# Patient Record
Sex: FEMALE | Race: WHITE | NOT HISPANIC OR LATINO | Employment: OTHER | URBAN - METROPOLITAN AREA
[De-identification: names, ages, dates, MRNs, and addresses within clinical notes are randomized per-mention and may not be internally consistent; named-entity substitution may affect disease eponyms.]

---

## 2017-03-16 ENCOUNTER — OFFICE VISIT (OUTPATIENT)
Dept: RETAIL CLINIC | Facility: CLINIC | Age: 77
End: 2017-03-16

## 2017-03-16 VITALS
HEART RATE: 60 BPM | OXYGEN SATURATION: 97 % | SYSTOLIC BLOOD PRESSURE: 124 MMHG | TEMPERATURE: 97.2 F | DIASTOLIC BLOOD PRESSURE: 74 MMHG | RESPIRATION RATE: 16 BRPM

## 2017-03-16 DIAGNOSIS — J40 BRONCHITIS: ICD-10-CM

## 2017-03-16 DIAGNOSIS — J01.00 ACUTE MAXILLARY SINUSITIS, RECURRENCE NOT SPECIFIED: Primary | ICD-10-CM

## 2017-03-16 PROBLEM — N95.1 MENOPAUSAL SYMPTOM: Status: ACTIVE | Noted: 2017-03-16

## 2017-03-16 PROBLEM — R92.8 ABNORMAL MAMMOGRAM: Status: ACTIVE | Noted: 2017-03-16

## 2017-03-16 PROBLEM — J34.9 SINUS PROBLEM: Status: ACTIVE | Noted: 2017-03-16

## 2017-03-16 PROBLEM — R05.9 COUGH: Status: ACTIVE | Noted: 2017-03-16

## 2017-03-16 PROBLEM — R09.89 CHEST CONGESTION: Status: ACTIVE | Noted: 2017-03-16

## 2017-03-16 PROBLEM — L72.3 SEBACEOUS CYST: Status: ACTIVE | Noted: 2017-03-16

## 2017-03-16 PROBLEM — R31.9 HEMATURIA: Status: ACTIVE | Noted: 2017-03-16

## 2017-03-16 PROCEDURE — 99213 OFFICE O/P EST LOW 20 MIN: CPT | Performed by: NURSE PRACTITIONER

## 2017-03-16 RX ORDER — AMOXICILLIN 875 MG/1
875 TABLET, COATED ORAL 2 TIMES DAILY
Qty: 20 TABLET | Refills: 0 | Status: SHIPPED | OUTPATIENT
Start: 2017-03-16 | End: 2017-05-24

## 2017-03-16 RX ORDER — BROMPHENIRAMINE MALEATE, PSEUDOEPHEDRINE HYDROCHLORIDE, AND DEXTROMETHORPHAN HYDROBROMIDE 2; 30; 10 MG/5ML; MG/5ML; MG/5ML
5 SYRUP ORAL 4 TIMES DAILY PRN
Qty: 200 ML | Refills: 0 | Status: SHIPPED | OUTPATIENT
Start: 2017-03-16 | End: 2017-05-22

## 2017-03-16 RX ORDER — PREDNISONE 1 MG/1
TABLET ORAL
Qty: 21 TABLET | Refills: 0 | Status: SHIPPED | OUTPATIENT
Start: 2017-03-16 | End: 2017-05-24

## 2017-03-16 NOTE — PROGRESS NOTES
Subjective   Janet Martinez is a 76 y.o. female.     URI    The current episode started yesterday. The problem has been gradually worsening. Associated symptoms include congestion, coughing, rhinorrhea and sinus pain. Pertinent negatives include no ear pain, headaches, nausea, sore throat or wheezing. Associated symptoms comments: Nasal congestion.   Cough   This is a new problem. The current episode started yesterday. The cough is non-productive. Associated symptoms include rhinorrhea and shortness of breath. Pertinent negatives include no chills, ear pain, fever, headaches, postnasal drip, sore throat or wheezing. Her past medical history is significant for COPD.        The following portions of the patient's history were reviewed and updated as appropriate: allergies, current medications, past family history, past medical history, past social history, past surgical history and problem list.    Review of Systems   Constitutional: Negative for chills, fatigue and fever.   HENT: Positive for congestion and rhinorrhea. Negative for ear pain, postnasal drip and sore throat.         Nasal congestion   Eyes: Negative.    Respiratory: Positive for cough and shortness of breath. Negative for chest tightness and wheezing.         Chest congestion   Cardiovascular: Negative.    Gastrointestinal: Negative.  Negative for nausea.   Neurological: Negative for headaches.       Objective   Physical Exam   Constitutional: She is oriented to person, place, and time. She appears well-developed and well-nourished. No distress.   HENT:   Head: Normocephalic.   Right Ear: Hearing and tympanic membrane normal.   Left Ear: Hearing and tympanic membrane normal.   Nose: Mucosal edema and rhinorrhea present. Right sinus exhibits maxillary sinus tenderness. Right sinus exhibits no frontal sinus tenderness. Left sinus exhibits maxillary sinus tenderness. Left sinus exhibits no frontal sinus tenderness.   Mouth/Throat: Oropharynx is clear and  moist. No posterior oropharyngeal edema or posterior oropharyngeal erythema.   Neck: No JVD present.   Cardiovascular: Normal rate and regular rhythm.    Pulmonary/Chest: Effort normal. No respiratory distress. She has no decreased breath sounds. She has wheezes in the right upper field and the left upper field. She has rhonchi in the right upper field, the right middle field, the left upper field and the left middle field. She has no rales. She exhibits no tenderness.   Neurological: She is oriented to person, place, and time.   Psychiatric: She has a normal mood and affect. Her behavior is normal.   Nursing note and vitals reviewed.      Assessment/Plan   Janet was seen today for uri and sinus problem.    Diagnoses and all orders for this visit:    Acute maxillary sinusitis, recurrence not specified  -     amoxicillin (AMOXIL) 875 MG tablet; Take 1 tablet by mouth 2 (Two) Times a Day.    Bronchitis  -     predniSONE (DELTASONE) 5 MG tablet; 5 mg pack  -     brompheniramine-pseudoephedrine-DM 30-2-10 MG/5ML syrup; Take 5 mL by mouth 4 (Four) Times a Day As Needed for Cough or Allergies.      Talked to the patient about the diagnosis and educate the patient and advise to visit to PCP if the symptoms worsens

## 2017-03-16 NOTE — PATIENT INSTRUCTIONS
Acute Bronchitis  Bronchitis is inflammation of the airways that extend from the windpipe into the lungs (bronchi). The inflammation often causes mucus to develop. This leads to a cough, which is the most common symptom of bronchitis.   In acute bronchitis, the condition usually develops suddenly and goes away over time, usually in a couple weeks. Smoking, allergies, and asthma can make bronchitis worse. Repeated episodes of bronchitis may cause further lung problems.   CAUSES  Acute bronchitis is most often caused by the same virus that causes a cold. The virus can spread from person to person (contagious) through coughing, sneezing, and touching contaminated objects.  SIGNS AND SYMPTOMS   · Cough.    · Fever.    · Coughing up mucus.    · Body aches.    · Chest congestion.    · Chills.    · Shortness of breath.    · Sore throat.    DIAGNOSIS   Acute bronchitis is usually diagnosed through a physical exam. Your health care provider will also ask you questions about your medical history. Tests, such as chest X-rays, are sometimes done to rule out other conditions.   TREATMENT   Acute bronchitis usually goes away in a couple weeks. Oftentimes, no medical treatment is necessary. Medicines are sometimes given for relief of fever or cough. Antibiotic medicines are usually not needed but may be prescribed in certain situations. In some cases, an inhaler may be recommended to help reduce shortness of breath and control the cough. A cool mist vaporizer may also be used to help thin bronchial secretions and make it easier to clear the chest.   HOME CARE INSTRUCTIONS  · Get plenty of rest.    · Drink enough fluids to keep your urine clear or pale yellow (unless you have a medical condition that requires fluid restriction). Increasing fluids may help thin your respiratory secretions (sputum) and reduce chest congestion, and it will prevent dehydration.    · Take medicines only as directed by your health care provider.  · If  you were prescribed an antibiotic medicine, finish it all even if you start to feel better.  · Avoid smoking and secondhand smoke. Exposure to cigarette smoke or irritating chemicals will make bronchitis worse. If you are a smoker, consider using nicotine gum or skin patches to help control withdrawal symptoms. Quitting smoking will help your lungs heal faster.    · Reduce the chances of another bout of acute bronchitis by washing your hands frequently, avoiding people with cold symptoms, and trying not to touch your hands to your mouth, nose, or eyes.    · Keep all follow-up visits as directed by your health care provider.    SEEK MEDICAL CARE IF:  Your symptoms do not improve after 1 week of treatment.   SEEK IMMEDIATE MEDICAL CARE IF:  · You develop an increased fever or chills.    · You have chest pain.    · You have severe shortness of breath.  · You have bloody sputum.    · You develop dehydration.  · You faint or repeatedly feel like you are going to pass out.  · You develop repeated vomiting.  · You develop a severe headache.  MAKE SURE YOU:   · Understand these instructions.  · Will watch your condition.  · Will get help right away if you are not doing well or get worse.     This information is not intended to replace advice given to you by your health care provider. Make sure you discuss any questions you have with your health care provider.     Document Released: 01/25/2006 Document Revised: 01/08/2016 Document Reviewed: 06/10/2014  [employer] Interactive Patient Education ©2016 [employer]    Sinusitis, Adult  Sinusitis is redness, soreness, and inflammation of the paranasal sinuses. Paranasal sinuses are air pockets within the bones of your face. They are located beneath your eyes, in the middle of your forehead, and above your eyes. In healthy paranasal sinuses, mucus is able to drain out, and air is able to circulate through them by way of your nose. However, when your paranasal sinuses are inflamed,  mucus and air can become trapped. This can allow bacteria and other germs to grow and cause infection.  Sinusitis can develop quickly and last only a short time (acute) or continue over a long period (chronic). Sinusitis that lasts for more than 12 weeks is considered chronic.  CAUSES  Causes of sinusitis include:  · Allergies.  · Structural abnormalities, such as displacement of the cartilage that separates your nostrils (deviated septum), which can decrease the air flow through your nose and sinuses and affect sinus drainage.  · Functional abnormalities, such as when the small hairs (cilia) that line your sinuses and help remove mucus do not work properly or are not present.  SIGNS AND SYMPTOMS  Symptoms of acute and chronic sinusitis are the same. The primary symptoms are pain and pressure around the affected sinuses. Other symptoms include:  · Upper toothache.  · Earache.  · Headache.  · Bad breath.  · Decreased sense of smell and taste.  · A cough, which worsens when you are lying flat.  · Fatigue.  · Fever.  · Thick drainage from your nose, which often is green and may contain pus (purulent).  · Swelling and warmth over the affected sinuses.  DIAGNOSIS  Your health care provider will perform a physical exam. During your exam, your health care provider may perform any of the following to help determine if you have acute sinusitis or chronic sinusitis:  · Look in your nose for signs of abnormal growths in your nostrils (nasal polyps).  · Tap over the affected sinus to check for signs of infection.  · View the inside of your sinuses using an imaging device that has a light attached (endoscope).  If your health care provider suspects that you have chronic sinusitis, one or more of the following tests may be recommended:  · Allergy tests.  · Nasal culture. A sample of mucus is taken from your nose, sent to a lab, and screened for bacteria.  · Nasal cytology. A sample of mucus is taken from your nose and examined by  your health care provider to determine if your sinusitis is related to an allergy.  TREATMENT  Most cases of acute sinusitis are related to a viral infection and will resolve on their own within 10 days. Sometimes, medicines are prescribed to help relieve symptoms of both acute and chronic sinusitis. These may include pain medicines, decongestants, nasal steroid sprays, or saline sprays.  However, for sinusitis related to a bacterial infection, your health care provider will prescribe antibiotic medicines. These are medicines that will help kill the bacteria causing the infection.  Rarely, sinusitis is caused by a fungal infection. In these cases, your health care provider will prescribe antifungal medicine.  For some cases of chronic sinusitis, surgery is needed. Generally, these are cases in which sinusitis recurs more than 3 times per year, despite other treatments.  HOME CARE INSTRUCTIONS  · Drink plenty of water. Water helps thin the mucus so your sinuses can drain more easily.  · Use a humidifier.  · Inhale steam 3-4 times a day (for example, sit in the bathroom with the shower running).  · Apply a warm, moist washcloth to your face 3-4 times a day, or as directed by your health care provider.  · Use saline nasal sprays to help moisten and clean your sinuses.  · Take medicines only as directed by your health care provider.  · If you were prescribed either an antibiotic or antifungal medicine, finish it all even if you start to feel better.  SEEK IMMEDIATE MEDICAL CARE IF:  · You have increasing pain or severe headaches.  · You have nausea, vomiting, or drowsiness.  · You have swelling around your face.  · You have vision problems.  · You have a stiff neck.  · You have difficulty breathing.     This information is not intended to replace advice given to you by your health care provider. Make sure you discuss any questions you have with your health care provider.     Document Released: 12/18/2006 Document  Revised: 01/08/2016 Document Reviewed: 10/12/2016  [employer] Interactive Patient Education ©2016 [employer]  Talked to the patient about the diagnosis and educate the patient and advise to visit to PCP if the symptoms worsens

## 2017-03-21 ENCOUNTER — HOSPITAL ENCOUNTER (OUTPATIENT)
Dept: HOSPITAL 23 - CAMB | Age: 77
Discharge: HOME | End: 2017-03-21
Payer: COMMERCIAL

## 2017-03-21 DIAGNOSIS — S93.129D: ICD-10-CM

## 2017-03-21 DIAGNOSIS — J44.9: ICD-10-CM

## 2017-03-21 DIAGNOSIS — S92.811K: ICD-10-CM

## 2017-03-21 DIAGNOSIS — M06.871: ICD-10-CM

## 2017-03-21 DIAGNOSIS — Z01.818: Primary | ICD-10-CM

## 2017-03-21 LAB
BLOOD UREA NITROGEN: 12 MG/DL (ref 9–23)
BUN/CREATININE RATIO: 17.14
CALCIUM SERUM: 9 MG/DL (ref 8.4–10.2)
CREATININE SERUM: 0.7 MG/DL (ref 0.6–1.4)
GLOM FILT RATE ESTIMATED: (no result) ML/MIN (ref 60–?)
GLUCOSE FASTING: 98 MG/DL (ref 70–110)
KETONES UR QL: 29 MMOL/L (ref 22–31)
KETONES UR QL: 97 MMOL/L (ref 100–111)
POTASSIUM: 3.3 MMOL/L (ref 3.5–5.1)
SODIUM: 133 MMOL/L (ref 135–145)

## 2017-03-21 RX ORDER — DULOXETIN HYDROCHLORIDE 60 MG/1
CAPSULE, DELAYED RELEASE ORAL
Qty: 90 CAPSULE | Refills: 1 | Status: SHIPPED | OUTPATIENT
Start: 2017-03-21 | End: 2017-06-30 | Stop reason: SDUPTHER

## 2017-03-31 ENCOUNTER — OFFICE VISIT (OUTPATIENT)
Dept: INTERNAL MEDICINE | Facility: CLINIC | Age: 77
End: 2017-03-31

## 2017-03-31 VITALS
BODY MASS INDEX: 23 KG/M2 | HEART RATE: 63 BPM | SYSTOLIC BLOOD PRESSURE: 129 MMHG | WEIGHT: 134 LBS | DIASTOLIC BLOOD PRESSURE: 70 MMHG | OXYGEN SATURATION: 98 %

## 2017-03-31 DIAGNOSIS — J44.9 CHRONIC OBSTRUCTIVE PULMONARY DISEASE, UNSPECIFIED COPD TYPE (HCC): ICD-10-CM

## 2017-03-31 DIAGNOSIS — I10 ESSENTIAL HYPERTENSION: Primary | ICD-10-CM

## 2017-03-31 DIAGNOSIS — E87.6 HYPOKALEMIA: ICD-10-CM

## 2017-03-31 DIAGNOSIS — M79.671 RIGHT FOOT PAIN: ICD-10-CM

## 2017-03-31 PROBLEM — R09.89 CHEST CONGESTION: Status: RESOLVED | Noted: 2017-03-16 | Resolved: 2017-03-31

## 2017-03-31 PROBLEM — R05.9 COUGH: Status: RESOLVED | Noted: 2017-03-16 | Resolved: 2017-03-31

## 2017-03-31 PROBLEM — J34.9 SINUS PROBLEM: Status: RESOLVED | Noted: 2017-03-16 | Resolved: 2017-03-31

## 2017-03-31 PROCEDURE — 99214 OFFICE O/P EST MOD 30 MIN: CPT | Performed by: INTERNAL MEDICINE

## 2017-03-31 NOTE — PROGRESS NOTES
Chief Complaint   Patient presents with   • Surgical Clearance       History of Present Illness   Janet Martinez is a 76 y.o. female presents for preoperative evaluation. She is doing well today. She has hypertension. Taking diuretic and other meds. On potassium daily. Noted to be low in November and advised to increase to 2 tab daily and retest in 1 month. She did not increase tabs or retest as advised. preop labs reveal continued hypokalemia.   Has COPD. She went to pulmonary MD and was advised to use daily flonase for PND. She was advised she could proceed w/ her surgery. She has good exercise tolerance. She had TKR 1 year ago and tolerated this well last year w/out complications.       The following portions of the patient's history were reviewed and updated as appropriate: allergies, current medications, past family history, past medical history, past social history, past surgical history and problem list.  Current Outpatient Prescriptions on File Prior to Visit   Medication Sig Dispense Refill   • Amlodipine-Valsartan-HCTZ -25 MG tablet Take 1 tablet by mouth daily. 90 tablet 2   • atorvastatin (LIPITOR) 20 MG tablet Take 1 tablet by mouth daily. 90 tablet 2   • brompheniramine-pseudoephedrine-DM 30-2-10 MG/5ML syrup Take 5 mL by mouth 4 (Four) Times a Day As Needed for Cough or Allergies. 200 mL 0   • buPROPion XL (WELLBUTRIN XL) 150 MG 24 hr tablet TAKE 1 TABLET BY MOUTH DAILY 90 tablet 1   • Cholecalciferol (VITAMIN D3) 2000 UNITS capsule Take 1 capsule by mouth Daily. 30 capsule 11   • diclofenac (VOLTAREN) 1 % gel gel Apply 4 g topically 4 (Four) Times a Day As Needed (knee  pain). Apply dime size amount to left jenny 5 tube 3   • DULoxetine (CYMBALTA) 60 MG capsule TAKE 1 CAPSULE BY MOUTH DAILY. 90 capsule 1   • etanercept (ENBREL) 50 MG/ML solution prefilled syringe injection Inject 50 mg under the skin 1 (one) time per week. THURSDAYS     • folic acid (FOLVITE) 1 MG tablet Take 1 tablet by mouth  daily. 90 tablet 1   • omeprazole (PriLOSEC) 40 MG capsule TK ONE C PO D  3   • potassium chloride (K-DUR,KLOR-CON) 20 MEQ CR tablet Take 1 tablet by mouth 2 (Two) Times a Day. 30 tablet 6   • vitamin D (ERGOCALCIFEROL) 55849 UNITS capsule capsule Take 1 capsule by mouth 1 (One) Time Per Week.  0   • amoxicillin (AMOXIL) 875 MG tablet Take 1 tablet by mouth 2 (Two) Times a Day. 20 tablet 0   • predniSONE (DELTASONE) 5 MG tablet 5 mg pack 21 tablet 0     No current facility-administered medications on file prior to visit.      Review of Systems   Constitutional: Negative.    HENT: Negative.    Eyes: Negative.    Respiratory:        Mild cough  Reports related to seasonal allergies   Cardiovascular: Negative.    Gastrointestinal: Negative.    Genitourinary: Negative.    Musculoskeletal: Positive for arthralgias.        Ankle pain   Skin: Negative.    Allergic/Immunologic: Negative.    Neurological: Negative.    Hematological: Negative.    Psychiatric/Behavioral: Negative.        Objective   Physical Exam   Constitutional: She is oriented to person, place, and time. She appears well-developed and well-nourished.   HENT:   Head: Normocephalic and atraumatic.   Right Ear: External ear normal.   Left Ear: External ear normal.   Nose: Nose normal.   Mouth/Throat: Oropharynx is clear and moist.   Eyes: EOM are normal. Pupils are equal, round, and reactive to light.   Neck: Normal range of motion. Neck supple.   Cardiovascular: Normal rate, regular rhythm and normal heart sounds.    Pulmonary/Chest: Effort normal and breath sounds normal. No respiratory distress.   Abdominal: Soft. Bowel sounds are normal.   Musculoskeletal:   OA changes hands and foot pain   Neurological: She is alert and oriented to person, place, and time.   Skin: Skin is warm and dry.   Psychiatric: She has a normal mood and affect. Her behavior is normal. Judgment and thought content normal.   Nursing note and vitals reviewed.       /70  Pulse 63   Wt 134 lb (60.8 kg)  SpO2 98%  BMI 23 kg/m2    Assessment/Plan   Diagnoses and all orders for this visit:    Essential hypertension    Hypokalemia  -     Basic Metabolic Panel; Future  -     Magnesium; Future    Chronic obstructive pulmonary disease, unspecified COPD type    Right foot pain    Patient preoperative evaluation. She is doing well today. She has hypokalemia. Advised again to take 2 tablets daily. Gave patient written instructions for same. Will continue current bp med. May consider d/c hctz in the future but not at this time. She had a preoperative eval by pulm md and per patient this is wnl. She will f/u pulmonary routinely. Routine dvt prophylaxis and weight bearing restrictions per surgeon. Repeat bmp w/ mg in 1 mo. F/u in office 3 mo or prn.

## 2017-04-04 ENCOUNTER — HOSPITAL ENCOUNTER (INPATIENT)
Dept: HOSPITAL 23 - CSUR | Age: 77
LOS: 4 days | Discharge: HOME HEALTH | DRG: 503 | End: 2017-04-08
Admitting: ORTHOPAEDIC SURGERY
Payer: COMMERCIAL

## 2017-04-04 DIAGNOSIS — R33.9: ICD-10-CM

## 2017-04-04 DIAGNOSIS — E83.42: ICD-10-CM

## 2017-04-04 DIAGNOSIS — M20.5X1: ICD-10-CM

## 2017-04-04 DIAGNOSIS — J98.11: ICD-10-CM

## 2017-04-04 DIAGNOSIS — J44.1: ICD-10-CM

## 2017-04-04 DIAGNOSIS — Z99.81: ICD-10-CM

## 2017-04-04 DIAGNOSIS — M21.41: ICD-10-CM

## 2017-04-04 DIAGNOSIS — N99.89: ICD-10-CM

## 2017-04-04 DIAGNOSIS — I10: ICD-10-CM

## 2017-04-04 DIAGNOSIS — S92.311A: ICD-10-CM

## 2017-04-04 DIAGNOSIS — E78.5: ICD-10-CM

## 2017-04-04 DIAGNOSIS — E87.1: ICD-10-CM

## 2017-04-04 DIAGNOSIS — K21.9: ICD-10-CM

## 2017-04-04 DIAGNOSIS — E87.6: ICD-10-CM

## 2017-04-04 DIAGNOSIS — M06.9: Primary | ICD-10-CM

## 2017-04-04 DIAGNOSIS — E78.00: ICD-10-CM

## 2017-04-04 DIAGNOSIS — J96.21: ICD-10-CM

## 2017-04-04 PROCEDURE — 0SGM07Z FUSION OF RIGHT METATARSAL-PHALANGEAL JOINT WITH AUTOLOGOUS TISSUE SUBSTITUTE, OPEN APPROACH: ICD-10-PCS | Performed by: ORTHOPAEDIC SURGERY

## 2017-04-04 PROCEDURE — 0SGP04Z FUSION OF RIGHT TOE PHALANGEAL JOINT WITH INTERNAL FIXATION DEVICE, OPEN APPROACH: ICD-10-PCS | Performed by: ORTHOPAEDIC SURGERY

## 2017-04-04 PROCEDURE — C1713 ANCHOR/SCREW BN/BN,TIS/BN: HCPCS

## 2017-04-04 PROCEDURE — 0SGH04Z FUSION OF RIGHT TARSAL JOINT WITH INTERNAL FIXATION DEVICE, OPEN APPROACH: ICD-10-PCS | Performed by: ORTHOPAEDIC SURGERY

## 2017-04-04 PROCEDURE — 0QBN0ZZ EXCISION OF RIGHT METATARSAL, OPEN APPROACH: ICD-10-PCS | Performed by: ORTHOPAEDIC SURGERY

## 2017-04-04 PROCEDURE — 0SPH04Z REMOVAL OF INTERNAL FIXATION DEVICE FROM RIGHT TARSAL JOINT, OPEN APPROACH: ICD-10-PCS | Performed by: ORTHOPAEDIC SURGERY

## 2017-04-04 PROCEDURE — G0238 OTH RESP PROC, INDIV: HCPCS

## 2017-04-05 LAB
%MB: 2.6 %
BLOOD UREA NITROGEN: 10 MG/DL
BUN/CREATININE RATIO: 16.66
CALCIUM SERUM: 7.9 MG/DL
CK MB SERPL-RTO: 13.6 %
CK MB SERPL-RTO: 32.8 G/DL
CK TOTAL: 204 IU/L
CREATININE SERUM: 0.6 MG/DL
GLOM FILT RATE ESTIMATED: 89.8 ML/MIN
GLUCOSE FASTING: 144 MG/DL
HEMATOCRIT: 32.4 %
HEMATOCRIT: 32.6 %
HEMOGLOBIN: 10.7 GM/DL
HEMOGLOBIN: 10.7 GM/DL
KETONES UR QL: 104 MMOL/L
KETONES UR QL: 26 MMOL/L
MB: 5.3 NG/ML
MEAN CELL VOLUME: 85.9 FL
MEAN CORPUSCULAR HEMOGLOBIN: 28.2 PG
MEAN PLATELET VOLUME: 8.6 FL
PLATELET COUNT: 227 X10E3
POTASSIUM: 3.1 MMOL/L
RED BLOOD COUNT: 3.79 X10E
SODIUM: 137 MMOL/L
WHITE BLOOD COUNT: 10.4 X10E3

## 2017-04-06 LAB
ARTERIAL BLOOD GAS ALLEN TEST: NORMAL
ARTERIAL BLOOD GAS ART SITE: (no result)
ARTERIAL BLOOD GAS DELIVERY: (no result)
ARTERIAL BLOOD GAS LITER FLOW: 5
BLOOD UREA NITROGEN: 10 MG/DL
BUN/CREATININE RATIO: 16.66
CALCIUM SERUM: 8.2 MG/DL
CK MB SERPL-RTO: 13.1 %
CK MB SERPL-RTO: 33.5 G/DL
CREATININE SERUM: 0.6 MG/DL
GENTAMICIN SERPL-MCNC: 1 %SAT
GENTAMICIN SERPL-MCNC: YES MG/L
GENTAMICIN TROUGH SERPL-MCNC: 0.7 %SAT
GENTAMICIN TROUGH SERPL-MCNC: 47.7 MMHG
GLOM FILT RATE ESTIMATED: 89.8 ML/MIN
GLUCOSE FASTING: 134 MG/DL
HCO3 BLDA-SCNC: 27.7 MMOL/L
HEMATOCRIT: 28.2 %
HEMOGLOBIN: 9.5 GM/DL
KETONES UR QL: 100 MMOL/L
KETONES UR QL: 26 MMOL/L
MAGNESIUM: 1.4 MG/DL
MEAN CELL VOLUME: 85 FL
MEAN CORPUSCULAR HEMOGLOBIN: 28.5 PG
MEAN PLATELET VOLUME: 9.6 FL
PLATELET COUNT: 205 X10E3
PO2 BLDA: 61.6 MMHG
POTASSIUM: 3.2 MMOL/L
RED BLOOD COUNT: 3.32 X10E
SAO2 % BLDA: 90.1 %
SODIUM: 133 MMOL/L
WHITE BLOOD COUNT: 11.6 X10E3

## 2017-04-07 LAB
BLOOD UREA NITROGEN: 11 MG/DL
BUN/CREATININE RATIO: 22
CALCIUM SERUM: 8.4 MG/DL
CREATININE SERUM: 0.5 MG/DL
GLOM FILT RATE ESTIMATED: 95.4 ML/MIN
GLUCOSE FASTING: 177 MG/DL
KETONES UR QL: 100 MMOL/L
KETONES UR QL: 25 MMOL/L
MAGNESIUM: 1.9 MG/DL
POTASSIUM: 4.1 MMOL/L
SODIUM: 132 MMOL/L

## 2017-04-24 RX ORDER — BUPROPION HYDROCHLORIDE 150 MG/1
TABLET ORAL
Qty: 240 TABLET | Refills: 0 | Status: SHIPPED | OUTPATIENT
Start: 2017-04-24 | End: 2017-05-22

## 2017-04-25 ENCOUNTER — RESULTS ENCOUNTER (OUTPATIENT)
Dept: INTERNAL MEDICINE | Facility: CLINIC | Age: 77
End: 2017-04-25

## 2017-04-25 DIAGNOSIS — E87.6 HYPOKALEMIA: ICD-10-CM

## 2017-05-19 RX ORDER — BUPROPION HYDROCHLORIDE 150 MG/1
TABLET ORAL
Qty: 90 TABLET | Refills: 1 | Status: SHIPPED | OUTPATIENT
Start: 2017-05-19 | End: 2017-05-22

## 2017-05-22 RX ORDER — BUPROPION HYDROCHLORIDE 150 MG/1
150 TABLET ORAL EVERY MORNING
Qty: 90 TABLET | Refills: 1
Start: 2017-05-22 | End: 2018-07-30 | Stop reason: SDUPTHER

## 2017-05-24 ENCOUNTER — OFFICE VISIT (OUTPATIENT)
Dept: ORTHOPEDIC SURGERY | Facility: CLINIC | Age: 77
End: 2017-05-24

## 2017-05-24 VITALS — WEIGHT: 132 LBS | TEMPERATURE: 97.6 F | BODY MASS INDEX: 22.53 KG/M2 | HEIGHT: 64 IN

## 2017-05-24 DIAGNOSIS — Z96.652 HISTORY OF TOTAL KNEE ARTHROPLASTY, LEFT: Primary | ICD-10-CM

## 2017-05-24 PROCEDURE — 73560 X-RAY EXAM OF KNEE 1 OR 2: CPT | Performed by: ORTHOPAEDIC SURGERY

## 2017-05-24 PROCEDURE — 99212 OFFICE O/P EST SF 10 MIN: CPT | Performed by: ORTHOPAEDIC SURGERY

## 2017-06-06 RX ORDER — ATORVASTATIN CALCIUM 20 MG/1
TABLET, FILM COATED ORAL
Qty: 90 TABLET | Refills: 2 | Status: SHIPPED | OUTPATIENT
Start: 2017-06-06 | End: 2017-07-10 | Stop reason: SDUPTHER

## 2017-06-06 RX ORDER — AMLODIPINE, VALSARTAN AND HYDROCHLOROTHIAZIDE 10; 320; 25 MG/1; MG/1; MG/1
TABLET ORAL
Qty: 90 TABLET | Refills: 2 | Status: SHIPPED | OUTPATIENT
Start: 2017-06-06 | End: 2017-07-10 | Stop reason: SDUPTHER

## 2017-06-09 ENCOUNTER — TELEPHONE (OUTPATIENT)
Dept: INTERNAL MEDICINE | Facility: CLINIC | Age: 77
End: 2017-06-09

## 2017-06-19 ENCOUNTER — OFFICE VISIT (OUTPATIENT)
Dept: INTERNAL MEDICINE | Facility: CLINIC | Age: 77
End: 2017-06-19

## 2017-06-19 VITALS — SYSTOLIC BLOOD PRESSURE: 124 MMHG | DIASTOLIC BLOOD PRESSURE: 62 MMHG | HEART RATE: 69 BPM | OXYGEN SATURATION: 98 %

## 2017-06-19 DIAGNOSIS — N95.1 MENOPAUSAL SYMPTOMS: ICD-10-CM

## 2017-06-19 DIAGNOSIS — J06.9 UPPER RESPIRATORY TRACT INFECTION, UNSPECIFIED TYPE: Primary | ICD-10-CM

## 2017-06-19 DIAGNOSIS — I10 ESSENTIAL HYPERTENSION: ICD-10-CM

## 2017-06-19 DIAGNOSIS — M19.071 PRIMARY OSTEOARTHRITIS OF RIGHT FOOT: ICD-10-CM

## 2017-06-19 PROCEDURE — 99214 OFFICE O/P EST MOD 30 MIN: CPT | Performed by: INTERNAL MEDICINE

## 2017-06-19 RX ORDER — AMOXICILLIN 875 MG/1
875 TABLET, COATED ORAL 2 TIMES DAILY
Qty: 20 TABLET | Refills: 0 | Status: SHIPPED | OUTPATIENT
Start: 2017-06-19 | End: 2017-07-10

## 2017-06-19 NOTE — PROGRESS NOTES
"Chief Complaint   Patient presents with   • Sinusitis   foot pain  Vaginal dryness.     History of Present Illness   Janet Martinez is a 77 y.o. female presents for acute care. She has a 1 week history of sinus drainage and congestion. She started mucinex w/ some benefit. Also using flonase \"whenever it plugs up\".   Has vaginal dryness. She has been using a estrogen/ progesterone combination in the past. Last usage was approx 1 year ago. She is now having discomfort w/ intercourse.   She has a recent foot surgery. Unfortunately injured her great toe w/ a fall about 1 days ago.   Has hypertension. Well managed w/ current meds.         The following portions of the patient's history were reviewed and updated as appropriate: allergies, current medications, past family history, past medical history, past social history, past surgical history and problem list.  Current Outpatient Prescriptions on File Prior to Visit   Medication Sig Dispense Refill   • Amlodipine-Valsartan-HCTZ -25 MG tablet TAKE 1 TABLET BY MOUTH DAILY. 90 tablet 2   • atorvastatin (LIPITOR) 20 MG tablet TAKE 1 TABLET BY MOUTH DAILY. 90 tablet 2   • buPROPion XL (WELLBUTRIN XL) 150 MG 24 hr tablet Take 1 tablet by mouth Every Morning. 90 tablet 1   • DULoxetine (CYMBALTA) 60 MG capsule TAKE 1 CAPSULE BY MOUTH DAILY. 90 capsule 1   • etanercept (ENBREL) 50 MG/ML solution prefilled syringe injection Inject 50 mg under the skin 1 (one) time per week. THURSDAYS     • folic acid (FOLVITE) 1 MG tablet Take 1 tablet by mouth daily. 90 tablet 1   • omeprazole (PriLOSEC) 40 MG capsule TK ONE C PO D  3     No current facility-administered medications on file prior to visit.      Review of Systems   Constitutional: Negative.    HENT: Positive for rhinorrhea, sinus pressure and sore throat.    Eyes: Negative.    Respiratory: Negative.    Cardiovascular: Negative.    Gastrointestinal: Negative.    Endocrine: Negative.    Genitourinary: Negative.  "   Musculoskeletal: Negative.    Skin: Negative.    Allergic/Immunologic: Negative.    Neurological: Negative.    Hematological: Negative.    Psychiatric/Behavioral: Negative.        Objective   Physical Exam   Constitutional: She is oriented to person, place, and time. She appears well-developed and well-nourished.   HENT:   Head: Normocephalic and atraumatic.   Right Ear: External ear normal.   Left Ear: External ear normal.   Nose: Nose normal.   Mouth/Throat: Oropharynx is clear and moist.   Sinus bogginess   Eyes: Conjunctivae and EOM are normal. Pupils are equal, round, and reactive to light.   Neck: Normal range of motion. Neck supple.   Cardiovascular: Normal rate, regular rhythm, normal heart sounds and intact distal pulses.    Pulmonary/Chest: Effort normal and breath sounds normal.   Abdominal: Soft. Bowel sounds are normal.   Musculoskeletal: Normal range of motion.   Neurological: She is alert and oriented to person, place, and time. She has normal reflexes.   Skin: Skin is warm and dry.   Diffuse cracking/ fungal changes of foot in cast.    Psychiatric: She has a normal mood and affect. Her behavior is normal. Judgment and thought content normal.   Nursing note and vitals reviewed.       /62  Pulse 69  SpO2 98%    Assessment/Plan   Diagnoses and all orders for this visit:    Upper respiratory tract infection, unspecified type    Primary osteoarthritis of right foot    Menopausal symptoms    Essential hypertension    Other orders  -     amoxicillin (AMOXIL) 875 MG tablet; Take 1 tablet by mouth 2 (Two) Times a Day.      Patient w/ uri/ possible sinusitis. Will avoid steroid as she is still recovering from ankle/ foot surgery. She will try nasal saline mist. Discarded neosynephrine and advised against that product. She has hypertension and bp is normotensive on meds and will continue these. She had previous topical hrt for menopausal symptoms. Have discussed w/ patient potential side effects of  this product (blood clot, breast cancer, etc). Advised she needs mammo and pelvic for hrt from this office. She is to schedule this. In the interim she is advised to try replens daily. She is only having vaginal symptoms so would use a vaginal only preparation if needs a hormone. Some fungal changes on the foot. May try lotrimin ultra for this. To f/u w/ ortho regarding foot trauma and she has an appointment later this week.

## 2017-06-30 RX ORDER — DULOXETIN HYDROCHLORIDE 60 MG/1
CAPSULE, DELAYED RELEASE ORAL
Qty: 90 CAPSULE | Refills: 0 | Status: SHIPPED | OUTPATIENT
Start: 2017-06-30 | End: 2017-09-25 | Stop reason: SDUPTHER

## 2017-07-10 ENCOUNTER — OFFICE VISIT (OUTPATIENT)
Dept: INTERNAL MEDICINE | Facility: CLINIC | Age: 77
End: 2017-07-10

## 2017-07-10 VITALS
DIASTOLIC BLOOD PRESSURE: 64 MMHG | WEIGHT: 136 LBS | SYSTOLIC BLOOD PRESSURE: 116 MMHG | OXYGEN SATURATION: 98 % | BODY MASS INDEX: 23.34 KG/M2 | HEART RATE: 67 BPM

## 2017-07-10 DIAGNOSIS — J44.9 CHRONIC OBSTRUCTIVE PULMONARY DISEASE, UNSPECIFIED COPD TYPE (HCC): ICD-10-CM

## 2017-07-10 DIAGNOSIS — Z12.39 ENCOUNTER FOR BREAST CANCER SCREENING OTHER THAN MAMMOGRAM: ICD-10-CM

## 2017-07-10 DIAGNOSIS — I10 ESSENTIAL HYPERTENSION: ICD-10-CM

## 2017-07-10 DIAGNOSIS — M81.0 OSTEOPOROSIS: ICD-10-CM

## 2017-07-10 DIAGNOSIS — R73.01 IMPAIRED FASTING GLUCOSE: ICD-10-CM

## 2017-07-10 DIAGNOSIS — M06.9 RHEUMATOID ARTHRITIS OF HAND, UNSPECIFIED LATERALITY, UNSPECIFIED RHEUMATOID FACTOR PRESENCE: ICD-10-CM

## 2017-07-10 DIAGNOSIS — Z00.00 HEALTHCARE MAINTENANCE: ICD-10-CM

## 2017-07-10 DIAGNOSIS — K63.5 COLON POLYP: Primary | ICD-10-CM

## 2017-07-10 DIAGNOSIS — E78.5 HYPERLIPIDEMIA, UNSPECIFIED HYPERLIPIDEMIA TYPE: ICD-10-CM

## 2017-07-10 DIAGNOSIS — Z12.11 COLON CANCER SCREENING: ICD-10-CM

## 2017-07-10 DIAGNOSIS — Z12.31 ENCOUNTER FOR SCREENING MAMMOGRAM FOR MALIGNANT NEOPLASM OF BREAST: ICD-10-CM

## 2017-07-10 DIAGNOSIS — Z12.4 ENCOUNTER FOR SCREENING FOR CERVICAL CANCER: ICD-10-CM

## 2017-07-10 PROCEDURE — 99214 OFFICE O/P EST MOD 30 MIN: CPT | Performed by: INTERNAL MEDICINE

## 2017-07-10 PROCEDURE — G0439 PPPS, SUBSEQ VISIT: HCPCS | Performed by: INTERNAL MEDICINE

## 2017-07-10 RX ORDER — ATORVASTATIN CALCIUM 20 MG/1
20 TABLET, FILM COATED ORAL DAILY
Qty: 90 TABLET | Refills: 2 | Status: SHIPPED | OUTPATIENT
Start: 2017-07-10 | End: 2018-08-28 | Stop reason: SDUPTHER

## 2017-07-10 RX ORDER — AMLODIPINE, VALSARTAN AND HYDROCHLOROTHIAZIDE 10; 320; 25 MG/1; MG/1; MG/1
10 TABLET ORAL DAILY
Qty: 90 TABLET | Refills: 2 | Status: SHIPPED | OUTPATIENT
Start: 2017-07-10 | End: 2018-01-08 | Stop reason: HOSPADM

## 2017-07-10 NOTE — PROGRESS NOTES
Subjective   AWV, RA, htn, hyperlipidemia, COPD, osteoporosis    Janet Martinez is a 77 y.o. female who presents for an annual wellness visit. She is doing well today. She had recent foot and ankle surgery and has been non weight bearing for 3 months. She is healing well from this. Has a h/o depression and is doing well w/ mood on current meds. Has hypertension and bp is normotensive- especially as she has been sedentary. She has well regulated lipids on lipitor. She has RA and this is stable w/ current meds. Has O2 in the evening and breathing well w/ this. Following routinely w/ pulm has osteoporosis and is switching from reclast to prolia. She is scheduled for a DEXA through her rheumatologist. Mild ifg in the past. Reports a healthy diet at this time.           Review of Systems   Constitutional: Negative.    HENT: Negative.    Eyes: Negative.    Respiratory: Negative.    Cardiovascular: Negative.    Gastrointestinal: Negative.    Endocrine: Negative.    Genitourinary: Negative.    Musculoskeletal: Positive for arthralgias.        S/p foot/ ankle surgery. Healing well. Pain if weight bears. Has boot.    Skin: Negative.    Allergic/Immunologic: Negative.    Neurological: Negative.    Hematological: Negative.    Psychiatric/Behavioral: Negative.        The following portions of the patient's history were reviewed and updated as appropriate: allergies, current medications, past family history, past medical history, past social history, past surgical history and problem list.     Patient Active Problem List   Diagnosis   • Arthritis of knee   • Anxiety   • Chronic obstructive pulmonary disease   • Hyperlipidemia   • Hypertension   • Impaired fasting glucose   • Knee pain   • Osteoporosis   • Rheumatoid arthritis   • Arthritis of knee, left   • Status post total left knee replacement   • History of total knee arthroplasty   • Chronic obstructive pulmonary disease with acute exacerbation   • Abnormal mammogram   •  Hematuria   • Menopausal symptom   • Sebaceous cyst   • Hypokalemia   • Right foot pain       Past Medical History:   Diagnosis Date   • COPD (chronic obstructive pulmonary disease)    • Depression    • Hiatal hernia    • History of bruising easily    • Hypercholesteremia    • Hypertension    • Joint pain     swelling   • On home oxygen therapy     O2 NC 2.5 LITERS/ NIGHT   • Osteoporosis    • PONV (postoperative nausea and vomiting)    • Rheumatoid arthritis     DIAGNOSIS AGE 23 - FOLLOWED BY RHEUMATOLOGY DR INFANTE, NO PROBLEMS WITH FLEX/ EXTENSION        Past Surgical History:   Procedure Laterality Date   • BREAST BIOPSY Left     BENIGN    • CAROTID ARTERY ANGIOPLASTY Left    • FOOT SURGERY      Bilateral (MULITPLE SURGERIES)   • HAND SURGERY      Bilateral (MULTIPLE SURGERIES)   • KNEE ARTHROSCOPY Left    • LEFT OOPHORECTOMY      AGE 10   • TOTAL KNEE ARTHROPLASTY Left 5/18/2016    Procedure: LT TOTAL KNEE ARTHROPLASTY WITH FELECIA NAVIGATION;  Surgeon: Florencio Frazier MD;  Location: [facility] OR;  Service:        Family History   Problem Relation Age of Onset   • Heart disease Mother    • Hypertension Mother    • Lung disease Mother    • Other Mother        Social History     Social History   • Marital status:      Spouse name: N/A   • Number of children: N/A   • Years of education: N/A     Occupational History   • Not on file.     Social History Main Topics   • Smoking status: Former Smoker     Packs/day: 1.50     Years: 30.00     Types: Cigarettes     Quit date: 1990   • Smokeless tobacco: Never Used   • Alcohol use No   • Drug use: No   • Sexual activity: Not on file     Other Topics Concern   • Not on file     Social History Narrative       Current Outpatient Prescriptions on File Prior to Visit   Medication Sig Dispense Refill   • Amlodipine-Valsartan-HCTZ -25 MG tablet TAKE 1 TABLET BY MOUTH DAILY. 90 tablet 2   • atorvastatin (LIPITOR) 20 MG tablet TAKE 1 TABLET BY MOUTH DAILY. 90 tablet 2    • buPROPion XL (WELLBUTRIN XL) 150 MG 24 hr tablet Take 1 tablet by mouth Every Morning. 90 tablet 1   • DULoxetine (CYMBALTA) 60 MG capsule TAKE 1 CAPSULE BY MOUTH DAILY. 90 capsule 0   • etanercept (ENBREL) 50 MG/ML solution prefilled syringe injection Inject 50 mg under the skin 1 (one) time per week. THURSDAYS     • folic acid (FOLVITE) 1 MG tablet Take 1 tablet by mouth daily. 90 tablet 1   • omeprazole (PriLOSEC) 40 MG capsule TK ONE C PO D  3   • [DISCONTINUED] amoxicillin (AMOXIL) 875 MG tablet Take 1 tablet by mouth 2 (Two) Times a Day. 20 tablet 0     No current facility-administered medications on file prior to visit.        Allergies   Allergen Reactions   • Hydrocodone Irritability     HYPERACTIVITY       Immunization History   Administered Date(s) Administered   • Influenza Split High Dose Preservative Free IM 11/07/2016   • Pneumococcal Conjugate 06/01/2010   • Pneumococcal Conjugate 13-Valent 03/31/2015   • Tdap 01/01/2010       Objective     /64  Pulse 67  Wt 136 lb (61.7 kg)  SpO2 98%  BMI 23.34 kg/m2    Physical Exam   Constitutional: She is oriented to person, place, and time. She appears well-developed and well-nourished.   HENT:   Head: Normocephalic and atraumatic.   Right Ear: External ear normal.   Left Ear: External ear normal.   Nose: Nose normal.   Mouth/Throat: Oropharynx is clear and moist.   Eyes: EOM are normal. Pupils are equal, round, and reactive to light.   Neck: Neck supple.   Cardiovascular: Normal rate, regular rhythm, normal heart sounds and intact distal pulses.    Pulmonary/Chest: Effort normal and breath sounds normal. No respiratory distress.   Abdominal: Soft. Bowel sounds are normal.   Genitourinary: Vagina normal. Rectal exam shows guaiac negative stool. No vaginal discharge found.   Genitourinary Comments: Vaginal dryness   Musculoskeletal: Normal range of motion.   Neurological: She is alert and oriented to person, place, and time.   Skin: Skin is warm and  dry.   Psychiatric: She has a normal mood and affect. Her behavior is normal. Judgment and thought content normal.   Nursing note and vitals reviewed.      Assessment/Plan   Janet was seen today for annual exam.    Diagnoses and all orders for this visit:    Colon polyp  -     Ambulatory Referral For Screening Colonoscopy  -     CBC & Differential    Healthcare maintenance    Colon cancer screening  -     Ambulatory Referral For Screening Colonoscopy    Encounter for screening mammogram for malignant neoplasm of breast   -     Mammo Screening Bilateral With CAD; Future    Encounter for breast cancer screening other than mammogram  -     Mammo Screening Bilateral With CAD; Future    Hyperlipidemia, unspecified hyperlipidemia type  -     CBC & Differential  -     Comprehensive Metabolic Panel  -     Lipid Panel With LDL / HDL Ratio    Essential hypertension  -     Comprehensive Metabolic Panel  -     TSH    Chronic obstructive pulmonary disease, unspecified COPD type    Impaired fasting glucose  -     Comprehensive Metabolic Panel  -     Urinalysis With / Culture If Indicated  -     Hemoglobin A1c    Osteoporosis  -     Vitamin D 25 Hydroxy  -     TSH    Rheumatoid arthritis of hand, unspecified laterality, unspecified rheumatoid factor presence  -     CBC & Differential    Encounter for screening for cervical cancer   -     Pap IG, Rfx HPV ASCU    Other orders  -     Amlodipine-Valsartan-HCTZ -25 MG tablet; Take 10 mg by mouth Daily.  -     atorvastatin (LIPITOR) 20 MG tablet; Take 1 tablet by mouth Daily.        Discussion    AWV.  See scanned forms for andriy history, PHQ-9, functional ability questionnaire, cognitive impairment screening and preventive services check list.  These were all reviewed with the patient and the patient was provided with a copy of the preventative services checklist. She is due for mammogram and cscope and has been referred for these tests. She will continue current meds for bp and  lipids. reclast for osteoporosis. Evening o2 for copd. Low carb diet. Test listed labs. F/u in 6 mo or prn.              No future appointments.

## 2017-07-11 DIAGNOSIS — E87.6 HYPOKALEMIA: Primary | ICD-10-CM

## 2017-07-11 LAB
25(OH)D3+25(OH)D2 SERPL-MCNC: 23.8 NG/ML (ref 30–100)
ALBUMIN SERPL-MCNC: 4.5 G/DL (ref 3.5–5.2)
ALBUMIN/GLOB SERPL: 1.3 G/DL
ALP SERPL-CCNC: 87 U/L (ref 39–117)
ALT SERPL-CCNC: 13 U/L (ref 1–33)
AST SERPL-CCNC: 18 U/L (ref 1–32)
BASOPHILS # BLD AUTO: 0.03 10*3/MM3 (ref 0–0.2)
BASOPHILS NFR BLD AUTO: 0.4 % (ref 0–1.5)
BILIRUB SERPL-MCNC: 0.3 MG/DL (ref 0.1–1.2)
BUN SERPL-MCNC: 9 MG/DL (ref 8–23)
BUN/CREAT SERPL: 12.9 (ref 7–25)
CALCIUM SERPL-MCNC: 9.1 MG/DL (ref 8.6–10.5)
CHLORIDE SERPL-SCNC: 97 MMOL/L (ref 98–107)
CHOLEST SERPL-MCNC: 126 MG/DL (ref 0–200)
CO2 SERPL-SCNC: 27 MMOL/L (ref 22–29)
CREAT SERPL-MCNC: 0.7 MG/DL (ref 0.57–1)
EOSINOPHIL # BLD AUTO: 0.49 10*3/MM3 (ref 0–0.7)
EOSINOPHIL NFR BLD AUTO: 6.1 % (ref 0.3–6.2)
ERYTHROCYTE [DISTWIDTH] IN BLOOD BY AUTOMATED COUNT: 13.9 % (ref 11.7–13)
GLOBULIN SER CALC-MCNC: 3.5 GM/DL
GLUCOSE SERPL-MCNC: 70 MG/DL (ref 65–99)
HBA1C MFR BLD: 5.85 % (ref 4.8–5.6)
HCT VFR BLD AUTO: 35.9 % (ref 35.6–45.5)
HDLC SERPL-MCNC: 43 MG/DL (ref 40–60)
HGB BLD-MCNC: 12 G/DL (ref 11.9–15.5)
IMM GRANULOCYTES # BLD: 0.02 10*3/MM3 (ref 0–0.03)
IMM GRANULOCYTES NFR BLD: 0.3 % (ref 0–0.5)
LDLC SERPL CALC-MCNC: 60 MG/DL (ref 0–100)
LDLC/HDLC SERPL: 1.4 {RATIO}
LYMPHOCYTES # BLD AUTO: 2.1 10*3/MM3 (ref 0.9–4.8)
LYMPHOCYTES NFR BLD AUTO: 26.3 % (ref 19.6–45.3)
MCH RBC QN AUTO: 28.1 PG (ref 26.9–32)
MCHC RBC AUTO-ENTMCNC: 33.4 G/DL (ref 32.4–36.3)
MCV RBC AUTO: 84.1 FL (ref 80.5–98.2)
MONOCYTES # BLD AUTO: 1.16 10*3/MM3 (ref 0.2–1.2)
MONOCYTES NFR BLD AUTO: 14.5 % (ref 5–12)
NEUTROPHILS # BLD AUTO: 4.2 10*3/MM3 (ref 1.9–8.1)
NEUTROPHILS NFR BLD AUTO: 52.4 % (ref 42.7–76)
PLATELET # BLD AUTO: 329 10*3/MM3 (ref 140–500)
POTASSIUM SERPL-SCNC: 3.3 MMOL/L (ref 3.5–5.2)
PROT SERPL-MCNC: 8 G/DL (ref 6–8.5)
RBC # BLD AUTO: 4.27 10*6/MM3 (ref 3.9–5.2)
SODIUM SERPL-SCNC: 140 MMOL/L (ref 136–145)
TRIGL SERPL-MCNC: 114 MG/DL (ref 0–150)
TSH SERPL DL<=0.005 MIU/L-ACNC: 2.96 MIU/ML (ref 0.27–4.2)
UNABLE TO VOID: NORMAL
VLDLC SERPL CALC-MCNC: 22.8 MG/DL (ref 5–40)
WBC # BLD AUTO: 8 10*3/MM3 (ref 4.5–10.7)

## 2017-07-11 RX ORDER — POTASSIUM CHLORIDE 20 MEQ/1
20 TABLET, EXTENDED RELEASE ORAL DAILY
Qty: 30 TABLET | Refills: 4 | Status: SHIPPED | OUTPATIENT
Start: 2017-07-11 | End: 2017-12-01 | Stop reason: SDUPTHER

## 2017-07-12 LAB
CONV .: NORMAL
CYTOLOGIST CVX/VAG CYTO: NORMAL
CYTOLOGY CVX/VAG DOC THIN PREP: NORMAL
DX ICD CODE: NORMAL
HIV 1 & 2 AB SER-IMP: NORMAL
OTHER STN SPEC: NORMAL
PATH REPORT.FINAL DX SPEC: NORMAL
STAT OF ADQ CVX/VAG CYTO-IMP: NORMAL

## 2017-07-17 ENCOUNTER — OFFICE VISIT (OUTPATIENT)
Dept: RETAIL CLINIC | Facility: CLINIC | Age: 77
End: 2017-07-17

## 2017-07-17 VITALS
OXYGEN SATURATION: 96 % | RESPIRATION RATE: 16 BRPM | SYSTOLIC BLOOD PRESSURE: 146 MMHG | HEART RATE: 66 BPM | TEMPERATURE: 98.3 F | DIASTOLIC BLOOD PRESSURE: 70 MMHG

## 2017-07-17 DIAGNOSIS — J01.00 ACUTE MAXILLARY SINUSITIS, RECURRENCE NOT SPECIFIED: Primary | ICD-10-CM

## 2017-07-17 PROCEDURE — 99213 OFFICE O/P EST LOW 20 MIN: CPT | Performed by: NURSE PRACTITIONER

## 2017-07-17 RX ORDER — AMOXICILLIN 875 MG/1
875 TABLET, COATED ORAL 2 TIMES DAILY
Qty: 14 TABLET | Refills: 0 | Status: SHIPPED | OUTPATIENT
Start: 2017-07-17 | End: 2017-07-24

## 2017-07-17 NOTE — PROGRESS NOTES
Subjective   Patient ID: Janet Martinez is a 77 y.o. female presents with   Chief Complaint   Patient presents with   • Sinus Problem       Sinus Problem   This is a new problem. The current episode started 1 to 4 weeks ago. The problem has been gradually worsening since onset. There has been no fever. She is experiencing no pain. Associated symptoms include congestion and sinus pressure (maxillary/nasal pressure mostly at night). Pertinent negatives include no chills, coughing, ear pain, headaches, shortness of breath, sneezing or sore throat. Past treatments include spray decongestants and oral decongestants (mucinex and flonase). The treatment provided no relief.       Allergies   Allergen Reactions   • Hydrocodone Irritability     HYPERACTIVITY       The following portions of the patient's history were reviewed and updated as appropriate: allergies, current medications, past family history, past medical history, past social history, past surgical history and problem list.      Review of Systems   Constitutional: Negative for chills, fatigue and fever.   HENT: Positive for congestion, rhinorrhea (purulent and blood tinged-mostly in the mornings) and sinus pressure (maxillary/nasal pressure mostly at night). Negative for ear pain, postnasal drip, sneezing and sore throat.    Respiratory: Negative for cough, shortness of breath and wheezing.    Cardiovascular: Negative.    Gastrointestinal: Negative for diarrhea, nausea and vomiting.   Neurological: Negative for headaches.       Objective     Vitals:    07/17/17 1815   BP: 146/70   Pulse: 66   Resp: 16   Temp: 98.3 °F (36.8 °C)   SpO2: 96%         Physical Exam   Constitutional: She is oriented to person, place, and time. She appears well-developed and well-nourished. She does not appear ill. No distress.   HENT:   Head: Normocephalic.   Right Ear: Hearing, tympanic membrane, external ear and ear canal normal.   Left Ear: Hearing, tympanic membrane, external ear  and ear canal normal.   Nose: Mucosal edema, rhinorrhea and sinus tenderness present. Right sinus exhibits maxillary sinus tenderness (ethmoidal). Right sinus exhibits no frontal sinus tenderness. Left sinus exhibits maxillary sinus tenderness (ethmoidal). Left sinus exhibits no frontal sinus tenderness.   Mouth/Throat: Oropharynx is clear and moist and mucous membranes are normal. Tonsils are 2+ on the right. Tonsils are 2+ on the left. No tonsillar exudate.   Eyes: Conjunctivae are normal.   Sclera white.   Neck: No tracheal deviation present.   Cardiovascular: Normal rate, regular rhythm, S1 normal, S2 normal and normal heart sounds.    Pulmonary/Chest: Effort normal and breath sounds normal. No accessory muscle usage. No respiratory distress.   Abdominal: Soft. Bowel sounds are normal. There is no tenderness.   Lymphadenopathy:     She has no cervical adenopathy.   Neurological: She is alert and oriented to person, place, and time.   Skin: Skin is warm and dry.   Vitals reviewed.        Janet was seen today for sinus problem.    Diagnoses and all orders for this visit:    Acute maxillary sinusitis, recurrence not specified  -     amoxicillin (AMOXIL) 875 MG tablet; Take 1 tablet by mouth 2 (Two) Times a Day for 7 days.        Follow-up with Primary Care Physician in 48-72 hours if these symptoms worsen or fail to improve as anticipated. Patient verbalizes understanding.

## 2017-07-18 ENCOUNTER — APPOINTMENT (OUTPATIENT)
Dept: MAMMOGRAPHY | Facility: HOSPITAL | Age: 77
End: 2017-07-18

## 2017-07-25 ENCOUNTER — TRANSCRIBE ORDERS (OUTPATIENT)
Dept: ADMINISTRATIVE | Facility: HOSPITAL | Age: 77
End: 2017-07-25

## 2017-07-25 DIAGNOSIS — Z12.31 VISIT FOR SCREENING MAMMOGRAM: Primary | ICD-10-CM

## 2017-07-28 ENCOUNTER — APPOINTMENT (OUTPATIENT)
Dept: MAMMOGRAPHY | Facility: HOSPITAL | Age: 77
End: 2017-07-28

## 2017-08-08 ENCOUNTER — PREP FOR SURGERY (OUTPATIENT)
Dept: OTHER | Facility: HOSPITAL | Age: 77
End: 2017-08-08

## 2017-08-08 DIAGNOSIS — Z86.010 ENCOUNTER FOR COLONOSCOPY DUE TO HISTORY OF ADENOMATOUS COLONIC POLYPS: Primary | ICD-10-CM

## 2017-08-08 DIAGNOSIS — Z12.11 ENCOUNTER FOR COLONOSCOPY DUE TO HISTORY OF ADENOMATOUS COLONIC POLYPS: Primary | ICD-10-CM

## 2017-08-21 RX ORDER — FOLIC ACID 1 MG/1
TABLET ORAL
Qty: 90 TABLET | Refills: 1 | Status: SHIPPED | OUTPATIENT
Start: 2017-08-21 | End: 2018-02-23 | Stop reason: SDUPTHER

## 2017-08-31 PROBLEM — Z86.010 ENCOUNTER FOR COLONOSCOPY DUE TO HISTORY OF ADENOMATOUS COLONIC POLYPS: Status: ACTIVE | Noted: 2017-08-31

## 2017-08-31 PROBLEM — Z12.11 ENCOUNTER FOR COLONOSCOPY DUE TO HISTORY OF ADENOMATOUS COLONIC POLYPS: Status: ACTIVE | Noted: 2017-08-31

## 2017-09-18 ENCOUNTER — TELEPHONE (OUTPATIENT)
Dept: INTERNAL MEDICINE | Facility: CLINIC | Age: 77
End: 2017-09-18

## 2017-09-18 NOTE — TELEPHONE ENCOUNTER
"Schedule patient an office visit to discuss \"sinus issues\" to determine if treatment can be given and does she need imaging prior to ent referral  "

## 2017-09-19 ENCOUNTER — OFFICE VISIT (OUTPATIENT)
Dept: INTERNAL MEDICINE | Facility: CLINIC | Age: 77
End: 2017-09-19

## 2017-09-19 VITALS
WEIGHT: 135 LBS | DIASTOLIC BLOOD PRESSURE: 70 MMHG | OXYGEN SATURATION: 98 % | BODY MASS INDEX: 23.17 KG/M2 | SYSTOLIC BLOOD PRESSURE: 134 MMHG | HEART RATE: 68 BPM

## 2017-09-19 DIAGNOSIS — J44.9 CHRONIC OBSTRUCTIVE PULMONARY DISEASE, UNSPECIFIED COPD TYPE (HCC): ICD-10-CM

## 2017-09-19 DIAGNOSIS — J30.2 SEASONAL ALLERGIC RHINITIS, UNSPECIFIED ALLERGIC RHINITIS TRIGGER: Primary | ICD-10-CM

## 2017-09-19 DIAGNOSIS — R04.0 EPISTAXIS: ICD-10-CM

## 2017-09-19 DIAGNOSIS — H91.93 HEARING IMPAIRMENT, BILATERAL: ICD-10-CM

## 2017-09-19 PROCEDURE — 99214 OFFICE O/P EST MOD 30 MIN: CPT | Performed by: INTERNAL MEDICINE

## 2017-09-19 RX ORDER — PREDNISONE 10 MG/1
10 TABLET ORAL DAILY
Qty: 15 TABLET | Refills: 0 | Status: SHIPPED | OUTPATIENT
Start: 2017-09-19 | End: 2017-10-09

## 2017-09-19 RX ORDER — AZELASTINE 1 MG/ML
2 SPRAY, METERED NASAL 2 TIMES DAILY
Qty: 1 EACH | Refills: 12 | Status: SHIPPED | OUTPATIENT
Start: 2017-09-19 | End: 2017-11-24

## 2017-09-19 RX ORDER — MONTELUKAST SODIUM 10 MG/1
10 TABLET ORAL NIGHTLY
Qty: 30 TABLET | Refills: 9 | Status: ON HOLD | OUTPATIENT
Start: 2017-09-19 | End: 2017-10-30

## 2017-09-19 NOTE — PROGRESS NOTES
"Chief Complaint   Patient presents with   • Allergies   allergic rhinitis    History of Present Illness   Janet Martinez is a 77 y.o. female presents for acute care. She reports several week h/o nasal dryness and irritation. She feels congested in the morning. She will \"blow and blow\" and then tends to clear for the afternoon. She is using a \"hilaria pot\" daily. Also using flonase daily. She has reduced hearing and would like audiology assesment for possible hearing aids as well. Using oxygen hs for copd. She is also using a new inhaler for her lungs.       The following portions of the patient's history were reviewed and updated as appropriate: allergies, current medications, past family history, past medical history, past social history, past surgical history and problem list.  Current Outpatient Prescriptions on File Prior to Visit   Medication Sig Dispense Refill   • Amlodipine-Valsartan-HCTZ -25 MG tablet Take 10 mg by mouth Daily. 90 tablet 2   • atorvastatin (LIPITOR) 20 MG tablet Take 1 tablet by mouth Daily. 90 tablet 2   • buPROPion XL (WELLBUTRIN XL) 150 MG 24 hr tablet Take 1 tablet by mouth Every Morning. 90 tablet 1   • DULoxetine (CYMBALTA) 60 MG capsule TAKE 1 CAPSULE BY MOUTH DAILY. 90 capsule 0   • etanercept (ENBREL) 50 MG/ML solution prefilled syringe injection Inject 50 mg under the skin 1 (one) time per week. THURSDAYS     • folic acid (FOLVITE) 1 MG tablet TAKE 1 TABLET BY MOUTH DAILY. 90 tablet 1   • omeprazole (PriLOSEC) 40 MG capsule TK ONE C PO D  3   • potassium chloride (K-DUR,KLOR-CON) 20 MEQ CR tablet Take 1 tablet by mouth Daily. 30 tablet 4     No current facility-administered medications on file prior to visit.      Review of Systems   Constitutional: Negative.    HENT: Positive for hearing loss, postnasal drip, rhinorrhea, sinus pressure, sneezing and sore throat.    Eyes: Negative.    Respiratory: Negative.    Cardiovascular: Negative.    Gastrointestinal: Negative.  "   Endocrine: Negative.    Genitourinary: Negative.    Musculoskeletal: Negative.    Skin: Negative.    Allergic/Immunologic: Negative.    Neurological: Negative.    Hematological: Negative.    Psychiatric/Behavioral: Negative.        Objective   Physical Exam   Constitutional: She is oriented to person, place, and time. She appears well-developed and well-nourished.   HENT:   Head: Normocephalic and atraumatic.   Right Ear: External ear normal.   Left Ear: External ear normal.   Mouth/Throat: Oropharynx is clear and moist.   Boggy irritated sinus passage   Eyes: EOM are normal. Pupils are equal, round, and reactive to light.   Neck: Neck supple.   Cardiovascular: Normal rate, regular rhythm and normal heart sounds.    Pulmonary/Chest: Effort normal. No respiratory distress. She has wheezes.   Abdominal: Soft.   Musculoskeletal: Normal range of motion.   Neurological: She is alert and oriented to person, place, and time.   Skin: Skin is warm and dry.   Psychiatric: She has a normal mood and affect. Her behavior is normal. Judgment and thought content normal.   Nursing note and vitals reviewed.       /70  Pulse 68  Wt 135 lb (61.2 kg)  SpO2 98%  BMI 23.17 kg/m2    Assessment/Plan   Diagnoses and all orders for this visit:    Seasonal allergic rhinitis, unspecified allergic rhinitis trigger  -     Ambulatory Referral to ENT (Otolaryngology)    Chronic obstructive pulmonary disease, unspecified COPD type    Epistaxis  -     Ambulatory Referral to ENT (Otolaryngology)    Hearing impairment, bilateral  -     Ambulatory Referral to ENT (Otolaryngology)    Other orders  -     montelukast (SINGULAIR) 10 MG tablet; Take 1 tablet by mouth Every Night.  -     azelastine (ASTELIN) 0.1 % nasal spray; 2 sprays into each nostril 2 (Two) Times a Day. Use in each nostril as directed  -     predniSONE (DELTASONE) 10 MG tablet; Take 1 tablet by mouth Daily.      Patient with sinus irritation, epistaxis, decreased hearing.  Will start singulair daily. Start astelin in place of flonase daily. She will start a premixed nasal saline. She will take a short course of prednisone given wheezing and sinus congestion. She will f/u w/ ENT for sinus evaluation as well as hearing testing once sinus congestion has cleared. combivent bid for wheeze/ soa. Continue long acting inhaler routinely.

## 2017-09-25 RX ORDER — DULOXETIN HYDROCHLORIDE 60 MG/1
CAPSULE, DELAYED RELEASE ORAL
Qty: 90 CAPSULE | Refills: 0 | Status: SHIPPED | OUTPATIENT
Start: 2017-09-25 | End: 2017-12-09 | Stop reason: SDUPTHER

## 2017-10-09 ENCOUNTER — OFFICE VISIT (OUTPATIENT)
Dept: RETAIL CLINIC | Facility: CLINIC | Age: 77
End: 2017-10-09

## 2017-10-09 VITALS
RESPIRATION RATE: 18 BRPM | TEMPERATURE: 98.1 F | HEART RATE: 64 BPM | OXYGEN SATURATION: 97 % | SYSTOLIC BLOOD PRESSURE: 124 MMHG | DIASTOLIC BLOOD PRESSURE: 70 MMHG

## 2017-10-09 DIAGNOSIS — J40 BRONCHITIS: Primary | ICD-10-CM

## 2017-10-09 PROCEDURE — 94640 AIRWAY INHALATION TREATMENT: CPT | Performed by: NURSE PRACTITIONER

## 2017-10-09 PROCEDURE — 99213 OFFICE O/P EST LOW 20 MIN: CPT | Performed by: NURSE PRACTITIONER

## 2017-10-09 RX ORDER — BENZONATATE 100 MG/1
CAPSULE ORAL
Qty: 30 CAPSULE | Refills: 0 | Status: SHIPPED | OUTPATIENT
Start: 2017-10-09 | End: 2017-11-06

## 2017-10-09 RX ORDER — PREDNISONE 20 MG/1
TABLET ORAL
Qty: 20 TABLET | Refills: 0 | Status: ON HOLD | OUTPATIENT
Start: 2017-10-09 | End: 2017-10-30

## 2017-10-09 RX ORDER — GUAIFENESIN 600 MG/1
600 TABLET, EXTENDED RELEASE ORAL 2 TIMES DAILY
Qty: 28 TABLET | Refills: 0 | Status: SHIPPED | OUTPATIENT
Start: 2017-10-09 | End: 2017-10-23

## 2017-10-09 RX ORDER — IPRATROPIUM BROMIDE AND ALBUTEROL SULFATE 2.5; .5 MG/3ML; MG/3ML
3 SOLUTION RESPIRATORY (INHALATION) ONCE
Status: COMPLETED | OUTPATIENT
Start: 2017-10-09 | End: 2017-10-11

## 2017-10-09 RX ORDER — IPRATROPIUM BROMIDE AND ALBUTEROL SULFATE 2.5; .5 MG/3ML; MG/3ML
3 SOLUTION RESPIRATORY (INHALATION)
Qty: 25 VIAL | Refills: 0 | Status: ON HOLD | OUTPATIENT
Start: 2017-10-09 | End: 2017-10-30

## 2017-10-09 RX ORDER — GUAIFENESIN 600 MG/1
1200 TABLET, EXTENDED RELEASE ORAL 2 TIMES DAILY
Qty: 28 TABLET | Refills: 0 | Status: SHIPPED | OUTPATIENT
Start: 2017-10-09 | End: 2017-10-09 | Stop reason: SDUPTHER

## 2017-10-09 NOTE — PATIENT INSTRUCTIONS
Acute Bronchitis  Bronchitis is inflammation of the airways that extend from the windpipe into the lungs (bronchi). The inflammation often causes mucus to develop. This leads to a cough, which is the most common symptom of bronchitis.   In acute bronchitis, the condition usually develops suddenly and goes away over time, usually in a couple weeks. Smoking, allergies, and asthma can make bronchitis worse. Repeated episodes of bronchitis may cause further lung problems.   CAUSES  Acute bronchitis is most often caused by the same virus that causes a cold. The virus can spread from person to person (contagious) through coughing, sneezing, and touching contaminated objects.  SIGNS AND SYMPTOMS   · Cough.    · Fever.    · Coughing up mucus.    · Body aches.    · Chest congestion.    · Chills.    · Shortness of breath.    · Sore throat.    DIAGNOSIS   Acute bronchitis is usually diagnosed through a physical exam. Your health care provider will also ask you questions about your medical history. Tests, such as chest X-rays, are sometimes done to rule out other conditions.   TREATMENT   Acute bronchitis usually goes away in a couple weeks. Oftentimes, no medical treatment is necessary. Medicines are sometimes given for relief of fever or cough. Antibiotic medicines are usually not needed but may be prescribed in certain situations. In some cases, an inhaler may be recommended to help reduce shortness of breath and control the cough. A cool mist vaporizer may also be used to help thin bronchial secretions and make it easier to clear the chest.   HOME CARE INSTRUCTIONS  · Get plenty of rest.    · Drink enough fluids to keep your urine clear or pale yellow (unless you have a medical condition that requires fluid restriction). Increasing fluids may help thin your respiratory secretions (sputum) and reduce chest congestion, and it will prevent dehydration.    · Take medicines only as directed by your health care provider.  · If  you were prescribed an antibiotic medicine, finish it all even if you start to feel better.  · Avoid smoking and secondhand smoke. Exposure to cigarette smoke or irritating chemicals will make bronchitis worse. If you are a smoker, consider using nicotine gum or skin patches to help control withdrawal symptoms. Quitting smoking will help your lungs heal faster.    · Reduce the chances of another bout of acute bronchitis by washing your hands frequently, avoiding people with cold symptoms, and trying not to touch your hands to your mouth, nose, or eyes.    · Keep all follow-up visits as directed by your health care provider.    SEEK MEDICAL CARE IF:  Your symptoms do not improve after 1 week of treatment.   SEEK IMMEDIATE MEDICAL CARE IF:  · You develop an increased fever or chills.    · You have chest pain.    · You have severe shortness of breath.  · You have bloody sputum.    · You develop dehydration.  · You faint or repeatedly feel like you are going to pass out.  · You develop repeated vomiting.  · You develop a severe headache.  MAKE SURE YOU:   · Understand these instructions.  · Will watch your condition.  · Will get help right away if you are not doing well or get worse.     This information is not intended to replace advice given to you by your health care provider. Make sure you discuss any questions you have with your health care provider.     Document Released: 01/25/2006 Document Revised: 01/08/2016 Document Reviewed: 06/10/2014  [employer] Patient Education ©2017 [employer]

## 2017-10-09 NOTE — PROGRESS NOTES
Subjective:     Janet Martinez is a 77 y.o.     Cough   The current episode started more than 1 month ago. The problem has been gradually worsening. The cough is non-productive. Associated symptoms include shortness of breath (mild) and wheezing. Pertinent negatives include no chills, ear pain, fever, headaches, myalgias, nasal congestion, postnasal drip, rash, rhinorrhea or sore throat. Associated symptoms comments: Tickle in throat. Treatments tried: mucinex, robitussin.         The following portions of the patient's history were reviewed and updated as appropriate: allergies, current medications, past family history, past medical history, past social history, past surgical history and problem list.      Review of Systems   Constitutional: Negative for chills and fever.   HENT: Positive for sinus pain and sinus pressure. Negative for ear pain, postnasal drip, rhinorrhea and sore throat. Congestion: dry now has been using juju pot.    Respiratory: Positive for cough, shortness of breath (mild) and wheezing.    Gastrointestinal: Negative for diarrhea, nausea and vomiting.   Musculoskeletal: Negative for myalgias.   Skin: Negative for color change, pallor and rash.   Neurological: Negative for dizziness, light-headedness and headaches.         Objective:      Physical Exam   Constitutional: She is oriented to person, place, and time. She appears well-developed and well-nourished. She is cooperative.   HENT:   Head: Normocephalic and atraumatic.   Right Ear: Tympanic membrane and ear canal normal.   Left Ear: Tympanic membrane and ear canal normal.   Nose: Right sinus exhibits maxillary sinus tenderness.   Mouth/Throat: Posterior oropharyngeal erythema (minimal) present. No oropharyngeal exudate.   Eyes: EOM and lids are normal. Pupils are equal, round, and reactive to light.   Neck: Normal range of motion. Neck supple.   Cardiovascular: Normal rate, regular rhythm and normal heart sounds.    Pulmonary/Chest:  Effort normal. She has wheezes. She has rhonchi (some clearing with cough) in the right upper field, the right middle field and the left upper field.   Wheezing gone post nebulizer treatment   Abdominal: Soft. Normal appearance and bowel sounds are normal. There is no tenderness.   Musculoskeletal: Normal range of motion.   Lymphadenopathy:     She has no cervical adenopathy.   Neurological: She is alert and oriented to person, place, and time.   Skin: Skin is warm, dry and intact.   Psychiatric: She has a normal mood and affect. Her speech is normal and behavior is normal. Thought content normal.   Vitals reviewed.          Janet was seen today for uri, nasal congestion and cough.    Diagnoses and all orders for this visit:    Bronchitis  -     ipratropium-albuterol (DUO-NEB) nebulizer solution 3 mL; Take 3 mL by nebulization 1 (One) Time.    Other orders  -     predniSONE (DELTASONE) 20 MG tablet; Prednisone 20mg tabs, 3 for 3 days, 2 for 3 days, 1 for 3 days, 1/2 for 3 days take with food or milk  -     benzonatate (TESSALON PERLES) 100 MG capsule; 1 to 2 capsules 3 times a day  -     Discontinue: guaiFENesin (MUCINEX) 600 MG 12 hr tablet; Take 2 tablets by mouth 2 (Two) Times a Day for 14 days.  -     ipratropium-albuterol (DUO-NEB) 0.5-2.5 mg/mL nebulizer; Take 3 mL by nebulization 4 (Four) Times a Day for 30 days.  -     guaiFENesin (MUCINEX) 600 MG 12 hr tablet; Take 1 tablet by mouth 2 (Two) Times a Day for 14 days.    If symptoms worsen or persist follow up with primary care provider.

## 2017-10-11 RX ADMIN — IPRATROPIUM BROMIDE AND ALBUTEROL SULFATE 3 ML: 2.5; .5 SOLUTION RESPIRATORY (INHALATION) at 13:11

## 2017-10-30 ENCOUNTER — ANESTHESIA EVENT (OUTPATIENT)
Dept: GASTROENTEROLOGY | Facility: HOSPITAL | Age: 77
End: 2017-10-30

## 2017-10-30 ENCOUNTER — ANESTHESIA (OUTPATIENT)
Dept: GASTROENTEROLOGY | Facility: HOSPITAL | Age: 77
End: 2017-10-30

## 2017-10-30 ENCOUNTER — HOSPITAL ENCOUNTER (OUTPATIENT)
Facility: HOSPITAL | Age: 77
Setting detail: HOSPITAL OUTPATIENT SURGERY
Discharge: HOME | End: 2017-10-30
Attending: INTERNAL MEDICINE | Admitting: INTERNAL MEDICINE

## 2017-10-30 VITALS
HEIGHT: 63 IN | DIASTOLIC BLOOD PRESSURE: 76 MMHG | WEIGHT: 132.6 LBS | RESPIRATION RATE: 16 BRPM | HEART RATE: 67 BPM | TEMPERATURE: 98.1 F | OXYGEN SATURATION: 97 % | BODY MASS INDEX: 23.5 KG/M2 | SYSTOLIC BLOOD PRESSURE: 153 MMHG

## 2017-10-30 DIAGNOSIS — Z86.010 ENCOUNTER FOR COLONOSCOPY DUE TO HISTORY OF ADENOMATOUS COLONIC POLYPS: ICD-10-CM

## 2017-10-30 DIAGNOSIS — Z12.11 ENCOUNTER FOR COLONOSCOPY DUE TO HISTORY OF ADENOMATOUS COLONIC POLYPS: ICD-10-CM

## 2017-10-30 PROCEDURE — 88305 TISSUE EXAM BY PATHOLOGIST: CPT | Performed by: INTERNAL MEDICINE

## 2017-10-30 PROCEDURE — S0260 H&P FOR SURGERY: HCPCS | Performed by: INTERNAL MEDICINE

## 2017-10-30 PROCEDURE — 45380 COLONOSCOPY AND BIOPSY: CPT | Performed by: INTERNAL MEDICINE

## 2017-10-30 PROCEDURE — 25010000002 PROPOFOL 10 MG/ML EMULSION: Performed by: ANESTHESIOLOGY

## 2017-10-30 RX ORDER — SODIUM CHLORIDE, SODIUM LACTATE, POTASSIUM CHLORIDE, CALCIUM CHLORIDE 600; 310; 30; 20 MG/100ML; MG/100ML; MG/100ML; MG/100ML
1000 INJECTION, SOLUTION INTRAVENOUS CONTINUOUS PRN
Status: DISCONTINUED | OUTPATIENT
Start: 2017-10-30 | End: 2017-10-30 | Stop reason: HOSPADM

## 2017-10-30 RX ORDER — PROPOFOL 10 MG/ML
VIAL (ML) INTRAVENOUS CONTINUOUS PRN
Status: DISCONTINUED | OUTPATIENT
Start: 2017-10-30 | End: 2017-10-30 | Stop reason: SURG

## 2017-10-30 RX ORDER — PROPOFOL 10 MG/ML
VIAL (ML) INTRAVENOUS AS NEEDED
Status: DISCONTINUED | OUTPATIENT
Start: 2017-10-30 | End: 2017-10-30 | Stop reason: SURG

## 2017-10-30 RX ORDER — LIDOCAINE HYDROCHLORIDE 20 MG/ML
INJECTION, SOLUTION INFILTRATION; PERINEURAL AS NEEDED
Status: DISCONTINUED | OUTPATIENT
Start: 2017-10-30 | End: 2017-10-30 | Stop reason: SURG

## 2017-10-30 RX ADMIN — SODIUM CHLORIDE, POTASSIUM CHLORIDE, SODIUM LACTATE AND CALCIUM CHLORIDE 1000 ML: 600; 310; 30; 20 INJECTION, SOLUTION INTRAVENOUS at 13:15

## 2017-10-30 RX ADMIN — PROPOFOL 50 MCG/KG/MIN: 10 INJECTION, EMULSION INTRAVENOUS at 13:55

## 2017-10-30 RX ADMIN — LIDOCAINE HYDROCHLORIDE 60 MG: 20 INJECTION, SOLUTION INFILTRATION; PERINEURAL at 13:55

## 2017-10-30 RX ADMIN — SODIUM CHLORIDE, POTASSIUM CHLORIDE, SODIUM LACTATE AND CALCIUM CHLORIDE: 600; 310; 30; 20 INJECTION, SOLUTION INTRAVENOUS at 13:50

## 2017-10-30 RX ADMIN — PROPOFOL 100 MG: 10 INJECTION, EMULSION INTRAVENOUS at 13:55

## 2017-10-30 NOTE — ANESTHESIA POSTPROCEDURE EVALUATION
"Patient: Janet Martinez    Procedure Summary     Date Anesthesia Start Anesthesia Stop Room / Location    10/30/17 1352 1424  [facility]       Procedure Diagnosis Surgeon Provider    COLONOSCOPY TO CECUM, TO TERMINAL ILEUM WITH COLD BIOPSY POLYPECTOMY (N/A ) Encounter for colonoscopy due to history of adenomatous colonic polyps  (Encounter for colonoscopy due to history of adenomatous colonic polyps [Z12.11, Z86.010]) MD Aman Glynn MD          Anesthesia Type: MAC  Last vitals  BP   150/72 (10/30/17 1433)   Temp   36.7 °C (98.1 °F) (10/30/17 1423)   Pulse   65 (10/30/17 1433)   Resp   16 (10/30/17 1433)     SpO2   97 % (10/30/17 1433)     Post Anesthesia Care and Evaluation    Patient location during evaluation: PACU  Patient participation: complete - patient participated  Level of consciousness: awake  Pain score: 0  Pain management: adequate  Airway patency: patent  Anesthetic complications: No anesthetic complications  PONV Status: none  Cardiovascular status: acceptable  Respiratory status: acceptable  Hydration status: acceptable    Comments: /72 (BP Location: Left arm, Patient Position: Lying)  Pulse 65  Temp 36.7 °C (98.1 °F) (Oral)   Resp 16  Ht 63\" (160 cm)  Wt 132 lb 9.6 oz (60.1 kg)  SpO2 97%  BMI 23.49 kg/m2      "

## 2017-10-30 NOTE — ANESTHESIA PREPROCEDURE EVALUATION
Anesthesia Evaluation     Patient summary reviewed and Nursing notes reviewed   history of anesthetic complications: PONV         Airway   Mallampati: I  TM distance: <3 FB  Neck ROM: full  no difficulty expected  Dental - normal exam     Pulmonary - normal exam   (+) a smoker Former, COPD,   Cardiovascular - normal exam    (+) hypertension, hyperlipidemia      Neuro/Psych- negative ROS  GI/Hepatic/Renal/Endo    (+)  hiatal hernia,     Musculoskeletal     Abdominal  - normal exam    Bowel sounds: normal.   Substance History - negative use     OB/GYN negative ob/gyn ROS         Other   (+) arthritis                                     Anesthesia Plan    ASA 3     MAC     Anesthetic plan and risks discussed with patient.

## 2017-10-31 LAB
CYTO UR: NORMAL
LAB AP CASE REPORT: NORMAL
Lab: NORMAL
PATH REPORT.FINAL DX SPEC: NORMAL
PATH REPORT.GROSS SPEC: NORMAL

## 2017-11-01 ENCOUNTER — TELEPHONE (OUTPATIENT)
Dept: GASTROENTEROLOGY | Facility: CLINIC | Age: 77
End: 2017-11-01

## 2017-11-01 NOTE — TELEPHONE ENCOUNTER
Called pt and advised per Dr Stanley that one of the polyps removed showed adenomatous change.  This is not cancerous but is considered potentially precancerous and she recommends a repeat c/s in 5 yrs. Pt verb understanding.     C/s placed in recall for 10/30/2022.

## 2017-11-01 NOTE — TELEPHONE ENCOUNTER
One of the polyp(s) biopsies showed adenomatous change. This is not cancerous but is considered potentially precancerous. Follow-up colonoscopy in 5 years is advised.

## 2017-11-04 ENCOUNTER — HOSPITAL ENCOUNTER (EMERGENCY)
Facility: HOSPITAL | Age: 77
Discharge: HOME | End: 2017-11-04
Attending: EMERGENCY MEDICINE | Admitting: EMERGENCY MEDICINE

## 2017-11-04 ENCOUNTER — APPOINTMENT (OUTPATIENT)
Dept: GENERAL RADIOLOGY | Facility: HOSPITAL | Age: 77
End: 2017-11-04

## 2017-11-04 VITALS
HEART RATE: 95 BPM | HEIGHT: 64 IN | SYSTOLIC BLOOD PRESSURE: 142 MMHG | TEMPERATURE: 97.4 F | RESPIRATION RATE: 16 BRPM | WEIGHT: 132 LBS | OXYGEN SATURATION: 96 % | DIASTOLIC BLOOD PRESSURE: 64 MMHG | BODY MASS INDEX: 22.53 KG/M2

## 2017-11-04 DIAGNOSIS — J44.1 COPD EXACERBATION (HCC): ICD-10-CM

## 2017-11-04 DIAGNOSIS — J06.9 UPPER RESPIRATORY TRACT INFECTION, UNSPECIFIED TYPE: Primary | ICD-10-CM

## 2017-11-04 LAB
ALBUMIN SERPL-MCNC: 4.1 G/DL (ref 3.5–5.2)
ALBUMIN/GLOB SERPL: 1.1 G/DL
ALP SERPL-CCNC: 73 U/L (ref 39–117)
ALT SERPL W P-5'-P-CCNC: 16 U/L (ref 1–33)
ANION GAP SERPL CALCULATED.3IONS-SCNC: 14.2 MMOL/L
AST SERPL-CCNC: 20 U/L (ref 1–32)
BASOPHILS # BLD AUTO: 0.03 10*3/MM3 (ref 0–0.2)
BASOPHILS NFR BLD AUTO: 0.4 % (ref 0–1.5)
BILIRUB SERPL-MCNC: 0.3 MG/DL (ref 0.1–1.2)
BUN BLD-MCNC: 8 MG/DL (ref 8–23)
BUN/CREAT SERPL: 15.1 (ref 7–25)
CALCIUM SPEC-SCNC: 9.6 MG/DL (ref 8.6–10.5)
CHLORIDE SERPL-SCNC: 99 MMOL/L (ref 98–107)
CO2 SERPL-SCNC: 26.8 MMOL/L (ref 22–29)
CREAT BLD-MCNC: 0.53 MG/DL (ref 0.57–1)
DEPRECATED RDW RBC AUTO: 46.7 FL (ref 37–54)
EOSINOPHIL # BLD AUTO: 0.72 10*3/MM3 (ref 0–0.7)
EOSINOPHIL NFR BLD AUTO: 9.5 % (ref 0.3–6.2)
ERYTHROCYTE [DISTWIDTH] IN BLOOD BY AUTOMATED COUNT: 14.5 % (ref 11.7–13)
GFR SERPL CREATININE-BSD FRML MDRD: 112 ML/MIN/1.73
GLOBULIN UR ELPH-MCNC: 3.7 GM/DL
GLUCOSE BLD-MCNC: 132 MG/DL (ref 65–99)
HCT VFR BLD AUTO: 38 % (ref 35.6–45.5)
HGB BLD-MCNC: 12.3 G/DL (ref 11.9–15.5)
IMM GRANULOCYTES # BLD: 0 10*3/MM3 (ref 0–0.03)
IMM GRANULOCYTES NFR BLD: 0 % (ref 0–0.5)
LYMPHOCYTES # BLD AUTO: 1.37 10*3/MM3 (ref 0.9–4.8)
LYMPHOCYTES NFR BLD AUTO: 18.2 % (ref 19.6–45.3)
MCH RBC QN AUTO: 28.3 PG (ref 26.9–32)
MCHC RBC AUTO-ENTMCNC: 32.4 G/DL (ref 32.4–36.3)
MCV RBC AUTO: 87.6 FL (ref 80.5–98.2)
MONOCYTES # BLD AUTO: 0.93 10*3/MM3 (ref 0.2–1.2)
MONOCYTES NFR BLD AUTO: 12.3 % (ref 5–12)
NEUTROPHILS # BLD AUTO: 4.49 10*3/MM3 (ref 1.9–8.1)
NEUTROPHILS NFR BLD AUTO: 59.6 % (ref 42.7–76)
PLATELET # BLD AUTO: 296 10*3/MM3 (ref 140–500)
PMV BLD AUTO: 11.3 FL (ref 6–12)
POTASSIUM BLD-SCNC: 3.4 MMOL/L (ref 3.5–5.2)
PROT SERPL-MCNC: 7.8 G/DL (ref 6–8.5)
RBC # BLD AUTO: 4.34 10*6/MM3 (ref 3.9–5.2)
SODIUM BLD-SCNC: 140 MMOL/L (ref 136–145)
WBC NRBC COR # BLD: 7.54 10*3/MM3 (ref 4.5–10.7)

## 2017-11-04 PROCEDURE — 80053 COMPREHEN METABOLIC PANEL: CPT | Performed by: EMERGENCY MEDICINE

## 2017-11-04 PROCEDURE — 71020 HC CHEST PA AND LATERAL: CPT

## 2017-11-04 PROCEDURE — 25010000002 METHYLPREDNISOLONE PER 125 MG: Performed by: EMERGENCY MEDICINE

## 2017-11-04 PROCEDURE — 94640 AIRWAY INHALATION TREATMENT: CPT

## 2017-11-04 PROCEDURE — 85025 COMPLETE CBC W/AUTO DIFF WBC: CPT | Performed by: EMERGENCY MEDICINE

## 2017-11-04 PROCEDURE — 99283 EMERGENCY DEPT VISIT LOW MDM: CPT

## 2017-11-04 PROCEDURE — 96374 THER/PROPH/DIAG INJ IV PUSH: CPT

## 2017-11-04 RX ORDER — AZITHROMYCIN 250 MG/1
TABLET, FILM COATED ORAL
Qty: 6 TABLET | Refills: 0 | Status: SHIPPED | OUTPATIENT
Start: 2017-11-04 | End: 2017-11-24

## 2017-11-04 RX ORDER — IPRATROPIUM BROMIDE AND ALBUTEROL SULFATE 2.5; .5 MG/3ML; MG/3ML
3 SOLUTION RESPIRATORY (INHALATION) ONCE
Status: COMPLETED | OUTPATIENT
Start: 2017-11-04 | End: 2017-11-04

## 2017-11-04 RX ORDER — BUDESONIDE 0.5 MG/2ML
0.5 INHALANT ORAL
COMMUNITY
End: 2018-03-26 | Stop reason: ALTCHOICE

## 2017-11-04 RX ORDER — METHYLPREDNISOLONE SODIUM SUCCINATE 125 MG/2ML
125 INJECTION, POWDER, LYOPHILIZED, FOR SOLUTION INTRAMUSCULAR; INTRAVENOUS ONCE
Status: COMPLETED | OUTPATIENT
Start: 2017-11-04 | End: 2017-11-04

## 2017-11-04 RX ORDER — METHYLPREDNISOLONE 4 MG/1
TABLET ORAL
Qty: 21 TABLET | Refills: 0 | Status: SHIPPED | OUTPATIENT
Start: 2017-11-04 | End: 2017-11-24

## 2017-11-04 RX ORDER — SODIUM CHLORIDE 0.9 % (FLUSH) 0.9 %
10 SYRINGE (ML) INJECTION AS NEEDED
Status: DISCONTINUED | OUTPATIENT
Start: 2017-11-04 | End: 2017-11-04 | Stop reason: HOSPADM

## 2017-11-04 RX ORDER — GUAIFENESIN 600 MG/1
1200 TABLET, EXTENDED RELEASE ORAL 2 TIMES DAILY
COMMUNITY
End: 2018-01-26

## 2017-11-04 RX ADMIN — METHYLPREDNISOLONE SODIUM SUCCINATE 125 MG: 125 INJECTION, POWDER, FOR SOLUTION INTRAMUSCULAR; INTRAVENOUS at 04:58

## 2017-11-04 RX ADMIN — IPRATROPIUM BROMIDE AND ALBUTEROL SULFATE 3 ML: .5; 3 SOLUTION RESPIRATORY (INHALATION) at 05:23

## 2017-11-04 NOTE — ED PROVIDER NOTES
EMERGENCY DEPARTMENT ENCOUNTER    CHIEF COMPLAINT  Chief Complaint: Cough  History given by: Patient  History limited by: nothing  Room Number: 18/18  PMD: Agata Burgess MD      HPI:  Pt is a 77 y.o. female who presents complaining of productive cough that began 2 days ago. Pt complains of sore throat and SOA after taking Mucinex, but denies chest pain and abd pain. Pt has hx of COPD and wheezing.     Duration:  2 days  Onset: gradual  Timing: constant  Location: chest  Radiation: none  Quality: cough  Intensity/Severity: mild  Progression: unchanged  Associated Symptoms: sore throat and SOA  Aggravating Factors: none  Alleviating Factors: none  Previous Episodes: none  Treatment before arrival: Pt took Mucinex prior to arrival at ED.    PAST MEDICAL HISTORY  Active Ambulatory Problems     Diagnosis Date Noted   • Arthritis of knee 03/23/2016   • Anxiety 04/05/2016   • Chronic obstructive pulmonary disease 04/05/2016   • Hyperlipidemia 04/05/2016   • Hypertension 04/05/2016   • Impaired fasting glucose 04/05/2016   • Knee pain 04/05/2016   • Osteoporosis 04/05/2016   • Rheumatoid arthritis 04/05/2016   • Arthritis of knee, left 05/18/2016   • Status post total left knee replacement 06/03/2016   • History of total knee arthroplasty 07/21/2016   • Chronic obstructive pulmonary disease with acute exacerbation 08/01/2016   • Abnormal mammogram 03/16/2017   • Hematuria 03/16/2017   • Menopausal symptom 03/16/2017   • Sebaceous cyst 03/16/2017   • Hypokalemia 03/31/2017   • Right foot pain 03/31/2017   • Encounter for colonoscopy due to history of adenomatous colonic polyps 08/31/2017     Resolved Ambulatory Problems     Diagnosis Date Noted   • Sinus problem 03/16/2017   • Chest congestion 03/16/2017   • Cough 03/16/2017     Past Medical History:   Diagnosis Date   • COPD (chronic obstructive pulmonary disease)    • Depression    • Hiatal hernia    • History of bruising easily    • Hypercholesteremia    • Hypertension     • Joint pain    • On home oxygen therapy    • Osteoporosis    • PONV (postoperative nausea and vomiting)    • Rheumatoid arthritis        PAST SURGICAL HISTORY  Past Surgical History:   Procedure Laterality Date   • BREAST BIOPSY Left     BENIGN    • CAROTID ARTERY ANGIOPLASTY Left    • COLONOSCOPY N/A 10/30/2017    Procedure: COLONOSCOPY TO CECUM, TO TERMINAL ILEUM WITH COLD BIOPSY POLYPECTOMY;  Surgeon: Jossie Stanley MD;  Location: [facility] ENDOSCOPY;  Service:    • FOOT SURGERY      Bilateral (MULITPLE SURGERIES)   • HAND SURGERY      Bilateral (MULTIPLE SURGERIES)   • KNEE ARTHROSCOPY Left    • LEFT OOPHORECTOMY      AGE 10-benign tumor   • TOTAL KNEE ARTHROPLASTY Left 5/18/2016    Procedure: LT TOTAL KNEE ARTHROPLASTY WITH FELECIA NAVIGATION;  Surgeon: Florencio Frazier MD;  Location: [facility] MAIN OR;  Service:        FAMILY HISTORY  Family History   Problem Relation Age of Onset   • Heart disease Mother    • Hypertension Mother    • Lung disease Mother    • Other Mother    • Diabetes Sister        SOCIAL HISTORY  Social History     Social History   • Marital status:      Spouse name: N/A   • Number of children: N/A   • Years of education: N/A     Occupational History   • Not on file.     Social History Main Topics   • Smoking status: Former Smoker     Packs/day: 1.50     Years: 30.00     Types: Cigarettes     Quit date: 1990   • Smokeless tobacco: Never Used   • Alcohol use No   • Drug use: No   • Sexual activity: Not on file     Other Topics Concern   • Not on file     Social History Narrative       ALLERGIES  Gold-containing drug products and Hydrocodone    REVIEW OF SYSTEMS  Review of Systems   Constitutional: Negative for fever.   HENT: Positive for sore throat.    Eyes: Negative.    Respiratory: Positive for cough (productive) and shortness of breath.    Cardiovascular: Negative for chest pain.   Gastrointestinal: Negative for abdominal pain, diarrhea and vomiting.   Genitourinary: Negative for  dysuria.   Musculoskeletal: Negative for neck pain.   Skin: Negative for rash.   Allergic/Immunologic: Negative.    Neurological: Negative for weakness, numbness and headaches.   Hematological: Negative.    Psychiatric/Behavioral: Negative.    All other systems reviewed and are negative.      PHYSICAL EXAM  ED Triage Vitals   Temp Heart Rate Resp BP SpO2   11/04/17 0411 11/04/17 0411 11/04/17 0411 11/04/17 0411 11/04/17 0411   97.4 °F (36.3 °C) 82 18 164/74 91 %      Temp src Heart Rate Source Patient Position BP Location FiO2 (%)   11/04/17 0411 11/04/17 0411 -- -- --   Tympanic Monitor          Physical Exam   Constitutional: She is oriented to person, place, and time and well-developed, well-nourished, and in no distress. No distress.   HENT:   Head: Normocephalic and atraumatic.   Mouth/Throat: No posterior oropharyngeal edema or posterior oropharyngeal erythema.   Eyes: EOM are normal. Pupils are equal, round, and reactive to light.   Neck: Normal range of motion. Neck supple.   Cardiovascular: Normal rate, regular rhythm and normal heart sounds.    Pulmonary/Chest: She is in respiratory distress (mild ). She has wheezes (mild ). She has rhonchi (mild).   Abdominal: Soft. There is no tenderness. There is no rebound and no guarding.   Musculoskeletal: Normal range of motion. She exhibits no edema.   Neurological: She is alert and oriented to person, place, and time. She has normal sensation and normal strength.   Skin: Skin is warm and dry. No rash noted.   Psychiatric: Mood and affect normal.   Nursing note and vitals reviewed.      LAB RESULTS  Lab Results (last 24 hours)     Procedure Component Value Units Date/Time    CBC & Differential [[id number]] Collected:  11/04/17 0428    Specimen:  Blood Updated:  11/04/17 0451    Narrative:       The following orders were created for panel order CBC & Differential.  Procedure                               Abnormality         Status                     ---------                                -----------         ------                     CBC Auto Differential[[id number]]        Abnormal            Final result                 Please view results for these tests on the individual orders.    Comprehensive Metabolic Panel [[id number]]  (Abnormal) Collected:  11/04/17 0428    Specimen:  Blood Updated:  11/04/17 0516     Glucose 132 (H) mg/dL      BUN 8 mg/dL      Creatinine 0.53 (L) mg/dL      Sodium 140 mmol/L      Potassium 3.4 (L) mmol/L      Chloride 99 mmol/L      CO2 26.8 mmol/L      Calcium 9.6 mg/dL      Total Protein 7.8 g/dL      Albumin 4.10 g/dL      ALT (SGPT) 16 U/L      AST (SGOT) 20 U/L      Alkaline Phosphatase 73 U/L      Total Bilirubin 0.3 mg/dL      eGFR Non African Amer 112 mL/min/1.73      Globulin 3.7 gm/dL      A/G Ratio 1.1 g/dL      BUN/Creatinine Ratio 15.1     Anion Gap 14.2 mmol/L     Narrative:       The MDRD GFR formula is only valid for adults with stable renal function between ages 18 and 70.    CBC Auto Differential [[id number]]  (Abnormal) Collected:  11/04/17 0428    Specimen:  Blood Updated:  11/04/17 0451     WBC 7.54 10*3/mm3      RBC 4.34 10*6/mm3      Hemoglobin 12.3 g/dL      Hematocrit 38.0 %      MCV 87.6 fL      MCH 28.3 pg      MCHC 32.4 g/dL      RDW 14.5 (H) %      RDW-SD 46.7 fl      MPV 11.3 fL      Platelets 296 10*3/mm3      Neutrophil % 59.6 %      Lymphocyte % 18.2 (L) %      Monocyte % 12.3 (H) %      Eosinophil % 9.5 (H) %      Basophil % 0.4 %      Immature Grans % 0.0 %      Neutrophils, Absolute 4.49 10*3/mm3      Lymphocytes, Absolute 1.37 10*3/mm3      Monocytes, Absolute 0.93 10*3/mm3      Eosinophils, Absolute 0.72 (H) 10*3/mm3      Basophils, Absolute 0.03 10*3/mm3      Immature Grans, Absolute 0.00 10*3/mm3           I ordered the above labs and reviewed the results    RADIOLOGY  XR Chest 2 View   Preliminary Result   No acute findings.                   I ordered the above noted radiological studies. Interpreted by  radiologist. Reviewed by me in PACS.       PROCEDURES  Procedures      PROGRESS AND CONSULTS  ED Course     0423  SOLU-Medrol and DUO-NEB ordered.   CXR and labs ordered for further evaluation.     0519  Pt rechecked and wheezing diminished due to breathing treatment. Discussed the negative results of CXR and labs and the possibility of viral illness. Discussed plan for discharge and changing medications, discontinuing Mucinex and medications that are not having effect. Discussed plan to give pt medication to alleviate cough as well as placing pt on steroid pack. Pt and family understand and agree with plan, all questions answered.    MEDICAL DECISION MAKING  Results were reviewed/discussed with the patient and they were also made aware of online access. Pt also made aware that some labs, such as cultures, will not be resulted during ER visit and follow up with PMD is necessary.     MDM  Number of Diagnoses or Management Options     Amount and/or Complexity of Data Reviewed  Clinical lab tests: ordered and reviewed (Glucose 132  BUN 8  WBC 7.54)  Tests in the radiology section of CPT®: ordered and reviewed (CXR- no acute findings)           DIAGNOSIS  Final diagnoses:   Upper respiratory tract infection, unspecified type   COPD exacerbation       DISPOSITION  DISCHARGE    Patient discharged in stable condition.    Reviewed implications of results, diagnosis, meds, responsibility to follow up, warning signs and symptoms of possible worsening, potential complications and reasons to return to ER.    Patient/Family voiced understanding of above instructions.    Discussed plan for discharge, as there is no emergent indication for admission.  Pt/family is agreeable and understands need for follow up and repeat testing.  Pt is aware that discharge does not mean that nothing is wrong but it indicates no emergency is present that requires admission and they must continue care with follow-up as given below or physician of  their choice.     FOLLOW-UP  Agata Burgess MD  [address] [facility] [address]  [place] [address]  [phone]    Call      Tariq Sigala MD  [address] [facility] [address]  [place] [address]  [phone]    Call           Medication List      New Prescriptions          azithromycin 250 MG tablet   Commonly known as:  ZITHROMAX   Take 2 tablets the first day, then 1 tablet daily for 4 days.       HYDROcod Polst-CPM Polst ER 10-8 MG/5ML ER suspension   Commonly known as:  TUSSIONEX PENNKINETIC ER   Take 5 mL by mouth Every 12 (Twelve) Hours As Needed for Cough.       MethylPREDNISolone 4 MG tablet   Commonly known as:  MEDROL (SAI)   Take as directed on package instructions.         Stop          benzonatate 100 MG capsule   Commonly known as:  TESSALON PERLES       guaiFENesin 600 MG 12 hr tablet   Commonly known as:  MUCINEX             Latest Documented Vital Signs:  As of 5:44 AM  BP- 152/88 HR- 69 Temp- 97.4 °F (36.3 °C) (Tympanic) O2 sat- 95%    --  Documentation assistance provided by johnny Hernandez  for Dr. Blake.  Information recorded by the scribe was done at my direction and has been verified and validated by me.     Kena Hernandez  11/04/17 0538       Megan Benjamin  11/04/17 0544       Trenton Blake MD  11/04/17 0668

## 2017-11-04 NOTE — ED NOTES
Pt  Reports that she has been coughing off and on 2 days. Pt reports going to [facility] and they gave her a cough medication. Pt reports going to ENT a week ago for sinus infection. Pt reports waking up tonight and taking muscinex and hasn't been able to stop coughing since.     Lisa Howe RN  11/04/17 0418

## 2017-11-06 ENCOUNTER — OFFICE VISIT (OUTPATIENT)
Dept: INTERNAL MEDICINE | Facility: CLINIC | Age: 77
End: 2017-11-06

## 2017-11-06 VITALS
WEIGHT: 139 LBS | DIASTOLIC BLOOD PRESSURE: 60 MMHG | OXYGEN SATURATION: 99 % | HEART RATE: 76 BPM | BODY MASS INDEX: 23.86 KG/M2 | SYSTOLIC BLOOD PRESSURE: 140 MMHG

## 2017-11-06 DIAGNOSIS — J44.1 CHRONIC OBSTRUCTIVE PULMONARY DISEASE WITH ACUTE EXACERBATION (HCC): Primary | ICD-10-CM

## 2017-11-06 PROCEDURE — 99214 OFFICE O/P EST MOD 30 MIN: CPT | Performed by: INTERNAL MEDICINE

## 2017-11-06 RX ORDER — GUAIFENESIN AND CODEINE PHOSPHATE 100; 10 MG/5ML; MG/5ML
5 SOLUTION ORAL 3 TIMES DAILY PRN
Qty: 150 ML | Refills: 1 | Status: SHIPPED | OUTPATIENT
Start: 2017-11-06 | End: 2018-01-26

## 2017-11-24 ENCOUNTER — OFFICE VISIT (OUTPATIENT)
Dept: RETAIL CLINIC | Facility: CLINIC | Age: 77
End: 2017-11-24

## 2017-11-24 VITALS
TEMPERATURE: 98 F | DIASTOLIC BLOOD PRESSURE: 69 MMHG | HEART RATE: 71 BPM | RESPIRATION RATE: 18 BRPM | OXYGEN SATURATION: 93 % | SYSTOLIC BLOOD PRESSURE: 146 MMHG

## 2017-11-24 DIAGNOSIS — J44.1 COPD EXACERBATION (HCC): Primary | ICD-10-CM

## 2017-11-24 PROCEDURE — 99213 OFFICE O/P EST LOW 20 MIN: CPT | Performed by: NURSE PRACTITIONER

## 2017-11-24 RX ORDER — PREDNISONE 20 MG/1
20 TABLET ORAL 2 TIMES DAILY
Qty: 14 TABLET | Refills: 0 | Status: SHIPPED | OUTPATIENT
Start: 2017-11-24 | End: 2017-12-01

## 2017-11-24 RX ORDER — IPRATROPIUM BROMIDE AND ALBUTEROL SULFATE 2.5; .5 MG/3ML; MG/3ML
3 SOLUTION RESPIRATORY (INHALATION) EVERY 4 HOURS PRN
Qty: 360 ML | Refills: 0 | Status: SHIPPED | OUTPATIENT
Start: 2017-11-24 | End: 2017-12-24

## 2017-11-24 RX ORDER — DOXYCYCLINE HYCLATE 100 MG/1
100 TABLET, DELAYED RELEASE ORAL 2 TIMES DAILY
Qty: 14 TABLET | Refills: 0 | Status: SHIPPED | OUTPATIENT
Start: 2017-11-24 | End: 2017-12-01

## 2017-11-24 NOTE — PATIENT INSTRUCTIONS
Chronic Obstructive Pulmonary Disease  Chronic obstructive pulmonary disease (COPD) is a common lung condition in which airflow from the lungs is limited. COPD is a general term that can be used to describe many different lung problems that limit airflow, including both chronic bronchitis and emphysema. If you have COPD, your lung function will probably never return to normal, but there are measures you can take to improve lung function and make yourself feel better.  CAUSES   · Smoking (common).  · Exposure to secondhand smoke.  · Genetic problems.  · Chronic inflammatory lung diseases or recurrent infections.  SYMPTOMS  · Shortness of breath, especially with physical activity.  · Deep, persistent (chronic) cough with a large amount of thick mucus.  · Wheezing.  · Rapid breaths (tachypnea).  · Gray or bluish discoloration (cyanosis) of the skin, especially in your fingers, toes, or lips.  · Fatigue.  · Weight loss.  · Frequent infections or episodes when breathing symptoms become much worse (exacerbations).  · Chest tightness.  DIAGNOSIS  Your health care provider will take a medical history and perform a physical examination to diagnose COPD. Additional tests for COPD may include:  · Lung (pulmonary) function tests.  · Chest X-ray.  · CT scan.  · Blood tests.  TREATMENT   Treatment for COPD may include:  · Inhaler and nebulizer medicines. These help manage the symptoms of COPD and make your breathing more comfortable.  · Supplemental oxygen. Supplemental oxygen is only helpful if you have a low oxygen level in your blood.  · Exercise and physical activity. These are beneficial for nearly all people with COPD.  · Lung surgery or transplant.  · Nutrition therapy to gain weight, if you are underweight.  · Pulmonary rehabilitation. This may involve working with a team of health care providers and specialists, such as respiratory, occupational, and physical therapists.  HOME CARE INSTRUCTIONS  · Take all medicines  (inhaled or pills) as directed by your health care provider.  · Avoid over-the-counter medicines or cough syrups that dry up your airway (such as antihistamines) and slow down the elimination of secretions unless instructed otherwise by your health care provider.  · If you are a smoker, the most important thing that you can do is stop smoking. Continuing to smoke will cause further lung damage and breathing trouble. Ask your health care provider for help with quitting smoking. He or she can direct you to community resources or hospitals that provide support.  · Avoid exposure to irritants such as smoke, chemicals, and fumes that aggravate your breathing.  · Use oxygen therapy and pulmonary rehabilitation if directed by your health care provider. If you require home oxygen therapy, ask your health care provider whether you should purchase a pulse oximeter to measure your oxygen level at home.  · Avoid contact with individuals who have a contagious illness.  · Avoid extreme temperature and humidity changes.  · Eat healthy foods. Eating smaller, more frequent meals and resting before meals may help you maintain your strength.  · Stay active, but balance activity with periods of rest. Exercise and physical activity will help you maintain your ability to do things you want to do.  · Preventing infection and hospitalization is very important when you have COPD. Make sure to receive all the vaccines your health care provider recommends, especially the pneumococcal and influenza vaccines. Ask your health care provider whether you need a pneumonia vaccine.  · Learn and use relaxation techniques to manage stress.  · Learn and use controlled breathing techniques as directed by your health care provider. Controlled breathing techniques include:    Pursed lip breathing. Start by breathing in (inhaling) through your nose for 1 second. Then, purse your lips as if you were going to whistle and breathe out (exhale) through the  pursed lips for 2 seconds.    Diaphragmatic breathing. Start by putting one hand on your abdomen just above your waist. Inhale slowly through your nose. The hand on your abdomen should move out. Then purse your lips and exhale slowly. You should be able to feel the hand on your abdomen moving in as you exhale.  · Learn and use controlled coughing to clear mucus from your lungs. Controlled coughing is a series of short, progressive coughs. The steps of controlled coughing are:  . Lean your head slightly forward.  . Breathe in deeply using diaphragmatic breathing.  . Try to hold your breath for 3 seconds.  . Keep your mouth slightly open while coughing twice.  . Spit any mucus out into a tissue.  . Rest and repeat the steps once or twice as needed.  SEEK MEDICAL CARE IF:  · You are coughing up more mucus than usual.  · There is a change in the color or thickness of your mucus.  · Your breathing is more labored than usual.  · Your breathing is faster than usual.  SEEK IMMEDIATE MEDICAL CARE IF:  · You have shortness of breath while you are resting.  · You have shortness of breath that prevents you from:    Being able to talk.    Performing your usual physical activities.  · You have chest pain lasting longer than 5 minutes.  · Your skin color is more cyanotic than usual.  · You measure low oxygen saturations for longer than 5 minutes with a pulse oximeter.  MAKE SURE YOU:  · Understand these instructions.  · Will watch your condition.  · Will get help right away if you are not doing well or get worse.     This information is not intended to replace advice given to you by your health care provider. Make sure you discuss any questions you have with your health care provider.     Document Released: 09/27/2006 Document Revised: 01/08/2016 Document Reviewed: 08/14/2014  [employer] Patient Education ©2017 [employer]

## 2017-11-24 NOTE — PROGRESS NOTES
Subjective   Janet Martinez is a 77 y.o. female.     Cough   This is a recurrent problem. The current episode started more than 1 month ago. The problem has been gradually worsening. The problem occurs every few minutes. The cough is productive of sputum. Associated symptoms include shortness of breath and wheezing. Pertinent negatives include no chest pain, chills, ear congestion, ear pain, eye redness, fever, headaches, heartburn, hemoptysis, myalgias, nasal congestion, postnasal drip, rash, rhinorrhea, sore throat, sweats or weight loss. The symptoms are aggravated by lying down, fumes and exercise. Risk factors for lung disease include smoking/tobacco exposure (quit smoking in ). She has tried prescription cough suppressant, oral steroids and ipratropium inhaler (has been prescribed medrol x2 packs and azithromycin with mild improvement in symptoms but never complete resolution, has not used nebs because her box is ) for the symptoms. The treatment provided mild relief. Her past medical history is significant for bronchitis, COPD and environmental allergies. There is no history of asthma or pneumonia.       The following portions of the patient's history were reviewed and updated as appropriate: allergies, current medications, past family history, past medical history, past social history, past surgical history and problem list.    Review of Systems   Constitutional: Positive for appetite change and fatigue. Negative for chills, diaphoresis, fever, unexpected weight change and weight loss.   HENT: Negative for congestion, ear discharge, ear pain, facial swelling, hearing loss, mouth sores, nosebleeds, postnasal drip, rhinorrhea, sinus pressure, sneezing, sore throat, trouble swallowing and voice change.    Eyes: Negative for pain, discharge, redness and itching.   Respiratory: Positive for cough, chest tightness, shortness of breath and wheezing. Negative for hemoptysis and stridor.    Cardiovascular:  Negative for chest pain and palpitations.   Gastrointestinal: Negative for abdominal pain, constipation, diarrhea, heartburn, nausea and vomiting.   Genitourinary: Negative for decreased urine volume.   Musculoskeletal: Negative for myalgias, neck pain and neck stiffness.   Skin: Negative for color change and rash.   Allergic/Immunologic: Positive for environmental allergies and immunocompromised state. Negative for food allergies.   Neurological: Negative for dizziness, syncope, weakness and headaches.       Objective   Physical Exam   Constitutional: She is oriented to person, place, and time. She appears well-developed and well-nourished. She is cooperative.  Non-toxic appearance. She does not appear ill. No distress.   HENT:   Right Ear: Hearing, tympanic membrane, external ear and ear canal normal.   Left Ear: Hearing, tympanic membrane, external ear and ear canal normal.   Nose: Nose normal. Right sinus exhibits no maxillary sinus tenderness and no frontal sinus tenderness. Left sinus exhibits no maxillary sinus tenderness and no frontal sinus tenderness.   Mouth/Throat: Uvula is midline, oropharynx is clear and moist and mucous membranes are normal. Tonsils are 2+ on the right. Tonsils are 2+ on the left. No tonsillar exudate.   Eyes: Conjunctivae and lids are normal.   Cardiovascular: Normal rate, regular rhythm, S1 normal and S2 normal.    Pulmonary/Chest: Effort normal. She has decreased breath sounds in the right lower field. She has wheezes in the left lower field. She has rhonchi in the right upper field, the right middle field, the left upper field, the left middle field and the left lower field. She has no rales.   Abdominal: Bowel sounds are normal. There is no tenderness.   Lymphadenopathy:     She has no cervical adenopathy.   Neurological: She is alert and oriented to person, place, and time.   Skin: Skin is warm and dry. She is not diaphoretic. No pallor.   Vitals reviewed.      Assessment/Plan    Janet was seen today for cough.    Diagnoses and all orders for this visit:    COPD exacerbation  -     ipratropium-albuterol (DUO-NEB) 0.5-2.5 mg/mL nebulizer; Take 3 mL by nebulization Every 4 (Four) Hours As Needed for Wheezing or Shortness of Air for up to 30 days.  -     doxycycline (DORYX) 100 MG enteric coated tablet; Take 1 tablet by mouth 2 (Two) Times a Day for 7 days.  -     predniSONE (DELTASONE) 20 MG tablet; Take 1 tablet by mouth 2 (Two) Times a Day for 7 days.          -     Follow up with your pulmonologist for persistent or worsening symptoms

## 2017-12-01 RX ORDER — POTASSIUM CHLORIDE 1500 MG/1
TABLET, EXTENDED RELEASE ORAL
Qty: 30 TABLET | Refills: 4 | Status: SHIPPED | OUTPATIENT
Start: 2017-12-01 | End: 2018-05-15

## 2017-12-06 RX ORDER — OMEPRAZOLE 40 MG/1
CAPSULE, DELAYED RELEASE ORAL
Qty: 90 CAPSULE | Refills: 3 | Status: SHIPPED | OUTPATIENT
Start: 2017-12-06 | End: 2018-05-15

## 2017-12-11 RX ORDER — DULOXETIN HYDROCHLORIDE 60 MG/1
CAPSULE, DELAYED RELEASE ORAL
Qty: 90 CAPSULE | Refills: 0 | Status: SHIPPED | OUTPATIENT
Start: 2017-12-11 | End: 2018-03-08 | Stop reason: SDUPTHER

## 2018-01-02 ENCOUNTER — HOSPITAL ENCOUNTER (INPATIENT)
Facility: HOSPITAL | Age: 78
LOS: 6 days | Discharge: HOME | End: 2018-01-08
Attending: INTERNAL MEDICINE | Admitting: INTERNAL MEDICINE

## 2018-01-02 ENCOUNTER — APPOINTMENT (OUTPATIENT)
Dept: GENERAL RADIOLOGY | Facility: HOSPITAL | Age: 78
End: 2018-01-02
Attending: INTERNAL MEDICINE

## 2018-01-02 DIAGNOSIS — R05.3 PERSISTENT COUGH: ICD-10-CM

## 2018-01-02 LAB
ALBUMIN SERPL-MCNC: 4 G/DL (ref 3.5–5.2)
ALBUMIN/GLOB SERPL: 1.1 G/DL
ALP SERPL-CCNC: 69 U/L (ref 39–117)
ALT SERPL W P-5'-P-CCNC: 18 U/L (ref 1–33)
ANION GAP SERPL CALCULATED.3IONS-SCNC: 17 MMOL/L
AST SERPL-CCNC: 23 U/L (ref 1–32)
B PERT DNA SPEC QL NAA+PROBE: NOT DETECTED
BASOPHILS # BLD AUTO: 0.01 10*3/MM3 (ref 0–0.2)
BASOPHILS NFR BLD AUTO: 0.2 % (ref 0–1.5)
BILIRUB SERPL-MCNC: 0.3 MG/DL (ref 0.1–1.2)
BUN BLD-MCNC: 10 MG/DL (ref 8–23)
BUN/CREAT SERPL: 15.2 (ref 7–25)
C PNEUM DNA NPH QL NAA+NON-PROBE: NOT DETECTED
CALCIUM SPEC-SCNC: 8.8 MG/DL (ref 8.6–10.5)
CHLORIDE SERPL-SCNC: 93 MMOL/L (ref 98–107)
CO2 SERPL-SCNC: 28 MMOL/L (ref 22–29)
CREAT BLD-MCNC: 0.66 MG/DL (ref 0.57–1)
DEPRECATED RDW RBC AUTO: 45.2 FL (ref 37–54)
EOSINOPHIL # BLD AUTO: 0 10*3/MM3 (ref 0–0.7)
EOSINOPHIL NFR BLD AUTO: 0 % (ref 0.3–6.2)
ERYTHROCYTE [DISTWIDTH] IN BLOOD BY AUTOMATED COUNT: 14.4 % (ref 11.7–13)
FLUAV H1 2009 PAND RNA NPH QL NAA+PROBE: NOT DETECTED
FLUAV H1 HA GENE NPH QL NAA+PROBE: NOT DETECTED
FLUAV H3 RNA NPH QL NAA+PROBE: DETECTED
FLUAV SUBTYP SPEC NAA+PROBE: NOT DETECTED
FLUBV RNA ISLT QL NAA+PROBE: NOT DETECTED
GFR SERPL CREATININE-BSD FRML MDRD: 87 ML/MIN/1.73
GLOBULIN UR ELPH-MCNC: 3.8 GM/DL
GLUCOSE BLD-MCNC: 229 MG/DL (ref 65–99)
GLUCOSE BLDC GLUCOMTR-MCNC: 243 MG/DL (ref 70–130)
HADV DNA SPEC NAA+PROBE: NOT DETECTED
HCOV 229E RNA SPEC QL NAA+PROBE: NOT DETECTED
HCOV HKU1 RNA SPEC QL NAA+PROBE: NOT DETECTED
HCOV NL63 RNA SPEC QL NAA+PROBE: NOT DETECTED
HCOV OC43 RNA SPEC QL NAA+PROBE: NOT DETECTED
HCT VFR BLD AUTO: 37.9 % (ref 35.6–45.5)
HGB BLD-MCNC: 12.4 G/DL (ref 11.9–15.5)
HMPV RNA NPH QL NAA+NON-PROBE: NOT DETECTED
HPIV1 RNA SPEC QL NAA+PROBE: NOT DETECTED
HPIV2 RNA SPEC QL NAA+PROBE: NOT DETECTED
HPIV3 RNA NPH QL NAA+PROBE: NOT DETECTED
HPIV4 P GENE NPH QL NAA+PROBE: NOT DETECTED
IMM GRANULOCYTES # BLD: 0 10*3/MM3 (ref 0–0.03)
IMM GRANULOCYTES NFR BLD: 0 % (ref 0–0.5)
INR PPP: 0.99 (ref 0.9–1.1)
LYMPHOCYTES # BLD AUTO: 0.39 10*3/MM3 (ref 0.9–4.8)
LYMPHOCYTES NFR BLD AUTO: 6 % (ref 19.6–45.3)
M PNEUMO IGG SER IA-ACNC: NOT DETECTED
MCH RBC QN AUTO: 28.2 PG (ref 26.9–32)
MCHC RBC AUTO-ENTMCNC: 32.7 G/DL (ref 32.4–36.3)
MCV RBC AUTO: 86.3 FL (ref 80.5–98.2)
MONOCYTES # BLD AUTO: 0.23 10*3/MM3 (ref 0.2–1.2)
MONOCYTES NFR BLD AUTO: 3.5 % (ref 5–12)
NEUTROPHILS # BLD AUTO: 5.86 10*3/MM3 (ref 1.9–8.1)
NEUTROPHILS NFR BLD AUTO: 90.3 % (ref 42.7–76)
PLATELET # BLD AUTO: 280 10*3/MM3 (ref 140–500)
PMV BLD AUTO: 11.6 FL (ref 6–12)
POTASSIUM BLD-SCNC: 3.2 MMOL/L (ref 3.5–5.2)
PROT SERPL-MCNC: 7.8 G/DL (ref 6–8.5)
PROTHROMBIN TIME: 12.7 SECONDS (ref 11.7–14.2)
RBC # BLD AUTO: 4.39 10*6/MM3 (ref 3.9–5.2)
RHINOVIRUS RNA SPEC NAA+PROBE: NOT DETECTED
RSV RNA NPH QL NAA+NON-PROBE: NOT DETECTED
SODIUM BLD-SCNC: 138 MMOL/L (ref 136–145)
WBC NRBC COR # BLD: 6.49 10*3/MM3 (ref 4.5–10.7)

## 2018-01-02 PROCEDURE — 85025 COMPLETE CBC W/AUTO DIFF WBC: CPT | Performed by: INTERNAL MEDICINE

## 2018-01-02 PROCEDURE — 94799 UNLISTED PULMONARY SVC/PX: CPT

## 2018-01-02 PROCEDURE — 87486 CHLMYD PNEUM DNA AMP PROBE: CPT | Performed by: INTERNAL MEDICINE

## 2018-01-02 PROCEDURE — 71046 X-RAY EXAM CHEST 2 VIEWS: CPT

## 2018-01-02 PROCEDURE — 87633 RESP VIRUS 12-25 TARGETS: CPT | Performed by: INTERNAL MEDICINE

## 2018-01-02 PROCEDURE — 82962 GLUCOSE BLOOD TEST: CPT

## 2018-01-02 PROCEDURE — 87798 DETECT AGENT NOS DNA AMP: CPT | Performed by: INTERNAL MEDICINE

## 2018-01-02 PROCEDURE — 25010000002 METHYLPREDNISOLONE PER 125 MG: Performed by: INTERNAL MEDICINE

## 2018-01-02 PROCEDURE — 80053 COMPREHEN METABOLIC PANEL: CPT | Performed by: INTERNAL MEDICINE

## 2018-01-02 PROCEDURE — 63710000001 INSULIN ASPART PER 5 UNITS: Performed by: INTERNAL MEDICINE

## 2018-01-02 PROCEDURE — 87205 SMEAR GRAM STAIN: CPT | Performed by: INTERNAL MEDICINE

## 2018-01-02 PROCEDURE — 94640 AIRWAY INHALATION TREATMENT: CPT

## 2018-01-02 PROCEDURE — 85610 PROTHROMBIN TIME: CPT | Performed by: INTERNAL MEDICINE

## 2018-01-02 PROCEDURE — 87581 M.PNEUMON DNA AMP PROBE: CPT | Performed by: INTERNAL MEDICINE

## 2018-01-02 PROCEDURE — 87070 CULTURE OTHR SPECIMN AEROBIC: CPT | Performed by: INTERNAL MEDICINE

## 2018-01-02 RX ORDER — PREDNISONE 10 MG/1
20 TABLET ORAL DAILY
COMMUNITY
Start: 2018-01-03 | End: 2018-01-08 | Stop reason: HOSPADM

## 2018-01-02 RX ORDER — IPRATROPIUM BROMIDE AND ALBUTEROL SULFATE 2.5; .5 MG/3ML; MG/3ML
3 SOLUTION RESPIRATORY (INHALATION)
Status: DISCONTINUED | OUTPATIENT
Start: 2018-01-02 | End: 2018-01-08 | Stop reason: HOSPADM

## 2018-01-02 RX ORDER — HYDROCHLOROTHIAZIDE 25 MG/1
25 TABLET ORAL DAILY
Status: DISCONTINUED | OUTPATIENT
Start: 2018-01-02 | End: 2018-01-08 | Stop reason: HOSPADM

## 2018-01-02 RX ORDER — POTASSIUM CHLORIDE 750 MG/1
20 CAPSULE, EXTENDED RELEASE ORAL DAILY
Status: DISCONTINUED | OUTPATIENT
Start: 2018-01-02 | End: 2018-01-02

## 2018-01-02 RX ORDER — NICOTINE POLACRILEX 4 MG
15 LOZENGE BUCCAL
Status: DISCONTINUED | OUTPATIENT
Start: 2018-01-02 | End: 2018-01-08 | Stop reason: HOSPADM

## 2018-01-02 RX ORDER — PANTOPRAZOLE SODIUM 40 MG/1
40 TABLET, DELAYED RELEASE ORAL EVERY MORNING
Status: DISCONTINUED | OUTPATIENT
Start: 2018-01-03 | End: 2018-01-08 | Stop reason: HOSPADM

## 2018-01-02 RX ORDER — DEXTROSE MONOHYDRATE 25 G/50ML
25 INJECTION, SOLUTION INTRAVENOUS
Status: DISCONTINUED | OUTPATIENT
Start: 2018-01-02 | End: 2018-01-08 | Stop reason: HOSPADM

## 2018-01-02 RX ORDER — VALSARTAN 320 MG/1
320 TABLET ORAL
Status: DISCONTINUED | OUTPATIENT
Start: 2018-01-02 | End: 2018-01-08 | Stop reason: HOSPADM

## 2018-01-02 RX ORDER — DULOXETIN HYDROCHLORIDE 60 MG/1
60 CAPSULE, DELAYED RELEASE ORAL DAILY
Status: DISCONTINUED | OUTPATIENT
Start: 2018-01-02 | End: 2018-01-08 | Stop reason: HOSPADM

## 2018-01-02 RX ORDER — METHYLPREDNISOLONE SODIUM SUCCINATE 40 MG/ML
40 INJECTION, POWDER, LYOPHILIZED, FOR SOLUTION INTRAMUSCULAR; INTRAVENOUS EVERY 12 HOURS
Status: DISCONTINUED | OUTPATIENT
Start: 2018-01-03 | End: 2018-01-04

## 2018-01-02 RX ORDER — IPRATROPIUM BROMIDE AND ALBUTEROL SULFATE 2.5; .5 MG/3ML; MG/3ML
3 SOLUTION RESPIRATORY (INHALATION)
Status: DISCONTINUED | OUTPATIENT
Start: 2018-01-02 | End: 2018-01-07

## 2018-01-02 RX ORDER — POTASSIUM CHLORIDE 750 MG/1
20 CAPSULE, EXTENDED RELEASE ORAL DAILY
Status: DISCONTINUED | OUTPATIENT
Start: 2018-01-03 | End: 2018-01-08 | Stop reason: HOSPADM

## 2018-01-02 RX ORDER — AMLODIPINE BESYLATE 10 MG/1
10 TABLET ORAL
Status: DISCONTINUED | OUTPATIENT
Start: 2018-01-03 | End: 2018-01-08 | Stop reason: HOSPADM

## 2018-01-02 RX ORDER — METHYLPREDNISOLONE SODIUM SUCCINATE 125 MG/2ML
125 INJECTION, POWDER, LYOPHILIZED, FOR SOLUTION INTRAMUSCULAR; INTRAVENOUS ONCE
Status: COMPLETED | OUTPATIENT
Start: 2018-01-02 | End: 2018-01-02

## 2018-01-02 RX ORDER — BUPROPION HYDROCHLORIDE 150 MG/1
150 TABLET ORAL EVERY MORNING
Status: DISCONTINUED | OUTPATIENT
Start: 2018-01-03 | End: 2018-01-08 | Stop reason: HOSPADM

## 2018-01-02 RX ORDER — FOLIC ACID 1 MG/1
1 TABLET ORAL DAILY
Status: DISCONTINUED | OUTPATIENT
Start: 2018-01-02 | End: 2018-01-08 | Stop reason: HOSPADM

## 2018-01-02 RX ORDER — ATORVASTATIN CALCIUM 20 MG/1
20 TABLET, FILM COATED ORAL DAILY
Status: DISCONTINUED | OUTPATIENT
Start: 2018-01-02 | End: 2018-01-08 | Stop reason: HOSPADM

## 2018-01-02 RX ORDER — NEOMYCIN SULFATE, POLYMYXIN B SULFATE, AND DEXAMETHASONE 3.5; 10000; 1 MG/G; [USP'U]/G; MG/G
1 OINTMENT OPHTHALMIC EVERY 12 HOURS
COMMUNITY
End: 2018-01-26

## 2018-01-02 RX ORDER — VALSARTAN AND HYDROCHLOROTHIAZIDE 160; 12.5 MG/1; MG/1
2 TABLET, FILM COATED ORAL
Status: DISCONTINUED | OUTPATIENT
Start: 2018-01-02 | End: 2018-01-02

## 2018-01-02 RX ORDER — VALSARTAN AND HYDROCHLOROTHIAZIDE 160; 12.5 MG/1; MG/1
2 TABLET, FILM COATED ORAL
Status: DISCONTINUED | OUTPATIENT
Start: 2018-01-03 | End: 2018-01-02 | Stop reason: CLARIF

## 2018-01-02 RX ADMIN — IPRATROPIUM BROMIDE AND ALBUTEROL SULFATE 3 ML: .5; 3 SOLUTION RESPIRATORY (INHALATION) at 23:37

## 2018-01-02 RX ADMIN — METHYLPREDNISOLONE SODIUM SUCCINATE 125 MG: 125 INJECTION, POWDER, FOR SOLUTION INTRAMUSCULAR; INTRAVENOUS at 18:49

## 2018-01-02 RX ADMIN — FOLIC ACID 1 MG: 1 TABLET ORAL at 21:09

## 2018-01-02 RX ADMIN — IPRATROPIUM BROMIDE AND ALBUTEROL SULFATE 3 ML: .5; 3 SOLUTION RESPIRATORY (INHALATION) at 19:14

## 2018-01-02 RX ADMIN — INSULIN ASPART 4 UNITS: 100 INJECTION, SOLUTION INTRAVENOUS; SUBCUTANEOUS at 21:26

## 2018-01-02 NOTE — PLAN OF CARE
Problem: Patient Care Overview (Adult)  Goal: Plan of Care Review  Outcome: Ongoing (interventions implemented as appropriate)   01/02/18 8989   Coping/Psychosocial Response Interventions   Plan Of Care Reviewed With patient;spouse   Patient Care Overview   Progress no change   Outcome Evaluation   Outcome Summary/Follow up Plan pt c/o cough and soa for several weeks, npo after MN for possible broch, consent not signed as MD has not talked to pt about it, chest xray done, resp viral panel sent, need sputum speciman, per pt she does use oxygen at night     Goal: Adult Individualization and Mutuality  Outcome: Ongoing (interventions implemented as appropriate)    Goal: Discharge Needs Assessment  Outcome: Ongoing (interventions implemented as appropriate)      Problem: Respiratory Insufficiency (Adult)  Goal: Identify Related Risk Factors and Signs and Symptoms  Outcome: Outcome(s) achieved Date Met: 01/02/18    Goal: Acid/Base Balance  Outcome: Ongoing (interventions implemented as appropriate)    Goal: Effective Ventilation  Outcome: Ongoing (interventions implemented as appropriate)

## 2018-01-02 NOTE — H&P
[facility]  Phone: [phone]  Tariq Sigala MD      Subjective   LOS: 0 days     The patient is a 77 year old female who presents with COPD. She came to see me in November 2018 bc of bronchitis symptoms. She has been to [facility] and ER prior. Ongoing for 1-2 months. She had 3 courses of prednisone. She got Azithromycin and finished Doxycycline. She ran out of [place] 1 month ago and presently using Combivent inhaler 2 puffs once a day. She is using her nebs tid to qid. CxR 11/4/17 was clear. She has congested cough that is clear productive phlegm, small qty. Denies fever or chills. She notices wheezing. She reports recent sinus infection and using a sinus abx rinse bid presently. Also reports recent mild leg edema, though none today. Using O2 with sleep and needs to change supplier since [employer] not covered by insurance. She was prescribed albuterol nebulizer along with acetylcysteine nebs.    At subsequent visit end December on O2 at night through a different company. Using nebs tid. Some better but not all the way. She wakes up feeling congested and coughing but rest of day she is some better.  She denies any fever or chills.    Came in today with : Still coughing and it hurts her chest. Having to take codeine cough syrup at night for relief. Denies fever. Using nebs as prescribed. She is still on end of prednisone taper. Wheezing has been worse. Remains on O2 at night.       I have reviewed and edited the Past Medical History, Past Surgical History, Home Medications, Social History and Family History as of 5:58 PM on 01/02/18.    Prescriptions Prior to Admission   Medication Sig Dispense Refill Last Dose   • Amlodipine-Valsartan-HCTZ -25 MG tablet Take 10 mg by mouth Daily. 90 tablet 2 1/2/2018 at Unknown time   • atorvastatin (LIPITOR) 20 MG tablet Take 1 tablet by mouth Daily. (Patient taking differently: Take 20 mg by mouth Every Night.) 90 tablet 2 1/1/2018 at Unknown time   • buPROPion  XL (WELLBUTRIN XL) 150 MG 24 hr tablet Take 1 tablet by mouth Every Morning. 90 tablet 1 1/2/2018 at Unknown time   • DULoxetine (CYMBALTA) 60 MG capsule TAKE 1 CAPSULE BY MOUTH DAILY. 90 capsule 0 1/2/2018 at Unknown time   • etanercept (ENBREL) 50 MG/ML solution prefilled syringe injection Inject 50 mg under the skin 1 (one) time per week. THURSDAYS   Past Week at Unknown time   • folic acid (FOLVITE) 1 MG tablet TAKE 1 TABLET BY MOUTH DAILY. 90 tablet 1 1/2/2018 at Unknown time   • guaifenesin-codeine (GUAIFENESIN AC) 100-10 MG/5ML liquid Take 5 mL by mouth 3 (Three) Times a Day As Needed for Cough. 150 mL 1 1/2/2018 at Unknown time   • ipratropium-albuterol (COMBIVENT RESPIMAT)  MCG/ACT inhaler Inhale 2 puffs Daily.   1/2/2018 at Unknown time   • KLOR-CON 20 MEQ CR tablet TAKE 1 TABLET BY MOUTH DAILY. 30 tablet 4 1/2/2018 at Unknown time   • neomycin-polymyxin-dexamethamethasone (POLYDEX) 3.5-56741-9.1 ointment ophthalmic ointment Apply 1 application to eye Every 12 (Twelve) Hours.   1/2/2018 at Unknown time   • omeprazole (priLOSEC) 40 MG capsule TAKE 1 CAPSULE BY MOUTH DAILY (Patient taking differently: TAKE 1 CAPSULE BY MOUTH NIGHTLY) 90 capsule 3 1/1/2018 at Unknown time   • [START ON 1/3/2018] predniSONE (DELTASONE) 10 MG tablet Take 20 mg by mouth Daily.      • budesonide (PULMICORT) 0.5 MG/2ML nebulizer solution Take 0.5 mg by nebulization Daily.      • guaiFENesin (MUCINEX) 600 MG 12 hr tablet Take 1,200 mg by mouth 2 (Two) Times a Day.   Not Taking   • mupirocin (BACTROBAN) 2 % ointment Apply  topically 3 (Three) Times a Day.      • tobramycin (MARIA LUISA) Take 300 mg by nebulization 2 (Two) Times a Day.   Not Taking   • Unable to find 1 each 1 (One) Time. neilmed sinus   Unknown     Allergies   Allergen Reactions   • Gold-Containing Drug Products    • Hydrocodone Irritability     HYPERACTIVITY       Review of Systems  General Not Present- Chills, Fever, Night Sweats, Poor Appetite and Weight  Loss.  Skin Not Present- Nail Changes and Rash.  HEENT Not Present- Ear Pain, Eye Pain, Hoarseness, Nasal Congestion, Post Nasal Drip, Recurrent Nose Bleeds and Sores in Mouth.  Respiratory Present- Cough, Shortness of Breath and Wheezing. Not Present- Awakens Exhausted, Bloody sputum, Chest Discomfort/Tightness, Excessive Daytime Sleepiness, Snoring, Trouble Falling Asleep and Wakes up from Sleep Wheezing or Short of Breath.  Cardiovascular Present- Difficulty Breathing Lying Down. Not Present- Chest Pain, Palpitations and Swollen Ankles or Legs.  Gastrointestinal Not Present- Belching, Difficulty Swallowing, Frequent Heartburn and Indigestion.  Male Genitourinary Not Present- Difficulty Emptying Bladder and Frequent Urination.  Female Genitourinary Not Present- Difficulty Emptying Bladder, Frequent Urination and Pregnancy.  Musculoskeletal Not Present- Joint Swelling and Muscle Weakness.  Neurological Not Present- Difficulty Speaking and Seizures.  Psychiatric Not Present- Anxiety and Depression.  Endocrine Not Present- Cold Intolerance and Excessive Sweating.  Hematology Not Present- Enlarged Lymph Nodes and Excessive bleeding.    Vital Signs past 24hrs  BP range: BP: (136)/(68) 136/68  Pulse range: Heart Rate:  [77] 77  Resp rate range: Resp:  [20] 20  Temp range: Temp (24hrs), Av.3 °F (36.3 °C), Min:97.3 °F (36.3 °C), Max:97.3 °F (36.3 °C)    Oxygen range: SpO2:  [93 %] 93 %;  ;   O2 Device: room air  58.9 kg (129 lb 12.8 oz); Body mass index is 22.99 kg/(m^2).       Physical Exam (Tariq Sigala MD; 2018 1:48 PM)  General  General Appearance - Not in acute distress.  Build & Nutrition - Well developed.  Gait - Ambulatory.  Oxygen Therapy - Breathing Room Air.    Head and Neck  Trachea - midline.  Thyroid  Gland Characteristics - normal size and consistency.    ENMT  Global Assessment  Upon examination of the ears, nose, mouth and throat - external inspection reveals no scars, masses or lesions.  Mouth and  Throat  Oral Cavity/Oropharynx - Teeth - Inspection reveals normal dentation. Tongue - normal, not enlarged. Oropharynx - Mallampati III (NARROW PX), no thrush present. Tonsils - Characteristics - Bilateral - no hypertrophy.    Chest and Lung Exam  Inspection  Chest Wall - Normal. Shape - Normal and Symmetric. Movements - Normal and Symmetrical. Accessory muscles - No use of accessory muscles in breathing.  Percussion  Quality and Intensity - Percussion of the chest reveals - Normal resonance. Diaphragm - Percussion Normal.  Palpation  Palpation of the chest reveals - Non-tender.  Auscultation  Breath sounds - Decreased - Both Lung Fields. Adventitious sounds - Expiratory wheeze - Both Lung Fields (MILD TO MODERATE COARSE).    Cardiovascular  Auscultation  Rhythm - Regular. Rate - heart rate is normal. Murmurs & Other Heart Sounds - Auscultation of the heart reveals - No Murmurs. Heart Sounds - Normal heart sounds.    Abdomen  Palpation/Percussion  Palpation and Percussion of the abdomen reveal - soft and non-tender. Liver - Normal. Spleen - Normal.  Auscultation  - Bowel sounds normal.    Peripheral Vascular  Upper Extremity  Inspection - Bilateral - Pink nail beds, No Digital clubbing.  Lower Extremity  Palpation - Edema - Bilateral - No edema.    Neuropsychiatric  Orientation - oriented X3.  The patient's mood and affect are described as  - normal.  Judgment and Insight - insight is appropriate concerning matters relevant to self.    Lymphatic  General  Cervical Nodes - Grossly normal bilaterally. Supraclavicular Nodes - Grossly normal bilaterally.    Results Review:    I have reviewed the laboratory and imaging data from current admission. My annotations are as noted in assessment and plan.    Medication Review:  I have reviewed the current MAR. My annotations are as noted in assessment and plan.    Plan   PCCM Problems  COPD exacerbation, not resolving  Nocturnal hypoxia, chronic  Now relative hypoxia while  awake    Plan of Treatment  I offered the patient admission from the office and she agreed.  She has been dealing with this now since the beginning of November if not earlier.  She continues to have wheezing.  She requires a bronchoscopy.  I briefly discussed pros and cons with her.  She is agreeable to procedure.  I have made her nothing by mouth after midnight for the same in case the hospital team decides to proceed.    Due to persistent wheezing I'll have started her on IV steroids.  We'll treat her here with bronchodilators along with acetylcysteine.  We will check a respiratory virus panel as well as sputum sample for bacterial infection.  He chest x-ray will be repeated.  Routine labs have been ordered.    I have started her Lovenox prophylaxis from tomorrow night in case bronchoscopy is done tomorrow morning.    Part of this note may be an electronic transcription/translation of spoken language to printed text using the Dragon Dictation System.

## 2018-01-03 ENCOUNTER — ANESTHESIA (OUTPATIENT)
Dept: GASTROENTEROLOGY | Facility: HOSPITAL | Age: 78
End: 2018-01-03

## 2018-01-03 ENCOUNTER — ANESTHESIA EVENT (OUTPATIENT)
Dept: GASTROENTEROLOGY | Facility: HOSPITAL | Age: 78
End: 2018-01-03

## 2018-01-03 PROBLEM — R05.3 PERSISTENT COUGH: Status: ACTIVE | Noted: 2018-01-03

## 2018-01-03 LAB
APPEARANCE FLD: ABNORMAL
COLOR FLD: ABNORMAL
EOSINOPHIL NFR FLD MANUAL: 1 %
GIE STN SPEC: NORMAL
GLUCOSE BLDC GLUCOMTR-MCNC: 127 MG/DL (ref 70–130)
GLUCOSE BLDC GLUCOMTR-MCNC: 136 MG/DL (ref 70–130)
GLUCOSE BLDC GLUCOMTR-MCNC: 160 MG/DL (ref 70–130)
GLUCOSE BLDC GLUCOMTR-MCNC: 164 MG/DL (ref 70–130)
LYMPHOCYTES NFR FLD MANUAL: 7 %
MONOS+MACROS NFR FLD: 82 %
NEUTROPHILS NFR FLD MANUAL: 10 %
OTHER CELLS FLUID PER 100/WBCS: 2 /100 WBCS
RBC # FLD AUTO: 3800 /MM3
WBC # FLD: 740 /MM3

## 2018-01-03 PROCEDURE — 87205 SMEAR GRAM STAIN: CPT | Performed by: INTERNAL MEDICINE

## 2018-01-03 PROCEDURE — 25010000002 METHYLPREDNISOLONE PER 40 MG: Performed by: INTERNAL MEDICINE

## 2018-01-03 PROCEDURE — 88312 SPECIAL STAINS GROUP 1: CPT | Performed by: INTERNAL MEDICINE

## 2018-01-03 PROCEDURE — 87206 SMEAR FLUORESCENT/ACID STAI: CPT | Performed by: INTERNAL MEDICINE

## 2018-01-03 PROCEDURE — 63710000001 INSULIN ASPART PER 5 UNITS: Performed by: INTERNAL MEDICINE

## 2018-01-03 PROCEDURE — 0B9D8ZX DRAINAGE OF RIGHT MIDDLE LUNG LOBE, VIA NATURAL OR ARTIFICIAL OPENING ENDOSCOPIC, DIAGNOSTIC: ICD-10-PCS | Performed by: INTERNAL MEDICINE

## 2018-01-03 PROCEDURE — 87102 FUNGUS ISOLATION CULTURE: CPT | Performed by: INTERNAL MEDICINE

## 2018-01-03 PROCEDURE — 25010000002 PROPOFOL 10 MG/ML EMULSION: Performed by: NURSE ANESTHETIST, CERTIFIED REGISTERED

## 2018-01-03 PROCEDURE — 82962 GLUCOSE BLOOD TEST: CPT

## 2018-01-03 PROCEDURE — 88112 CYTOPATH CELL ENHANCE TECH: CPT | Performed by: INTERNAL MEDICINE

## 2018-01-03 PROCEDURE — 87071 CULTURE AEROBIC QUANT OTHER: CPT | Performed by: INTERNAL MEDICINE

## 2018-01-03 PROCEDURE — 25010000002 ENOXAPARIN PER 10 MG: Performed by: INTERNAL MEDICINE

## 2018-01-03 PROCEDURE — 94799 UNLISTED PULMONARY SVC/PX: CPT

## 2018-01-03 PROCEDURE — 88305 TISSUE EXAM BY PATHOLOGIST: CPT | Performed by: INTERNAL MEDICINE

## 2018-01-03 PROCEDURE — 25010000002 SUCCINYLCHOLINE PER 20 MG: Performed by: NURSE ANESTHETIST, CERTIFIED REGISTERED

## 2018-01-03 PROCEDURE — 87116 MYCOBACTERIA CULTURE: CPT | Performed by: INTERNAL MEDICINE

## 2018-01-03 PROCEDURE — 89051 BODY FLUID CELL COUNT: CPT | Performed by: INTERNAL MEDICINE

## 2018-01-03 PROCEDURE — 25010000002 PROPOFOL 1000 MG/ML EMULSION: Performed by: NURSE ANESTHETIST, CERTIFIED REGISTERED

## 2018-01-03 PROCEDURE — 0B9H8ZX DRAINAGE OF LUNG LINGULA, VIA NATURAL OR ARTIFICIAL OPENING ENDOSCOPIC, DIAGNOSTIC: ICD-10-PCS | Performed by: INTERNAL MEDICINE

## 2018-01-03 RX ORDER — GLYCOPYRROLATE 0.2 MG/ML
INJECTION INTRAMUSCULAR; INTRAVENOUS AS NEEDED
Status: DISCONTINUED | OUTPATIENT
Start: 2018-01-03 | End: 2018-01-03 | Stop reason: SURG

## 2018-01-03 RX ORDER — LIDOCAINE HYDROCHLORIDE 20 MG/ML
INJECTION, SOLUTION EPIDURAL; INFILTRATION; INTRACAUDAL; PERINEURAL AS NEEDED
Status: DISCONTINUED | OUTPATIENT
Start: 2018-01-03 | End: 2018-01-03 | Stop reason: HOSPADM

## 2018-01-03 RX ORDER — SODIUM CHLORIDE 0.9 % (FLUSH) 0.9 %
3 SYRINGE (ML) INJECTION AS NEEDED
Status: DISCONTINUED | OUTPATIENT
Start: 2018-01-03 | End: 2018-01-03

## 2018-01-03 RX ORDER — SUCCINYLCHOLINE CHLORIDE 20 MG/ML
INJECTION INTRAMUSCULAR; INTRAVENOUS AS NEEDED
Status: DISCONTINUED | OUTPATIENT
Start: 2018-01-03 | End: 2018-01-03 | Stop reason: SURG

## 2018-01-03 RX ORDER — SODIUM CHLORIDE, SODIUM LACTATE, POTASSIUM CHLORIDE, CALCIUM CHLORIDE 600; 310; 30; 20 MG/100ML; MG/100ML; MG/100ML; MG/100ML
1000 INJECTION, SOLUTION INTRAVENOUS CONTINUOUS PRN
Status: DISCONTINUED | OUTPATIENT
Start: 2018-01-03 | End: 2018-01-08 | Stop reason: HOSPADM

## 2018-01-03 RX ORDER — LIDOCAINE HYDROCHLORIDE 20 MG/ML
INJECTION, SOLUTION INFILTRATION; PERINEURAL AS NEEDED
Status: DISCONTINUED | OUTPATIENT
Start: 2018-01-03 | End: 2018-01-03 | Stop reason: SURG

## 2018-01-03 RX ORDER — OSELTAMIVIR PHOSPHATE 75 MG/1
75 CAPSULE ORAL EVERY 12 HOURS SCHEDULED
Status: COMPLETED | OUTPATIENT
Start: 2018-01-03 | End: 2018-01-07

## 2018-01-03 RX ORDER — PROPOFOL 10 MG/ML
VIAL (ML) INTRAVENOUS AS NEEDED
Status: DISCONTINUED | OUTPATIENT
Start: 2018-01-03 | End: 2018-01-03 | Stop reason: SURG

## 2018-01-03 RX ADMIN — OSELTAMIVIR PHOSPHATE 75 MG: 75 CAPSULE ORAL at 21:23

## 2018-01-03 RX ADMIN — IPRATROPIUM BROMIDE AND ALBUTEROL SULFATE 3 ML: .5; 3 SOLUTION RESPIRATORY (INHALATION) at 16:17

## 2018-01-03 RX ADMIN — INSULIN ASPART 2 UNITS: 100 INJECTION, SOLUTION INTRAVENOUS; SUBCUTANEOUS at 21:23

## 2018-01-03 RX ADMIN — METHYLPREDNISOLONE SODIUM SUCCINATE 40 MG: 40 INJECTION, POWDER, FOR SOLUTION INTRAMUSCULAR; INTRAVENOUS at 07:40

## 2018-01-03 RX ADMIN — PROPOFOL 150 MG: 10 INJECTION, EMULSION INTRAVENOUS at 11:30

## 2018-01-03 RX ADMIN — SODIUM CHLORIDE, POTASSIUM CHLORIDE, SODIUM LACTATE AND CALCIUM CHLORIDE 1000 ML: 600; 310; 30; 20 INJECTION, SOLUTION INTRAVENOUS at 11:07

## 2018-01-03 RX ADMIN — AMLODIPINE BESYLATE 10 MG: 10 TABLET ORAL at 09:33

## 2018-01-03 RX ADMIN — PROPOFOL 150 MCG/KG/MIN: 10 INJECTION, EMULSION INTRAVENOUS at 11:31

## 2018-01-03 RX ADMIN — FOLIC ACID 1 MG: 1 TABLET ORAL at 09:33

## 2018-01-03 RX ADMIN — IPRATROPIUM BROMIDE AND ALBUTEROL SULFATE 3 ML: .5; 3 SOLUTION RESPIRATORY (INHALATION) at 07:57

## 2018-01-03 RX ADMIN — IPRATROPIUM BROMIDE AND ALBUTEROL SULFATE 3 ML: .5; 3 SOLUTION RESPIRATORY (INHALATION) at 23:03

## 2018-01-03 RX ADMIN — PANTOPRAZOLE SODIUM 40 MG: 40 TABLET, DELAYED RELEASE ORAL at 07:39

## 2018-01-03 RX ADMIN — SUCCINYLCHOLINE CHLORIDE 40 MG: 20 INJECTION, SOLUTION INTRAMUSCULAR; INTRAVENOUS at 11:34

## 2018-01-03 RX ADMIN — IPRATROPIUM BROMIDE AND ALBUTEROL SULFATE 3 ML: .5; 3 SOLUTION RESPIRATORY (INHALATION) at 19:11

## 2018-01-03 RX ADMIN — POTASSIUM CHLORIDE 20 MEQ: 750 CAPSULE, EXTENDED RELEASE ORAL at 09:33

## 2018-01-03 RX ADMIN — INSULIN ASPART 2 UNITS: 100 INJECTION, SOLUTION INTRAVENOUS; SUBCUTANEOUS at 13:21

## 2018-01-03 RX ADMIN — GLYCOPYRROLATE 0.2 MG: 0.2 INJECTION INTRAMUSCULAR; INTRAVENOUS at 11:27

## 2018-01-03 RX ADMIN — OSELTAMIVIR PHOSPHATE 75 MG: 75 CAPSULE ORAL at 12:55

## 2018-01-03 RX ADMIN — VALSARTAN 320 MG: 320 TABLET, FILM COATED ORAL at 09:33

## 2018-01-03 RX ADMIN — ATORVASTATIN CALCIUM 20 MG: 20 TABLET, FILM COATED ORAL at 09:33

## 2018-01-03 RX ADMIN — HYDROCHLOROTHIAZIDE 25 MG: 25 TABLET ORAL at 09:33

## 2018-01-03 RX ADMIN — METHYLPREDNISOLONE SODIUM SUCCINATE 40 MG: 40 INJECTION, POWDER, FOR SOLUTION INTRAMUSCULAR; INTRAVENOUS at 18:18

## 2018-01-03 RX ADMIN — IPRATROPIUM BROMIDE AND ALBUTEROL SULFATE 3 ML: .5; 3 SOLUTION RESPIRATORY (INHALATION) at 03:41

## 2018-01-03 RX ADMIN — LIDOCAINE HYDROCHLORIDE 80 MG: 20 INJECTION, SOLUTION INFILTRATION; PERINEURAL at 11:30

## 2018-01-03 RX ADMIN — DULOXETINE HYDROCHLORIDE 60 MG: 60 CAPSULE, DELAYED RELEASE ORAL at 09:33

## 2018-01-03 RX ADMIN — ENOXAPARIN SODIUM 40 MG: 40 INJECTION SUBCUTANEOUS at 21:23

## 2018-01-03 RX ADMIN — BUPROPION HYDROCHLORIDE 150 MG: 150 TABLET, EXTENDED RELEASE ORAL at 07:39

## 2018-01-03 NOTE — PLAN OF CARE
Problem: Bronchoscopy (Adult)  Intervention: Monitor/Manage Procedure Recovery   01/03/18 1043   Respiratory Interventions   Airway/Ventilation Management airway patency maintained   Coping/Psychosocial Interventions   Environmental Support calm environment promoted   Activity   Activity Type activity adjusted per tolerance     Intervention: Promote Effective Ventilation During Bronchoscopy   01/03/18 1043   Respiratory Interventions   Airway/Ventilation Management airway patency maintained       Goal: Signs and Symptoms of Listed Potential Problems Will be Absent or Manageable (Bronchoscopy)  Outcome: Ongoing (interventions implemented as appropriate)   01/03/18 1043   Bronchoscopy   Problems Assessed (Bronchoscopy) all   Problems Present (Bronchoscopy) none

## 2018-01-03 NOTE — PROGRESS NOTES
[facility] INPATIENT PROGRESS NOTE         [facility] ENDO SUITES    1/3/2018      PATIENT IDENTIFICATION:   Name:  Janet Martinez      MRN:  [id number]     77 y.o.  female             LOS 1    Reason for visit: f/u resp failure with AECOPD and flu    SUBJECTIVE:    Still soa and with cough and moderate wheeze.    Objective   OBJECTIVE:    Vital Sign Min/Max for last 24 hours  Temp  Min: 97 °F (36.1 °C)  Max: 98.6 °F (37 °C)   BP  Min: 116/67  Max: 158/84   Pulse  Min: 75  Max: 88   Resp  Min: 18  Max: 20   SpO2  Min: 91 %  Max: 97 %   No Data Recorded   Weight  Min: 58.9 kg (129 lb 12.8 oz)  Max: 58.9 kg (129 lb 12.8 oz)                         Body mass index is 22.99 kg/(m^2).    Intake/Output Summary (Last 24 hours) at 01/03/18 1121  Last data filed at 01/03/18 0611   Gross per 24 hour   Intake              600 ml   Output              800 ml   Net             -200 ml         Exam:  GEN:  No distress, appears stated age  EYES:   PERRL, anicteric sclera  ENT:    External ears/nose normal, OP clear  NECK:  No adenopathy, midline trachea  LUNGS: Normal chest on inspection, palpation and diffuse wheeze on auscultation  CV:  Normal S1S2, without murmur  ABD:  Non tender, non distended, no hepatosplenomegaly, +BS  EXT:  No edema, cyanosis or clubbing    Scheduled meds:    amLODIPine 10 mg Oral Q24H   atorvastatin 20 mg Oral Daily   buPROPion  mg Oral QAM   DULoxetine 60 mg Oral Daily   enoxaparin 40 mg Subcutaneous Nightly   folic acid 1 mg Oral Daily   valsartan 320 mg Oral Q24H   And      hydrochlorothiazide 25 mg Oral Daily   insulin aspart 0-9 Units Subcutaneous 4x Daily With Meals & Nightly   ipratropium-albuterol 3 mL Nebulization Q4H - RT   methylPREDNISolone sodium succinate 40 mg Intravenous Q12H   oseltamivir 75 mg Oral Q12H   pantoprazole 40 mg Oral QAM   potassium chloride 20 mEq Oral Daily     IV meds:                        lactated ringers 1,000 mL Last Rate: 1,000 mL (01/03/18 1107)      Data Review:    Results from last 7 days  Lab Units 01/02/18 2006   SODIUM mmol/L 138   POTASSIUM mmol/L 3.2*   CHLORIDE mmol/L 93*   CO2 mmol/L 28.0   BUN mg/dL 10   CREATININE mg/dL 0.66   GLUCOSE mg/dL 229*   CALCIUM mg/dL 8.8         Estimated Creatinine Clearance: 56.5 mL/min (by C-G formula based on Cr of 0.66).    Results from last 7 days  Lab Units 01/02/18 2006   WBC 10*3/mm3 6.49   HEMOGLOBIN g/dL 12.4   PLATELETS 10*3/mm3 280       Results from last 7 days  Lab Units 01/02/18 2006   INR  0.99       Results from last 7 days  Lab Units 01/02/18 2006   ALT (SGPT) U/L 18   AST (SGOT) U/L 23              Influenza A H3 Detected (A)             CXR 1/2/18 viewed    )Assessment   ASSESSMENT:   Influenza   COPD exacerbation  Nocturnal hypoxia, chronic  Now relative hypoxia while awake    PLAN:  Plan for bronchoscopy.  Continue same inhaled therapy  Tamiflu    Trenton Garcia MD  Pulmonary and Critical Care Medicine  [facility], [facility]  1/3/2018    11:21 AM

## 2018-01-03 NOTE — PLAN OF CARE
Problem: Patient Care Overview (Adult)  Goal: Plan of Care Review  Outcome: Ongoing (interventions implemented as appropriate)   01/03/18 8360   Coping/Psychosocial Response Interventions   Plan Of Care Reviewed With patient   Patient Care Overview   Progress no change   Outcome Evaluation   Outcome Summary/Follow up Plan Bronch done today with lavage. Pt c/o soa when up to take a shower, 2LO2 applied and breathing tx given per respiratory. Droplet isolation maintained. VSS. Regular diet initiated after bronch. Pt got 2U of insulin per ssi for lunch. No coverage needed for breakfast or supper. No c/o pain, n/v/d. Will continue to monitor.      Goal: Adult Individualization and Mutuality  Outcome: Ongoing (interventions implemented as appropriate)    Goal: Discharge Needs Assessment  Outcome: Ongoing (interventions implemented as appropriate)      Problem: Respiratory Insufficiency (Adult)  Goal: Acid/Base Balance  Outcome: Ongoing (interventions implemented as appropriate)    Goal: Effective Ventilation  Outcome: Ongoing (interventions implemented as appropriate)

## 2018-01-03 NOTE — ANESTHESIA PREPROCEDURE EVALUATION
Anesthesia Evaluation     Patient summary reviewed and Nursing notes reviewed   history of anesthetic complications: PONV  NPO Solid Status: > 8 hours  NPO Liquid Status: > 8 hours     Airway   Mallampati: II  TM distance: >3 FB  Neck ROM: full  no difficulty expected  Dental - normal exam     Pulmonary    (+) a smoker Former, COPD mild, recent URI, rhonchi, decreased breath sounds,     ROS comment: Currently with H.flu.  Baseline COPD is stable, rare MDI's, does use 2L home O2 qhs.  Cardiovascular - normal exam  Exercise tolerance: good (4-7 METS)    Rhythm: regular  Rate: normal    (+) hypertension well controlled, hyperlipidemia      Neuro/Psych  (+) psychiatric history Anxiety and Depression,     GI/Hepatic/Renal/Endo    (+)  hiatal hernia,     Musculoskeletal     Abdominal  - normal exam   Substance History - negative use     OB/GYN          Other   (+) arthritis                                             Anesthesia Plan    ASA 3     general     intravenous induction   Anesthetic plan and risks discussed with patient.    Plan discussed with CRNA and attending.

## 2018-01-03 NOTE — PLAN OF CARE
Problem: Patient Care Overview (Adult)  Goal: Plan of Care Review  Outcome: Ongoing (interventions implemented as appropriate)   01/02/18 1826 01/02/18 2000 01/03/18 0402   Coping/Psychosocial Response Interventions   Plan Of Care Reviewed With --  patient --    Patient Care Overview   Progress no change --  --    Outcome Evaluation   Outcome Summary/Follow up Plan --  --  Steady gait. Resp panel pos influenza. Droplet isolation initiated. Sputum specimen sent. Productive cough with small amount thick, whitish sputum. SOA with exertion. On 2 liters o2 at night. NPO since midnight for pos bronch 1/3. Carter exp wheezes and rhonchi. No c/o pain.      Goal: Adult Individualization and Mutuality  Outcome: Ongoing (interventions implemented as appropriate)    Goal: Discharge Needs Assessment  Outcome: Ongoing (interventions implemented as appropriate)      Problem: Respiratory Insufficiency (Adult)  Goal: Acid/Base Balance  Outcome: Ongoing (interventions implemented as appropriate)    Goal: Effective Ventilation  Outcome: Ongoing (interventions implemented as appropriate)

## 2018-01-03 NOTE — PAYOR COMM NOTE
"Allie Martinez (77 y.o. Female)     Date of Birth Social Security Number Address Home Phone MRN    [id number]  [id number]   [facility] [address] [phone] [id number]    [facility] Mar[facility] Status          None        Admission Date Admission Type Admitting Provider Attending Provider Department, Room/Bed    1/2/18 Urgent Trenton Garcia MD Esterle, Mark Edwin, MD [facility] ENDO SUITES, ENDO/ENDO    Discharge Date Discharge Disposition Discharge Destination                      Attending Provider: Trenton Garcia MD     Allergies:  Gold-containing Drug Products, Hydrocodone    Isolation:  Droplet   Infection:  Influenza (01/02/18)   Code Status:  Prior    Ht:  160 cm (63\")   Wt:  58.9 kg (129 lb 12.8 oz)    Admission Cmt:  None   Principal Problem:  None                Active Insurance as of 1/2/2018     Primary Coverage     Payor Plan Insurance Group Employer/Plan Group    MISC COMMERCIAL MISC COMMERCIAL INS [id number]     Coverage Address Coverage Phone Number Effective From Effective To    [address] [phone] 6/5/2017     [place] [address]       Subscriber Name Subscriber Birth Date Member ID       ALLIE MARTINEZ 1940 [id number]           Secondary Coverage     Payor Plan Insurance Group Employer/Plan Group    ANTH MEDICARE REPLACEMENT ANTHEM MEDICARE ADVANTAGE [id number]     Payor Plan Address Payor Plan Phone Number Effective From Effective To    [address] [phone] 1/1/2016     [place] [address]       Subscriber Name Subscriber Birth Date Member ID       ALLIE MARTINEZ 1940 [id number]                 Emergency Contacts      (Rel.) Home Phone Work Phone Mobile Phone    MartinezÓscar hernandez (Spouse) [phone] -- [phone]    Mian Martinez (Son) [phone] -- --        ATTN: LIANNE VILLAFUERTE   REF#[id number]  PLEASE CALL BACK TO HIPOLITO [phone] OR [phone]  THANKS!  HIPOLITO       History & Physical    "   Tariq Sigala MD at 1/2/2018  5:57 PM              [facility]  Phone: [phone]  Tariq Sigala MD      Subjective   LOS: 0 days     The patient is a 77 year old female who presents with COPD. She came to see me in November 2018 bc of bronchitis symptoms. She has been to [facility] and ER prior. Ongoing for 1-2 months. She had 3 courses of prednisone. She got Azithromycin and finished Doxycycline. She ran out of [employer] 1 month ago and presently using Combivent inhaler 2 puffs once a day. She is using her nebs tid to qid. CxR 11/4/17 was clear. She has congested cough that is clear productive phlegm, small qty. Denies fever or chills. She notices wheezing. She reports recent sinus infection and using a sinus abx rinse bid presently. Also reports recent mild leg edema, though none today. Using O2 with sleep and needs to change supplier since [employer] not covered by insurance. She was prescribed albuterol nebulizer along with acetylcysteine nebs.    At subsequent visit end December on O2 at night through a different company. Using nebs tid. Some better but not all the way. She wakes up feeling congested and coughing but rest of day she is some better.  She denies any fever or chills.    Came in today with : Still coughing and it hurts her chest. Having to take codeine cough syrup at night for relief. Denies fever. Using nebs as prescribed. She is still on end of prednisone taper. Wheezing has been worse. Remains on O2 at night.       I have reviewed and edited the Past Medical History, Past Surgical History, Home Medications, Social History and Family History as of 5:58 PM on 01/02/18.    Prescriptions Prior to Admission   Medication Sig Dispense Refill Last Dose   • Amlodipine-Valsartan-HCTZ -25 MG tablet Take 10 mg by mouth Daily. 90 tablet 2 1/2/2018 at Unknown time   • atorvastatin (LIPITOR) 20 MG tablet Take 1 tablet by mouth Daily. (Patient taking differently: Take 20 mg by mouth Every Night.) 90  tablet 2 1/1/2018 at Unknown time   • buPROPion XL (WELLBUTRIN XL) 150 MG 24 hr tablet Take 1 tablet by mouth Every Morning. 90 tablet 1 1/2/2018 at Unknown time   • DULoxetine (CYMBALTA) 60 MG capsule TAKE 1 CAPSULE BY MOUTH DAILY. 90 capsule 0 1/2/2018 at Unknown time   • etanercept (ENBREL) 50 MG/ML solution prefilled syringe injection Inject 50 mg under the skin 1 (one) time per week. THURSDAYS   Past Week at Unknown time   • folic acid (FOLVITE) 1 MG tablet TAKE 1 TABLET BY MOUTH DAILY. 90 tablet 1 1/2/2018 at Unknown time   • guaifenesin-codeine (GUAIFENESIN AC) 100-10 MG/5ML liquid Take 5 mL by mouth 3 (Three) Times a Day As Needed for Cough. 150 mL 1 1/2/2018 at Unknown time   • ipratropium-albuterol (COMBIVENT RESPIMAT)  MCG/ACT inhaler Inhale 2 puffs Daily.   1/2/2018 at Unknown time   • KLOR-CON 20 MEQ CR tablet TAKE 1 TABLET BY MOUTH DAILY. 30 tablet 4 1/2/2018 at Unknown time   • neomycin-polymyxin-dexamethamethasone (POLYDEX) 3.5-50610-4.1 ointment ophthalmic ointment Apply 1 application to eye Every 12 (Twelve) Hours.   1/2/2018 at Unknown time   • omeprazole (priLOSEC) 40 MG capsule TAKE 1 CAPSULE BY MOUTH DAILY (Patient taking differently: TAKE 1 CAPSULE BY MOUTH NIGHTLY) 90 capsule 3 1/1/2018 at Unknown time   • [START ON 1/3/2018] predniSONE (DELTASONE) 10 MG tablet Take 20 mg by mouth Daily.      • budesonide (PULMICORT) 0.5 MG/2ML nebulizer solution Take 0.5 mg by nebulization Daily.      • guaiFENesin (MUCINEX) 600 MG 12 hr tablet Take 1,200 mg by mouth 2 (Two) Times a Day.   Not Taking   • mupirocin (BACTROBAN) 2 % ointment Apply  topically 3 (Three) Times a Day.      • tobramycin (MARIA LUISA) Take 300 mg by nebulization 2 (Two) Times a Day.   Not Taking   • Unable to find 1 each 1 (One) Time. neilmed sinus   Unknown     Allergies   Allergen Reactions   • Gold-Containing Drug Products    • Hydrocodone Irritability     HYPERACTIVITY       Review of Systems  General Not Present- Chills, Fever,  Night Sweats, Poor Appetite and Weight Loss.  Skin Not Present- Nail Changes and Rash.  HEENT Not Present- Ear Pain, Eye Pain, Hoarseness, Nasal Congestion, Post Nasal Drip, Recurrent Nose Bleeds and Sores in Mouth.  Respiratory Present- Cough, Shortness of Breath and Wheezing. Not Present- Awakens Exhausted, Bloody sputum, Chest Discomfort/Tightness, Excessive Daytime Sleepiness, Snoring, Trouble Falling Asleep and Wakes up from Sleep Wheezing or Short of Breath.  Cardiovascular Present- Difficulty Breathing Lying Down. Not Present- Chest Pain, Palpitations and Swollen Ankles or Legs.  Gastrointestinal Not Present- Belching, Difficulty Swallowing, Frequent Heartburn and Indigestion.  Male Genitourinary Not Present- Difficulty Emptying Bladder and Frequent Urination.  Female Genitourinary Not Present- Difficulty Emptying Bladder, Frequent Urination and Pregnancy.  Musculoskeletal Not Present- Joint Swelling and Muscle Weakness.  Neurological Not Present- Difficulty Speaking and Seizures.  Psychiatric Not Present- Anxiety and Depression.  Endocrine Not Present- Cold Intolerance and Excessive Sweating.  Hematology Not Present- Enlarged Lymph Nodes and Excessive bleeding.    Vital Signs past 24hrs  BP range: BP: (136)/(68) 136/68  Pulse range: Heart Rate:  [77] 77  Resp rate range: Resp:  [20] 20  Temp range: Temp (24hrs), Av.3 °F (36.3 °C), Min:97.3 °F (36.3 °C), Max:97.3 °F (36.3 °C)    Oxygen range: SpO2:  [93 %] 93 %;  ;   O2 Device: room air  58.9 kg (129 lb 12.8 oz); Body mass index is 22.99 kg/(m^2).       Physical Exam (Tariq Sigala MD; 2018 1:48 PM)  General  General Appearance - Not in acute distress.  Build & Nutrition - Well developed.  Gait - Ambulatory.  Oxygen Therapy - Breathing Room Air.    Head and Neck  Trachea - midline.  Thyroid  Gland Characteristics - normal size and consistency.    ENMT  Global Assessment  Upon examination of the ears, nose, mouth and throat - external inspection reveals  no scars, masses or lesions.  Mouth and Throat  Oral Cavity/Oropharynx - Teeth - Inspection reveals normal dentation. Tongue - normal, not enlarged. Oropharynx - Mallampati III (NARROW PX), no thrush present. Tonsils - Characteristics - Bilateral - no hypertrophy.    Chest and Lung Exam  Inspection  Chest Wall - Normal. Shape - Normal and Symmetric. Movements - Normal and Symmetrical. Accessory muscles - No use of accessory muscles in breathing.  Percussion  Quality and Intensity - Percussion of the chest reveals - Normal resonance. Diaphragm - Percussion Normal.  Palpation  Palpation of the chest reveals - Non-tender.  Auscultation  Breath sounds - Decreased - Both Lung Fields. Adventitious sounds - Expiratory wheeze - Both Lung Fields (MILD TO MODERATE COARSE).    Cardiovascular  Auscultation  Rhythm - Regular. Rate - heart rate is normal. Murmurs & Other Heart Sounds - Auscultation of the heart reveals - No Murmurs. Heart Sounds - Normal heart sounds.    Abdomen  Palpation/Percussion  Palpation and Percussion of the abdomen reveal - soft and non-tender. Liver - Normal. Spleen - Normal.  Auscultation  - Bowel sounds normal.    Peripheral Vascular  Upper Extremity  Inspection - Bilateral - Pink nail beds, No Digital clubbing.  Lower Extremity  Palpation - Edema - Bilateral - No edema.    Neuropsychiatric  Orientation - oriented X3.  The patient's mood and affect are described as  - normal.  Judgment and Insight - insight is appropriate concerning matters relevant to self.    Lymphatic  General  Cervical Nodes - Grossly normal bilaterally. Supraclavicular Nodes - Grossly normal bilaterally.    Results Review:    I have reviewed the laboratory and imaging data from current admission. My annotations are as noted in assessment and plan.    Medication Review:  I have reviewed the current MAR. My annotations are as noted in assessment and plan.    Plan   PCCM Problems  COPD exacerbation, not resolving  Nocturnal hypoxia,  chronic  Now relative hypoxia while awake    Plan of Treatment  I offered the patient admission from the office and she agreed.  She has been dealing with this now since the beginning of November if not earlier.  She continues to have wheezing.  She requires a bronchoscopy.  I briefly discussed pros and cons with her.  She is agreeable to procedure.  I have made her nothing by mouth after midnight for the same in case the hospital team decides to proceed.    Due to persistent wheezing I'll have started her on IV steroids.  We'll treat her here with bronchodilators along with acetylcysteine.  We will check a respiratory virus panel as well as sputum sample for bacterial infection.  He chest x-ray will be repeated.  Routine labs have been ordered.    I have started her Lovenox prophylaxis from tomorrow night in case bronchoscopy is done tomorrow morning.    Part of this note may be an electronic transcription/translation of spoken language to printed text using the Dragon Dictation System.       Electronically signed by Tariq Sigala MD at 1/2/2018  6:12 PM        Hospital Medications (all)       Dose Frequency Start End    amLODIPine (NORVASC) tablet 10 mg 10 mg Every 24 Hours Scheduled 1/3/2018     Sig - Route: Take 1 tablet by mouth Daily. - Oral    atorvastatin (LIPITOR) tablet 20 mg 20 mg Daily 1/2/2018     Sig - Route: Take 1 tablet by mouth Daily. - Oral    buPROPion XL (WELLBUTRIN XL) 24 hr tablet 150 mg 150 mg Every Morning 1/3/2018     Sig - Route: Take 1 tablet by mouth Every Morning. - Oral    dextrose (D50W) solution 25 g 25 g Every 15 Minutes PRN 1/2/2018     Sig - Route: Infuse 50 mL into a venous catheter Every 15 (Fifteen) Minutes As Needed for Low Blood Sugar (Blood Sugar Less Than 70, Patient Has IV Access - Unresponsive, NPO or Unable To Safely Swallow). - Intravenous    dextrose (GLUTOSE) oral gel 15 g 15 g Every 15 Minutes PRN 1/2/2018     Sig - Route: Take 15 g by mouth Every 15 (Fifteen) Minutes  "As Needed for Low Blood Sugar (Blood Sugar Less Than 70, Patient Alert, Is Not NPO & Can Safely Swallow). - Oral    DULoxetine (CYMBALTA) DR capsule 60 mg 60 mg Daily 1/2/2018     Sig - Route: Take 1 capsule by mouth Daily. - Oral    enoxaparin (LOVENOX) syringe 40 mg 40 mg Nightly 1/3/2018     Sig - Route: Inject 0.4 mL under the skin Every Night. - Subcutaneous    folic acid (FOLVITE) tablet 1 mg 1 mg Daily 1/2/2018     Sig - Route: Take 1 tablet by mouth Daily. - Oral    glucagon (human recombinant) (GLUCAGEN DIAGNOSTIC) injection 1 mg 1 mg Every 15 Minutes PRN 1/2/2018     Sig - Route: Inject 1 mg under the skin Every 15 (Fifteen) Minutes As Needed (Blood Glucose Less Than 70 - Patient Without IV Access - Unresponsive, NPO or Unable To Safely Swallow). - Subcutaneous    hydrochlorothiazide (HYDRODIURIL) tablet 25 mg 25 mg Daily 1/2/2018     Sig - Route: Take 1 tablet by mouth Daily. - Oral    Linked Group 1:  \"And\" Linked Group Details        insulin aspart (novoLOG) injection 0-9 Units 0-9 Units 4 Times Daily With Meals & Nightly 1/2/2018     Sig - Route: Inject 0-9 Units under the skin 4 (Four) Times a Day With Meals & at Bedtime. - Subcutaneous    ipratropium-albuterol (DUO-NEB) nebulizer solution 3 mL 3 mL Every 4 Hours - RT 1/2/2018     Sig - Route: Take 3 mL by nebulization Every 4 (Four) Hours. - Nebulization    ipratropium-albuterol (DUO-NEB) nebulizer solution 3 mL 3 mL Every 2 Hours PRN 1/2/2018     Sig - Route: Take 3 mL by nebulization Every 2 (Two) Hours As Needed for Shortness of Air. - Nebulization    lactated ringers infusion 1,000 mL 1,000 mL Continuous PRN 1/3/2018     Sig - Route: Infuse 1,000 mL into a venous catheter Continuous As Needed (Start Prior to Procedure). - Intravenous    Cosign for Ordering: Required by Trenton Garcia MD    methylPREDNISolone sodium succinate (SOLU-Medrol) injection 125 mg 125 mg Once 1/2/2018 1/2/2018    Sig - Route: Infuse 2 mL into a venous catheter 1 " "(One) Time. - Intravenous    methylPREDNISolone sodium succinate (SOLU-Medrol) injection 40 mg 40 mg Every 12 Hours 1/3/2018     Sig - Route: Infuse 1 mL into a venous catheter Every 12 (Twelve) Hours. - Intravenous    oseltamivir (TAMIFLU) capsule 75 mg 75 mg Every 12 Hours Scheduled 1/3/2018 1/8/2018    Sig - Route: Take 1 capsule by mouth Every 12 (Twelve) Hours. - Oral    pantoprazole (PROTONIX) EC tablet 40 mg 40 mg Every Morning 1/3/2018     Sig - Route: Take 1 tablet by mouth Every Morning. - Oral    potassium chloride (MICRO-K) CR capsule 20 mEq 20 mEq Daily 1/3/2018     Sig - Route: Take 2 capsules by mouth Daily. - Oral    sodium chloride 0.9 % flush 3 mL 3 mL As Needed 1/3/2018     Sig - Route: Infuse 3 mL into a venous catheter As Needed for Line Care. - Intravenous    Cosign for Ordering: Required by Trenton Garcia MD    valsartan (DIOVAN) tablet 320 mg 320 mg Every 24 Hours Scheduled 1/2/2018     Sig - Route: Take 1 tablet by mouth Daily. - Oral    Linked Group 1:  \"And\" Linked Group Details        lidocaine PF 2% (XYLOCAINE) injection (Discontinued)  As Needed 1/3/2018 1/3/2018    Sig: As Needed.    Reason for Discontinue: Patient Discharge    potassium chloride (MICRO-K) CR capsule 20 mEq (Discontinued) 20 mEq Daily 1/2/2018 1/2/2018    Sig - Route: Take 2 capsules by mouth Daily. - Oral    valsartan-hydrochlorothiazide (DIOVAN-HCT) 160-12.5 MG per tablet 2 tablet (Discontinued) 2 tablet Every 24 Hours Scheduled 1/2/2018 1/2/2018    Sig - Route: Take 2 tablets by mouth Daily. - Oral    valsartan-hydrochlorothiazide (DIOVAN-HCT) 160-12.5 MG per tablet 2 tablet (Discontinued) 2 tablet Every 24 Hours Scheduled 1/3/2018 1/2/2018    Sig - Route: Take 2 tablets by mouth Daily. - Oral    Reason for Discontinue: Formulary change             Operative/Procedure Notes (all)      Trenton Garcia MD at 1/3/2018 11:37 AM  Version 1 of 1         Bronchoscopy Procedure Note    Procedure:  1. Bronchoscopy, " Diagnostic    Pre-Operative Diagnosis:  Chronic cough    Post-Operative Diagnosis: Same    Indication:  Chronic cough    Anesthesia: Monitored Anesthesia Care (MAC)    Procedure Details: Patient was consented for the procedure with all risk and benefit of the procedure explained in detail.  Patient was given the opportunity to ask questions and all concerns were answered.  The bronchocope was inserted into the main airway via the LMA. An anatomical survey was done of the main airways and the subsegmental bronchus to at least the first subsegmental level of all five lobes of both lungs.  The findings are reported below.  A bronchialalveolar lavage was performed using aliquots of normal saline instilled into the airways then aspirated back.    Findings:  Bronchoscope passed through LMA to the level of the vocal cords.  Lidocaine used for local anesthetic over vocal cords.  Bronchoscope was passed between the vocal cords into the trachea.  All airways were visualized to at least the first subsegment level of all 5 lobes of both lungs.  Airways were of normal size and caliber.  No endobronchial lesions seen.  Erythematous and edematous airways noted bilaterally.  Bronchial alveolar lavage performed in right middle lobe and Lingula and sent for analysis with 180cc instilled and 67cc blood tinged, hazy return.    Estimated Blood Loss:  Minimal           Specimens:  BAL RML and Lingula                Complications:  None; patient tolerated the procedure well.           Disposition: PACU - hemodynamically stable.      Patient tolerated the procedure well.    Trenton Garcia MD  1/3/2018  11:37 AM       Electronically signed by Trenton Garcia MD at 1/3/2018 11:38 AM           Physician Progress Notes (last 72 hours) (Notes from 12/31/2017 12:40 PM through 1/3/2018 12:40 PM)      Trenton Garcia MD at 1/3/2018  8:00 AM  Version 1 of 1             [facility] INPATIENT PROGRESS NOTE         [facility] ENDO  SUITES    1/3/2018      PATIENT IDENTIFICATION:   Name:  Janet Martinez      MRN:  [id number]     77 y.o.  female             LOS 1    Reason for visit: f/u resp failure with AECOPD and flu    SUBJECTIVE:    Still soa and with cough and moderate wheeze.    Objective   OBJECTIVE:    Vital Sign Min/Max for last 24 hours  Temp  Min: 97 °F (36.1 °C)  Max: 98.6 °F (37 °C)   BP  Min: 116/67  Max: 158/84   Pulse  Min: 75  Max: 88   Resp  Min: 18  Max: 20   SpO2  Min: 91 %  Max: 97 %   No Data Recorded   Weight  Min: 58.9 kg (129 lb 12.8 oz)  Max: 58.9 kg (129 lb 12.8 oz)                         Body mass index is 22.99 kg/(m^2).    Intake/Output Summary (Last 24 hours) at 01/03/18 1121  Last data filed at 01/03/18 0611   Gross per 24 hour   Intake              600 ml   Output              800 ml   Net             -200 ml         Exam:  GEN:  No distress, appears stated age  EYES:   PERRL, anicteric sclera  ENT:    External ears/nose normal, OP clear  NECK:  No adenopathy, midline trachea  LUNGS: Normal chest on inspection, palpation and diffuse wheeze on auscultation  CV:  Normal S1S2, without murmur  ABD:  Non tender, non distended, no hepatosplenomegaly, +BS  EXT:  No edema, cyanosis or clubbing    Scheduled meds:    amLODIPine 10 mg Oral Q24H   atorvastatin 20 mg Oral Daily   buPROPion  mg Oral QAM   DULoxetine 60 mg Oral Daily   enoxaparin 40 mg Subcutaneous Nightly   folic acid 1 mg Oral Daily   valsartan 320 mg Oral Q24H   And      hydrochlorothiazide 25 mg Oral Daily   insulin aspart 0-9 Units Subcutaneous 4x Daily With Meals & Nightly   ipratropium-albuterol 3 mL Nebulization Q4H - RT   methylPREDNISolone sodium succinate 40 mg Intravenous Q12H   oseltamivir 75 mg Oral Q12H   pantoprazole 40 mg Oral QAM   potassium chloride 20 mEq Oral Daily     IV meds:                        lactated ringers 1,000 mL Last Rate: 1,000 mL (01/03/18 1107)     Data Review:    Results from last 7 days  Lab Units 01/02/18 2006    SODIUM mmol/L 138   POTASSIUM mmol/L 3.2*   CHLORIDE mmol/L 93*   CO2 mmol/L 28.0   BUN mg/dL 10   CREATININE mg/dL 0.66   GLUCOSE mg/dL 229*   CALCIUM mg/dL 8.8         Estimated Creatinine Clearance: 56.5 mL/min (by C-G formula based on Cr of 0.66).    Results from last 7 days  Lab Units 01/02/18 2006   WBC 10*3/mm3 6.49   HEMOGLOBIN g/dL 12.4   PLATELETS 10*3/mm3 280       Results from last 7 days  Lab Units 01/02/18 2006   INR  0.99       Results from last 7 days  Lab Units 01/02/18 2006   ALT (SGPT) U/L 18   AST (SGOT) U/L 23              Influenza A H3 Detected (A)             CXR 1/2/18 viewed    )Assessment   ASSESSMENT:   Influenza   COPD exacerbation  Nocturnal hypoxia, chronic  Now relative hypoxia while awake    PLAN:  Plan for bronchoscopy.  Continue same inhaled therapy  Tamiflu    Trenton Garcia MD  Pulmonary and Critical Care Medicine  [facility]  1/3/2018    11:21 AM        Electronically signed by Trenton Garcia MD at 1/3/2018 11:24 AM        Consult Notes (last 72 hours) (Notes from 12/31/2017 12:40 PM through 1/3/2018 12:40 PM)     No notes of this type exist for this encounter.

## 2018-01-03 NOTE — OP NOTE
Bronchoscopy Procedure Note    Procedure:  1. Bronchoscopy, Diagnostic    Pre-Operative Diagnosis:  Chronic cough    Post-Operative Diagnosis: Same    Indication:  Chronic cough    Anesthesia: Monitored Anesthesia Care (MAC)    Procedure Details: Patient was consented for the procedure with all risk and benefit of the procedure explained in detail.  Patient was given the opportunity to ask questions and all concerns were answered.  The bronchocope was inserted into the main airway via the LMA. An anatomical survey was done of the main airways and the subsegmental bronchus to at least the first subsegmental level of all five lobes of both lungs.  The findings are reported below.  A bronchialalveolar lavage was performed using aliquots of normal saline instilled into the airways then aspirated back.    Findings:  Bronchoscope passed through LMA to the level of the vocal cords.  Lidocaine used for local anesthetic over vocal cords.  Bronchoscope was passed between the vocal cords into the trachea.  All airways were visualized to at least the first subsegment level of all 5 lobes of both lungs.  Airways were of normal size and caliber.  No endobronchial lesions seen.  Erythematous and edematous airways noted bilaterally.  Bronchial alveolar lavage performed in right middle lobe and Lingula and sent for analysis with 180cc instilled and 67cc blood tinged, hazy return.    Estimated Blood Loss:  Minimal           Specimens:  BAL RML and Lingula                Complications:  None; patient tolerated the procedure well.           Disposition: PACU - hemodynamically stable.      Patient tolerated the procedure well.    Trenton Garcia MD  1/3/2018  11:37 AM

## 2018-01-03 NOTE — ANESTHESIA POSTPROCEDURE EVALUATION
"Patient: Janet Martinez    Procedure Summary     Date Anesthesia Start Anesthesia Stop Room / Location    01/03/18 1122 1157  [facility] /  [facility]       Procedure Diagnosis Surgeon Provider    BRONCHOSCOPY with lavage in right middle lobe and lingula. (N/A Bronchus) (Pneumonia) MD Waqar Batres MD          Anesthesia Type: general  Last vitals  BP   137/82 (01/03/18 1220)   Temp   36.6 °C (97.9 °F) (01/03/18 1207)   Pulse   88 (01/03/18 1220)   Resp   14 (01/03/18 1220)     SpO2   93 % (01/03/18 1220)     Post Anesthesia Care and Evaluation    Patient location during evaluation: PACU  Patient participation: complete - patient participated  Level of consciousness: awake and alert  Pain score: 0  Pain management: adequate  Airway patency: patent  Anesthetic complications: No anesthetic complications    Cardiovascular status: acceptable  Respiratory status: acceptable  Hydration status: acceptable    Comments: /82 (BP Location: Right arm, Patient Position: Lying)  Pulse 88  Temp 36.6 °C (97.9 °F) (Oral)   Resp 14  Ht 160 cm (63\")  Wt 58.9 kg (129 lb 12.8 oz)  SpO2 93%  BMI 22.99 kg/m2      "

## 2018-01-03 NOTE — ANESTHESIA PROCEDURE NOTES
Airway  Urgency: elective    Airway not difficult    General Information and Staff    Patient location during procedure: OR  Anesthesiologist: CARINE PETERSON  CRNA: MUSTAPHA APONTE    Indications and Patient Condition  Indications for airway management: airway protection    Preoxygenated: yes  MILS maintained throughout  Mask difficulty assessment: 1 - vent by mask    Final Airway Details  Final airway type: supraglottic airway      Successful airway: unique  Size 4  Cuff Pressure (cm H2O): 14  Airway Seal Pressure (cm H2O): 22    Number of attempts at approach: 1

## 2018-01-04 ENCOUNTER — TELEPHONE (OUTPATIENT)
Dept: INTERNAL MEDICINE | Facility: CLINIC | Age: 78
End: 2018-01-04

## 2018-01-04 LAB
BACTERIA SPEC RESP CULT: NORMAL
BACTERIA SPEC RESP CULT: NORMAL
CYTO UR: NORMAL
GLUCOSE BLDC GLUCOMTR-MCNC: 101 MG/DL (ref 70–130)
GLUCOSE BLDC GLUCOMTR-MCNC: 130 MG/DL (ref 70–130)
GLUCOSE BLDC GLUCOMTR-MCNC: 145 MG/DL (ref 70–130)
GLUCOSE BLDC GLUCOMTR-MCNC: 212 MG/DL (ref 70–130)
GRAM STN SPEC: NORMAL
LAB AP CASE REPORT: NORMAL
LAB AP CLINICAL INFORMATION: NORMAL
Lab: NORMAL
PATH REPORT.FINAL DX SPEC: NORMAL
PATH REPORT.GROSS SPEC: NORMAL

## 2018-01-04 PROCEDURE — 63710000001 INSULIN ASPART PER 5 UNITS: Performed by: INTERNAL MEDICINE

## 2018-01-04 PROCEDURE — 25010000002 ENOXAPARIN PER 10 MG: Performed by: INTERNAL MEDICINE

## 2018-01-04 PROCEDURE — 25010000002 METHYLPREDNISOLONE PER 40 MG: Performed by: INTERNAL MEDICINE

## 2018-01-04 PROCEDURE — 82962 GLUCOSE BLOOD TEST: CPT

## 2018-01-04 PROCEDURE — 94799 UNLISTED PULMONARY SVC/PX: CPT

## 2018-01-04 RX ORDER — PREDNISONE 20 MG/1
40 TABLET ORAL
Status: DISCONTINUED | OUTPATIENT
Start: 2018-01-05 | End: 2018-01-06

## 2018-01-04 RX ORDER — SENNOSIDES 8.6 MG
2 TABLET ORAL NIGHTLY
Status: DISCONTINUED | OUTPATIENT
Start: 2018-01-04 | End: 2018-01-08 | Stop reason: HOSPADM

## 2018-01-04 RX ADMIN — IPRATROPIUM BROMIDE AND ALBUTEROL SULFATE 3 ML: .5; 3 SOLUTION RESPIRATORY (INHALATION) at 20:11

## 2018-01-04 RX ADMIN — HYDROCHLOROTHIAZIDE 25 MG: 25 TABLET ORAL at 08:06

## 2018-01-04 RX ADMIN — IPRATROPIUM BROMIDE AND ALBUTEROL SULFATE 3 ML: .5; 3 SOLUTION RESPIRATORY (INHALATION) at 07:19

## 2018-01-04 RX ADMIN — OSELTAMIVIR PHOSPHATE 75 MG: 75 CAPSULE ORAL at 21:55

## 2018-01-04 RX ADMIN — DULOXETINE HYDROCHLORIDE 60 MG: 60 CAPSULE, DELAYED RELEASE ORAL at 08:06

## 2018-01-04 RX ADMIN — ENOXAPARIN SODIUM 40 MG: 40 INJECTION SUBCUTANEOUS at 21:55

## 2018-01-04 RX ADMIN — FOLIC ACID 1 MG: 1 TABLET ORAL at 08:06

## 2018-01-04 RX ADMIN — IPRATROPIUM BROMIDE AND ALBUTEROL SULFATE 3 ML: .5; 3 SOLUTION RESPIRATORY (INHALATION) at 04:13

## 2018-01-04 RX ADMIN — ATORVASTATIN CALCIUM 20 MG: 20 TABLET, FILM COATED ORAL at 08:06

## 2018-01-04 RX ADMIN — METHYLPREDNISOLONE SODIUM SUCCINATE 40 MG: 40 INJECTION, POWDER, FOR SOLUTION INTRAMUSCULAR; INTRAVENOUS at 06:58

## 2018-01-04 RX ADMIN — OSELTAMIVIR PHOSPHATE 75 MG: 75 CAPSULE ORAL at 08:06

## 2018-01-04 RX ADMIN — SENNOSIDES 17.2 MG: 8.6 TABLET, FILM COATED ORAL at 21:55

## 2018-01-04 RX ADMIN — VALSARTAN 320 MG: 320 TABLET, FILM COATED ORAL at 08:06

## 2018-01-04 RX ADMIN — IPRATROPIUM BROMIDE AND ALBUTEROL SULFATE 3 ML: .5; 3 SOLUTION RESPIRATORY (INHALATION) at 10:56

## 2018-01-04 RX ADMIN — POTASSIUM CHLORIDE 20 MEQ: 750 CAPSULE, EXTENDED RELEASE ORAL at 08:06

## 2018-01-04 RX ADMIN — AMLODIPINE BESYLATE 10 MG: 10 TABLET ORAL at 08:06

## 2018-01-04 RX ADMIN — BUPROPION HYDROCHLORIDE 150 MG: 150 TABLET, EXTENDED RELEASE ORAL at 06:27

## 2018-01-04 RX ADMIN — IPRATROPIUM BROMIDE AND ALBUTEROL SULFATE 3 ML: .5; 3 SOLUTION RESPIRATORY (INHALATION) at 14:54

## 2018-01-04 RX ADMIN — PANTOPRAZOLE SODIUM 40 MG: 40 TABLET, DELAYED RELEASE ORAL at 06:27

## 2018-01-04 RX ADMIN — INSULIN ASPART 4 UNITS: 100 INJECTION, SOLUTION INTRAVENOUS; SUBCUTANEOUS at 12:30

## 2018-01-04 NOTE — PLAN OF CARE
Problem: Patient Care Overview (Adult)  Goal: Plan of Care Review  Outcome: Ongoing (interventions implemented as appropriate)   01/04/18 1550   Coping/Psychosocial Response Interventions   Plan Of Care Reviewed With patient   Patient Care Overview   Progress improving   Outcome Evaluation   Outcome Summary/Follow up Plan no c/o pain, c/o soa with exertion, n/p cough, O2 2n/l , droplet isolation maintain, up ad blake, will continue to monitor      Goal: Adult Individualization and Mutuality  Outcome: Ongoing (interventions implemented as appropriate)    Goal: Discharge Needs Assessment  Outcome: Ongoing (interventions implemented as appropriate)      Problem: Respiratory Insufficiency (Adult)  Goal: Acid/Base Balance  Outcome: Ongoing (interventions implemented as appropriate)    Goal: Effective Ventilation  Outcome: Ongoing (interventions implemented as appropriate)

## 2018-01-04 NOTE — PLAN OF CARE
Problem: Patient Care Overview (Adult)  Goal: Plan of Care Review  Outcome: Ongoing (interventions implemented as appropriate)   01/04/18 0442   Coping/Psychosocial Response Interventions   Plan Of Care Reviewed With patient   Patient Care Overview   Progress progress toward functional goals as expected   Outcome Evaluation   Outcome Summary/Follow up Plan Vitals as charted, no c/o SOA or pain, bronch completed yesterday w/ lavage, NC 2L, droplet isolation maintained, will continue to monitor.      Goal: Adult Individualization and Mutuality  Outcome: Ongoing (interventions implemented as appropriate)    Goal: Discharge Needs Assessment  Outcome: Ongoing (interventions implemented as appropriate)      Problem: Respiratory Insufficiency (Adult)  Goal: Acid/Base Balance  Outcome: Ongoing (interventions implemented as appropriate)    Goal: Effective Ventilation  Outcome: Ongoing (interventions implemented as appropriate)

## 2018-01-04 NOTE — PROGRESS NOTES
[facility] INPATIENT PROGRESS NOTE         [address]    1/4/2018      PATIENT IDENTIFICATION:   Name:  Janet Martinez      MRN:  [id number]     77 y.o.  female             LOS 2    Reason for visit: f/u resp failure with AECOPD and flu    SUBJECTIVE:    Still soa and with cough and moderate wheeze.    Objective   OBJECTIVE:    Vital Sign Min/Max for last 24 hours  Temp  Min: 97.1 °F (36.2 °C)  Max: 98.4 °F (36.9 °C)   BP  Min: 135/68  Max: 150/74   Pulse  Min: 74  Max: 87   Resp  Min: 16  Max: 20   SpO2  Min: 91 %  Max: 95 %   Flow (L/min)  Min: 2  Max: 2   No Data Recorded                         Body mass index is 22.99 kg/(m^2).    Intake/Output Summary (Last 24 hours) at 01/04/18 1454  Last data filed at 01/04/18 1228   Gross per 24 hour   Intake              600 ml   Output             1900 ml   Net            -1300 ml         Exam:  GEN:  No distress, appears stated age  EYES:   PERRL, anicteric sclera  ENT:    External ears/nose normal, OP clear  NECK:  No adenopathy, midline trachea  LUNGS: Normal chest on inspection, palpation and diffuse wheeze on auscultation  CV:  Normal S1S2, without murmur  ABD:  Non tender, non distended, no hepatosplenomegaly, +BS  EXT:  No edema, cyanosis or clubbing    Scheduled meds:      amLODIPine 10 mg Oral Q24H   atorvastatin 20 mg Oral Daily   buPROPion  mg Oral QAM   DULoxetine 60 mg Oral Daily   enoxaparin 40 mg Subcutaneous Nightly   folic acid 1 mg Oral Daily   valsartan 320 mg Oral Q24H   And      hydrochlorothiazide 25 mg Oral Daily   insulin aspart 0-9 Units Subcutaneous 4x Daily With Meals & Nightly   ipratropium-albuterol 3 mL Nebulization Q4H - RT   methylPREDNISolone sodium succinate 40 mg Intravenous Q12H   oseltamivir 75 mg Oral Q12H   pantoprazole 40 mg Oral QAM   potassium chloride 20 mEq Oral Daily   senna 2 tablet Oral Nightly     IV meds:                          lactated ringers 1,000 mL Last Rate: 1,000 mL (01/03/18 1107)     Data  Review:    Results from last 7 days  Lab Units 01/02/18 2006   SODIUM mmol/L 138   POTASSIUM mmol/L 3.2*   CHLORIDE mmol/L 93*   CO2 mmol/L 28.0   BUN mg/dL 10   CREATININE mg/dL 0.66   GLUCOSE mg/dL 229*   CALCIUM mg/dL 8.8         Estimated Creatinine Clearance: 56.5 mL/min (by C-G formula based on Cr of 0.66).    Results from last 7 days  Lab Units 01/02/18 2006   WBC 10*3/mm3 6.49   HEMOGLOBIN g/dL 12.4   PLATELETS 10*3/mm3 280       Results from last 7 days  Lab Units 01/02/18 2006   INR  0.99       Results from last 7 days  Lab Units 01/02/18 2006   ALT (SGPT) U/L 18   AST (SGOT) U/L 23              Influenza A H3 Detected (A)             CXR 1/2/18 viewed    )Assessment   ASSESSMENT:   Influenza   COPD exacerbation  Nocturnal hypoxia, chronic  Now relative hypoxia while awake    PLAN:  Status post bronchoscopy.  Continue same inhaled therapy  Continue bronchodilators.  Change steroids to by mouth with taper.  Tamiflu course x5 days    Trenton Garcia MD  Pulmonary and Critical Care Medicine  [facility], [facility]  1/4/2018    2:54 PM

## 2018-01-04 NOTE — TELEPHONE ENCOUNTER
----- Message from Agata Burgess MD sent at 1/3/2018  9:09 PM EST -----  Patient is admitted to [facility]. Cancel labs but keep her office visit. Will test needed labs during o/v.         Labs cancelled

## 2018-01-04 NOTE — PROGRESS NOTES
Discharge Planning Assessment  [facility]     Patient Name: Janet Martinez  MRN: [id number]  Today's Date: 1/4/2018    Admit Date: 1/2/2018          Discharge Needs Assessment       01/04/18 1318    Living Environment    Lives With spouse    Living Arrangements other (see comments)   Patio home    Home Accessibility bed and bath on same level    Stair Railings at Home none    Type of Financial/Environmental Concern none    Transportation Available car;family or friend will provide    Living Environment    Provides Primary Care For no one    Quality Of Family Relationships supportive    Able to Return to Prior Living Arrangements yes    Discharge Needs Assessment    Concerns To Be Addressed no discharge needs identified;denies needs/concerns at this time    Anticipated Changes Related to Illness none    Equipment Currently Used at Home nebulizer    Equipment Needed After Discharge none    Discharge Disposition home or self-care            Discharge Plan       01/04/18 1320    Case Management/Social Work Plan    Plan Home; currently denies any d/c needs.     Patient/Family In Agreement With Plan yes    Additional Comments Met with the patient at bedside; explained role of CCP, verified facesheet, checked IMM and discussed discharge planning needs.  The patient resides at home with hers pouse who can assist her as needed.  She uses a nebulizer, has no steps to enter her single story patio home.  Her PCP is Agata Burgess, pharmacy is [facility] [place] [facility] the [facility] and she denies any trouble remembering to take her medication or with affording her medication.  The patient was unsure who she has used in the past for HH services and previous CCP notes indicate she used [facility].  The patient denies any SNF history, was provided with a HH/SNF list and currently denies any d/c needs.  CCP will follow to assist with any d/c needs that may arise.  MONICA Zacarias        Discharge Placement     No information found                 Demographic Summary       01/04/18 1317    Referral Information    Admission Type inpatient    Arrived From home or self-care    Referral Source nursing;physician    Reason For Consult discharge planning    Record Reviewed history and physical;medical record    Primary Care Physician Information    Name Agata Burgess MD            Functional Status       01/04/18 1317    Functional Status Current    Ambulation 0-->independent    Transferring 0-->independent    Toileting 0-->independent    Bathing 0-->independent    Dressing 0-->independent    Eating 0-->independent    Communication 0-->understands/communicates without difficulty    Swallowing (if score 2 or more for any item, consult Rehab Services) 0-->swallows foods/liquids without difficulty    Functional Status Prior    Ambulation 0-->independent    Transferring 0-->independent    Toileting 0-->independent    Bathing 0-->independent    Dressing 0-->independent    Eating 0-->independent    Communication 0-->understands/communicates without difficulty    Swallowing 0-->swallows foods/liquids without difficulty    IADL    Medications independent    Meal Preparation independent    Housekeeping independent    Laundry independent    Shopping independent    Oral Care independent            Psychosocial     None            Abuse/Neglect     None            Legal     None            Substance Abuse     None            Patient Forms       01/04/18 1317    Patient Forms    Provider Choice List Delivered    Delivered to Patient    Method of delivery In person          MONICA Tran

## 2018-01-05 LAB
BACTERIA SPEC AEROBE CULT: NORMAL
BACTERIA SPEC AEROBE CULT: NORMAL
CREAT BLD-MCNC: 0.41 MG/DL (ref 0.57–1)
GFR SERPL CREATININE-BSD FRML MDRD: >150 ML/MIN/1.73
GLUCOSE BLDC GLUCOMTR-MCNC: 117 MG/DL (ref 70–130)
GLUCOSE BLDC GLUCOMTR-MCNC: 154 MG/DL (ref 70–130)
GLUCOSE BLDC GLUCOMTR-MCNC: 234 MG/DL (ref 70–130)
GLUCOSE BLDC GLUCOMTR-MCNC: 87 MG/DL (ref 70–130)
GRAM STN SPEC: NORMAL

## 2018-01-05 PROCEDURE — 94799 UNLISTED PULMONARY SVC/PX: CPT

## 2018-01-05 PROCEDURE — 63710000001 INSULIN ASPART PER 5 UNITS: Performed by: INTERNAL MEDICINE

## 2018-01-05 PROCEDURE — 82962 GLUCOSE BLOOD TEST: CPT

## 2018-01-05 PROCEDURE — 82565 ASSAY OF CREATININE: CPT | Performed by: INTERNAL MEDICINE

## 2018-01-05 PROCEDURE — 25010000002 ENOXAPARIN PER 10 MG: Performed by: INTERNAL MEDICINE

## 2018-01-05 PROCEDURE — 63710000001 PREDNISONE PER 1 MG: Performed by: INTERNAL MEDICINE

## 2018-01-05 RX ADMIN — PREDNISONE 40 MG: 20 TABLET ORAL at 10:02

## 2018-01-05 RX ADMIN — IPRATROPIUM BROMIDE AND ALBUTEROL SULFATE 3 ML: .5; 3 SOLUTION RESPIRATORY (INHALATION) at 23:50

## 2018-01-05 RX ADMIN — POTASSIUM CHLORIDE 20 MEQ: 750 CAPSULE, EXTENDED RELEASE ORAL at 10:01

## 2018-01-05 RX ADMIN — IPRATROPIUM BROMIDE AND ALBUTEROL SULFATE 3 ML: .5; 3 SOLUTION RESPIRATORY (INHALATION) at 07:39

## 2018-01-05 RX ADMIN — IPRATROPIUM BROMIDE AND ALBUTEROL SULFATE 3 ML: .5; 3 SOLUTION RESPIRATORY (INHALATION) at 12:36

## 2018-01-05 RX ADMIN — AMLODIPINE BESYLATE 10 MG: 10 TABLET ORAL at 10:04

## 2018-01-05 RX ADMIN — IPRATROPIUM BROMIDE AND ALBUTEROL SULFATE 3 ML: .5; 3 SOLUTION RESPIRATORY (INHALATION) at 20:01

## 2018-01-05 RX ADMIN — ENOXAPARIN SODIUM 40 MG: 40 INJECTION SUBCUTANEOUS at 22:05

## 2018-01-05 RX ADMIN — ATORVASTATIN CALCIUM 20 MG: 20 TABLET, FILM COATED ORAL at 10:02

## 2018-01-05 RX ADMIN — HYDROCHLOROTHIAZIDE 25 MG: 25 TABLET ORAL at 10:02

## 2018-01-05 RX ADMIN — DULOXETINE HYDROCHLORIDE 60 MG: 60 CAPSULE, DELAYED RELEASE ORAL at 10:02

## 2018-01-05 RX ADMIN — IPRATROPIUM BROMIDE AND ALBUTEROL SULFATE 3 ML: .5; 3 SOLUTION RESPIRATORY (INHALATION) at 17:26

## 2018-01-05 RX ADMIN — BUPROPION HYDROCHLORIDE 150 MG: 150 TABLET, EXTENDED RELEASE ORAL at 07:24

## 2018-01-05 RX ADMIN — FOLIC ACID 1 MG: 1 TABLET ORAL at 10:01

## 2018-01-05 RX ADMIN — OSELTAMIVIR PHOSPHATE 75 MG: 75 CAPSULE ORAL at 10:01

## 2018-01-05 RX ADMIN — IPRATROPIUM BROMIDE AND ALBUTEROL SULFATE 3 ML: .5; 3 SOLUTION RESPIRATORY (INHALATION) at 03:36

## 2018-01-05 RX ADMIN — INSULIN ASPART 4 UNITS: 100 INJECTION, SOLUTION INTRAVENOUS; SUBCUTANEOUS at 22:05

## 2018-01-05 RX ADMIN — VALSARTAN 320 MG: 320 TABLET, FILM COATED ORAL at 10:02

## 2018-01-05 RX ADMIN — OSELTAMIVIR PHOSPHATE 75 MG: 75 CAPSULE ORAL at 22:05

## 2018-01-05 RX ADMIN — PANTOPRAZOLE SODIUM 40 MG: 40 TABLET, DELAYED RELEASE ORAL at 07:24

## 2018-01-05 NOTE — PLAN OF CARE
Problem: Patient Care Overview (Adult)  Goal: Plan of Care Review  Outcome: Ongoing (interventions implemented as appropriate)   01/04/18 1550 01/04/18 2000 01/05/18 0034   Coping/Psychosocial Response Interventions   Plan Of Care Reviewed With --  patient --    Patient Care Overview   Progress improving --  --    Outcome Evaluation   Outcome Summary/Follow up Plan --  --  Steady gait. Non-productive cough. SOA with exertion. [place]. Exp wheezes. Droplet isolation for flu. No c/o pain. On 2 liters o2.      Goal: Adult Individualization and Mutuality  Outcome: Ongoing (interventions implemented as appropriate)    Goal: Discharge Needs Assessment  Outcome: Ongoing (interventions implemented as appropriate)      Problem: Respiratory Insufficiency (Adult)  Goal: Acid/Base Balance  Outcome: Ongoing (interventions implemented as appropriate)    Goal: Effective Ventilation  Outcome: Ongoing (interventions implemented as appropriate)

## 2018-01-05 NOTE — PROGRESS NOTES
[facility] INPATIENT PROGRESS NOTE         [address]    1/5/2018      PATIENT IDENTIFICATION:   Name:  Janet Martinez      MRN:  [id number]     77 y.o.  female             LOS 3    Reason for visit: f/u resp failure with AECOPD and flu    SUBJECTIVE:    Still pretty soa especially with activity and moderate cough.  Minimal improvement from yesterday.    Objective   OBJECTIVE:    Vital Sign Min/Max for last 24 hours  Temp  Min: 96.4 °F (35.8 °C)  Max: 98.7 °F (37.1 °C)   BP  Min: 137/75  Max: 147/86   Pulse  Min: 70  Max: 85   Resp  Min: 18  Max: 20   SpO2  Min: 89 %  Max: 95 %   Flow (L/min)  Min: 2  Max: 2   No Data Recorded                         Body mass index is 22.99 kg/(m^2).    Intake/Output Summary (Last 24 hours) at 01/05/18 1426  Last data filed at 01/05/18 1259   Gross per 24 hour   Intake             1080 ml   Output             2950 ml   Net            -1870 ml         Exam:  GEN:  No distress, appears stated age  EYES:   PERRL, anicteric sclera  ENT:    External ears/nose normal, OP clear  NECK:  No adenopathy, midline trachea  LUNGS: Normal chest on inspection, palpation and diffuse course wheeze on auscultation  CV:  Normal S1S2, without murmur  ABD:  Non tender, non distended, no hepatosplenomegaly, +BS  EXT:  No edema, cyanosis or clubbing    Scheduled meds:      amLODIPine 10 mg Oral Q24H   atorvastatin 20 mg Oral Daily   buPROPion  mg Oral QAM   DULoxetine 60 mg Oral Daily   enoxaparin 40 mg Subcutaneous Nightly   folic acid 1 mg Oral Daily   valsartan 320 mg Oral Q24H   And      hydrochlorothiazide 25 mg Oral Daily   insulin aspart 0-9 Units Subcutaneous 4x Daily With Meals & Nightly   ipratropium-albuterol 3 mL Nebulization Q4H - RT   oseltamivir 75 mg Oral Q12H   pantoprazole 40 mg Oral QAM   potassium chloride 20 mEq Oral Daily   predniSONE 40 mg Oral Daily With Breakfast   senna 2 tablet Oral Nightly     IV meds:                          lactated ringers 1,000 mL  Last Rate: 1,000 mL (01/03/18 1107)     Data Review:    Results from last 7 days  Lab Units 01/05/18  0647 01/02/18 2006   SODIUM mmol/L  --  138   POTASSIUM mmol/L  --  3.2*   CHLORIDE mmol/L  --  93*   CO2 mmol/L  --  28.0   BUN mg/dL  --  10   CREATININE mg/dL 0.41* 0.66   GLUCOSE mg/dL  --  229*   CALCIUM mg/dL  --  8.8         Estimated Creatinine Clearance: 56.5 mL/min (by C-G formula based on Cr of 0.41).    Results from last 7 days  Lab Units 01/02/18 2006   WBC 10*3/mm3 6.49   HEMOGLOBIN g/dL 12.4   PLATELETS 10*3/mm3 280       Results from last 7 days  Lab Units 01/02/18 2006   INR  0.99       Results from last 7 days  Lab Units 01/02/18 2006   ALT (SGPT) U/L 18   AST (SGOT) U/L 23              Influenza A H3 Detected (A)             CXR 1/2/18 viewed    )Assessment   ASSESSMENT:   Influenza   COPD exacerbation  Nocturnal hypoxia, chronic  Now relative hypoxia while awake  Chronic cough for several weeks persist     PLAN:  Status post bronchoscopy.  Final cultures pending.  Continue same inhaled therapy  Continue bronchodilators.  Changed steroids to by mouth with taper.  Tamiflu course x5 days.  Hopefully will be ready for home over weekend.    Trenton Garcia MD  Pulmonary and Critical Care Medicine  [facility], [facility]  1/5/2018    2:26 PM

## 2018-01-06 LAB
GLUCOSE BLDC GLUCOMTR-MCNC: 114 MG/DL (ref 70–130)
GLUCOSE BLDC GLUCOMTR-MCNC: 127 MG/DL (ref 70–130)
GLUCOSE BLDC GLUCOMTR-MCNC: 244 MG/DL (ref 70–130)
GLUCOSE BLDC GLUCOMTR-MCNC: 90 MG/DL (ref 70–130)

## 2018-01-06 PROCEDURE — 82962 GLUCOSE BLOOD TEST: CPT

## 2018-01-06 PROCEDURE — 25010000002 METHYLPREDNISOLONE PER 40 MG: Performed by: INTERNAL MEDICINE

## 2018-01-06 PROCEDURE — 25010000002 ENOXAPARIN PER 10 MG: Performed by: INTERNAL MEDICINE

## 2018-01-06 PROCEDURE — 94799 UNLISTED PULMONARY SVC/PX: CPT

## 2018-01-06 PROCEDURE — 63710000001 INSULIN ASPART PER 5 UNITS: Performed by: INTERNAL MEDICINE

## 2018-01-06 PROCEDURE — 63710000001 PREDNISONE PER 1 MG: Performed by: INTERNAL MEDICINE

## 2018-01-06 RX ORDER — METHYLPREDNISOLONE SODIUM SUCCINATE 40 MG/ML
20 INJECTION, POWDER, LYOPHILIZED, FOR SOLUTION INTRAMUSCULAR; INTRAVENOUS EVERY 12 HOURS
Status: DISCONTINUED | OUTPATIENT
Start: 2018-01-06 | End: 2018-01-07

## 2018-01-06 RX ADMIN — OSELTAMIVIR PHOSPHATE 75 MG: 75 CAPSULE ORAL at 09:09

## 2018-01-06 RX ADMIN — ENOXAPARIN SODIUM 40 MG: 40 INJECTION SUBCUTANEOUS at 22:55

## 2018-01-06 RX ADMIN — METHYLPREDNISOLONE SODIUM SUCCINATE 20 MG: 40 INJECTION, POWDER, FOR SOLUTION INTRAMUSCULAR; INTRAVENOUS at 22:55

## 2018-01-06 RX ADMIN — DULOXETINE HYDROCHLORIDE 60 MG: 60 CAPSULE, DELAYED RELEASE ORAL at 09:09

## 2018-01-06 RX ADMIN — AMLODIPINE BESYLATE 10 MG: 10 TABLET ORAL at 09:09

## 2018-01-06 RX ADMIN — IPRATROPIUM BROMIDE AND ALBUTEROL SULFATE 3 ML: .5; 3 SOLUTION RESPIRATORY (INHALATION) at 23:42

## 2018-01-06 RX ADMIN — IPRATROPIUM BROMIDE AND ALBUTEROL SULFATE 3 ML: .5; 3 SOLUTION RESPIRATORY (INHALATION) at 17:30

## 2018-01-06 RX ADMIN — POTASSIUM CHLORIDE 20 MEQ: 750 CAPSULE, EXTENDED RELEASE ORAL at 09:10

## 2018-01-06 RX ADMIN — ATORVASTATIN CALCIUM 20 MG: 20 TABLET, FILM COATED ORAL at 09:09

## 2018-01-06 RX ADMIN — PANTOPRAZOLE SODIUM 40 MG: 40 TABLET, DELAYED RELEASE ORAL at 06:56

## 2018-01-06 RX ADMIN — HYDROCHLOROTHIAZIDE 25 MG: 25 TABLET ORAL at 10:44

## 2018-01-06 RX ADMIN — FOLIC ACID 1 MG: 1 TABLET ORAL at 09:09

## 2018-01-06 RX ADMIN — SENNOSIDES 8.6 MG: 8.6 TABLET, FILM COATED ORAL at 22:55

## 2018-01-06 RX ADMIN — IPRATROPIUM BROMIDE AND ALBUTEROL SULFATE 3 ML: .5; 3 SOLUTION RESPIRATORY (INHALATION) at 03:26

## 2018-01-06 RX ADMIN — INSULIN ASPART 4 UNITS: 100 INJECTION, SOLUTION INTRAVENOUS; SUBCUTANEOUS at 17:47

## 2018-01-06 RX ADMIN — IPRATROPIUM BROMIDE AND ALBUTEROL SULFATE 3 ML: .5; 3 SOLUTION RESPIRATORY (INHALATION) at 12:30

## 2018-01-06 RX ADMIN — IPRATROPIUM BROMIDE AND ALBUTEROL SULFATE 3 ML: .5; 3 SOLUTION RESPIRATORY (INHALATION) at 19:53

## 2018-01-06 RX ADMIN — BUPROPION HYDROCHLORIDE 150 MG: 150 TABLET, EXTENDED RELEASE ORAL at 06:56

## 2018-01-06 RX ADMIN — VALSARTAN 320 MG: 320 TABLET, FILM COATED ORAL at 10:44

## 2018-01-06 RX ADMIN — PREDNISONE 40 MG: 20 TABLET ORAL at 09:09

## 2018-01-06 NOTE — PLAN OF CARE
Problem: Patient Care Overview (Adult)  Goal: Plan of Care Review  Outcome: Ongoing (interventions implemented as appropriate)   01/06/18 0330   Coping/Psychosocial Response Interventions   Plan Of Care Reviewed With patient   Patient Care Overview   Progress improving   Outcome Evaluation   Outcome Summary/Follow up Plan pt is alert and oriented, room air-2L O2 nasal cannula, nonproductive cough, SOA with exertion, wheezy, no complaints of pain, continue to monitor     Goal: Adult Individualization and Mutuality  Outcome: Ongoing (interventions implemented as appropriate)    Goal: Discharge Needs Assessment  Outcome: Ongoing (interventions implemented as appropriate)      Problem: Respiratory Insufficiency (Adult)  Goal: Acid/Base Balance  Outcome: Ongoing (interventions implemented as appropriate)    Goal: Effective Ventilation  Outcome: Ongoing (interventions implemented as appropriate)

## 2018-01-06 NOTE — PROGRESS NOTES
Dr. NATA Del Valle    [address]    1/6/2018    Patient ID:  Name:  Janet Martinez  MRN:  [id number]  1940  77 y.o.  female            CC/Reason for visit: Follow-up for flu infection,    HPI: Patient is still coughing and very short of breath with any minimal exertion.  Still wheezing    Vitals:  Vitals:    01/06/18 0808 01/06/18 1231 01/06/18 1701 01/06/18 1731   BP: 159/79  138/74    BP Location: Left arm  Left arm    Patient Position: Lying  Lying    Pulse: 64 68 80 82   Resp: 20 16 16 16   Temp: 97.5 °F (36.4 °C)  97 °F (36.1 °C)    TempSrc: Oral  Oral    SpO2: 98%  95%    Weight:       Height:               Body mass index is 22.99 kg/(m^2).    Intake/Output Summary (Last 24 hours) at 01/06/18 1828  Last data filed at 01/05/18 2206   Gross per 24 hour   Intake              360 ml   Output              500 ml   Net             -140 ml       Exam:  GEN:  No distress, appears stated age  EYES:   EOM-i, anicteric sclera bilat  ENT:    External ears/nose normal, OP clear  NECK:  Supple, midline trachea  LUNGS: Bilateral wheezing, nonlabored breathing  CV:  Normal S1S2, without murmur, no edema  ABD:  Non tender, no enlarged liver or masses  EXT:  No cyanosis or clubbing    Scheduled meds:    amLODIPine 10 mg Oral Q24H   atorvastatin 20 mg Oral Daily   buPROPion  mg Oral QAM   DULoxetine 60 mg Oral Daily   enoxaparin 40 mg Subcutaneous Nightly   folic acid 1 mg Oral Daily   valsartan 320 mg Oral Q24H   And      hydrochlorothiazide 25 mg Oral Daily   insulin aspart 0-9 Units Subcutaneous 4x Daily With Meals & Nightly   ipratropium-albuterol 3 mL Nebulization Q4H - RT   oseltamivir 75 mg Oral Q12H   pantoprazole 40 mg Oral QAM   potassium chloride 20 mEq Oral Daily   predniSONE 40 mg Oral Daily With Breakfast   senna 2 tablet Oral Nightly     IV meds:                        lactated ringers 1,000 mL Last Rate: 1,000 mL (01/03/18 1107)       Data Review:   I reviewed the patient's  medications and new clinical results.  Lab Results   Component Value Date    CALCIUM 8.8 01/02/2018       Results from last 7 days  Lab Units 01/05/18  0647 01/02/18 2006   SODIUM mmol/L  --  138   POTASSIUM mmol/L  --  3.2*   CHLORIDE mmol/L  --  93*   CO2 mmol/L  --  28.0   BUN mg/dL  --  10   CREATININE mg/dL 0.41* 0.66   CALCIUM mg/dL  --  8.8   BILIRUBIN mg/dL  --  0.3   ALK PHOS U/L  --  69   ALT (SGPT) U/L  --  18   AST (SGOT) U/L  --  23   GLUCOSE mg/dL  --  229*   WBC 10*3/mm3  --  6.49   HEMOGLOBIN g/dL  --  12.4   PLATELETS 10*3/mm3  --  280   INR   --  0.99       Results from last 7 days  Lab Units 01/03/18  1138 01/02/18 2034   RESPCX   --  Moderate growth (3+) Normal Respiratory Deepali   GRAM STAIN RESULT  Moderate (3+) WBCs seen  Few (2+) Epithelial cells per low power field  Mixed bacterial morphotypes seen on Gram Stain Few (2+) WBCs seen  Rare (1+) Epithelial cells per low power field  Few (2+) Gram positive cocci in pairs and chains       Results from last 7 days  Lab Units 01/02/18  1751   ADENOVIRUS DETECTION BY PCR  Not Detected   CORONAVIRUS 229E  Not Detected   CORONAVIRUS HKU1  Not Detected   CORONAVIRUS NL63  Not Detected   CORONAVIRUS OC43  Not Detected   HUMAN METAPNEUMOVIRUS  Not Detected   HUMAN RHINOVIRUS/ENTEROVIRUS  Not Detected   INFLUENZA B PCR  Not Detected   PARAINFLUENZA 1  Not Detected   PARAINFLUENZA VIRUS 2  Not Detected   PARAINFLUENZA VIRUS 3  Not Detected   PARAINFLUENZA VIRUS 4  Not Detected   BORDETELLA PERTUSSIS PCR  Not Detected   CHLAMYDOPHILA PNEUMONIAE PCR  Not Detected   MYCOPLAMA PNEUMO PCR  Not Detected   INFLUENZA A PCR  Not Detected   INFLUENZA A H3  Detected*   INFLUENZA A H1  Not Detected   RSV, PCR  Not Detected       ASSESSMENT:   Influenza   COPD exacerbation  Nocturnal hypoxia, chronic  Now relative hypoxia while awake  Chronic cough for several weeks persist       PLAN:  Patient has intermittently required oxygen during the daytime.  She is still  coughing and wheezing and is extremely limited in her activities of daily living.  She has severe dyspnea with any minimal exertion when she stands up and gets out of bed.  I will have physical therapy evaluate the patient.  She may need rehabilitation referral before going home.  Continue current care with steroids, oxygen and bronchodilators.        Davin Del Valle MD  1/6/2018

## 2018-01-07 LAB
GLUCOSE BLDC GLUCOMTR-MCNC: 135 MG/DL (ref 70–130)
GLUCOSE BLDC GLUCOMTR-MCNC: 159 MG/DL (ref 70–130)
GLUCOSE BLDC GLUCOMTR-MCNC: 166 MG/DL (ref 70–130)

## 2018-01-07 PROCEDURE — 97161 PT EVAL LOW COMPLEX 20 MIN: CPT

## 2018-01-07 PROCEDURE — 63710000001 PREDNISONE PER 1 MG: Performed by: INTERNAL MEDICINE

## 2018-01-07 PROCEDURE — 94799 UNLISTED PULMONARY SVC/PX: CPT

## 2018-01-07 PROCEDURE — 25010000002 ENOXAPARIN PER 10 MG: Performed by: INTERNAL MEDICINE

## 2018-01-07 PROCEDURE — 25010000002 METHYLPREDNISOLONE PER 40 MG: Performed by: INTERNAL MEDICINE

## 2018-01-07 PROCEDURE — 63710000001 INSULIN ASPART PER 5 UNITS: Performed by: INTERNAL MEDICINE

## 2018-01-07 PROCEDURE — 82962 GLUCOSE BLOOD TEST: CPT

## 2018-01-07 RX ORDER — PREDNISONE 20 MG/1
20 TABLET ORAL
Status: COMPLETED | OUTPATIENT
Start: 2018-01-07 | End: 2018-01-08

## 2018-01-07 RX ORDER — IPRATROPIUM BROMIDE AND ALBUTEROL SULFATE 2.5; .5 MG/3ML; MG/3ML
3 SOLUTION RESPIRATORY (INHALATION)
Status: DISCONTINUED | OUTPATIENT
Start: 2018-01-07 | End: 2018-01-08 | Stop reason: HOSPADM

## 2018-01-07 RX ADMIN — DULOXETINE HYDROCHLORIDE 60 MG: 60 CAPSULE, DELAYED RELEASE ORAL at 08:49

## 2018-01-07 RX ADMIN — POTASSIUM CHLORIDE 20 MEQ: 750 CAPSULE, EXTENDED RELEASE ORAL at 08:49

## 2018-01-07 RX ADMIN — SENNOSIDES 17.2 MG: 8.6 TABLET, FILM COATED ORAL at 21:57

## 2018-01-07 RX ADMIN — HYDROCHLOROTHIAZIDE 25 MG: 25 TABLET ORAL at 08:49

## 2018-01-07 RX ADMIN — FOLIC ACID 1 MG: 1 TABLET ORAL at 08:49

## 2018-01-07 RX ADMIN — ENOXAPARIN SODIUM 40 MG: 40 INJECTION SUBCUTANEOUS at 21:57

## 2018-01-07 RX ADMIN — OSELTAMIVIR PHOSPHATE 75 MG: 75 CAPSULE ORAL at 21:57

## 2018-01-07 RX ADMIN — IPRATROPIUM BROMIDE AND ALBUTEROL SULFATE 3 ML: .5; 3 SOLUTION RESPIRATORY (INHALATION) at 02:57

## 2018-01-07 RX ADMIN — VALSARTAN 320 MG: 320 TABLET, FILM COATED ORAL at 08:49

## 2018-01-07 RX ADMIN — BUPROPION HYDROCHLORIDE 150 MG: 150 TABLET, EXTENDED RELEASE ORAL at 06:49

## 2018-01-07 RX ADMIN — METHYLPREDNISOLONE SODIUM SUCCINATE 20 MG: 40 INJECTION, POWDER, FOR SOLUTION INTRAMUSCULAR; INTRAVENOUS at 06:49

## 2018-01-07 RX ADMIN — INSULIN ASPART 2 UNITS: 100 INJECTION, SOLUTION INTRAVENOUS; SUBCUTANEOUS at 08:49

## 2018-01-07 RX ADMIN — PANTOPRAZOLE SODIUM 40 MG: 40 TABLET, DELAYED RELEASE ORAL at 06:49

## 2018-01-07 RX ADMIN — IPRATROPIUM BROMIDE AND ALBUTEROL SULFATE 3 ML: .5; 3 SOLUTION RESPIRATORY (INHALATION) at 13:07

## 2018-01-07 RX ADMIN — ATORVASTATIN CALCIUM 20 MG: 20 TABLET, FILM COATED ORAL at 08:49

## 2018-01-07 RX ADMIN — PREDNISONE 20 MG: 20 TABLET ORAL at 15:41

## 2018-01-07 RX ADMIN — IPRATROPIUM BROMIDE AND ALBUTEROL SULFATE 3 ML: .5; 3 SOLUTION RESPIRATORY (INHALATION) at 07:09

## 2018-01-07 RX ADMIN — INSULIN ASPART 2 UNITS: 100 INJECTION, SOLUTION INTRAVENOUS; SUBCUTANEOUS at 13:11

## 2018-01-07 RX ADMIN — OSELTAMIVIR PHOSPHATE 75 MG: 75 CAPSULE ORAL at 08:49

## 2018-01-07 RX ADMIN — AMLODIPINE BESYLATE 10 MG: 10 TABLET ORAL at 08:49

## 2018-01-07 RX ADMIN — OSELTAMIVIR PHOSPHATE 75 MG: 75 CAPSULE ORAL at 00:00

## 2018-01-07 NOTE — PROGRESS NOTES
Dr. NATA Del Valle    [address]    1/7/2018    Patient ID:  Name:  Janet Martinez  MRN:  [id number]  1940  77 y.o.  female            CC/Reason for visit: Follow-up for COPD exacerbation    HPI: Patient is still coughing and wheezing but she feels a little bit better and a little less short of breath    Vitals:  Vitals:    01/07/18 0600 01/07/18 0709 01/07/18 0816 01/07/18 1307   BP: 146/77  163/81    BP Location: Left arm  Left arm    Patient Position: Lying  Lying    Pulse: 75 80 79 78   Resp: 16 18 18 20   Temp: 97.7 °F (36.5 °C)  98 °F (36.7 °C)    TempSrc: Oral  Oral    SpO2: 99%  95%    Weight:       Height:               Body mass index is 22.99 kg/(m^2).    Intake/Output Summary (Last 24 hours) at 01/07/18 1313  Last data filed at 01/07/18 0600   Gross per 24 hour   Intake                0 ml   Output             1600 ml   Net            -1600 ml       Exam:  GEN:  No distress, appears stated age  EYES:   EOM-i, anicteric sclera bilat  ENT:    External ears/nose normal, OP clear  NECK:  Supple, midline trachea  LUNGS: Distant and diminished breath sounds and a few wheezes, nonlabored breathing  CV:  Normal S1S2, without murmur, no edema  ABD:  Non tender, no enlarged liver or masses  EXT:  No cyanosis or clubbing    Scheduled meds:    amLODIPine 10 mg Oral Q24H   atorvastatin 20 mg Oral Daily   buPROPion  mg Oral QAM   DULoxetine 60 mg Oral Daily   enoxaparin 40 mg Subcutaneous Nightly   folic acid 1 mg Oral Daily   valsartan 320 mg Oral Q24H   And      hydrochlorothiazide 25 mg Oral Daily   insulin aspart 0-9 Units Subcutaneous 4x Daily With Meals & Nightly   ipratropium-albuterol 3 mL Nebulization Q4H - RT   methylPREDNISolone sodium succinate 20 mg Intravenous Q12H   oseltamivir 75 mg Oral Q12H   pantoprazole 40 mg Oral QAM   potassium chloride 20 mEq Oral Daily   senna 2 tablet Oral Nightly     IV meds:                        lactated ringers 1,000 mL Last Rate: 1,000 mL  (01/03/18 1107)       Data Review:   I reviewed the patient's medications and new clinical results.  Lab Results   Component Value Date    CALCIUM 8.8 01/02/2018       Results from last 7 days  Lab Units 01/05/18  0647 01/02/18 2006   SODIUM mmol/L  --  138   POTASSIUM mmol/L  --  3.2*   CHLORIDE mmol/L  --  93*   CO2 mmol/L  --  28.0   BUN mg/dL  --  10   CREATININE mg/dL 0.41* 0.66   CALCIUM mg/dL  --  8.8   BILIRUBIN mg/dL  --  0.3   ALK PHOS U/L  --  69   ALT (SGPT) U/L  --  18   AST (SGOT) U/L  --  23   GLUCOSE mg/dL  --  229*   WBC 10*3/mm3  --  6.49   HEMOGLOBIN g/dL  --  12.4   PLATELETS 10*3/mm3  --  280   INR   --  0.99       Results from last 7 days  Lab Units 01/03/18  1138 01/02/18 2034   RESPCX   --  Moderate growth (3+) Normal Respiratory Deepali   GRAM STAIN RESULT  Moderate (3+) WBCs seen  Few (2+) Epithelial cells per low power field  Mixed bacterial morphotypes seen on Gram Stain Few (2+) WBCs seen  Rare (1+) Epithelial cells per low power field  Few (2+) Gram positive cocci in pairs and chains       Results from last 7 days  Lab Units 01/02/18  1751   ADENOVIRUS DETECTION BY PCR  Not Detected   CORONAVIRUS 229E  Not Detected   CORONAVIRUS HKU1  Not Detected   CORONAVIRUS NL63  Not Detected   CORONAVIRUS OC43  Not Detected   HUMAN METAPNEUMOVIRUS  Not Detected   HUMAN RHINOVIRUS/ENTEROVIRUS  Not Detected   INFLUENZA B PCR  Not Detected   PARAINFLUENZA 1  Not Detected   PARAINFLUENZA VIRUS 2  Not Detected   PARAINFLUENZA VIRUS 3  Not Detected   PARAINFLUENZA VIRUS 4  Not Detected   BORDETELLA PERTUSSIS PCR  Not Detected   CHLAMYDOPHILA PNEUMONIAE PCR  Not Detected   MYCOPLAMA PNEUMO PCR  Not Detected   INFLUENZA A PCR  Not Detected   INFLUENZA A H3  Detected*   INFLUENZA A H1  Not Detected   RSV, PCR  Not Detected           ASSESSMENT:   Influenza   COPD exacerbation  Acute hypoxemia      PLAN:  We will try to wean her off of oxygen today.  I am planning on discharging her tomorrow.  We will  need ambulatory oximetry tomorrow.  She feels somewhat better today.        Davin Del Valle MD  1/7/2018

## 2018-01-07 NOTE — THERAPY DISCHARGE NOTE
Acute Care - Physical Therapy Initial Eval/Discharge  [facility]     Patient Name: Janet Martinez  : 1940  MRN: [id number]  Today's Date: 2018   Onset of Illness/Injury or Date of Surgery Date: 18     Referring Physician: Dr. Del Valle      Admit Date: 2018    Visit Dx:    ICD-10-CM ICD-9-CM   1. Persistent cough R05 786.2     Patient Active Problem List   Diagnosis   • Arthritis of knee   • Anxiety   • Chronic obstructive pulmonary disease   • Hyperlipidemia   • Hypertension   • Impaired fasting glucose   • Knee pain   • Osteoporosis   • Rheumatoid arthritis   • Arthritis of knee, left   • Status post total left knee replacement   • History of total knee arthroplasty   • Chronic obstructive pulmonary disease with acute exacerbation   • Abnormal mammogram   • Hematuria   • Menopausal symptom   • Sebaceous cyst   • Hypokalemia   • Right foot pain   • Encounter for colonoscopy due to history of adenomatous colonic polyps   • Persistent cough     Past Medical History:   Diagnosis Date   • COPD (chronic obstructive pulmonary disease)    • Depression    • Hiatal hernia    • History of bruising easily    • Hypercholesteremia    • Hypertension    • Joint pain     swelling   • On home oxygen therapy     O2 NC 2.5 LITERS/ NIGHT   • Osteoporosis    • PONV (postoperative nausea and vomiting)    • Rheumatoid arthritis     DIAGNOSIS AGE 23 - FOLLOWED BY RHEUMATOLOGY DR INFANTE, NO PROBLEMS WITH FLEX/ EXTENSION      Past Surgical History:   Procedure Laterality Date   • BREAST BIOPSY Left     BENIGN    • BRONCHOSCOPY N/A 1/3/2018    Procedure: BRONCHOSCOPY with lavage in right middle lobe and lingula.;  Surgeon: Trenton Garcia MD;  Location: [facility] ENDOSCOPY;  Service:    • CAROTID ARTERY ANGIOPLASTY Left    • COLONOSCOPY N/A 10/30/2017    Procedure: COLONOSCOPY TO CECUM, TO TERMINAL ILEUM WITH COLD BIOPSY POLYPECTOMY;  Surgeon: Jossie Stanley MD;  Location: [facility] ENDOSCOPY;  Service:    • FOOT SURGERY       Bilateral (MULITPLE SURGERIES)   • HAND SURGERY      Bilateral (MULTIPLE SURGERIES)   • KNEE ARTHROSCOPY Left    • LEFT OOPHORECTOMY      AGE 10-benign tumor   • TOTAL KNEE ARTHROPLASTY Left 5/18/2016    Procedure: LT TOTAL KNEE ARTHROPLASTY WITH FELECIA NAVIGATION;  Surgeon: Florencio Frazier MD;  Location: [facility] MAIN OR;  Service:           PT ASSESSMENT (last 72 hours)      PT Evaluation       01/07/18 1028 01/04/18 1318    Rehab Evaluation    Document Type evaluation  -AA     Subjective Information agree to therapy;no complaints  -AA     Patient Effort, Rehab Treatment excellent  -AA     Symptoms Noted During/After Treatment none  -AA     General Information    Patient Profile Review yes  -AA     Onset of Illness/Injury or Date of Surgery Date 01/02/18  -AA     Referring Physician Dr. Del Valle  -AA     General Observations pt supine in bed no signs distress/discomfort.  -AA     Pertinent History Of Current Problem Pt admitted with persistent cough and positive for flu  -AA     Precautions/Limitations fall precautions  -AA     Prior Level of Function independent:;all household mobility;ADL's  -AA     Equipment Currently Used at Home  nebulizer  -MD    Plans/Goals Discussed With patient  -AA     Barriers to Rehab none identified  -AA     Living Environment    Lives With spouse  -AA spouse  -MD    Living Arrangements house  -AA other (see comments)   [place] home  -MD    Home Accessibility no concerns  -AA bed and bath on same level  -MD    Stair Railings at Home  none  -MD    Type of Financial/Environmental Concern  none  -MD    Transportation Available  car;family or friend will provide  -MD    Clinical Impression    Patient/Family Goals Statement Pt would like to return to OF  -AA     Criteria for Skilled Therapeutic Interventions Met no problems identified which require skilled intervention  -AA     Pain Assessment    Pain Assessment No/denies pain  -AA     Cognitive Assessment/Intervention    Current  Cognitive/Communication Assessment functional  -     Orientation Status oriented x 4  -AA     Follows Commands/Answers Questions 100% of the time  -     Personal Safety WNL/WFL  -     Personal Safety Interventions fall prevention program maintained;gait belt;nonskid shoes/slippers when out of bed  -     ROM (Range of Motion)    General ROM no range of motion deficits identified  -     MMT (Manual Muscle Testing)    General MMT Assessment no strength deficits identified  -     Bed Mobility, Assessment/Treatment    Bed Mob, Supine to Sit, Jordan independent  -AA     Bed Mob, Sit to Supine, Jordan independent  -AA     Transfer Assessment/Treatment    Transfers, Sit-Stand Jordan independent  -     Transfers, Stand-Sit Jordan independent  -     Gait Assessment/Treatment    Gait, Jordan Level supervision required  -     Gait, Distance (Feet) 150  -AA     Gait, Gait Pattern Analysis swing-through gait  -     Gait, Comment no safety concerns noted during ambulation  -AA     Positioning and Restraints    Pre-Treatment Position in bed  -AA     Post Treatment Position bed  -AA     In Bed supine;call light within reach;encouraged to call for assist  -AA       User Key  (r) = Recorded By, (t) = Taken By, (c) = Cosigned By    Initials Name Provider Type    MD Delicia Dewitt, SWETHAW     CARSON Josue, PT Physical Therapist          Physical Therapy Education     Title: PT OT SLP Therapies (Resolved)     Topic: Physical Therapy (Resolved)     Point: Mobility training (Resolved)    Learning Progress Summary    Learner Readiness Method Response Comment Documented by Status   Patient Acceptance E [facility] 01/07/18 1043 Done               Point: Home exercise program (Resolved)    Learning Progress Summary    Learner Readiness Method Response Comment Documented by Status   Patient Acceptance E [facility] 01/07/18 1043 Done               Point: Body mechanics (Resolved)     Learning Progress Summary    Learner Readiness Method Response Comment Documented by Status   Patient Acceptance E [facility] 01/07/18 1043 Done               Point: Precautions (Resolved)    Learning Progress Summary    Learner Readiness Method Response Comment Documented by Status   Patient Acceptance E [facility] 01/07/18 1043 Done                      User Key     Initials Effective Dates Name Provider Type [facility] 09/05/17 -  Yenifer Josue PT Physical Therapist PT                PT Recommendation and Plan  Anticipated Discharge Disposition: home  PT Frequency: evaluation only  Plan of Care Review  Plan Of Care Reviewed With: patient  Outcome Summary/Follow up Plan: Pt able to perform bed mobility, transfers independent with no safety concerns. Pt ambulated 150ft SPV with no AD. Pt to be d/c from skilled services as she is independent/SPV with all mobility. PT recommends d/c home.              Outcome Measures       01/07/18 1000          How much help from another person do you currently need...    Turning from your back to your side while in flat bed without using bedrails? 4  -AA      Moving from lying on back to sitting on the side of a flat bed without bedrails? 4  -AA      Moving to and from a bed to a chair (including a wheelchair)? 4  -AA      Standing up from a chair using your arms (e.g., wheelchair, bedside chair)? 4  -AA      Climbing 3-5 steps with a railing? 4  -AA      To walk in hospital room? 4  -AA      AM-PAC 6 Clicks Score 24  -AA      Functional Assessment    Outcome Measure Options AM-PAC 6 Clicks Basic Mobility (PT)  -AA        User Key  (r) = Recorded By, (t) = Taken By, (c) = Cosigned By    Initials Name Provider Type     Yenifer Josue PT Physical Therapist           Time Calculation:         PT Charges       01/07/18 1038          Time Calculation    Start Time 1028  -AA      Stop Time 1039  -AA      Time Calculation (min) 11 min  -AA      PT Received On 01/07/18  -CARSON         User Key  (r) = Recorded By, (t) = Taken By, (c) = Cosigned By    Initials Name Provider Type    AA Yenifer Josue PT Physical Therapist          Therapy Charges for Today     Code Description Service Date Service Provider Modifiers Qty    [id number] HC PT EVAL LOW COMPLEXITY 1 1/7/2018 Yenifer Josue PT GP 1          PT G-Codes  Outcome Measure Options: AM-PAC 6 Clicks Basic Mobility (PT)    PT Discharge Summary  Anticipated Discharge Disposition: home    Yenifer Josue PT  1/7/2018

## 2018-01-07 NOTE — PLAN OF CARE
Problem: Patient Care Overview (Adult)  Goal: Plan of Care Review  Outcome: Ongoing (interventions implemented as appropriate)   01/07/18 0441   Coping/Psychosocial Response Interventions   Plan Of Care Reviewed With patient   Patient Care Overview   Progress improving   Outcome Evaluation   Outcome Summary/Follow up Plan pt is alert and oriented, 2L O2 nasal cannula, IV steroid, breathing treatments, continue to monitor     Goal: Adult Individualization and Mutuality  Outcome: Ongoing (interventions implemented as appropriate)    Goal: Discharge Needs Assessment  Outcome: Ongoing (interventions implemented as appropriate)      Problem: Respiratory Insufficiency (Adult)  Goal: Acid/Base Balance  Outcome: Ongoing (interventions implemented as appropriate)    Goal: Effective Ventilation  Outcome: Ongoing (interventions implemented as appropriate)

## 2018-01-07 NOTE — PLAN OF CARE
Problem: Patient Care Overview (Adult)  Goal: Plan of Care Review   01/07/18 1043   Coping/Psychosocial Response Interventions   Plan Of Care Reviewed With patient   Outcome Evaluation   Outcome Summary/Follow up Plan Pt able to perform bed mobility, transfers independent with no safety concerns. Pt ambulated 150ft SPV with no AD. Pt to be d/c from skilled services as she is independent/SPV with all mobility. PT recommends d/c home.

## 2018-01-07 NOTE — PLAN OF CARE
Problem: Patient Care Overview (Adult)  Goal: Plan of Care Review  Outcome: Ongoing (interventions implemented as appropriate)   01/07/18 7400   Coping/Psychosocial Response Interventions   Plan Of Care Reviewed With patient   Patient Care Overview   Progress improving   Outcome Evaluation   Outcome Summary/Follow up Plan cont to have SOA on exertion with some improvement per pt, 92% sats on room air, ambulated with PT in plaza, cont on neb, resp with exp wheezes, VSS, walking oximetry to be done in am on room air, poss home in am     Goal: Adult Individualization and Mutuality  Outcome: Ongoing (interventions implemented as appropriate)    Goal: Discharge Needs Assessment  Outcome: Ongoing (interventions implemented as appropriate)      Problem: Respiratory Insufficiency (Adult)  Goal: Acid/Base Balance  Outcome: Ongoing (interventions implemented as appropriate)    Goal: Effective Ventilation  Outcome: Ongoing (interventions implemented as appropriate)

## 2018-01-08 VITALS
SYSTOLIC BLOOD PRESSURE: 158 MMHG | HEART RATE: 75 BPM | DIASTOLIC BLOOD PRESSURE: 85 MMHG | TEMPERATURE: 97.4 F | HEIGHT: 63 IN | RESPIRATION RATE: 20 BRPM | BODY MASS INDEX: 23 KG/M2 | OXYGEN SATURATION: 98 % | WEIGHT: 129.8 LBS

## 2018-01-08 LAB — GLUCOSE BLDC GLUCOMTR-MCNC: 102 MG/DL (ref 70–130)

## 2018-01-08 PROCEDURE — 94799 UNLISTED PULMONARY SVC/PX: CPT

## 2018-01-08 PROCEDURE — 82962 GLUCOSE BLOOD TEST: CPT

## 2018-01-08 PROCEDURE — 63710000001 PREDNISONE PER 1 MG: Performed by: INTERNAL MEDICINE

## 2018-01-08 RX ORDER — HYDROCHLOROTHIAZIDE 25 MG/1
25 TABLET ORAL DAILY
Qty: 30 TABLET | Refills: 3 | Status: SHIPPED | OUTPATIENT
Start: 2018-01-09 | End: 2018-05-15

## 2018-01-08 RX ORDER — AMLODIPINE BESYLATE 10 MG/1
10 TABLET ORAL
Qty: 30 TABLET | Refills: 3 | Status: SHIPPED | OUTPATIENT
Start: 2018-01-09 | End: 2018-05-15

## 2018-01-08 RX ADMIN — IPRATROPIUM BROMIDE AND ALBUTEROL SULFATE 3 ML: .5; 3 SOLUTION RESPIRATORY (INHALATION) at 10:44

## 2018-01-08 RX ADMIN — DULOXETINE HYDROCHLORIDE 60 MG: 60 CAPSULE, DELAYED RELEASE ORAL at 08:51

## 2018-01-08 RX ADMIN — POTASSIUM CHLORIDE 20 MEQ: 750 CAPSULE, EXTENDED RELEASE ORAL at 08:51

## 2018-01-08 RX ADMIN — BUPROPION HYDROCHLORIDE 150 MG: 150 TABLET, EXTENDED RELEASE ORAL at 06:46

## 2018-01-08 RX ADMIN — HYDROCHLOROTHIAZIDE 25 MG: 25 TABLET ORAL at 08:51

## 2018-01-08 RX ADMIN — ATORVASTATIN CALCIUM 20 MG: 20 TABLET, FILM COATED ORAL at 08:51

## 2018-01-08 RX ADMIN — PREDNISONE 20 MG: 20 TABLET ORAL at 08:51

## 2018-01-08 RX ADMIN — PANTOPRAZOLE SODIUM 40 MG: 40 TABLET, DELAYED RELEASE ORAL at 06:46

## 2018-01-08 RX ADMIN — VALSARTAN 320 MG: 320 TABLET, FILM COATED ORAL at 08:51

## 2018-01-08 RX ADMIN — FOLIC ACID 1 MG: 1 TABLET ORAL at 08:51

## 2018-01-08 RX ADMIN — AMLODIPINE BESYLATE 10 MG: 10 TABLET ORAL at 08:51

## 2018-01-08 NOTE — DISCHARGE SUMMARY
Patient Identification:  Name: Janet Martinez  Age: 77 y.o.  Sex: female  :  1940  MRN: [id number]  Primary Care Physician: Agata Burgess MD    Admit date: 2018  Discharge date and time: 2018   Discharged Condition: good    Discharge Diagnoses:   Influenza   COPD exacerbation  Acute hypoxemia      Hospital Course: Janet Martinez presented to [facility]  admitted directly from the office where she was found to have hypoxemia.  She is an immunosuppressed host on Enbrel.  She usually only uses oxygen at nighttime but in the office she was found to be hypoxic on room air.  She was diagnosed with influenza infection which was causing wheezing and COPD exacerbation.  She was treated in the hospital with intravenous steroids, bronchodilators via nebulizer and supplemental oxygen.    Over the ensuing days she slowly improved.  She has been successfully weaned off oxygen today.  She is going home today and some of her home medications have been modified due to her blood pressure overall not requiring angiotensin receptor blocker agent.    Her Enbrel will be resumed and her usual bronchodilators will be resumed.  She is to follow-up with Dr. Gaston within 1-2 weeks.      Consults:   None    Significant Diagnostic Studies:   Imaging Results (most recent)     Procedure Component Value Units Date/Time    XR Chest 2 View [[id number]] Collected:  18     Updated:  18    Narrative:       XR CHEST 2 VW-     HISTORY: Female who is 77 years-old,  chronic dyspnea     TECHNIQUE: Frontal and lateral views of the chest     COMPARISON: 2017     FINDINGS: Heart, mediastinum and pulmonary vasculature are unremarkable.  No focal pulmonary consolidation, pleural effusion, or pneumothorax.  Pulmonary hyperinflation suggests COPD. Chronic right clavicle deformity  is noted. No acute osseous process.       Impression:       No focal pulmonary consolidation. COPD change.  Follow-up as  clinical indications persist.     This report was finalized on 1/2/2018 7:08 PM by Dr. Erasmo Green MD.             Results from last 7 days  Lab Units 01/02/18  1751   ADENOVIRUS DETECTION BY PCR  Not Detected   CORONAVIRUS 229E  Not Detected   CORONAVIRUS HKU1  Not Detected   CORONAVIRUS NL63  Not Detected   CORONAVIRUS OC43  Not Detected   HUMAN METAPNEUMOVIRUS  Not Detected   HUMAN RHINOVIRUS/ENTEROVIRUS  Not Detected   INFLUENZA B PCR  Not Detected   PARAINFLUENZA 1  Not Detected   PARAINFLUENZA VIRUS 2  Not Detected   PARAINFLUENZA VIRUS 3  Not Detected   PARAINFLUENZA VIRUS 4  Not Detected   BORDETELLA PERTUSSIS PCR  Not Detected   CHLAMYDOPHILA PNEUMONIAE PCR  Not Detected   MYCOPLAMA PNEUMO PCR  Not Detected   INFLUENZA A PCR  Not Detected   INFLUENZA A H3  Detected*   INFLUENZA A H1  Not Detected   RSV, PCR  Not Detected       Results from last 7 days  Lab Units 01/03/18  1138 01/02/18 2034   RESPCX   --  Moderate growth (3+) Normal Respiratory Deepali   GRAM STAIN RESULT  Moderate (3+) WBCs seen  Few (2+) Epithelial cells per low power field  Mixed bacterial morphotypes seen on Gram Stain Few (2+) WBCs seen  Rare (1+) Epithelial cells per low power field  Few (2+) Gram positive cocci in pairs and chains     TEST  RESULTS PENDING AT DISCHARGE   Order Current Status    Fungus Culture - Lavage, Lung, Right Middle Lobe In process    AFB Culture - Lavage, Lung, Right Middle Lobe Preliminary result          Discharge Exam:  Alert and oriented x 4, in NAD  Supple neck, midline trach  RRR, no m/r/g, no edema  Some mild scattered wheezes but much better today, nonlabored  No clubbing or cyanosis     Disposition:  Home    Patient Instructions:        Your medication list      START taking these medications       Instructions Last Dose Given Next Dose Due    amLODIPine 10 MG tablet   Commonly known as:  NORVASC   Start taking on:  1/9/2018        Take 1 tablet by mouth Daily.          hydrochlorothiazide 25 MG tablet   Commonly known as:  HYDRODIURIL   Start taking on:  1/9/2018        Take 1 tablet by mouth Daily.           CHANGE how you take these medications       Instructions Last Dose Given Next Dose Due    atorvastatin 20 MG tablet   Commonly known as:  LIPITOR   What changed:  when to take this        Take 1 tablet by mouth Daily.         omeprazole 40 MG capsule   Commonly known as:  priLOSEC   What changed:  See the new instructions.        TAKE 1 CAPSULE BY MOUTH DAILY           CONTINUE taking these medications       Instructions Last Dose Given Next Dose Due    budesonide 0.5 MG/2ML nebulizer solution   Commonly known as:  PULMICORT              buPROPion  MG 24 hr tablet   Commonly known as:  WELLBUTRIN XL        Take 1 tablet by mouth Every Morning.         DULoxetine 60 MG capsule   Commonly known as:  CYMBALTA        TAKE 1 CAPSULE BY MOUTH DAILY.         etanercept 50 MG/ML solution prefilled syringe injection   Commonly known as:  ENBREL              folic acid 1 MG tablet   Commonly known as:  FOLVITE        TAKE 1 TABLET BY MOUTH DAILY.         guaiFENesin 600 MG 12 hr tablet   Commonly known as:  MUCINEX              guaifenesin-codeine 100-10 MG/5ML liquid   Commonly known as:  GUAIFENESIN AC        Take 5 mL by mouth 3 (Three) Times a Day As Needed for Cough.         ipratropium-albuterol  MCG/ACT inhaler   Commonly known as:  COMBIVENT RESPIMAT              KLOR-CON 20 MEQ CR tablet   Generic drug:  potassium chloride        TAKE 1 TABLET BY MOUTH DAILY.         mupirocin 2 % ointment   Commonly known as:  BACTROBAN              neomycin-polymyxin-dexamethamethasone 3.5-40691-4.1 ointment ophthalmic ointment   Commonly known as:  POLYDEX              tobramycin   Commonly known as:  MARIA LUISA              Unable to find                STOP taking these medications          Amlodipine-Valsartan-HCTZ -25 MG tablet           predniSONE 10 MG tablet   Commonly  known as:  DELTASONE                Where to Get Your Medications      These medications were sent to [facility] [id number] [place] [facility] [place], [place] - [address] [facility] - [phone] [facility] [phone]   [address] [place] [id number]     Phone:  [phone]    • amLODIPine 10 MG tablet   • hydrochlorothiazide 25 MG tablet                 Medication Reconciliation: Please see electronically completed Med Rec.    Total time spent discharging patient including evaluation, medication reconciliation, arranging follow up, and post hospitalization instructions and education total time exceeds 30 minutes.    Signed:  Davin Del Valle MD  1/8/2018  11:25 AM

## 2018-01-08 NOTE — PLAN OF CARE
Problem: Patient Care Overview (Adult)  Goal: Plan of Care Review  Outcome: Ongoing (interventions implemented as appropriate)   01/08/18 0409   Coping/Psychosocial Response Interventions   Plan Of Care Reviewed With patient   Patient Care Overview   Progress improving   Outcome Evaluation   Outcome Summary/Follow up Plan VSS. SOA w/exertion. Wheezing noted during assessment. Walking Ox need to be done in AM prior to d/c       Problem: Respiratory Insufficiency (Adult)  Goal: Acid/Base Balance  Outcome: Ongoing (interventions implemented as appropriate)    Goal: Effective Ventilation  Outcome: Ongoing (interventions implemented as appropriate)

## 2018-01-08 NOTE — PROGRESS NOTES
Case Management Discharge Note    Final Note: Orders received to dc home. No needs noted. Family to transport per private auto.    Discharge Placement     No information found        Other: Family Support Services    Discharge Codes: 01  Discharge to home

## 2018-01-08 NOTE — PROGRESS NOTES
Continued Stay Note  [facility]     Patient Name: Janet Martinez  MRN: [id number]  Today's Date: 1/8/2018    Admit Date: 1/2/2018          Discharge Plan       01/08/18 1221    Case Management/Social Work Plan    Additional Comments Dr. Del Valle ordered walking oximetry. Patient 87% RA and 91% on 2/L. Refusing continuous O2. States insurance will not pay for continuous O2 and will use current O2 at HS. Patient's RN informed.              Discharge Codes       01/08/18 1221    Discharge Codes    Discharge Codes 01  Discharge to home        Expected Discharge Date and Time     Expected Discharge Date Expected Discharge Time    Jan 8, 2018             Aysha Matthews, RN

## 2018-01-08 NOTE — NURSING NOTE
Pt refuses any home oxygen for during the day. Spoke with CCP regarding this and it is documented that patient has refused

## 2018-01-08 NOTE — PROGRESS NOTES
Exercise Oximetry    Patient Name:Janet Martinez   MRN: [id number]   Date: 01/08/18             ROOM AIR BASELINE   SpO2% 87   Heart Rate83   Blood Pressure     EXERCISE ON ROOM AIR SpO2% EXERCISE ON O2 @2 LPM SpO2%   1 MINUTE  1 MINUTE 87   2 MINUTES  2 MINUTES 90   3 MINUTES  3 MINUTES 90   4 MINUTES  4 MINUTES 92   5 MINUTES  5 MINUTES 94   6 MINUTES  6 MINUTES 93              Distance Walked   Distance Walked   Dyspnea (Pamela Scale)   Dyspnea (Pamela Scale)   Fatigue (Pamela Scale)  Fatigue (Pamela Scale)   SpO2% Post Exercise  93 SpO2% Post Exercise   HR Post Exercise 82 HR Post Exercise   Time to Recovery  Time to Recovery     Comments:Tolerated well.

## 2018-01-12 ENCOUNTER — OFFICE VISIT (OUTPATIENT)
Dept: INTERNAL MEDICINE | Facility: CLINIC | Age: 78
End: 2018-01-12

## 2018-01-12 VITALS
DIASTOLIC BLOOD PRESSURE: 62 MMHG | TEMPERATURE: 97.9 F | SYSTOLIC BLOOD PRESSURE: 122 MMHG | HEIGHT: 63 IN | BODY MASS INDEX: 23.39 KG/M2 | WEIGHT: 132 LBS | HEART RATE: 81 BPM | OXYGEN SATURATION: 99 % | RESPIRATION RATE: 17 BRPM

## 2018-01-12 DIAGNOSIS — J44.1 COPD EXACERBATION (HCC): Primary | ICD-10-CM

## 2018-01-12 DIAGNOSIS — M06.9 RHEUMATOID ARTHRITIS OF HAND, UNSPECIFIED LATERALITY, UNSPECIFIED RHEUMATOID FACTOR PRESENCE: ICD-10-CM

## 2018-01-12 DIAGNOSIS — E78.5 HYPERLIPIDEMIA, UNSPECIFIED HYPERLIPIDEMIA TYPE: ICD-10-CM

## 2018-01-12 DIAGNOSIS — I10 ESSENTIAL HYPERTENSION: ICD-10-CM

## 2018-01-12 DIAGNOSIS — R73.01 IMPAIRED FASTING GLUCOSE: ICD-10-CM

## 2018-01-12 LAB
BUN SERPL-MCNC: 10 MG/DL (ref 8–23)
BUN/CREAT SERPL: 15.9 (ref 7–25)
CALCIUM SERPL-MCNC: 9.1 MG/DL (ref 8.6–10.5)
CHLORIDE SERPL-SCNC: 95 MMOL/L (ref 98–107)
CHOLEST SERPL-MCNC: 133 MG/DL (ref 0–200)
CO2 SERPL-SCNC: 27 MMOL/L (ref 22–29)
CREAT SERPL-MCNC: 0.63 MG/DL (ref 0.57–1)
GLUCOSE SERPL-MCNC: 107 MG/DL (ref 65–99)
HBA1C MFR BLD: 6 % (ref 4.8–5.6)
HDLC SERPL-MCNC: 50 MG/DL (ref 40–60)
LDLC SERPL CALC-MCNC: 59 MG/DL (ref 0–100)
LDLC/HDLC SERPL: 1.18 {RATIO}
POTASSIUM SERPL-SCNC: 3.3 MMOL/L (ref 3.5–5.2)
SODIUM SERPL-SCNC: 135 MMOL/L (ref 136–145)
TRIGL SERPL-MCNC: 121 MG/DL (ref 0–150)
TSH SERPL DL<=0.005 MIU/L-ACNC: 1.32 MIU/ML (ref 0.27–4.2)
VLDLC SERPL CALC-MCNC: 24.2 MG/DL (ref 5–40)

## 2018-01-12 PROCEDURE — 99214 OFFICE O/P EST MOD 30 MIN: CPT | Performed by: INTERNAL MEDICINE

## 2018-01-12 NOTE — PROGRESS NOTES
"Chief Complaint   Patient presents with   • Follow-up     [facility] f/u COPD       History of Present Illness   Janet Martinez is a 77 y.o. female presents for hosptial follow up. Patient has COPD. Acute exacerbation. Treated at  x2 then to ER. Diagnosed w/ influenza A She required admission w/ bronchoscopy to clear her airways. Given neb treatments, tamiflu, and steroid treatments. She now reports that she feels \"100%\" better. She has hypertension and her ARB was discontinued due to low pressures in the hospital. Reports that she has some GI upset w/ guaiffenisin. She has dyslipidemia and takes lipitor for this. She has RA and did require off enbrel for one dose. She is however, stable at this time w/ arthritis.       The following portions of the patient's history were reviewed and updated as appropriate: allergies, current medications, past family history, past medical history, past social history, past surgical history and problem list.  Current Outpatient Prescriptions on File Prior to Visit   Medication Sig Dispense Refill   • amLODIPine (NORVASC) 10 MG tablet Take 1 tablet by mouth Daily. 30 tablet 3   • atorvastatin (LIPITOR) 20 MG tablet Take 1 tablet by mouth Daily. (Patient taking differently: Take 20 mg by mouth Every Night.) 90 tablet 2   • budesonide (PULMICORT) 0.5 MG/2ML nebulizer solution Take 0.5 mg by nebulization Daily.     • buPROPion XL (WELLBUTRIN XL) 150 MG 24 hr tablet Take 1 tablet by mouth Every Morning. 90 tablet 1   • DULoxetine (CYMBALTA) 60 MG capsule TAKE 1 CAPSULE BY MOUTH DAILY. 90 capsule 0   • etanercept (ENBREL) 50 MG/ML solution prefilled syringe injection Inject 50 mg under the skin 1 (one) time per week. THURSDAYS     • folic acid (FOLVITE) 1 MG tablet TAKE 1 TABLET BY MOUTH DAILY. 90 tablet 1   • guaiFENesin (MUCINEX) 600 MG 12 hr tablet Take 1,200 mg by mouth 2 (Two) Times a Day.     • guaifenesin-codeine (GUAIFENESIN AC) 100-10 MG/5ML liquid Take 5 mL by mouth 3 (Three) Times a " Day As Needed for Cough. 150 mL 1   • hydrochlorothiazide (HYDRODIURIL) 25 MG tablet Take 1 tablet by mouth Daily. 30 tablet 3   • ipratropium-albuterol (COMBIVENT RESPIMAT)  MCG/ACT inhaler Inhale 2 puffs Daily.     • KLOR-CON 20 MEQ CR tablet TAKE 1 TABLET BY MOUTH DAILY. 30 tablet 4   • mupirocin (BACTROBAN) 2 % ointment Apply  topically 3 (Three) Times a Day.     • neomycin-polymyxin-dexamethamethasone (POLYDEX) 3.5-75889-2.1 ointment ophthalmic ointment Apply 1 application to eye Every 12 (Twelve) Hours.     • omeprazole (priLOSEC) 40 MG capsule TAKE 1 CAPSULE BY MOUTH DAILY (Patient taking differently: TAKE 1 CAPSULE BY MOUTH NIGHTLY) 90 capsule 3   • tobramycin (MARIA LUISA) Take 300 mg by nebulization 2 (Two) Times a Day.     • Unable to find 1 each 1 (One) Time. neilmed sinus       No current facility-administered medications on file prior to visit.      Review of Systems   Constitutional: Negative.  Negative for fatigue and fever.   HENT: Negative.    Eyes: Negative.    Respiratory: Positive for cough. Negative for shortness of breath and wheezing.    Cardiovascular: Negative.    Gastrointestinal: Negative.         Upset stomach from meds   Endocrine: Negative.         Elevated glucose while taking steroids   Genitourinary: Negative.    Musculoskeletal: Negative.    Skin: Negative.    Allergic/Immunologic: Negative.    Neurological: Negative.    Hematological: Negative.    Psychiatric/Behavioral: Negative.        Objective   Physical Exam   Constitutional: She is oriented to person, place, and time. She appears well-developed and well-nourished.   HENT:   Head: Normocephalic and atraumatic.   Right Ear: External ear normal.   Left Ear: External ear normal.   Nose: Nose normal.   Mouth/Throat: Oropharynx is clear and moist.   Eyes: Conjunctivae and EOM are normal. Pupils are equal, round, and reactive to light.   Neck: Normal range of motion. Neck supple.   Cardiovascular: Normal rate, regular rhythm, normal  "heart sounds and intact distal pulses.    Pulmonary/Chest: Effort normal and breath sounds normal. No respiratory distress.   Abdominal: Soft. Bowel sounds are normal.   Musculoskeletal: Normal range of motion.   Neurological: She is alert and oriented to person, place, and time.   Skin: Skin is warm and dry.   Psychiatric: She has a normal mood and affect. Her behavior is normal. Judgment and thought content normal.   Nursing note and vitals reviewed.       /62  Pulse 81  Temp 97.9 °F (36.6 °C)  Resp 17  Ht 160 cm (62.99\")  Wt 59.9 kg (132 lb)  SpO2 99%  BMI 23.39 kg/m2    Assessment/Plan   Diagnoses and all orders for this visit:    COPD exacerbation  -     Basic Metabolic Panel    Hyperlipidemia, unspecified hyperlipidemia type  -     Basic Metabolic Panel  -     Lipid Panel With LDL / HDL Ratio    Essential hypertension  -     Basic Metabolic Panel    Impaired fasting glucose  -     TSH  -     Hemoglobin A1c    Rheumatoid arthritis of hand, unspecified laterality, unspecified rheumatoid factor presence  -     TSH        Patient w/ recent copd exacerbation. She will clarify which nebulized treatments she should continue at this time. She will stop guaifenisin as it is causing gi upset. She will take 1/2 tab hctz and monitor bp. She is off of ARB as well. bp check in 3-4 weeks. Had hyperglycemia and will test A1C. She will adhere to a low carb diet w/ increased fitness as needed. Continue meds for lipid regulation and test levels today. She will f/u w/ rheum routinely given RA. F/u here in 4 mo or prn.          "

## 2018-01-15 DIAGNOSIS — Z86.39 HISTORY OF LOW POTASSIUM: Primary | ICD-10-CM

## 2018-01-26 ENCOUNTER — OFFICE VISIT (OUTPATIENT)
Dept: INTERNAL MEDICINE | Facility: CLINIC | Age: 78
End: 2018-01-26

## 2018-01-26 ENCOUNTER — TELEPHONE (OUTPATIENT)
Dept: INTERNAL MEDICINE | Facility: CLINIC | Age: 78
End: 2018-01-26

## 2018-01-26 VITALS
SYSTOLIC BLOOD PRESSURE: 124 MMHG | WEIGHT: 134 LBS | BODY MASS INDEX: 23.74 KG/M2 | OXYGEN SATURATION: 98 % | HEART RATE: 70 BPM | DIASTOLIC BLOOD PRESSURE: 60 MMHG

## 2018-01-26 DIAGNOSIS — E87.6 HYPOKALEMIA: ICD-10-CM

## 2018-01-26 DIAGNOSIS — J44.9 CHRONIC OBSTRUCTIVE PULMONARY DISEASE, UNSPECIFIED COPD TYPE (HCC): Primary | ICD-10-CM

## 2018-01-26 DIAGNOSIS — I10 ESSENTIAL HYPERTENSION: ICD-10-CM

## 2018-01-26 PROCEDURE — 99214 OFFICE O/P EST MOD 30 MIN: CPT | Performed by: INTERNAL MEDICINE

## 2018-01-26 NOTE — TELEPHONE ENCOUNTER
Pro air does not replace stiolto. That is her RESCUE inhaler. Will send RX for stioloto to her pharmacy. She should clarify her pulmonary meds w/ Dr. Sigala

## 2018-01-26 NOTE — PROGRESS NOTES
"Chief Complaint   Patient presents with   • Follow-up     from hospital   COPD exacerbation, flu, hypokalemia.       History of Present Illness   Janet Martinez is a 77 y.o. female presents for hospital follow up. Recent COPD exacerbation and hospitalization for this. She had been off of stiolto prior to hospital admission. Reports that she was discharged \"on some other inhaler that dr. clay gave me I'm not sure what it is. She is having improvement in breathing at this time. She did have the flu and these symptoms have resolved. She was incidentally noted to have hypokalemia. No muscular symptoms but some fatigue related to recent illness. bp is normotensive at this time. She is on hctz and this is likely her source of hypokalemia.      The following portions of the patient's history were reviewed and updated as appropriate: allergies, current medications, past family history, past medical history, past social history, past surgical history and problem list.  Current Outpatient Prescriptions on File Prior to Visit   Medication Sig Dispense Refill   • amLODIPine (NORVASC) 10 MG tablet Take 1 tablet by mouth Daily. 30 tablet 3   • atorvastatin (LIPITOR) 20 MG tablet Take 1 tablet by mouth Daily. (Patient taking differently: Take 20 mg by mouth Every Night.) 90 tablet 2   • budesonide (PULMICORT) 0.5 MG/2ML nebulizer solution Take 0.5 mg by nebulization Daily.     • buPROPion XL (WELLBUTRIN XL) 150 MG 24 hr tablet Take 1 tablet by mouth Every Morning. 90 tablet 1   • DULoxetine (CYMBALTA) 60 MG capsule TAKE 1 CAPSULE BY MOUTH DAILY. 90 capsule 0   • etanercept (ENBREL) 50 MG/ML solution prefilled syringe injection Inject 50 mg under the skin 1 (one) time per week. THURSDAYS     • folic acid (FOLVITE) 1 MG tablet TAKE 1 TABLET BY MOUTH DAILY. 90 tablet 1   • hydrochlorothiazide (HYDRODIURIL) 25 MG tablet Take 1 tablet by mouth Daily. 30 tablet 3   • ipratropium-albuterol (COMBIVENT RESPIMAT)  MCG/ACT inhaler " Inhale 2 puffs Daily.     • KLOR-CON 20 MEQ CR tablet TAKE 1 TABLET BY MOUTH DAILY. 30 tablet 4   • mupirocin (BACTROBAN) 2 % ointment Apply  topically 3 (Three) Times a Day.     • omeprazole (priLOSEC) 40 MG capsule TAKE 1 CAPSULE BY MOUTH DAILY (Patient taking differently: TAKE 1 CAPSULE BY MOUTH NIGHTLY) 90 capsule 3   • tobramycin (MARIA LUISA) Take 300 mg by nebulization 2 (Two) Times a Day.     • Unable to find 1 each 1 (One) Time. neilmed sinus     • [DISCONTINUED] guaiFENesin (MUCINEX) 600 MG 12 hr tablet Take 1,200 mg by mouth 2 (Two) Times a Day.     • [DISCONTINUED] guaifenesin-codeine (GUAIFENESIN AC) 100-10 MG/5ML liquid Take 5 mL by mouth 3 (Three) Times a Day As Needed for Cough. 150 mL 1   • [DISCONTINUED] neomycin-polymyxin-dexamethamethasone (POLYDEX) 3.5-06187-1.1 ointment ophthalmic ointment Apply 1 application to eye Every 12 (Twelve) Hours.       No current facility-administered medications on file prior to visit.      Review of Systems   Constitutional: Negative.    HENT: Negative.    Eyes: Negative.    Respiratory: Positive for shortness of breath. Negative for cough.    Cardiovascular: Negative.    Gastrointestinal: Negative.    Endocrine: Negative.    Genitourinary: Negative.    Musculoskeletal: Positive for arthralgias.   Skin: Negative.    Allergic/Immunologic: Negative.    Neurological: Negative.    Hematological: Negative.    Psychiatric/Behavioral: Negative.        Objective   Physical Exam   Constitutional: She is oriented to person, place, and time. She appears well-developed and well-nourished.   HENT:   Head: Normocephalic and atraumatic.   Right Ear: External ear normal.   Left Ear: External ear normal.   Nose: Nose normal.   Mouth/Throat: Oropharynx is clear and moist.   Eyes: EOM are normal. Pupils are equal, round, and reactive to light.   Neck: Neck supple.   Cardiovascular: Normal rate, regular rhythm and normal heart sounds.    Pulmonary/Chest: Effort normal and breath sounds  normal. No respiratory distress.   Abdominal: Soft. Bowel sounds are normal.   Musculoskeletal: Normal range of motion.   Neurological: She is alert and oriented to person, place, and time.   Skin: Skin is warm and dry.   Psychiatric: She has a normal mood and affect. Her behavior is normal. Judgment and thought content normal.   Nursing note and vitals reviewed.       /60  Pulse 70  Wt 60.8 kg (134 lb)  SpO2 98%  BMI 23.74 kg/m2    Assessment/Plan   Diagnoses and all orders for this visit:    Chronic obstructive pulmonary disease, unspecified COPD type    Essential hypertension    Hypokalemia  -     Basic Metabolic Panel  -     Magnesium      Recent copd exac. hospital follow up. She is improving today. Will need to find out what inhaler she is using to ensure she is on a long acting. She will also advise which medications she is taking. Will test potassium and magnesium levels and adjust medications as needed. She will continue potassium supplementation for now but may d/c in the future depending on levels. She will f/u here in 2 mo or rpn.

## 2018-01-27 LAB
BUN SERPL-MCNC: 8 MG/DL (ref 8–27)
BUN/CREAT SERPL: 14 (ref 12–28)
CALCIUM SERPL-MCNC: 9.6 MG/DL (ref 8.7–10.3)
CHLORIDE SERPL-SCNC: 97 MMOL/L (ref 96–106)
CO2 SERPL-SCNC: 28 MMOL/L (ref 18–29)
CREAT SERPL-MCNC: 0.56 MG/DL (ref 0.57–1)
GFR SERPLBLD CREATININE-BSD FMLA CKD-EPI: 105 ML/MIN/1.73
GFR SERPLBLD CREATININE-BSD FMLA CKD-EPI: 92 ML/MIN/1.73
GLUCOSE SERPL-MCNC: 124 MG/DL (ref 65–99)
MAGNESIUM SERPL-MCNC: 1.8 MG/DL (ref 1.6–2.3)
POTASSIUM SERPL-SCNC: 3.4 MMOL/L (ref 3.5–5.2)
SODIUM SERPL-SCNC: 142 MMOL/L (ref 134–144)

## 2018-01-29 ENCOUNTER — TELEPHONE (OUTPATIENT)
Dept: INTERNAL MEDICINE | Facility: CLINIC | Age: 78
End: 2018-01-29

## 2018-01-29 NOTE — TELEPHONE ENCOUNTER
She can check with her insurance company to see if anoro is covered. She can have samples to try. 1 puff daily.

## 2018-01-31 LAB — FUNGUS WND CULT: NORMAL

## 2018-02-14 LAB
MYCOBACTERIUM SPEC CULT: NORMAL
NIGHT BLUE STAIN TISS: NORMAL

## 2018-02-23 RX ORDER — FOLIC ACID 1 MG/1
TABLET ORAL
Qty: 90 TABLET | Refills: 1 | Status: SHIPPED | OUTPATIENT
Start: 2018-02-23 | End: 2019-01-12 | Stop reason: SDUPTHER

## 2018-03-05 ENCOUNTER — OFFICE VISIT (OUTPATIENT)
Dept: INTERNAL MEDICINE | Facility: CLINIC | Age: 78
End: 2018-03-05

## 2018-03-05 VITALS
HEART RATE: 70 BPM | DIASTOLIC BLOOD PRESSURE: 66 MMHG | BODY MASS INDEX: 23.92 KG/M2 | WEIGHT: 135 LBS | SYSTOLIC BLOOD PRESSURE: 124 MMHG | OXYGEN SATURATION: 96 %

## 2018-03-05 DIAGNOSIS — J44.9 CHRONIC OBSTRUCTIVE PULMONARY DISEASE, UNSPECIFIED COPD TYPE (HCC): ICD-10-CM

## 2018-03-05 DIAGNOSIS — J18.9 COMMUNITY ACQUIRED PNEUMONIA OF RIGHT LOWER LOBE OF LUNG: Primary | ICD-10-CM

## 2018-03-05 PROCEDURE — 99213 OFFICE O/P EST LOW 20 MIN: CPT | Performed by: INTERNAL MEDICINE

## 2018-03-05 PROCEDURE — 71046 X-RAY EXAM CHEST 2 VIEWS: CPT | Performed by: INTERNAL MEDICINE

## 2018-03-05 RX ORDER — GUAIFENESIN AND CODEINE PHOSPHATE 100; 10 MG/5ML; MG/5ML
5 SOLUTION ORAL 3 TIMES DAILY PRN
Qty: 130 ML | Refills: 0 | Status: SHIPPED | OUTPATIENT
Start: 2018-03-05 | End: 2018-03-27 | Stop reason: SDUPTHER

## 2018-03-05 RX ORDER — DOXYCYCLINE 100 MG/1
100 TABLET ORAL 2 TIMES DAILY
Qty: 14 TABLET | Refills: 0 | Status: SHIPPED | OUTPATIENT
Start: 2018-03-05 | End: 2018-03-26

## 2018-03-05 NOTE — PROGRESS NOTES
"Chief Complaint   Patient presents with   • Cough     2-3 weeks       History of Present Illness   Janet Martinez is a 77 y.o. female presents for acute care. Reports that she has a cough that started 2-3 weeks ago. She was on a prednisone taper course 2/16 and completed this prior to developing a cough. \"i do not feel that bad. My throat tickles and then I cough.\"  She is on anoro daily. She has not been using nebulized treatments for last 2 weeks. Denies fevers. She reports that she is going to have dental extraction in the future for some periodontal disease.     The following portions of the patient's history were reviewed and updated as appropriate: allergies, current medications, past family history, past medical history, past social history, past surgical history and problem list.  Current Outpatient Prescriptions on File Prior to Visit   Medication Sig Dispense Refill   • amLODIPine (NORVASC) 10 MG tablet Take 1 tablet by mouth Daily. 30 tablet 3   • atorvastatin (LIPITOR) 20 MG tablet Take 1 tablet by mouth Daily. (Patient taking differently: Take 20 mg by mouth Every Night.) 90 tablet 2   • budesonide (PULMICORT) 0.5 MG/2ML nebulizer solution Take 0.5 mg by nebulization Daily.     • buPROPion XL (WELLBUTRIN XL) 150 MG 24 hr tablet Take 1 tablet by mouth Every Morning. 90 tablet 1   • DULoxetine (CYMBALTA) 60 MG capsule TAKE 1 CAPSULE BY MOUTH DAILY. 90 capsule 0   • etanercept (ENBREL) 50 MG/ML solution prefilled syringe injection Inject 50 mg under the skin 1 (one) time per week. THURSDAYS     • folic acid (FOLVITE) 1 MG tablet TAKE 1 TABLET BY MOUTH DAILY. 90 tablet 1   • hydrochlorothiazide (HYDRODIURIL) 25 MG tablet Take 1 tablet by mouth Daily. 30 tablet 3   • ipratropium-albuterol (COMBIVENT RESPIMAT)  MCG/ACT inhaler Inhale 2 puffs Daily.     • KLOR-CON 20 MEQ CR tablet TAKE 1 TABLET BY MOUTH DAILY. 30 tablet 4   • mupirocin (BACTROBAN) 2 % ointment Apply  topically 3 (Three) Times a Day.   " "  • omeprazole (priLOSEC) 40 MG capsule TAKE 1 CAPSULE BY MOUTH DAILY (Patient taking differently: TAKE 1 CAPSULE BY MOUTH NIGHTLY) 90 capsule 3   • tiotropium bromide-olodaterol (STIOLTO RESPIMAT) 2.5-2.5 MCG/ACT aerosol solution inhaler Inhale 2 puffs Daily. 4 g 11   • tobramycin (MARIA LUISA) Take 300 mg by nebulization 2 (Two) Times a Day.     • Unable to find 1 each 1 (One) Time. neilmed sinus       No current facility-administered medications on file prior to visit.      Review of Systems   Constitutional: Positive for fatigue.   HENT: Positive for postnasal drip and rhinorrhea. Negative for sinus pain, sinus pressure and sore throat.         Right side of nose \"swollen\" compared to left   Eyes: Negative.    Respiratory: Positive for cough and wheezing. Negative for shortness of breath.    Cardiovascular: Negative.    Gastrointestinal: Positive for constipation.   Endocrine: Negative.    Genitourinary: Negative.    Musculoskeletal: Positive for arthralgias.        Right hip pain   Skin: Negative.    Allergic/Immunologic: Negative.    Neurological: Negative.    Hematological: Negative.    Psychiatric/Behavioral: Negative.      cxr for cough, wheeze. rll infiltrate. New compared to prior imaging.     Objective   Physical Exam   Constitutional: She is oriented to person, place, and time. She appears well-developed.   Alert. Has a deep cough but no acute distress.    HENT:   Head: Normocephalic and atraumatic.   Right Ear: External ear normal.   Left Ear: External ear normal.   Mild pharyngeal erythema   Eyes: EOM are normal. Pupils are equal, round, and reactive to light.   Neck: Normal range of motion. Neck supple.   Cardiovascular: Normal rate, regular rhythm and normal heart sounds.    Pulmonary/Chest: Effort normal. She has wheezes.   Right side > left   Abdominal: Soft. Bowel sounds are normal.   Musculoskeletal: Normal range of motion.   ra changes/ chronic   Neurological: She is alert and oriented to person, " place, and time.   Skin: Skin is warm and dry.   Psychiatric: She has a normal mood and affect. Her behavior is normal. Judgment and thought content normal.   Nursing note and vitals reviewed.     cxr for cough and wheeze. rll infiltrate. New compared to prior.   /66  Pulse 70  Wt 61.2 kg (135 lb)  SpO2 96%  BMI 23.92 kg/m2    Assessment/Plan   Diagnoses and all orders for this visit:    Community acquired pneumonia of right lower lobe of lung    Chronic obstructive pulmonary disease, unspecified COPD type    Other orders  -     ANORO ELLIPTA 62.5-25 MCG/INH aerosol powder  inhaler; INHALE 1 (ONE) PUFF BY MOUTH ONCE DAILY  -     doxycycline (ADOXA) 100 MG tablet; Take 1 tablet by mouth 2 (Two) Times a Day.  patient w/ acute cough and wheeze. Apparent infiltrate on cxr. Will forward xray to radiology for overread. Patient will start doxycycline bid. She will also take prednisone taper. To continue anoro. Start duo neb qid for wheeze. F/u 1 week or prn.

## 2018-03-08 RX ORDER — DULOXETIN HYDROCHLORIDE 60 MG/1
CAPSULE, DELAYED RELEASE ORAL
Qty: 90 CAPSULE | Refills: 0 | Status: SHIPPED | OUTPATIENT
Start: 2018-03-08 | End: 2018-03-26 | Stop reason: DRUGHIGH

## 2018-03-20 ENCOUNTER — OFFICE VISIT (OUTPATIENT)
Dept: INTERNAL MEDICINE | Facility: CLINIC | Age: 78
End: 2018-03-20

## 2018-03-20 VITALS
DIASTOLIC BLOOD PRESSURE: 66 MMHG | OXYGEN SATURATION: 93 % | WEIGHT: 137 LBS | BODY MASS INDEX: 24.27 KG/M2 | SYSTOLIC BLOOD PRESSURE: 130 MMHG | HEART RATE: 69 BPM

## 2018-03-20 DIAGNOSIS — M79.89 NODULE OF SOFT TISSUE: Primary | ICD-10-CM

## 2018-03-20 PROCEDURE — 99213 OFFICE O/P EST LOW 20 MIN: CPT | Performed by: INTERNAL MEDICINE

## 2018-03-20 NOTE — PROGRESS NOTES
Chief Complaint   Patient presents with   • Mass     in stomach   • Cough       History of Present Illness   Janet Martinez is a 77 y.o. female presents for acute care. Patient noted yesterday a mass in her abdomen. She reports that this is not tender. No changes in bowel habits. No recent heavy lifting.     The following portions of the patient's history were reviewed and updated as appropriate: allergies, current medications, past family history, past medical history, past social history, past surgical history and problem list.  Current Outpatient Prescriptions on File Prior to Visit   Medication Sig Dispense Refill   • amLODIPine (NORVASC) 10 MG tablet Take 1 tablet by mouth Daily. 30 tablet 3   • ANORO ELLIPTA 62.5-25 MCG/INH aerosol powder  inhaler INHALE 1 (ONE) PUFF BY MOUTH ONCE DAILY  5   • atorvastatin (LIPITOR) 20 MG tablet Take 1 tablet by mouth Daily. (Patient taking differently: Take 20 mg by mouth Every Night.) 90 tablet 2   • budesonide (PULMICORT) 0.5 MG/2ML nebulizer solution Take 0.5 mg by nebulization Daily.     • buPROPion XL (WELLBUTRIN XL) 150 MG 24 hr tablet Take 1 tablet by mouth Every Morning. 90 tablet 1   • doxycycline (ADOXA) 100 MG tablet Take 1 tablet by mouth 2 (Two) Times a Day. 14 tablet 0   • DULoxetine (CYMBALTA) 60 MG capsule TAKE 1 CAPSULE BY MOUTH DAILY. 90 capsule 0   • etanercept (ENBREL) 50 MG/ML solution prefilled syringe injection Inject 50 mg under the skin 1 (one) time per week. THURSDAYS     • folic acid (FOLVITE) 1 MG tablet TAKE 1 TABLET BY MOUTH DAILY. 90 tablet 1   • hydrochlorothiazide (HYDRODIURIL) 25 MG tablet Take 1 tablet by mouth Daily. 30 tablet 3   • ipratropium-albuterol (COMBIVENT RESPIMAT)  MCG/ACT inhaler Inhale 2 puffs Daily.     • KLOR-CON 20 MEQ CR tablet TAKE 1 TABLET BY MOUTH DAILY. 30 tablet 4   • mupirocin (BACTROBAN) 2 % ointment Apply  topically 3 (Three) Times a Day.     • omeprazole (priLOSEC) 40 MG capsule TAKE 1 CAPSULE BY MOUTH DAILY  (Patient taking differently: TAKE 1 CAPSULE BY MOUTH NIGHTLY) 90 capsule 3   • tiotropium bromide-olodaterol (STIOLTO RESPIMAT) 2.5-2.5 MCG/ACT aerosol solution inhaler Inhale 2 puffs Daily. 4 g 11   • guaifenesin-codeine (GUAIFENESIN AC) 100-10 MG/5ML liquid Take 5 mL by mouth 3 (Three) Times a Day As Needed for Cough. 130 mL 0   • tobramycin (MARIA LUISA) Take 300 mg by nebulization 2 (Two) Times a Day.     • Unable to find 1 each 1 (One) Time. neilmed sinus       No current facility-administered medications on file prior to visit.      Review of Systems   Constitutional: Negative.    HENT: Negative.    Eyes: Negative.    Respiratory: Negative.    Cardiovascular: Negative.    Gastrointestinal:        Abdominal mass   Endocrine: Negative.    Genitourinary: Negative.    Musculoskeletal: Positive for arthralgias.   Skin: Negative.    Allergic/Immunologic: Negative.    Neurological: Negative.    Hematological: Negative.    Psychiatric/Behavioral: Negative.        Objective   Physical Exam   Constitutional: She is oriented to person, place, and time. She appears well-developed and well-nourished.   HENT:   Head: Normocephalic and atraumatic.   Right Ear: Hearing, tympanic membrane and external ear normal.   Left Ear: Hearing, tympanic membrane and external ear normal.   Nose: Nose normal.   Mouth/Throat: Oropharynx is clear and moist.   Neck: Neck supple. No thyromegaly present.   Cardiovascular: Normal rate, regular rhythm and normal heart sounds.    No murmur heard.  Pulmonary/Chest: Effort normal and breath sounds normal. Right breast exhibits no mass. Left breast exhibits no mass.   Abdominal: Soft. She exhibits no distension. There is no hepatosplenomegaly. There is no tenderness.   Soft tissue mass noted upper mid abdomen. Non tender. Mobile.    Genitourinary: No breast tenderness.   Musculoskeletal:   Arthritic changes.    Lymphadenopathy:     She has no cervical adenopathy.   Neurological: She is alert and oriented to  person, place, and time.   Skin: Skin is warm and dry.   Psychiatric: She has a normal mood and affect. Her speech is normal and behavior is normal. Judgment and thought content normal. Cognition and memory are normal.        /66   Pulse 69   Wt 62.1 kg (137 lb)   SpO2 93%   BMI 24.27 kg/m²     Assessment/Plan   Diagnoses and all orders for this visit:    Nodule of soft tissue  -     US Soft Tissue; Future  -     Ambulatory Referral to General Surgery      Patient w/ newly noted soft tissue mass of the abdomen. Given history of immunosuppressants for RA will investigate this immediately, but suspect it is likely benign. Will get an ultrasound of the soft tissue and have a surgical evaluation to determine if resection is indicated.

## 2018-03-23 ENCOUNTER — HOSPITAL ENCOUNTER (OUTPATIENT)
Dept: ULTRASOUND IMAGING | Facility: HOSPITAL | Age: 78
Discharge: HOME | End: 2018-03-23
Admitting: INTERNAL MEDICINE

## 2018-03-23 DIAGNOSIS — M79.89 NODULE OF SOFT TISSUE: ICD-10-CM

## 2018-03-23 PROCEDURE — 76705 ECHO EXAM OF ABDOMEN: CPT

## 2018-03-26 ENCOUNTER — OFFICE VISIT (OUTPATIENT)
Dept: SURGERY | Facility: CLINIC | Age: 78
End: 2018-03-26

## 2018-03-26 VITALS — OXYGEN SATURATION: 97 % | WEIGHT: 136 LBS | HEART RATE: 65 BPM | BODY MASS INDEX: 23.22 KG/M2 | HEIGHT: 64 IN

## 2018-03-26 DIAGNOSIS — K43.9 EPIGASTRIC HERNIA: Primary | ICD-10-CM

## 2018-03-26 PROCEDURE — 99203 OFFICE O/P NEW LOW 30 MIN: CPT | Performed by: SURGERY

## 2018-03-26 RX ORDER — ROSUVASTATIN CALCIUM 5 MG/1
5 TABLET, COATED ORAL DAILY
COMMUNITY
End: 2018-05-15

## 2018-03-26 RX ORDER — PRASTERONE (DHEA) 25 MG
25 CAPSULE ORAL DAILY
COMMUNITY
End: 2018-05-15

## 2018-03-26 RX ORDER — BUDESONIDE AND FORMOTEROL FUMARATE DIHYDRATE 160; 4.5 UG/1; UG/1
2 AEROSOL RESPIRATORY (INHALATION)
COMMUNITY
End: 2018-03-26 | Stop reason: ALTCHOICE

## 2018-03-26 RX ORDER — MONTELUKAST SODIUM 10 MG/1
10 TABLET ORAL DAILY
COMMUNITY
End: 2018-08-07 | Stop reason: SDUPTHER

## 2018-03-26 RX ORDER — DULOXETIN HYDROCHLORIDE 30 MG/1
30 CAPSULE, DELAYED RELEASE ORAL DAILY
COMMUNITY
End: 2018-05-15

## 2018-03-26 RX ORDER — ALBUTEROL SULFATE 90 UG/1
2 AEROSOL, METERED RESPIRATORY (INHALATION) EVERY 4 HOURS PRN
COMMUNITY
End: 2018-06-11

## 2018-03-26 RX ORDER — CEFAZOLIN SODIUM 2 G/100ML
2 INJECTION, SOLUTION INTRAVENOUS ONCE
Status: CANCELLED | OUTPATIENT
Start: 2018-05-22 | End: 2018-05-22

## 2018-03-26 RX ORDER — ALBUTEROL SULFATE 2.5 MG/3ML
2.5 SOLUTION RESPIRATORY (INHALATION) EVERY 4 HOURS PRN
COMMUNITY

## 2018-03-26 RX ORDER — DULOXETIN HYDROCHLORIDE 60 MG/1
60 CAPSULE, DELAYED RELEASE ORAL DAILY
COMMUNITY
End: 2018-06-11 | Stop reason: SDUPTHER

## 2018-03-26 RX ORDER — ERGOCALCIFEROL 1.25 MG/1
50000 CAPSULE ORAL WEEKLY
COMMUNITY
End: 2018-06-11

## 2018-03-26 NOTE — PROGRESS NOTES
Cc: Knot on abdomen    History of presenting illness:   This is a very nice, 77-year-old lady with a history of COPD who presents with a knot which she noted on her abdomen a couple of weeks ago.  Its associated with some very mild discomfort and seems to be more notable when she is standing.  She says when she coughs she has some discomfort and she thinks it may have grown a little bit larger.  When she lies flat it seems to be better.  She's never noticed anything like this before couple of weeks ago.  There is no radiation of any discomfort.  She does report some constipation, but this problem pretty exists the finding of this mass.    Past Medical History: COPD, depression, arthritis, hypertension, of relevance she was hospitalized in January of this year with an attack of COPD, requiring a brief period of time on the ventilator    Past Surgical History: Left oophorectomy is an 10-year-old child, otherwise significant for multiple orthopedic procedures and carotid artery angioplasty and bronchoscopy.  She is also had a breast biopsy (benign)    Medications: Extensive, reviewed and reconciled with in Epic.  Of note she takes multiple inhalers and is also on Enbrel for her arthritis.    Allergies: Reviewed and reconciled with in Epic    Social History: Former smoker who quit greater than 25 years ago.  Does not use alcohol.  Retired.    Family History: Negative for colorectal cancer    Review of Systems:  Constitutional: Negative for change in activity, fever or unexpected fatigue  Neck: no swollen glands or dysphagia or odynophagia  Respiratory: Positive for cough, wheezing and shortness of breath, negative for hemoptysis  Cardiovascular: negative for chest pain, palpitations or peripheral edema  Gastrointestinal: Positive for abdominal swelling and constipation, negative for significant abdominal pain, negative for vomiting      Physical Exam:    General: alert and oriented, appropriate, no acute distress  Neck:  Supple without lymphadenopathy or thyromegaly, trachea is in the midline  Respiratory: Positive for bilateral wheezes and decreased breath sounds, negative for use of accessory muscles  Cardiovascular: Regular rate and rhythm without murmur, no peripheral edema  Gastrointestinal: Soft, mildly tender in the epigastrium with a partially reducible hernia noted.  I'm not able to completely reduce this.  It's difficult to feel the size of the neck, but I estimated to be less than 2 cm.  No significant periumbilical hernia noted.  No mass noted.  No hepatosplenomegaly appreciated.    Laboratory data: None relevant    Imaging data: Ultrasound done a couple of days ago reviewed by me.  His demonstrates a fascial defect in the linea alba, measured at about 2.8 cm.      Assessment and plan:   Symptomatic, partially incarcerated epigastric hernia  COPD    Discussed the options with the patient.  I think that particularly given her problems with constipation and persistent cough it's reasonable to repair this.  In all likelihood is going to grown larger over time and may potentially become incarcerated and therefore more dangerous.  However, I think she needs to check back with her family doctor, and possibly with her pulmonologist to ensure that her respiratory status is as good as we can get it.  There certainly no urgency to proceed with the operation.  In addition she will need to stop her Enbrel several weeks prior to the procedure in order to improve our chances that postoperative healing.  She understands all this and is willing to proceed.  I have recommended a da Margot robot assisted minimally invasive repair, likely with a preperitoneal approach.  Patient understands the risks of this and is willing to proceed.      Josr Jennings MD, FACS  General, Minimally Invasive and Endoscopic Surgery  [facility]    [address], [address]  [place], [place], [address]  P: [phone]  F: [phone]

## 2018-03-27 ENCOUNTER — OFFICE VISIT (OUTPATIENT)
Dept: INTERNAL MEDICINE | Facility: CLINIC | Age: 78
End: 2018-03-27

## 2018-03-27 VITALS
SYSTOLIC BLOOD PRESSURE: 142 MMHG | TEMPERATURE: 98.1 F | HEART RATE: 78 BPM | DIASTOLIC BLOOD PRESSURE: 60 MMHG | BODY MASS INDEX: 23.69 KG/M2 | WEIGHT: 138 LBS | OXYGEN SATURATION: 95 %

## 2018-03-27 DIAGNOSIS — J44.1 COPD EXACERBATION (HCC): ICD-10-CM

## 2018-03-27 DIAGNOSIS — R05.3 PERSISTENT COUGH: ICD-10-CM

## 2018-03-27 DIAGNOSIS — R05.9 COUGH: Primary | ICD-10-CM

## 2018-03-27 LAB
HCT VFR BLDA CALC: 38.8 %
HGB BLDA-MCNC: 12.6 G/DL
LYMPHOCYTES # BLD: 14.6 %
MCH, POC: 28
MCHC, POC: 32.6
MCV, POC: 86
MONOCYTES # BLD: 8.5 %
PLATELET # BLD: 240 10*3/MM3
PMV BLD: 8.8 FL
POC NEUTROPHIL: 76.9 %
RBC, POC: 4.51
RDW, POC: 16
WBC # BLD: 11.9 10*3/UL

## 2018-03-27 PROCEDURE — 99214 OFFICE O/P EST MOD 30 MIN: CPT | Performed by: INTERNAL MEDICINE

## 2018-03-27 PROCEDURE — 85025 COMPLETE CBC W/AUTO DIFF WBC: CPT | Performed by: INTERNAL MEDICINE

## 2018-03-27 PROCEDURE — 71046 X-RAY EXAM CHEST 2 VIEWS: CPT | Performed by: INTERNAL MEDICINE

## 2018-03-27 RX ORDER — LEVALBUTEROL INHALATION SOLUTION 0.63 MG/3ML
0.63 SOLUTION RESPIRATORY (INHALATION) EVERY 6 HOURS PRN
Status: DISCONTINUED | OUTPATIENT
Start: 2018-03-27 | End: 2018-05-15

## 2018-03-27 RX ORDER — GUAIFENESIN AND CODEINE PHOSPHATE 100; 10 MG/5ML; MG/5ML
5 SOLUTION ORAL 3 TIMES DAILY PRN
Qty: 150 ML | Refills: 0 | Status: SHIPPED | OUTPATIENT
Start: 2018-03-27 | End: 2018-05-15

## 2018-03-27 RX ORDER — METHYLPREDNISOLONE ACETATE 40 MG/ML
80 INJECTION, SUSPENSION INTRA-ARTICULAR; INTRALESIONAL; INTRAMUSCULAR; SOFT TISSUE ONCE
Status: COMPLETED | OUTPATIENT
Start: 2018-03-27 | End: 2018-03-27

## 2018-03-27 RX ORDER — AZITHROMYCIN 250 MG/1
TABLET, FILM COATED ORAL
Qty: 6 TABLET | Refills: 0 | Status: SHIPPED | OUTPATIENT
Start: 2018-03-27 | End: 2018-05-15

## 2018-03-27 RX ORDER — METHYLPREDNISOLONE 4 MG/1
TABLET ORAL
Qty: 21 EACH | Refills: 0 | Status: SHIPPED | OUTPATIENT
Start: 2018-03-27 | End: 2018-05-15

## 2018-03-27 RX ADMIN — METHYLPREDNISOLONE ACETATE 80 MG: 40 INJECTION, SUSPENSION INTRA-ARTICULAR; INTRALESIONAL; INTRAMUSCULAR; SOFT TISSUE at 15:54

## 2018-03-27 NOTE — PROGRESS NOTES
"Chief Complaint   Patient presents with   • Cough   ventral hernia    History of Present Illness   Janet Martinez is a 77 y.o. female presents for follow up evaluation. She has COPD and RA. She has had a cough for the last 4 months. She has been treated on several occasions for this. Most recently she was given doxycycline and prednisone. She felt well for a week or so and then started to cough again. No fevers. She is short of air. She is having wheezing. She recently had a surgical evaluation for abdominal hernia. She is thought to need this repaired but she needs to have improved lung activity before any procedure.   She is using anoro and duo neb. Patient reports having \"no energy\" and feeling cold.     The following portions of the patient's history were reviewed and updated as appropriate: allergies, current medications, past family history, past medical history, past social history, past surgical history and problem list.  Current Outpatient Prescriptions on File Prior to Visit   Medication Sig Dispense Refill   • albuterol (PROVENTIL HFA;VENTOLIN HFA) 108 (90 Base) MCG/ACT inhaler Inhale 2 puffs Every 4 (Four) Hours As Needed for Wheezing.     • albuterol (PROVENTIL) (2.5 MG/3ML) 0.083% nebulizer solution Take 2.5 mg by nebulization Every 4 (Four) Hours As Needed for Wheezing.     • amLODIPine (NORVASC) 10 MG tablet Take 1 tablet by mouth Daily. 30 tablet 3   • Amlodipine Besylate-Valsartan (EXFORGE PO) Take 10 mg by mouth Daily.     • ANORO ELLIPTA 62.5-25 MCG/INH aerosol powder  inhaler INHALE 1 (ONE) PUFF BY MOUTH ONCE DAILY  5   • atorvastatin (LIPITOR) 20 MG tablet Take 1 tablet by mouth Daily. (Patient taking differently: Take 20 mg by mouth Every Night.) 90 tablet 2   • buPROPion XL (WELLBUTRIN XL) 150 MG 24 hr tablet Take 1 tablet by mouth Every Morning. 90 tablet 1   • Calcium Carbonate-Vitamin D (CALCIUM 600+D PO) Take 1 tablet by mouth Daily.     • Calcium Citrate-Vitamin D (DESI-CITRATE PLUS " VITAMIN D PO) Take 1 tablet by mouth Daily.     • DHEA 25 MG capsule Take 25 mg by mouth Daily.     • DULoxetine (CYMBALTA) 30 MG capsule Take 30 mg by mouth Daily.     • DULoxetine (CYMBALTA) 60 MG capsule Take 60 mg by mouth Daily.     • etanercept (ENBREL) 50 MG/ML solution prefilled syringe injection Inject 50 mg under the skin 1 (one) time per week. THURSDAYS     • folic acid (FOLVITE) 1 MG tablet TAKE 1 TABLET BY MOUTH DAILY. 90 tablet 1   • hydrochlorothiazide (HYDRODIURIL) 25 MG tablet Take 1 tablet by mouth Daily. 30 tablet 3   • KLOR-CON 20 MEQ CR tablet TAKE 1 TABLET BY MOUTH DAILY. 30 tablet 4   • montelukast (SINGULAIR) 10 MG tablet Take 10 mg by mouth Every Night.     • mupirocin (BACTROBAN) 2 % ointment Apply  topically 3 (Three) Times a Day.     • omeprazole (priLOSEC) 40 MG capsule TAKE 1 CAPSULE BY MOUTH DAILY (Patient taking differently: TAKE 1 CAPSULE BY MOUTH NIGHTLY) 90 capsule 3   • rosuvastatin (CRESTOR) 5 MG tablet Take 5 mg by mouth Daily.     • Unable to find 1 each into each nostril As Needed. neilmed sinus rinse as needed     • vitamin D (ERGOCALCIFEROL) 07856 units capsule capsule Take 50,000 Units by mouth 1 (One) Time Per Week.     • [DISCONTINUED] guaifenesin-codeine (GUAIFENESIN AC) 100-10 MG/5ML liquid Take 5 mL by mouth 3 (Three) Times a Day As Needed for Cough. 130 mL 0     No current facility-administered medications on file prior to visit.      Review of Systems   Constitutional: Positive for fatigue.   HENT: Positive for rhinorrhea and sore throat.    Eyes: Negative.    Respiratory: Positive for cough, shortness of breath and wheezing.    Cardiovascular: Negative.    Gastrointestinal: Negative.    Endocrine: Negative.    Genitourinary: Negative.    Musculoskeletal: Positive for arthralgias.   Skin: Negative.    Allergic/Immunologic: Negative.    Neurological: Negative.    Hematological: Negative.    Psychiatric/Behavioral: Negative.        Objective   Physical Exam    Constitutional: She is oriented to person, place, and time. She appears well-developed and well-nourished.   HENT:   Head: Normocephalic and atraumatic.   Right Ear: External ear normal.   Left Ear: External ear normal.   Nose: Nose normal.   Mouth/Throat: Oropharynx is clear and moist.   Eyes: EOM are normal. Pupils are equal, round, and reactive to light.   Neck: Neck supple.   Cardiovascular: Normal rate, regular rhythm and normal heart sounds.    Pulmonary/Chest: No respiratory distress. She has wheezes.   B wheeze. Some dyspnea w/ speech.    Abdominal: Soft.   Musculoskeletal: Normal range of motion.   Neurological: She is alert and oriented to person, place, and time.   Skin: Skin is warm and dry.   Psychiatric: She has a normal mood and affect. Her behavior is normal. Judgment and thought content normal.   Nursing note and vitals reviewed.     xopenex neb given. Improvement symptomatically and some improved air movement after treatment.   /60   Pulse 78   Wt 62.6 kg (138 lb)   SpO2 95%   BMI 23.69 kg/m²     Assessment/Plan   Diagnoses and all orders for this visit:    Cough  -     XR Chest PA & Lateral (In Office)  -     levalbuterol (XOPENEX) nebulizer solution 0.63 mg; Take 3 mL by nebulization Every 6 (Six) Hours As Needed for Wheezing.    COPD exacerbation    Persistent cough    Other orders  -     MethylPREDNISolone (MEDROL, SAI,) 4 MG tablet; Take as directed on package instructions.  -     guaifenesin-codeine (GUAIFENESIN AC) 100-10 MG/5ML liquid; Take 5 mL by mouth 3 (Three) Times a Day As Needed for Cough.      Patient w/ persistent cough/ apparent copd exacerbation. Some improvement w/ neb.Cbc w elevated wbc. cxr chronic changes only. Afebrile.  Will give a medrol inj today with a medrol dose pack as well as course zpack. She is to have neb treatments qid. Prn codeine cough syrup. To pulmonary tomorrow as scheduled. She will need improved pulmonary function prior to surgery.            Alert and oriented to person, place, time/situation. normal mood and affect. no apparent risk to self or others.

## 2018-04-18 ENCOUNTER — TELEPHONE (OUTPATIENT)
Dept: SURGERY | Facility: CLINIC | Age: 78
End: 2018-04-18

## 2018-04-18 PROBLEM — K43.9 EPIGASTRIC HERNIA: Status: ACTIVE | Noted: 2018-04-18

## 2018-05-15 ENCOUNTER — APPOINTMENT (OUTPATIENT)
Dept: PREADMISSION TESTING | Facility: HOSPITAL | Age: 78
End: 2018-05-15

## 2018-05-15 VITALS
OXYGEN SATURATION: 98 % | HEIGHT: 64 IN | RESPIRATION RATE: 16 BRPM | TEMPERATURE: 98.3 F | DIASTOLIC BLOOD PRESSURE: 73 MMHG | WEIGHT: 130 LBS | HEART RATE: 59 BPM | BODY MASS INDEX: 22.2 KG/M2 | SYSTOLIC BLOOD PRESSURE: 163 MMHG

## 2018-05-15 DIAGNOSIS — K43.9 EPIGASTRIC HERNIA: ICD-10-CM

## 2018-05-15 LAB
ANION GAP SERPL CALCULATED.3IONS-SCNC: 12.4 MMOL/L
BASOPHILS # BLD AUTO: 0.04 10*3/MM3 (ref 0–0.2)
BASOPHILS NFR BLD AUTO: 0.6 % (ref 0–1.5)
BUN BLD-MCNC: 8 MG/DL (ref 8–23)
BUN/CREAT SERPL: 14.8 (ref 7–25)
CALCIUM SPEC-SCNC: 9.6 MG/DL (ref 8.6–10.5)
CHLORIDE SERPL-SCNC: 99 MMOL/L (ref 98–107)
CO2 SERPL-SCNC: 30.6 MMOL/L (ref 22–29)
CREAT BLD-MCNC: 0.54 MG/DL (ref 0.57–1)
DEPRECATED RDW RBC AUTO: 45 FL (ref 37–54)
EOSINOPHIL # BLD AUTO: 0.44 10*3/MM3 (ref 0–0.7)
EOSINOPHIL NFR BLD AUTO: 7 % (ref 0.3–6.2)
ERYTHROCYTE [DISTWIDTH] IN BLOOD BY AUTOMATED COUNT: 14.2 % (ref 11.7–13)
GFR SERPL CREATININE-BSD FRML MDRD: 110 ML/MIN/1.73
GLUCOSE BLD-MCNC: 120 MG/DL (ref 65–99)
HCT VFR BLD AUTO: 39.5 % (ref 35.6–45.5)
HGB BLD-MCNC: 13 G/DL (ref 11.9–15.5)
IMM GRANULOCYTES # BLD: 0 10*3/MM3 (ref 0–0.03)
IMM GRANULOCYTES NFR BLD: 0 % (ref 0–0.5)
LYMPHOCYTES # BLD AUTO: 1.63 10*3/MM3 (ref 0.9–4.8)
LYMPHOCYTES NFR BLD AUTO: 25.9 % (ref 19.6–45.3)
MCH RBC QN AUTO: 28.7 PG (ref 26.9–32)
MCHC RBC AUTO-ENTMCNC: 32.9 G/DL (ref 32.4–36.3)
MCV RBC AUTO: 87.2 FL (ref 80.5–98.2)
MONOCYTES # BLD AUTO: 0.74 10*3/MM3 (ref 0.2–1.2)
MONOCYTES NFR BLD AUTO: 11.7 % (ref 5–12)
NEUTROPHILS # BLD AUTO: 3.45 10*3/MM3 (ref 1.9–8.1)
NEUTROPHILS NFR BLD AUTO: 54.8 % (ref 42.7–76)
PLATELET # BLD AUTO: 302 10*3/MM3 (ref 140–500)
PMV BLD AUTO: 12.3 FL (ref 6–12)
POTASSIUM BLD-SCNC: 3.7 MMOL/L (ref 3.5–5.2)
RBC # BLD AUTO: 4.53 10*6/MM3 (ref 3.9–5.2)
SODIUM BLD-SCNC: 142 MMOL/L (ref 136–145)
WBC NRBC COR # BLD: 6.3 10*3/MM3 (ref 4.5–10.7)

## 2018-05-15 PROCEDURE — 85025 COMPLETE CBC W/AUTO DIFF WBC: CPT | Performed by: SURGERY

## 2018-05-15 PROCEDURE — 93005 ELECTROCARDIOGRAM TRACING: CPT | Performed by: SURGERY

## 2018-05-15 PROCEDURE — 36415 COLL VENOUS BLD VENIPUNCTURE: CPT

## 2018-05-15 PROCEDURE — 80048 BASIC METABOLIC PNL TOTAL CA: CPT | Performed by: SURGERY

## 2018-05-15 PROCEDURE — 93010 ELECTROCARDIOGRAM REPORT: CPT | Performed by: INTERNAL MEDICINE

## 2018-05-15 RX ORDER — IPRATROPIUM BROMIDE 42 UG/1
2 SPRAY, METERED NASAL 4 TIMES DAILY PRN
COMMUNITY
End: 2021-01-01

## 2018-05-15 RX ORDER — AMLODIPINE AND VALSARTAN 10; 320 MG/1; MG/1
1 TABLET ORAL DAILY
COMMUNITY
End: 2018-05-23 | Stop reason: SDUPTHER

## 2018-05-15 RX ORDER — IPRATROPIUM BROMIDE AND ALBUTEROL SULFATE 2.5; .5 MG/3ML; MG/3ML
3 SOLUTION RESPIRATORY (INHALATION) EVERY 4 HOURS PRN
COMMUNITY

## 2018-05-15 RX ORDER — POTASSIUM CHLORIDE 1.5 G/1.77G
20 POWDER, FOR SOLUTION ORAL DAILY
COMMUNITY
End: 2018-06-11

## 2018-05-15 RX ORDER — OMEPRAZOLE 40 MG/1
40 CAPSULE, DELAYED RELEASE ORAL EVERY EVENING
COMMUNITY
End: 2018-12-26 | Stop reason: SDUPTHER

## 2018-05-15 NOTE — DISCHARGE INSTRUCTIONS
Take the following medications the morning of surgery with a small sip of water: ANORA ELIPTA, AMLODIPINE/VALSARTAN        General Instructions:  • Do not eat solid food after midnight the night before surgery.  • You may drink clear liquids day of surgery but must stop at least one hour before your hospital arrival time.  • It is beneficial for you to have a clear drink that contains carbohydrates the day of surgery.  We suggest a 12 to 20 ounce bottle of Gatorade or Powerade for non-diabetic patients or a 12 to 20 ounce bottle of G2 or Powerade Zero for diabetic patients.     Clear liquids are liquids you can see through.  Nothing red in color.     Plain water                               Sports drinks  Sodas                                   Gelatin (Jell-O)  Fruit juices without pulp such as white grape juice and apple juice  Popsicles that contain no fruit or yogurt  Tea or coffee (no cream or milk added)  Gatorade / Powerade  G2 / Powerade Zero    • Bring any papers given to you in the doctor’s office.  • Wear clean comfortable clothes and socks.  • Do not wear contact lenses or make-up.  Bring a case for your glasses.   • Bring crutches or walker if applicable.  • Remove all piercings.  Leave jewelry and any other valuables at home.  • The Pre-Admission Testing nurse will instruct you to bring medications if unable to obtain an accurate list in Pre-Admission Testing.            Preventing a Surgical Site Infection:  • For 2 to 3 days before surgery, avoid shaving with a razor because the razor can irritate skin and make it easier to develop an infection.  • The night prior to surgery sleep in a clean bed with clean clothing.  Do not allow pets to sleep with you.  • Shower on the morning of surgery using a fresh bar of anti-bacterial soap (such as Dial) and clean washcloth.  Dry with a clean towel and dress in clean clothing.  • Ask your surgeon if you will be receiving antibiotics prior to surgery.  • Make  sure you, your family, and all healthcare providers clean their hands with soap and water or an alcohol based hand  before caring for you or your wound.    Day of surgery: 5/22/2018. MAIN Or. ARRIVAL TIME 6 AM  Upon arrival, a Pre-op nurse and Anesthesiologist will review your health history, obtain vital signs, and answer questions you may have.  The only belongings needed at this time will be your home medications and if applicable your C-PAP/BI-PAP machine.  If you are staying overnight your family can leave the rest of your belongings in the car and bring them to your room later.  A Pre-op nurse will start an IV and you may receive medication in preparation for surgery, including something to help you relax.  Your family will be able to see you in the Pre-op area.  While you are in surgery your family should notify the waiting room  if they leave the waiting room area and provide a contact phone number.    Please be aware that surgery does come with discomfort.  We want to make every effort to control your discomfort so please discuss any uncontrolled symptoms with your nurse.   Your doctor will most likely have prescribed pain medications.      If you are going home after surgery you will receive individualized written care instructions before being discharged.  A responsible adult must drive you to and from the hospital on the day of your surgery and stay with you for 24 hours.        If you have any questions please call Pre-Admission Testing at [phone].  Deductibles and co-payments are collected on the day of service. Please be prepared to pay the required co-pay, deductible or deposit on the day of service as defined by your plan.

## 2018-05-18 DIAGNOSIS — Z12.39 BREAST CANCER SCREENING: Primary | ICD-10-CM

## 2018-05-22 ENCOUNTER — ANESTHESIA EVENT (OUTPATIENT)
Dept: PERIOP | Facility: HOSPITAL | Age: 78
End: 2018-05-22

## 2018-05-22 ENCOUNTER — HOSPITAL ENCOUNTER (OUTPATIENT)
Facility: HOSPITAL | Age: 78
Setting detail: HOSPITAL OUTPATIENT SURGERY
Discharge: HOME | End: 2018-05-22
Attending: SURGERY | Admitting: SURGERY

## 2018-05-22 ENCOUNTER — ANESTHESIA (OUTPATIENT)
Dept: PERIOP | Facility: HOSPITAL | Age: 78
End: 2018-05-22

## 2018-05-22 VITALS
TEMPERATURE: 98.2 F | OXYGEN SATURATION: 96 % | HEART RATE: 77 BPM | SYSTOLIC BLOOD PRESSURE: 122 MMHG | RESPIRATION RATE: 16 BRPM | BODY MASS INDEX: 22.62 KG/M2 | HEIGHT: 64 IN | DIASTOLIC BLOOD PRESSURE: 58 MMHG | WEIGHT: 132.5 LBS

## 2018-05-22 DIAGNOSIS — K43.9 EPIGASTRIC HERNIA: ICD-10-CM

## 2018-05-22 PROCEDURE — 25010000002 PROPOFOL 10 MG/ML EMULSION: Performed by: NURSE ANESTHETIST, CERTIFIED REGISTERED

## 2018-05-22 PROCEDURE — 25010000002 HYDROMORPHONE HCL PF 500 MG/50ML SOLUTION: Performed by: NURSE ANESTHETIST, CERTIFIED REGISTERED

## 2018-05-22 PROCEDURE — 25010000002 HYDROMORPHONE PER 4 MG: Performed by: NURSE ANESTHETIST, CERTIFIED REGISTERED

## 2018-05-22 PROCEDURE — 94799 UNLISTED PULMONARY SVC/PX: CPT

## 2018-05-22 PROCEDURE — S0260 H&P FOR SURGERY: HCPCS | Performed by: SURGERY

## 2018-05-22 PROCEDURE — 49652 PR LAP, VENTRAL HERNIA REPAIR,REDUCIBLE: CPT | Performed by: PHYSICIAN ASSISTANT

## 2018-05-22 PROCEDURE — 25010000003 CEFAZOLIN IN DEXTROSE 2-4 GM/100ML-% SOLUTION: Performed by: SURGERY

## 2018-05-22 PROCEDURE — 25010000002 DEXAMETHASONE PER 1 MG: Performed by: NURSE ANESTHETIST, CERTIFIED REGISTERED

## 2018-05-22 PROCEDURE — S2900 ROBOTIC SURGICAL SYSTEM: HCPCS | Performed by: SURGERY

## 2018-05-22 PROCEDURE — 25010000002 ONDANSETRON PER 1 MG: Performed by: NURSE ANESTHETIST, CERTIFIED REGISTERED

## 2018-05-22 PROCEDURE — 25010000002 HYDRALAZINE PER 20 MG: Performed by: NURSE ANESTHETIST, CERTIFIED REGISTERED

## 2018-05-22 PROCEDURE — C1781 MESH (IMPLANTABLE): HCPCS | Performed by: SURGERY

## 2018-05-22 PROCEDURE — 49652 PR LAP, VENTRAL HERNIA REPAIR,REDUCIBLE: CPT | Performed by: SURGERY

## 2018-05-22 PROCEDURE — 25010000002 MIDAZOLAM PER 1 MG: Performed by: ANESTHESIOLOGY

## 2018-05-22 PROCEDURE — 25010000002 FENTANYL CITRATE (PF) 100 MCG/2ML SOLUTION: Performed by: NURSE ANESTHETIST, CERTIFIED REGISTERED

## 2018-05-22 DEVICE — MESH FLUT SHT 3X6IN: Type: IMPLANTABLE DEVICE | Status: FUNCTIONAL

## 2018-05-22 RX ORDER — DEXAMETHASONE SODIUM PHOSPHATE 10 MG/ML
INJECTION INTRAMUSCULAR; INTRAVENOUS AS NEEDED
Status: DISCONTINUED | OUTPATIENT
Start: 2018-05-22 | End: 2018-05-22 | Stop reason: SURG

## 2018-05-22 RX ORDER — MIDAZOLAM HYDROCHLORIDE 1 MG/ML
2 INJECTION INTRAMUSCULAR; INTRAVENOUS
Status: DISCONTINUED | OUTPATIENT
Start: 2018-05-22 | End: 2018-05-22 | Stop reason: HOSPADM

## 2018-05-22 RX ORDER — ROCURONIUM BROMIDE 10 MG/ML
INJECTION, SOLUTION INTRAVENOUS AS NEEDED
Status: DISCONTINUED | OUTPATIENT
Start: 2018-05-22 | End: 2018-05-22 | Stop reason: SURG

## 2018-05-22 RX ORDER — SODIUM CHLORIDE 0.9 % (FLUSH) 0.9 %
1-10 SYRINGE (ML) INJECTION AS NEEDED
Status: DISCONTINUED | OUTPATIENT
Start: 2018-05-22 | End: 2018-05-22 | Stop reason: HOSPADM

## 2018-05-22 RX ORDER — OXYCODONE HYDROCHLORIDE AND ACETAMINOPHEN 5; 325 MG/1; MG/1
1 TABLET ORAL EVERY 6 HOURS PRN
Qty: 30 TABLET | Refills: 0 | Status: SHIPPED | OUTPATIENT
Start: 2018-05-22 | End: 2018-06-06

## 2018-05-22 RX ORDER — LIDOCAINE HYDROCHLORIDE 20 MG/ML
INJECTION, SOLUTION INFILTRATION; PERINEURAL AS NEEDED
Status: DISCONTINUED | OUTPATIENT
Start: 2018-05-22 | End: 2018-05-22 | Stop reason: SURG

## 2018-05-22 RX ORDER — PROPOFOL 10 MG/ML
VIAL (ML) INTRAVENOUS AS NEEDED
Status: DISCONTINUED | OUTPATIENT
Start: 2018-05-22 | End: 2018-05-22 | Stop reason: SURG

## 2018-05-22 RX ORDER — EPHEDRINE SULFATE 50 MG/ML
5 INJECTION, SOLUTION INTRAVENOUS ONCE AS NEEDED
Status: DISCONTINUED | OUTPATIENT
Start: 2018-05-22 | End: 2018-05-22 | Stop reason: HOSPADM

## 2018-05-22 RX ORDER — PROMETHAZINE HYDROCHLORIDE 25 MG/ML
12.5 INJECTION, SOLUTION INTRAMUSCULAR; INTRAVENOUS ONCE AS NEEDED
Status: DISCONTINUED | OUTPATIENT
Start: 2018-05-22 | End: 2018-05-22 | Stop reason: HOSPADM

## 2018-05-22 RX ORDER — FENTANYL CITRATE 50 UG/ML
50 INJECTION, SOLUTION INTRAMUSCULAR; INTRAVENOUS
Status: DISCONTINUED | OUTPATIENT
Start: 2018-05-22 | End: 2018-05-22 | Stop reason: HOSPADM

## 2018-05-22 RX ORDER — ONDANSETRON 2 MG/ML
INJECTION INTRAMUSCULAR; INTRAVENOUS AS NEEDED
Status: DISCONTINUED | OUTPATIENT
Start: 2018-05-22 | End: 2018-05-22 | Stop reason: SURG

## 2018-05-22 RX ORDER — LIDOCAINE HYDROCHLORIDE 10 MG/ML
0.5 INJECTION, SOLUTION EPIDURAL; INFILTRATION; INTRACAUDAL; PERINEURAL ONCE AS NEEDED
Status: DISCONTINUED | OUTPATIENT
Start: 2018-05-22 | End: 2018-05-22 | Stop reason: HOSPADM

## 2018-05-22 RX ORDER — MIDAZOLAM HYDROCHLORIDE 1 MG/ML
1 INJECTION INTRAMUSCULAR; INTRAVENOUS
Status: DISCONTINUED | OUTPATIENT
Start: 2018-05-22 | End: 2018-05-22 | Stop reason: HOSPADM

## 2018-05-22 RX ORDER — NALOXONE HCL 0.4 MG/ML
0.2 VIAL (ML) INJECTION AS NEEDED
Status: DISCONTINUED | OUTPATIENT
Start: 2018-05-22 | End: 2018-05-22 | Stop reason: HOSPADM

## 2018-05-22 RX ORDER — SODIUM CHLORIDE, SODIUM LACTATE, POTASSIUM CHLORIDE, CALCIUM CHLORIDE 600; 310; 30; 20 MG/100ML; MG/100ML; MG/100ML; MG/100ML
INJECTION, SOLUTION INTRAVENOUS CONTINUOUS PRN
Status: DISCONTINUED | OUTPATIENT
Start: 2018-05-22 | End: 2018-05-22 | Stop reason: SURG

## 2018-05-22 RX ORDER — FLUMAZENIL 0.1 MG/ML
0.2 INJECTION INTRAVENOUS AS NEEDED
Status: DISCONTINUED | OUTPATIENT
Start: 2018-05-22 | End: 2018-05-22 | Stop reason: HOSPADM

## 2018-05-22 RX ORDER — PROMETHAZINE HYDROCHLORIDE 25 MG/1
25 TABLET ORAL ONCE AS NEEDED
Status: DISCONTINUED | OUTPATIENT
Start: 2018-05-22 | End: 2018-05-22 | Stop reason: HOSPADM

## 2018-05-22 RX ORDER — PROMETHAZINE HYDROCHLORIDE 25 MG/1
25 SUPPOSITORY RECTAL ONCE AS NEEDED
Status: DISCONTINUED | OUTPATIENT
Start: 2018-05-22 | End: 2018-05-22 | Stop reason: HOSPADM

## 2018-05-22 RX ORDER — FAMOTIDINE 10 MG/ML
20 INJECTION, SOLUTION INTRAVENOUS ONCE
Status: COMPLETED | OUTPATIENT
Start: 2018-05-22 | End: 2018-05-22

## 2018-05-22 RX ORDER — OXYCODONE AND ACETAMINOPHEN 7.5; 325 MG/1; MG/1
1 TABLET ORAL ONCE AS NEEDED
Status: COMPLETED | OUTPATIENT
Start: 2018-05-22 | End: 2018-05-22

## 2018-05-22 RX ORDER — DIPHENHYDRAMINE HYDROCHLORIDE 50 MG/ML
12.5 INJECTION INTRAMUSCULAR; INTRAVENOUS
Status: DISCONTINUED | OUTPATIENT
Start: 2018-05-22 | End: 2018-05-22 | Stop reason: HOSPADM

## 2018-05-22 RX ORDER — HYDRALAZINE HYDROCHLORIDE 20 MG/ML
INJECTION INTRAMUSCULAR; INTRAVENOUS AS NEEDED
Status: DISCONTINUED | OUTPATIENT
Start: 2018-05-22 | End: 2018-05-22 | Stop reason: SURG

## 2018-05-22 RX ORDER — EPHEDRINE SULFATE 50 MG/ML
INJECTION, SOLUTION INTRAVENOUS AS NEEDED
Status: DISCONTINUED | OUTPATIENT
Start: 2018-05-22 | End: 2018-05-22 | Stop reason: SURG

## 2018-05-22 RX ORDER — HYDRALAZINE HYDROCHLORIDE 20 MG/ML
5 INJECTION INTRAMUSCULAR; INTRAVENOUS
Status: DISCONTINUED | OUTPATIENT
Start: 2018-05-22 | End: 2018-05-22 | Stop reason: HOSPADM

## 2018-05-22 RX ORDER — SODIUM CHLORIDE, SODIUM LACTATE, POTASSIUM CHLORIDE, CALCIUM CHLORIDE 600; 310; 30; 20 MG/100ML; MG/100ML; MG/100ML; MG/100ML
9 INJECTION, SOLUTION INTRAVENOUS CONTINUOUS
Status: DISCONTINUED | OUTPATIENT
Start: 2018-05-22 | End: 2018-05-22 | Stop reason: HOSPADM

## 2018-05-22 RX ORDER — HYDROMORPHONE HYDROCHLORIDE 1 MG/ML
0.5 INJECTION, SOLUTION INTRAMUSCULAR; INTRAVENOUS; SUBCUTANEOUS
Status: DISCONTINUED | OUTPATIENT
Start: 2018-05-22 | End: 2018-05-22 | Stop reason: HOSPADM

## 2018-05-22 RX ORDER — BUPIVACAINE HYDROCHLORIDE AND EPINEPHRINE 5; 5 MG/ML; UG/ML
INJECTION, SOLUTION PERINEURAL AS NEEDED
Status: DISCONTINUED | OUTPATIENT
Start: 2018-05-22 | End: 2018-05-22 | Stop reason: HOSPADM

## 2018-05-22 RX ORDER — ONDANSETRON 2 MG/ML
4 INJECTION INTRAMUSCULAR; INTRAVENOUS ONCE AS NEEDED
Status: DISCONTINUED | OUTPATIENT
Start: 2018-05-22 | End: 2018-05-22 | Stop reason: HOSPADM

## 2018-05-22 RX ORDER — FENTANYL CITRATE 50 UG/ML
INJECTION, SOLUTION INTRAMUSCULAR; INTRAVENOUS AS NEEDED
Status: DISCONTINUED | OUTPATIENT
Start: 2018-05-22 | End: 2018-05-22 | Stop reason: SURG

## 2018-05-22 RX ORDER — LABETALOL HYDROCHLORIDE 5 MG/ML
5 INJECTION, SOLUTION INTRAVENOUS
Status: DISCONTINUED | OUTPATIENT
Start: 2018-05-22 | End: 2018-05-22 | Stop reason: HOSPADM

## 2018-05-22 RX ORDER — CEFAZOLIN SODIUM 2 G/100ML
2 INJECTION, SOLUTION INTRAVENOUS ONCE
Status: COMPLETED | OUTPATIENT
Start: 2018-05-22 | End: 2018-05-22

## 2018-05-22 RX ORDER — HYDROMORPHONE HCL 110MG/55ML
PATIENT CONTROLLED ANALGESIA SYRINGE INTRAVENOUS AS NEEDED
Status: DISCONTINUED | OUTPATIENT
Start: 2018-05-22 | End: 2018-05-22 | Stop reason: SURG

## 2018-05-22 RX ORDER — PROMETHAZINE HYDROCHLORIDE 25 MG/1
12.5 TABLET ORAL ONCE AS NEEDED
Status: DISCONTINUED | OUTPATIENT
Start: 2018-05-22 | End: 2018-05-22 | Stop reason: HOSPADM

## 2018-05-22 RX ADMIN — SODIUM CHLORIDE, POTASSIUM CHLORIDE, SODIUM LACTATE AND CALCIUM CHLORIDE 9 ML/HR: 600; 310; 30; 20 INJECTION, SOLUTION INTRAVENOUS at 07:32

## 2018-05-22 RX ADMIN — CEFAZOLIN SODIUM 2 G: 2 INJECTION, SOLUTION INTRAVENOUS at 08:16

## 2018-05-22 RX ADMIN — SODIUM CHLORIDE, POTASSIUM CHLORIDE, SODIUM LACTATE AND CALCIUM CHLORIDE: 600; 310; 30; 20 INJECTION, SOLUTION INTRAVENOUS at 08:16

## 2018-05-22 RX ADMIN — EPHEDRINE SULFATE 10 MG: 50 INJECTION INTRAMUSCULAR; INTRAVENOUS; SUBCUTANEOUS at 08:40

## 2018-05-22 RX ADMIN — ROCURONIUM BROMIDE 10 MG: 10 INJECTION INTRAVENOUS at 09:18

## 2018-05-22 RX ADMIN — HYDROMORPHONE HYDROCHLORIDE 0.5 MG: 2 INJECTION INTRAMUSCULAR; INTRAVENOUS; SUBCUTANEOUS at 08:57

## 2018-05-22 RX ADMIN — FENTANYL CITRATE 50 MCG: 50 INJECTION INTRAMUSCULAR; INTRAVENOUS at 08:20

## 2018-05-22 RX ADMIN — LIDOCAINE HYDROCHLORIDE 60 MG: 20 INJECTION, SOLUTION INFILTRATION; PERINEURAL at 08:28

## 2018-05-22 RX ADMIN — OXYCODONE HYDROCHLORIDE AND ACETAMINOPHEN 1 TABLET: 7.5; 325 TABLET ORAL at 11:12

## 2018-05-22 RX ADMIN — ROCURONIUM BROMIDE 40 MG: 10 INJECTION INTRAVENOUS at 08:28

## 2018-05-22 RX ADMIN — MIDAZOLAM 1 MG: 1 INJECTION INTRAMUSCULAR; INTRAVENOUS at 07:33

## 2018-05-22 RX ADMIN — SUGAMMADEX 200 MG: 100 INJECTION, SOLUTION INTRAVENOUS at 09:58

## 2018-05-22 RX ADMIN — DEXAMETHASONE SODIUM PHOSPHATE 8 MG: 10 INJECTION INTRAMUSCULAR; INTRAVENOUS at 08:37

## 2018-05-22 RX ADMIN — EPHEDRINE SULFATE 10 MG: 50 INJECTION INTRAMUSCULAR; INTRAVENOUS; SUBCUTANEOUS at 08:37

## 2018-05-22 RX ADMIN — PROPOFOL 100 MG: 10 INJECTION, EMULSION INTRAVENOUS at 08:28

## 2018-05-22 RX ADMIN — HYDROMORPHONE HYDROCHLORIDE 0.5 MG: 10 INJECTION INTRAMUSCULAR; INTRAVENOUS; SUBCUTANEOUS at 11:07

## 2018-05-22 RX ADMIN — ONDANSETRON 4 MG: 2 INJECTION INTRAMUSCULAR; INTRAVENOUS at 09:52

## 2018-05-22 RX ADMIN — FAMOTIDINE 20 MG: 10 INJECTION INTRAVENOUS at 07:32

## 2018-05-22 RX ADMIN — FENTANYL CITRATE 50 MCG: 50 INJECTION INTRAMUSCULAR; INTRAVENOUS at 08:23

## 2018-05-22 RX ADMIN — HYDROMORPHONE HYDROCHLORIDE 0.5 MG: 2 INJECTION INTRAMUSCULAR; INTRAVENOUS; SUBCUTANEOUS at 09:04

## 2018-05-22 RX ADMIN — HYDRALAZINE HYDROCHLORIDE 10 MG: 20 INJECTION INTRAMUSCULAR; INTRAVENOUS at 09:00

## 2018-05-22 NOTE — OP NOTE
Operative Note :   Josr Jennings MD    Janet Martinez  1940    Procedure Date: 05/22/18    Pre-op Diagnosis:  Epigastric hernia [K43.9]    Post-Op Diagnosis:  Same     Procedure:   · Upper scopic repair of epigastric hernia with da Margot robot assistance    Surgeon: Josr Jennings MD    Assistant: Sandhya DONIS    Anesthesia:  General (general endotracheal tube)    EBL:   minimal    Specimens:   none    Indications:  · 77-year-old lady with a small symptomatic hernia in the midline on the upper aspect of her abdominal wall    Findings:   · Small epigastric defect of approximately 2x1.5cm    Recommendations:   · Routine post op care    Description of procedure:    After obtaining informed consent, patient brought to the operating room and was placed under a general anesthetic.  Patient's positioned appropriately on the bed with all pressure points padded.  The bed was flexed to maximize our exposure of the abdominal wall.  The abdomen was sterilely prepped and draped.  I gained access to the peritoneal cavity with a direct access technique in the left upper quadrant with a 5 mm trocar.  Initial inspection the abdomen demonstrated no evidence of injury.  I then introduced a 12 mm trocar is far lateral as possible on the left side of the abdomen just below the umbilicus and a 8 mm robotic trocar was introduced in the left lower quadrant.  The initial 5 mm trocar was then switched out for the 8 mm robotic trocar and the robot was then brought up and docked perpendicular to the patient on her right side.  The Cadiere forceps were introduced on the right side.  The monopolar scissors were introduced on the left.  I identified the area of the hernia.  I then began taking the peritoneum down working my way from about 5 cm lateral to the midline and dissecting in the preperitoneal space until I was able identify the hernia defect.  I dissected the peritoneum away from the posterior rectus sheath superiorly and  inferiorly and then worked lateral to the hernia defect.  I peeled all of the preperitoneal fat out of this hernia defect until I could see the edges very clearly.  I then measured the defect and it was about 2 cm from craniocaudal direction and about 1.5 cm in width.  I measured the total space that I had made in the preperitoneal dissection and I had about 8 x 6 cm.  I then closed the fascial defect with a running barbed 0 PDS suture.  I then cut a Bard soft polypropylene mesh cut to 8 x 6 cm and then introduced this into the abdominal cavity.  A Prolene suture had been placed in the middle of this and the suture grasper was used to grab the Prolene suture and center the mesh.  The mesh was secured to the posterior rectus fascia with running 2-0 barbed Monocryl suture.  The Prolene suture was cut.  The peritoneal flap was closed with a running barbed 2-0 Monocryl suture.  All sutures were removed.  The robot was undocked.  The abdomen was desufflated after removal of the trochars under direct visualization.  The 12 mm camera trocar was closed with 0 Vicryl suture.  Skin incisions were closed with 4-0 Monocryl.  Sterile dressings were applied.  Patient tolerated the procedure well.    Josr Jennings MD  General and Endoscopic Surgery  [facility]    [address], [address]  [place], [place], [address]  P: [phone]  F: [phone]

## 2018-05-22 NOTE — H&P
Cc: Knot on abdomen     History of presenting illness:   This is a very nice, 77-year-old lady with a history of COPD who presents with a knot which she noted on her abdomen a couple of weeks ago.  Its associated with some very mild discomfort and seems to be more notable when she is standing.  She says when she coughs she has some discomfort and she thinks it may have grown a little bit larger.  When she lies flat it seems to be better.  She's never noticed anything like this before couple of weeks ago.  There is no radiation of any discomfort.  She does report some constipation, but this problem pretty exists the finding of this mass.     Past Medical History: COPD, depression, arthritis, hypertension, of relevance she was hospitalized in January of this year with an attack of COPD, requiring a brief period of time on the ventilator     Past Surgical History: Left oophorectomy is an 10-year-old child, otherwise significant for multiple orthopedic procedures and carotid artery angioplasty and bronchoscopy.  She is also had a breast biopsy (benign)     Medications: Extensive, reviewed and reconciled with in Epic.  Of note she takes multiple inhalers and is also on Enbrel for her arthritis.     Allergies: Reviewed and reconciled with in Epic     Social History: Former smoker who quit greater than 25 years ago.  Does not use alcohol.  Retired.     Family History: Negative for colorectal cancer     Review of Systems:  Constitutional: Negative for change in activity, fever or unexpected fatigue  Neck: no swollen glands or dysphagia or odynophagia  Respiratory: Positive for cough, wheezing and shortness of breath, negative for hemoptysis  Cardiovascular: negative for chest pain, palpitations or peripheral edema  Gastrointestinal: Positive for abdominal swelling and constipation, negative for significant abdominal pain, negative for vomiting        Physical Exam:     General: alert and oriented, appropriate, no acute  distress  Neck: Supple without lymphadenopathy or thyromegaly, trachea is in the midline  Respiratory: Positive for bilateral wheezes and decreased breath sounds, negative for use of accessory muscles  Cardiovascular: Regular rate and rhythm without murmur, no peripheral edema  Gastrointestinal: Soft, mildly tender in the epigastrium with a partially reducible hernia noted.  I'm not able to completely reduce this.  It's difficult to feel the size of the neck, but I estimated to be less than 2 cm.  No significant periumbilical hernia noted.  No mass noted.  No hepatosplenomegaly appreciated.     Laboratory data: None relevant     Imaging data: Ultrasound done a couple of days ago reviewed by me.  His demonstrates a fascial defect in the linea alba, measured at about 2.8 cm.        Assessment and plan:   Symptomatic, partially incarcerated epigastric hernia  COPD     Discussed the options with the patient.  I think that particularly given her problems with constipation and persistent cough it's reasonable to repair this.  In all likelihood is going to grown larger over time and may potentially become incarcerated and therefore more dangerous.  However, I think she needs to check back with her family doctor, and possibly with her pulmonologist to ensure that her respiratory status is as good as we can get it.  There certainly no urgency to proceed with the operation.  In addition she will need to stop her Enbrel several weeks prior to the procedure in order to improve our chances that postoperative healing.  She understands all this and is willing to proceed.  I have recommended a da Margot robot assisted minimally invasive repair, likely with a preperitoneal approach.  Patient understands the risks of this and is willing to proceed.        Josr Jennings MD, FACS  General, Minimally Invasive and Endoscopic Surgery  [facility]     [address], [address]  [place], [place], [address]  P: [phone]  F:  [phone]

## 2018-05-22 NOTE — ANESTHESIA PREPROCEDURE EVALUATION
Anesthesia Evaluation     Patient summary reviewed and Nursing notes reviewed   history of anesthetic complications: PONV  NPO Solid Status: > 8 hours  NPO Liquid Status: > 2 hours           Airway   Mallampati: II  no difficulty expected  Dental - normal exam     Pulmonary     breath sounds clear to auscultation  (+) a smoker Former, COPD,   Cardiovascular     ECG reviewed  Rhythm: regular  Rate: normal    (+) hypertension, hyperlipidemia,       Neuro/Psych  GI/Hepatic/Renal/Endo    (+)  hiatal hernia, GERD,      Musculoskeletal     Abdominal    Substance History      OB/GYN          Other   (+) arthritis                     Anesthesia Plan    ASA 3     general     intravenous and inhalational induction   Anesthetic plan and risks discussed with patient.

## 2018-05-22 NOTE — ANESTHESIA PROCEDURE NOTES
Airway  Urgency: elective    Date/Time: 5/22/2018 8:30 AM  Airway not difficult    General Information and Staff    Patient location during procedure: OR  Anesthesiologist: CHAO SEGUNDO  CRNA: SEJAL CARVER    Indications and Patient Condition  Indications for airway management: airway protection    Preoxygenated: yes  Mask difficulty assessment: 2 - vent by mask + OA or adjuvant +/- NMBA    Final Airway Details  Final airway type: endotracheal airway      Successful airway: ETT  Cuffed: yes   Successful intubation technique: direct laryngoscopy  Endotracheal tube insertion site: oral  Blade: Rivas  Blade size: #2  ETT size: 7.0 mm  Cormack-Lehane Classification: grade I - full view of glottis  Placement verified by: chest auscultation and capnometry   Cuff volume (mL): 4  Measured from: lips  ETT to lips (cm): 20  Number of attempts at approach: 1    Additional Comments  EBBS: ETCO2+: Atraumatic intubation; Lips and teeth same as pre op

## 2018-05-23 RX ORDER — AMLODIPINE AND VALSARTAN 10; 320 MG/1; MG/1
1 TABLET ORAL DAILY
Qty: 30 TABLET | Refills: 3 | Status: SHIPPED | OUTPATIENT
Start: 2018-05-23 | End: 2018-08-07 | Stop reason: SDUPTHER

## 2018-06-04 RX ORDER — DULOXETIN HYDROCHLORIDE 60 MG/1
CAPSULE, DELAYED RELEASE ORAL
Qty: 90 CAPSULE | Refills: 0 | Status: SHIPPED | OUTPATIENT
Start: 2018-06-04 | End: 2018-07-30 | Stop reason: SINTOL

## 2018-06-04 RX ORDER — BUPROPION HYDROCHLORIDE 150 MG/1
TABLET ORAL
Qty: 90 TABLET | Refills: 1 | Status: SHIPPED | OUTPATIENT
Start: 2018-06-04 | End: 2018-06-11

## 2018-06-06 ENCOUNTER — OFFICE VISIT (OUTPATIENT)
Dept: SURGERY | Facility: CLINIC | Age: 78
End: 2018-06-06

## 2018-06-06 DIAGNOSIS — K43.9 EPIGASTRIC HERNIA: Primary | ICD-10-CM

## 2018-06-06 PROCEDURE — 99024 POSTOP FOLLOW-UP VISIT: CPT | Performed by: SURGERY

## 2018-06-07 NOTE — PROGRESS NOTES
Follow-up da Margot robot assisted repair of epigastric hernia    Subjective:  No complaints.  Patient had minimal postoperative pain.  Eating well and bowels are functioning normally.    Objective:  Trocar sites are well-healed.  No significant tenderness in the epigastrium.  No evidence for recurrence of hernia.    Assessment and plan:  Patient is now couple of weeks out from da Margot robot assisted preperitoneal epigastric hernia repair.  No evidence of any postoperative complication.  Patient may return to normal activities next week.  Follow-up as needed.    Josr Jennings MD  General and Endoscopic Surgery  [facility]    [address] [place] [address], [address]  [place], [place], [address]  P: [phone]  F: [phone]

## 2018-06-11 ENCOUNTER — HOSPITAL ENCOUNTER (OUTPATIENT)
Dept: MAMMOGRAPHY | Facility: HOSPITAL | Age: 78
Discharge: HOME | End: 2018-06-11
Admitting: INTERNAL MEDICINE

## 2018-06-11 ENCOUNTER — OFFICE VISIT (OUTPATIENT)
Dept: INTERNAL MEDICINE | Facility: CLINIC | Age: 78
End: 2018-06-11

## 2018-06-11 VITALS
OXYGEN SATURATION: 98 % | RESPIRATION RATE: 18 BRPM | DIASTOLIC BLOOD PRESSURE: 68 MMHG | HEART RATE: 67 BPM | TEMPERATURE: 98.2 F | BODY MASS INDEX: 22.02 KG/M2 | WEIGHT: 129 LBS | SYSTOLIC BLOOD PRESSURE: 116 MMHG | HEIGHT: 64 IN

## 2018-06-11 DIAGNOSIS — J44.9 CHRONIC OBSTRUCTIVE PULMONARY DISEASE, UNSPECIFIED COPD TYPE (HCC): ICD-10-CM

## 2018-06-11 DIAGNOSIS — I10 ESSENTIAL HYPERTENSION: ICD-10-CM

## 2018-06-11 DIAGNOSIS — E78.5 HYPERLIPIDEMIA, UNSPECIFIED HYPERLIPIDEMIA TYPE: Primary | ICD-10-CM

## 2018-06-11 DIAGNOSIS — Z12.39 BREAST CANCER SCREENING: ICD-10-CM

## 2018-06-11 DIAGNOSIS — F32.A DEPRESSION, UNSPECIFIED DEPRESSION TYPE: ICD-10-CM

## 2018-06-11 PROBLEM — R05.3 PERSISTENT COUGH: Status: RESOLVED | Noted: 2018-01-03 | Resolved: 2018-06-11

## 2018-06-11 PROCEDURE — 77067 SCR MAMMO BI INCL CAD: CPT

## 2018-06-11 PROCEDURE — 99214 OFFICE O/P EST MOD 30 MIN: CPT | Performed by: INTERNAL MEDICINE

## 2018-06-11 PROCEDURE — 77063 BREAST TOMOSYNTHESIS BI: CPT

## 2018-06-11 NOTE — PROGRESS NOTES
Chief Complaint   Patient presents with   • COPD   • Hypertension   copd, hypertension, hyperlipidemia, ventral hernia      History of Present Illness   Janet Martinez is a 77 y.o. female presents for routine follow up evaluation. She has hypertension. She was previously prescribed amlod/ valsart/ hct. This was switched to valsart/ amlod w/o hct. However, she has been taking both medications at this time. She is also taking atorvastatin, buproprion, duloxetine, folic acid, singulair, and omeprazole. She reports that mood is stable. bp is running low given extra dosing of meds. She had a ventral hernia repair and has recovered well from this. She has a lot of confusion regarding her medication usage.     The following portions of the patient's history were reviewed and updated as appropriate: allergies, current medications, past family history, past medical history, past social history, past surgical history and problem list.  Current Outpatient Prescriptions on File Prior to Visit   Medication Sig Dispense Refill   • albuterol (PROVENTIL) (2.5 MG/3ML) 0.083% nebulizer solution Take 2.5 mg by nebulization Every 4 (Four) Hours As Needed for Wheezing.     • amLODIPine-valsartan (EXFORGE)  MG per tablet Take 1 tablet by mouth Daily. 30 tablet 3   • atorvastatin (LIPITOR) 20 MG tablet Take 1 tablet by mouth Daily. 90 tablet 2   • buPROPion XL (WELLBUTRIN XL) 150 MG 24 hr tablet Take 1 tablet by mouth Every Morning. 90 tablet 1   • Calcium Carbonate-Vitamin D (CALCIUM 600+D PO) Take 1 tablet by mouth Daily.     • DULoxetine (CYMBALTA) 60 MG capsule TAKE 1 CAPSULE BY MOUTH DAILY. 90 capsule 0   • etanercept (ENBREL) 50 MG/ML solution prefilled syringe injection Inject 50 mg under the skin 1 (One) Time Per Week. THURSDAYS. TO HOLD 4 WEEKS     • folic acid (FOLVITE) 1 MG tablet TAKE 1 TABLET BY MOUTH DAILY. 90 tablet 1   • ipratropium (ATROVENT) 0.06 % nasal spray 2 sprays into each nostril 4 (Four) Times a Day As  Needed for Rhinitis.     • ipratropium-albuterol (DUO-NEB) 0.5-2.5 mg/3 ml nebulizer Take 3 mL by nebulization Every 4 (Four) Hours As Needed for Wheezing.     • montelukast (SINGULAIR) 10 MG tablet Take 10 mg by mouth Daily. AT NOON     • omeprazole (priLOSEC) 40 MG capsule Take 40 mg by mouth Every Evening.     • umeclidinium-vilanterol (ANORO ELLIPTA) 62.5-25 MCG/INH aerosol powder  inhaler Inhale 1 puff Daily.     • [DISCONTINUED] albuterol (PROVENTIL HFA;VENTOLIN HFA) 108 (90 Base) MCG/ACT inhaler Inhale 2 puffs Every 4 (Four) Hours As Needed for Wheezing.     • [DISCONTINUED] buPROPion XL (WELLBUTRIN XL) 150 MG 24 hr tablet TAKE 1 TABLET BY MOUTH DAILY 90 tablet 1   • [DISCONTINUED] DULoxetine (CYMBALTA) 60 MG capsule Take 60 mg by mouth Daily.     • [DISCONTINUED] potassium chloride (KLOR-CON) 20 MEQ packet Take 20 mEq by mouth Daily.     • [DISCONTINUED] vitamin D (ERGOCALCIFEROL) 65546 units capsule capsule Take 50,000 Units by mouth 1 (One) Time Per Week. SUNDAYS       No current facility-administered medications on file prior to visit.      Review of Systems   Constitutional: Negative.    HENT: Negative.    Eyes: Negative.    Respiratory: Negative.    Cardiovascular: Negative.    Gastrointestinal: Positive for constipation.        Using stool softener for bowel health.    Endocrine: Negative.    Genitourinary: Negative.    Musculoskeletal: Positive for arthralgias.        Stable OA   Skin: Negative.    Allergic/Immunologic: Negative.    Neurological: Negative.    Hematological: Negative.    Psychiatric/Behavioral: Negative.        Objective   Physical Exam   Constitutional: She is oriented to person, place, and time. She appears well-developed and well-nourished.   HENT:   Head: Normocephalic and atraumatic.   Right Ear: Hearing, tympanic membrane and external ear normal.   Left Ear: Hearing, tympanic membrane and external ear normal.   Nose: Nose normal.   Mouth/Throat: Oropharynx is clear and moist.  "  Eyes: Conjunctivae and EOM are normal. Pupils are equal, round, and reactive to light.   Neck: Normal range of motion. Neck supple. No thyromegaly present.   Cardiovascular: Normal rate, regular rhythm, normal heart sounds and intact distal pulses.    No murmur heard.  Pulmonary/Chest: Effort normal and breath sounds normal. Right breast exhibits no mass. Left breast exhibits no mass.   Abdominal: Soft. Bowel sounds are normal. She exhibits no distension. There is no hepatosplenomegaly. There is no tenderness.   Well healed surgical scars. Hernia repaired   Genitourinary: No breast tenderness.   Musculoskeletal: Normal range of motion.   Lymphadenopathy:     She has no cervical adenopathy.   Neurological: She is alert and oriented to person, place, and time.   Skin: Skin is warm and dry.   Psychiatric: She has a normal mood and affect. Her speech is normal and behavior is normal. Judgment and thought content normal. Cognition and memory are normal.   Nursing note and vitals reviewed.       /68   Pulse 67   Temp 98.2 °F (36.8 °C) (Oral)   Resp 18   Ht 162.6 cm (64\")   Wt 58.5 kg (129 lb)   SpO2 98%   Breastfeeding? No   BMI 22.14 kg/m²     Assessment/Plan   Diagnoses and all orders for this visit:    Hyperlipidemia, unspecified hyperlipidemia type    Essential hypertension    Chronic obstructive pulmonary disease, unspecified COPD type    Depression, unspecified depression type          Current outpatient and discharge medications have been reconciled for the patient. Spent 30 min detailing patient's medications. She will stop the exforge hctand just take exforge. Monitor bp. Reduce then d/c welbutrin as she is taking cymbalta. May adjust cymbalta if needed. She had meds written out for her and timeing of taking. She will allow for full surgical healing. conitnue meds for breathing/ inhalers. Follow up in 6 mo or prn.     Reviewed by: Agata Burgess MD    "

## 2018-07-30 ENCOUNTER — TELEPHONE (OUTPATIENT)
Dept: INTERNAL MEDICINE | Facility: CLINIC | Age: 78
End: 2018-07-30

## 2018-07-30 RX ORDER — BUPROPION HYDROCHLORIDE 150 MG/1
150 TABLET ORAL EVERY MORNING
Qty: 90 TABLET | Refills: 1
Start: 2018-07-30 | End: 2018-08-17 | Stop reason: SINTOL

## 2018-07-30 RX ORDER — BUPROPION HYDROCHLORIDE 150 MG/1
150 TABLET ORAL EVERY MORNING
Qty: 90 TABLET | Refills: 1
Start: 2018-07-30 | End: 2018-07-30 | Stop reason: SDUPTHER

## 2018-07-30 NOTE — TELEPHONE ENCOUNTER
Pt called and said she is to emotional on the duloxetine. States she cries very easy and her  has noticed a change in her emotions. She said she use to take the Wellbutrin with this and it worked fine and then you stopped her Wellbutrin. She said she needs a change in meds.

## 2018-08-04 ENCOUNTER — OFFICE VISIT (OUTPATIENT)
Dept: RETAIL CLINIC | Facility: CLINIC | Age: 78
End: 2018-08-04

## 2018-08-04 VITALS
DIASTOLIC BLOOD PRESSURE: 74 MMHG | OXYGEN SATURATION: 98 % | TEMPERATURE: 98.2 F | HEART RATE: 62 BPM | RESPIRATION RATE: 18 BRPM | SYSTOLIC BLOOD PRESSURE: 138 MMHG

## 2018-08-04 DIAGNOSIS — R42 DIZZINESS: Primary | ICD-10-CM

## 2018-08-04 PROCEDURE — 99213 OFFICE O/P EST LOW 20 MIN: CPT | Performed by: NURSE PRACTITIONER

## 2018-08-04 NOTE — PROGRESS NOTES
Subjective   Janet Martinez is a 78 y.o. female.     Dizziness   This is a new problem. The current episode started yesterday. The problem occurs constantly. The problem has been gradually worsening. Associated symptoms include nausea. Pertinent negatives include no fever, headaches or sore throat. Associated symptoms comments: Ringing in the ears. Exacerbated by: started wellbutrin 4 days ago, nausea and dizziness started yesterday and continues. She has tried nothing for the symptoms.   Nausea   This is a new problem. The current episode started yesterday. The problem has been gradually worsening. Associated symptoms include nausea. Pertinent negatives include no fever, headaches or sore throat. Nothing aggravates the symptoms. The treatment provided mild relief.        The following portions of the patient's history were reviewed and updated as appropriate: allergies, current medications, past family history, past medical history, past social history, past surgical history and problem list.    Review of Systems   Constitutional: Negative for fever.   HENT: Negative for sore throat.    Respiratory: Negative.    Cardiovascular: Negative.    Gastrointestinal: Positive for nausea.   Genitourinary: Negative.    Musculoskeletal: Negative.    Neurological: Positive for dizziness.       Objective   Physical Exam   Constitutional: She is cooperative. No distress.   HENT:   Head: Normocephalic.   Right Ear: Hearing, external ear and ear canal normal. A middle ear effusion is present.   Left Ear: Hearing, tympanic membrane, external ear and ear canal normal.   Nose: Nose normal.   Mouth/Throat: Oropharynx is clear and moist.   Eyes: Pupils are equal, round, and reactive to light. Conjunctivae, EOM and lids are normal.   Neck: Trachea normal and full passive range of motion without pain.   Cardiovascular: Normal rate, regular rhythm and normal pulses.    Pulmonary/Chest: Effort normal and breath sounds normal.    Lymphadenopathy:     She has cervical adenopathy.        Right cervical: Superficial cervical adenopathy present.        Left cervical: Superficial cervical adenopathy present.   Neurological: She is alert.   Skin: Skin is warm. Capillary refill takes less than 2 seconds.   Psychiatric: She has a normal mood and affect. Her speech is normal and behavior is normal.   Vitals reviewed.        Assessment/Plan   Janet was seen today for dizziness and nausea.    Diagnoses and all orders for this visit:    Dizziness        Dizziness  Dizziness is a common problem. It is a feeling of unsteadiness or light-headedness. You may feel like you are about to faint. Dizziness can lead to injury if you stumble or fall. Anyone can become dizzy, but dizziness is more common in older adults. This condition can be caused by a number of things, including medicines, dehydration, or illness.  Follow these instructions at home:  Eating and drinking  · Drink enough fluid to keep your urine clear or pale yellow. This helps to keep you from becoming dehydrated. Try to drink more clear fluids, such as water.  · Do not drink alcohol.  · Limit your caffeine intake if told to do so by your health care provider. Check ingredients and nutrition facts to see if a food or beverage contains caffeine.  · Limit your salt (sodium) intake if told to do so by your health care provider. Check ingredients and nutrition facts to see if a food or beverage contains sodium.  Activity  · Avoid making quick movements.  ? Rise slowly from chairs and steady yourself until you feel okay.  ? In the morning, first sit up on the side of the bed. When you feel okay, stand slowly while you hold onto something until you know that your balance is fine.  · If you need to  one place for a long time, move your legs often. Tighten and relax the muscles in your legs while you are standing.  · Do not drive or use heavy machinery if you feel dizzy.  · Avoid bending down  if you feel dizzy. Place items in your home so that they are easy for you to reach without leaning over.  Lifestyle  · Do not use any products that contain nicotine or tobacco, such as cigarettes and e-cigarettes. If you need help quitting, ask your health care provider.  · Try to reduce your stress level by using methods such as yoga or meditation. Talk with your health care provider if you need help to manage your stress.  General instructions  · Watch your dizziness for any changes.  · Take over-the-counter and prescription medicines only as told by your health care provider. Talk with your health care provider if you think that your dizziness is caused by a medicine that you are taking.  · Tell a friend or a family member that you are feeling dizzy. If he or she notices any changes in your behavior, have this person call your health care provider.  · Keep all follow-up visits as told by your health care provider. This is important.  Contact a health care provider if:  · Your dizziness does not go away.  · Your dizziness or light-headedness gets worse.  · You feel nauseous.  · You have reduced hearing.  · You have new symptoms.  · You are unsteady on your feet or you feel like the room is spinning.  Get help right away if:  · You vomit or have diarrhea and are unable to eat or drink anything.  · You have problems talking, walking, swallowing, or using your arms, hands, or legs.  · You feel generally weak.  · You are not thinking clearly or you have trouble forming sentences. It may take a friend or family member to notice this.  · You have chest pain, abdominal pain, shortness of breath, or sweating.  · Your vision changes.  · You have any bleeding.  · You have a severe headache.  · You have neck pain or a stiff neck.  · You have a fever.  These symptoms may represent a serious problem that is an emergency. Do not wait to see if the symptoms will go away. Get medical help right away. Call your local emergency  services (911 in the [place] Do not drive yourself to the hospital.  Summary  · Dizziness is a feeling of unsteadiness or light-headedness. This condition can be caused by a number of things, including medicines, dehydration, or illness.  · Anyone can become dizzy, but dizziness is more common in older adults.  · Drink enough fluid to keep your urine clear or pale yellow. Do not drink alcohol.  · Avoid making quick movements if you feel dizzy. Monitor your dizziness for any changes.  This information is not intended to replace advice given to you by your health care provider. Make sure you discuss any questions you have with your health care provider.  Document Released: 06/13/2002 Document Revised: 01/20/2018 Document Reviewed: 01/20/2018  Elsevier Interactive Patient Education © 2018 [employer]

## 2018-08-04 NOTE — PATIENT INSTRUCTIONS
Dizziness  Dizziness is a common problem. It is a feeling of unsteadiness or light-headedness. You may feel like you are about to faint. Dizziness can lead to injury if you stumble or fall. Anyone can become dizzy, but dizziness is more common in older adults. This condition can be caused by a number of things, including medicines, dehydration, or illness.  Follow these instructions at home:  Eating and drinking  · Drink enough fluid to keep your urine clear or pale yellow. This helps to keep you from becoming dehydrated. Try to drink more clear fluids, such as water.  · Do not drink alcohol.  · Limit your caffeine intake if told to do so by your health care provider. Check ingredients and nutrition facts to see if a food or beverage contains caffeine.  · Limit your salt (sodium) intake if told to do so by your health care provider. Check ingredients and nutrition facts to see if a food or beverage contains sodium.  Activity  · Avoid making quick movements.  ? Rise slowly from chairs and steady yourself until you feel okay.  ? In the morning, first sit up on the side of the bed. When you feel okay, stand slowly while you hold onto something until you know that your balance is fine.  · If you need to  one place for a long time, move your legs often. Tighten and relax the muscles in your legs while you are standing.  · Do not drive or use heavy machinery if you feel dizzy.  · Avoid bending down if you feel dizzy. Place items in your home so that they are easy for you to reach without leaning over.  Lifestyle  · Do not use any products that contain nicotine or tobacco, such as cigarettes and e-cigarettes. If you need help quitting, ask your health care provider.  · Try to reduce your stress level by using methods such as yoga or meditation. Talk with your health care provider if you need help to manage your stress.  General instructions  · Watch your dizziness for any changes.  · Take over-the-counter and  prescription medicines only as told by your health care provider. Talk with your health care provider if you think that your dizziness is caused by a medicine that you are taking.  · Tell a friend or a family member that you are feeling dizzy. If he or she notices any changes in your behavior, have this person call your health care provider.  · Keep all follow-up visits as told by your health care provider. This is important.  Contact a health care provider if:  · Your dizziness does not go away.  · Your dizziness or light-headedness gets worse.  · You feel nauseous.  · You have reduced hearing.  · You have new symptoms.  · You are unsteady on your feet or you feel like the room is spinning.  Get help right away if:  · You vomit or have diarrhea and are unable to eat or drink anything.  · You have problems talking, walking, swallowing, or using your arms, hands, or legs.  · You feel generally weak.  · You are not thinking clearly or you have trouble forming sentences. It may take a friend or family member to notice this.  · You have chest pain, abdominal pain, shortness of breath, or sweating.  · Your vision changes.  · You have any bleeding.  · You have a severe headache.  · You have neck pain or a stiff neck.  · You have a fever.  These symptoms may represent a serious problem that is an emergency. Do not wait to see if the symptoms will go away. Get medical help right away. Call your local emergency services (911 in the [place] Do not drive yourself to the hospital.  Summary  · Dizziness is a feeling of unsteadiness or light-headedness. This condition can be caused by a number of things, including medicines, dehydration, or illness.  · Anyone can become dizzy, but dizziness is more common in older adults.  · Drink enough fluid to keep your urine clear or pale yellow. Do not drink alcohol.  · Avoid making quick movements if you feel dizzy. Monitor your dizziness for any changes.  This information is not intended to  replace advice given to you by your health care provider. Make sure you discuss any questions you have with your health care provider.  Document Released: 06/13/2002 Document Revised: 01/20/2018 Document Reviewed: 01/20/2018  Elsevier Interactive Patient Education © 2018 [employer]

## 2018-08-06 ENCOUNTER — TELEPHONE (OUTPATIENT)
Dept: INTERNAL MEDICINE | Facility: CLINIC | Age: 78
End: 2018-08-06

## 2018-08-06 NOTE — TELEPHONE ENCOUNTER
Pt stopped taking her Wellbutrin. Said it caused her to be nauseated and blurred vision . Pt states she was only on it for 4 days

## 2018-08-07 RX ORDER — MONTELUKAST SODIUM 10 MG/1
TABLET ORAL
Qty: 30 TABLET | Refills: 3 | Status: SHIPPED | OUTPATIENT
Start: 2018-08-07 | End: 2018-12-10 | Stop reason: SDUPTHER

## 2018-08-07 RX ORDER — AMLODIPINE AND VALSARTAN 10; 320 MG/1; MG/1
TABLET ORAL
Qty: 30 TABLET | Refills: 2 | Status: SHIPPED | OUTPATIENT
Start: 2018-08-07 | End: 2018-11-08 | Stop reason: SDUPTHER

## 2018-08-14 ENCOUNTER — TELEPHONE (OUTPATIENT)
Dept: INTERNAL MEDICINE | Facility: CLINIC | Age: 78
End: 2018-08-14

## 2018-08-14 NOTE — TELEPHONE ENCOUNTER
"Pt called to say that the rx Wellbutrin is not working for her.   It is making her \"unpleaseable\"   She wants to stop taking it. How should she come off it?  "

## 2018-08-17 ENCOUNTER — OFFICE VISIT (OUTPATIENT)
Dept: INTERNAL MEDICINE | Facility: CLINIC | Age: 78
End: 2018-08-17

## 2018-08-17 VITALS
DIASTOLIC BLOOD PRESSURE: 80 MMHG | HEART RATE: 56 BPM | BODY MASS INDEX: 21.97 KG/M2 | WEIGHT: 128 LBS | SYSTOLIC BLOOD PRESSURE: 126 MMHG | OXYGEN SATURATION: 98 %

## 2018-08-17 DIAGNOSIS — F32.A DEPRESSION, UNSPECIFIED DEPRESSION TYPE: Primary | ICD-10-CM

## 2018-08-17 PROCEDURE — 99213 OFFICE O/P EST LOW 20 MIN: CPT | Performed by: INTERNAL MEDICINE

## 2018-08-17 RX ORDER — DULOXETIN HYDROCHLORIDE 30 MG/1
90 CAPSULE, DELAYED RELEASE ORAL DAILY
Qty: 270 CAPSULE | Refills: 2 | Status: SHIPPED | OUTPATIENT
Start: 2018-08-17 | End: 2019-02-21 | Stop reason: DRUGHIGH

## 2018-08-17 NOTE — PROGRESS NOTES
Chief Complaint   Patient presents with   • Irritable   • Medication Problem       History of Present Illness   Janet Martinez is a 78 y.o. female presents for follow up evaluation. Some medical confusion regarding meds for depression. She was taking both duloxetine and welbutrin for mood. Initially was to switch from welbutrin to duloxetine but somehow ended up on both meds. She is now weaning off of buproprion. Currently taking buproprion qod and taking one duloxetine every day and is tolerating this switch in medication.     The following portions of the patient's history were reviewed and updated as appropriate: allergies, current medications, past family history, past medical history, past social history, past surgical history and problem list.  Current Outpatient Prescriptions on File Prior to Visit   Medication Sig Dispense Refill   • albuterol (PROVENTIL) (2.5 MG/3ML) 0.083% nebulizer solution Take 2.5 mg by nebulization Every 4 (Four) Hours As Needed for Wheezing.     • amLODIPine-valsartan (EXFORGE)  MG per tablet TAKE 1 TABLET BY MOUTH EVERY DAY 30 tablet 2   • atorvastatin (LIPITOR) 20 MG tablet Take 1 tablet by mouth Daily. 90 tablet 2   • Calcium Carbonate-Vitamin D (CALCIUM 600+D PO) Take 1 tablet by mouth Daily.     • etanercept (ENBREL) 50 MG/ML solution prefilled syringe injection Inject 50 mg under the skin 1 (One) Time Per Week. THURSDAYS. TO HOLD 4 WEEKS     • folic acid (FOLVITE) 1 MG tablet TAKE 1 TABLET BY MOUTH DAILY. 90 tablet 1   • ipratropium (ATROVENT) 0.06 % nasal spray 2 sprays into each nostril 4 (Four) Times a Day As Needed for Rhinitis.     • ipratropium-albuterol (DUO-NEB) 0.5-2.5 mg/3 ml nebulizer Take 3 mL by nebulization Every 4 (Four) Hours As Needed for Wheezing.     • montelukast (SINGULAIR) 10 MG tablet TAKE 1 TABLET BY MOUTH EVERY DAY 30 tablet 3   • omeprazole (priLOSEC) 40 MG capsule Take 40 mg by mouth Every Evening.     • umeclidinium-vilanterol (ANORO ELLIPTA)  62.5-25 MCG/INH aerosol powder  inhaler Inhale 1 puff Daily.     • [DISCONTINUED] buPROPion XL (WELLBUTRIN XL) 150 MG 24 hr tablet Take 1 tablet by mouth Every Morning. 90 tablet 1     No current facility-administered medications on file prior to visit.      Review of Systems   Constitutional: Negative.    HENT: Negative.    Eyes: Negative.    Respiratory: Negative.    Cardiovascular: Negative.    Gastrointestinal: Negative.    Endocrine: Negative.    Genitourinary: Negative.    Musculoskeletal: Negative.    Skin: Negative.    Allergic/Immunologic: Negative.    Neurological: Negative.    Hematological: Negative.    Psychiatric/Behavioral: The patient is nervous/anxious.         Depressed   irritable       Objective   Physical Exam   Constitutional: She is oriented to person, place, and time. She appears well-developed and well-nourished.   HENT:   Head: Normocephalic and atraumatic.   Right Ear: Hearing, tympanic membrane and external ear normal.   Left Ear: Hearing, tympanic membrane and external ear normal.   Nose: Nose normal.   Mouth/Throat: Oropharynx is clear and moist.   Eyes: Pupils are equal, round, and reactive to light. Conjunctivae and EOM are normal.   Neck: Neck supple. No thyromegaly present.   Cardiovascular: Normal rate, regular rhythm and normal heart sounds.    No murmur heard.  Pulmonary/Chest: Effort normal and breath sounds normal. Right breast exhibits no mass. Left breast exhibits no mass.   Abdominal: Soft. She exhibits no distension. There is no hepatosplenomegaly. There is no tenderness.   Genitourinary: No breast tenderness.   Musculoskeletal:   Chronic arthritic changes in joints/ particularly hands.    Lymphadenopathy:     She has no cervical adenopathy.   Neurological: She is alert and oriented to person, place, and time.   Skin: Skin is warm and dry.   Psychiatric: She has a normal mood and affect. Her speech is normal and behavior is normal. Judgment and thought content normal.  Cognition and memory are normal.        /80   Pulse 56   Wt 58.1 kg (128 lb)   SpO2 98%   BMI 21.97 kg/m²     Assessment/Plan   Diagnoses and all orders for this visit:    Depression, unspecified depression type    Other orders  -     DULoxetine (CYMBALTA) 30 MG capsule; Take 3 capsules by mouth Daily.        Patient with depression. Will stop buproprion. Will increase duloxetine to 90 mg daily. She will follow up routinely but will call if mood is not doing well with this switch in medication.

## 2018-08-28 RX ORDER — ATORVASTATIN CALCIUM 20 MG/1
20 TABLET, FILM COATED ORAL DAILY
Qty: 90 TABLET | Refills: 2 | Status: SHIPPED | OUTPATIENT
Start: 2018-08-28 | End: 2019-02-08 | Stop reason: SDUPTHER

## 2018-09-06 ENCOUNTER — TELEPHONE (OUTPATIENT)
Dept: INTERNAL MEDICINE | Facility: CLINIC | Age: 78
End: 2018-09-06

## 2018-09-06 NOTE — TELEPHONE ENCOUNTER
Pt is congested and has a cough. She is using her Nebulizer.  She would like something called in .

## 2018-09-07 ENCOUNTER — OFFICE VISIT (OUTPATIENT)
Dept: INTERNAL MEDICINE | Facility: CLINIC | Age: 78
End: 2018-09-07

## 2018-09-07 VITALS
TEMPERATURE: 98.1 F | HEART RATE: 70 BPM | SYSTOLIC BLOOD PRESSURE: 126 MMHG | DIASTOLIC BLOOD PRESSURE: 80 MMHG | WEIGHT: 128 LBS | BODY MASS INDEX: 21.97 KG/M2 | OXYGEN SATURATION: 98 %

## 2018-09-07 DIAGNOSIS — J40 BRONCHITIS: ICD-10-CM

## 2018-09-07 DIAGNOSIS — J44.9 CHRONIC OBSTRUCTIVE PULMONARY DISEASE, UNSPECIFIED COPD TYPE (HCC): Primary | ICD-10-CM

## 2018-09-07 PROCEDURE — 71046 X-RAY EXAM CHEST 2 VIEWS: CPT | Performed by: INTERNAL MEDICINE

## 2018-09-07 PROCEDURE — 99214 OFFICE O/P EST MOD 30 MIN: CPT | Performed by: INTERNAL MEDICINE

## 2018-09-07 RX ORDER — FLUTICASONE PROPIONATE 50 MCG
2 SPRAY, SUSPENSION (ML) NASAL DAILY
Qty: 1 BOTTLE | Refills: 5 | Status: SHIPPED | OUTPATIENT
Start: 2018-09-07 | End: 2021-01-01

## 2018-09-07 RX ORDER — GUAIFENESIN AND CODEINE PHOSPHATE 100; 10 MG/5ML; MG/5ML
5 SOLUTION ORAL 3 TIMES DAILY PRN
Qty: 180 ML | Refills: 1 | Status: SHIPPED | OUTPATIENT
Start: 2018-09-07 | End: 2018-12-26

## 2018-09-07 RX ORDER — DOXYCYCLINE HYCLATE 100 MG/1
100 CAPSULE ORAL 2 TIMES DAILY
Qty: 14 CAPSULE | Refills: 0 | Status: SHIPPED | OUTPATIENT
Start: 2018-09-07 | End: 2018-12-17

## 2018-09-07 NOTE — PROGRESS NOTES
Chief Complaint   Patient presents with   • Cough       History of Present Illness   Janet Martinez is a 78 y.o. female presents for acute care. Patient is having increased congestion, sinus pressure, and cough. Patient has COPD and is compliant w/ all medications. She reports that cough is now keeping her up at night. She is taking nyquil/ dayquil w/ some benefit of symptoms. She is using her nebulizer and needed this 3 times yesterday and once this morning.         The following portions of the patient's history were reviewed and updated as appropriate: allergies, current medications, past family history, past medical history, past social history, past surgical history and problem list.  Current Outpatient Prescriptions on File Prior to Visit   Medication Sig Dispense Refill   • albuterol (PROVENTIL) (2.5 MG/3ML) 0.083% nebulizer solution Take 2.5 mg by nebulization Every 4 (Four) Hours As Needed for Wheezing.     • amLODIPine-valsartan (EXFORGE)  MG per tablet TAKE 1 TABLET BY MOUTH EVERY DAY 30 tablet 2   • atorvastatin (LIPITOR) 20 MG tablet TAKE 1 TABLET BY MOUTH DAILY. 90 tablet 2   • Calcium Carbonate-Vitamin D (CALCIUM 600+D PO) Take 1 tablet by mouth Daily.     • DULoxetine (CYMBALTA) 30 MG capsule Take 3 capsules by mouth Daily. 270 capsule 2   • etanercept (ENBREL) 50 MG/ML solution prefilled syringe injection Inject 50 mg under the skin 1 (One) Time Per Week. THURSDAYS. TO HOLD 4 WEEKS     • folic acid (FOLVITE) 1 MG tablet TAKE 1 TABLET BY MOUTH DAILY. 90 tablet 1   • ipratropium (ATROVENT) 0.06 % nasal spray 2 sprays into each nostril 4 (Four) Times a Day As Needed for Rhinitis.     • ipratropium-albuterol (DUO-NEB) 0.5-2.5 mg/3 ml nebulizer Take 3 mL by nebulization Every 4 (Four) Hours As Needed for Wheezing.     • montelukast (SINGULAIR) 10 MG tablet TAKE 1 TABLET BY MOUTH EVERY DAY 30 tablet 3   • omeprazole (priLOSEC) 40 MG capsule Take 40 mg by mouth Every Evening.     •  umeclidinium-vilanterol (ANORO ELLIPTA) 62.5-25 MCG/INH aerosol powder  inhaler Inhale 1 puff Daily.       No current facility-administered medications on file prior to visit.      Review of Systems   Constitutional: Positive for fatigue.   HENT: Positive for congestion.    Eyes: Negative.    Respiratory: Positive for cough and shortness of breath.    Cardiovascular: Negative.    Gastrointestinal: Negative.    Endocrine: Negative.    Genitourinary: Negative.    Musculoskeletal: Positive for arthralgias.   Skin: Negative.    Allergic/Immunologic: Negative.    Neurological: Negative.    Hematological: Negative.    Psychiatric/Behavioral: Negative.        Objective   Physical Exam   Constitutional: She is oriented to person, place, and time. She appears well-developed and well-nourished.   Mild ill appearing   HENT:   Head: Normocephalic and atraumatic.   Right Ear: External ear normal.   Left Ear: External ear normal.   Mouth/Throat: Oropharynx is clear and moist.   Eyes: Pupils are equal, round, and reactive to light. EOM are normal.   Neck: Normal range of motion. Neck supple.   Cardiovascular: Normal rate, regular rhythm, normal heart sounds and intact distal pulses.    Pulmonary/Chest: Effort normal. She has wheezes.   Abdominal: She exhibits no distension. There is no tenderness. There is no guarding.   Musculoskeletal:   Chronic oa/ ra changes of joints   Neurological: She is alert and oriented to person, place, and time.   Skin: Skin is warm and dry.   Psychiatric: She has a normal mood and affect. Her behavior is normal. Judgment and thought content normal.   Nursing note and vitals reviewed.     cxr for cough w/ wheeze. Chronic changes w/ flattened diaphragm. Opacity rll ? Atelectasis v infiltrate. Sent to radiology for overread.   /80   Pulse 70   Temp 98.1 °F (36.7 °C)   Wt 58.1 kg (128 lb)   SpO2 98%   BMI 21.97 kg/m²     Assessment/Plan   Diagnoses and all orders for this visit:    Chronic  obstructive pulmonary disease, unspecified COPD type (CMS/HCC)    Bronchitis  -     XR Chest PA & Lateral (In Office)    Other orders  -     fluticasone (FLONASE) 50 MCG/ACT nasal spray; 2 sprays into the nostril(s) as directed by provider Daily.  -     doxycycline (VIBRAMYCIN) 100 MG capsule; Take 1 capsule by mouth 2 (Two) Times a Day.  -     guaifenesin-codeine (GUAIFENESIN AC) 100-10 MG/5ML liquid; Take 5 mL by mouth 3 (Three) Times a Day As Needed for Cough.    patient w/ copd/ exacerbation bronchitis. She will start doxycycline one tab po bid. Nebulizer 3-4 times daily. Take cheratussin prn cough. Send xr for overread. Start flonase now and continue until first frost. Will follow up routinely.   Patient w/ acute copd exac/ bronchitis. Patient will

## 2018-09-12 RX ORDER — DULOXETIN HYDROCHLORIDE 60 MG/1
CAPSULE, DELAYED RELEASE ORAL
Qty: 90 CAPSULE | Refills: 0 | Status: SHIPPED | OUTPATIENT
Start: 2018-09-12 | End: 2018-12-17 | Stop reason: SDUPTHER

## 2018-09-28 ENCOUNTER — OFFICE VISIT (OUTPATIENT)
Dept: INTERNAL MEDICINE | Facility: CLINIC | Age: 78
End: 2018-09-28

## 2018-09-28 VITALS
OXYGEN SATURATION: 98 % | HEART RATE: 67 BPM | SYSTOLIC BLOOD PRESSURE: 130 MMHG | DIASTOLIC BLOOD PRESSURE: 70 MMHG | BODY MASS INDEX: 21.63 KG/M2 | WEIGHT: 126 LBS

## 2018-09-28 DIAGNOSIS — M06.9 RHEUMATOID ARTHRITIS OF HAND, UNSPECIFIED LATERALITY, UNSPECIFIED RHEUMATOID FACTOR PRESENCE: ICD-10-CM

## 2018-09-28 DIAGNOSIS — J44.9 CHRONIC OBSTRUCTIVE PULMONARY DISEASE, UNSPECIFIED COPD TYPE (HCC): ICD-10-CM

## 2018-09-28 DIAGNOSIS — J40 BRONCHITIS: Primary | ICD-10-CM

## 2018-09-28 PROCEDURE — 99213 OFFICE O/P EST LOW 20 MIN: CPT | Performed by: INTERNAL MEDICINE

## 2018-09-28 RX ORDER — AZITHROMYCIN 250 MG/1
TABLET, FILM COATED ORAL
Qty: 6 TABLET | Refills: 0 | Status: SHIPPED | OUTPATIENT
Start: 2018-09-28 | End: 2018-12-17

## 2018-09-28 RX ORDER — METHYLPREDNISOLONE 4 MG/1
TABLET ORAL
Qty: 21 TABLET | Refills: 0 | Status: SHIPPED | OUTPATIENT
Start: 2018-09-28 | End: 2018-12-17

## 2018-09-28 NOTE — PROGRESS NOTES
"Chief Complaint   Patient presents with   • Cough   • Nasal Congestion       History of Present Illness   Janet Martinez is a 78 y.o. female presents for acute care. She reports a cough that is uncontrollable starting about 48 hours ago. She \"took a breathing treatment today\" with some benefit. She is chronically on a daily inhaler for copd (possibly Anoro). She was last on doxycycline for 1 week. She felt well for a bout one week and symptoms are now starting to recur. No fevers. She was off Enbrel for a week but did take a dose last wed.         The following portions of the patient's history were reviewed and updated as appropriate: allergies, current medications, past family history, past medical history, past social history, past surgical history and problem list.  Current Outpatient Prescriptions on File Prior to Visit   Medication Sig Dispense Refill   • albuterol (PROVENTIL) (2.5 MG/3ML) 0.083% nebulizer solution Take 2.5 mg by nebulization Every 4 (Four) Hours As Needed for Wheezing.     • amLODIPine-valsartan (EXFORGE)  MG per tablet TAKE 1 TABLET BY MOUTH EVERY DAY 30 tablet 2   • atorvastatin (LIPITOR) 20 MG tablet TAKE 1 TABLET BY MOUTH DAILY. 90 tablet 2   • Calcium Carbonate-Vitamin D (CALCIUM 600+D PO) Take 1 tablet by mouth Daily.     • DULoxetine (CYMBALTA) 30 MG capsule Take 3 capsules by mouth Daily. 270 capsule 2   • DULoxetine (CYMBALTA) 60 MG capsule TAKE 1 CAPSULE BY MOUTH DAILY. 90 capsule 0   • etanercept (ENBREL) 50 MG/ML solution prefilled syringe injection Inject 50 mg under the skin 1 (One) Time Per Week. THURSDAYS. TO HOLD 4 WEEKS     • fluticasone (FLONASE) 50 MCG/ACT nasal spray 2 sprays into the nostril(s) as directed by provider Daily. 1 bottle 5   • folic acid (FOLVITE) 1 MG tablet TAKE 1 TABLET BY MOUTH DAILY. 90 tablet 1   • guaifenesin-codeine (GUAIFENESIN AC) 100-10 MG/5ML liquid Take 5 mL by mouth 3 (Three) Times a Day As Needed for Cough. 180 mL 1   • ipratropium " (ATROVENT) 0.06 % nasal spray 2 sprays into each nostril 4 (Four) Times a Day As Needed for Rhinitis.     • ipratropium-albuterol (DUO-NEB) 0.5-2.5 mg/3 ml nebulizer Take 3 mL by nebulization Every 4 (Four) Hours As Needed for Wheezing.     • montelukast (SINGULAIR) 10 MG tablet TAKE 1 TABLET BY MOUTH EVERY DAY 30 tablet 3   • omeprazole (priLOSEC) 40 MG capsule Take 40 mg by mouth Every Evening.     • umeclidinium-vilanterol (ANORO ELLIPTA) 62.5-25 MCG/INH aerosol powder  inhaler Inhale 1 puff Daily.     • doxycycline (VIBRAMYCIN) 100 MG capsule Take 1 capsule by mouth 2 (Two) Times a Day. 14 capsule 0     No current facility-administered medications on file prior to visit.      Review of Systems   Constitutional: Negative.    HENT: Negative.    Eyes: Negative.    Respiratory: Positive for cough, shortness of breath and wheezing.    Gastrointestinal: Negative.    Genitourinary: Negative.    Musculoskeletal: Positive for arthralgias.   Neurological: Negative.    Hematological: Negative.    Psychiatric/Behavioral: Negative.        Objective   Physical Exam   Constitutional: She is oriented to person, place, and time. She appears well-developed and well-nourished.   HENT:   Head: Normocephalic and atraumatic.   Right Ear: External ear normal.   Left Ear: External ear normal.   Mouth/Throat: Oropharynx is clear and moist.   Eyes: Pupils are equal, round, and reactive to light. Conjunctivae and EOM are normal.   Neck: Normal range of motion. Neck supple.   Cardiovascular: Normal rate and regular rhythm.    Pulmonary/Chest: Effort normal. No respiratory distress. She has wheezes. She has rales.   Abdominal: Soft. Bowel sounds are normal.   Musculoskeletal:   Arthritic changes noted   Neurological: She is alert and oriented to person, place, and time.   Skin: Skin is warm and dry.   Psychiatric: She has a normal mood and affect. Her behavior is normal. Judgment and thought content normal.   Nursing note and vitals  reviewed.       /70   Pulse 67   Wt 57.2 kg (126 lb)   SpO2 98%   BMI 21.63 kg/m²     Assessment/Plan   Diagnoses and all orders for this visit:    Bronchitis    Rheumatoid arthritis of hand, unspecified laterality, unspecified rheumatoid factor presence (CMS/HCC)    Chronic obstructive pulmonary disease, unspecified COPD type (CMS/HCC)    Other orders  -     MethylPREDNISolone (MEDROL, SAI,) 4 MG tablet; Take as directed on package instructions.  -     azithromycin (ZITHROMAX) 250 MG tablet; Take 2 tablets the first day, then 1 tablet daily for 4 days.        Patient w/ bronchitis/ copd exac. seecond episode in last one month. She is immunocompromised. She will be given azithromycin one course and a medrol pack. She will f/u w/ her pulmonologist. She will remain off Enbrel until cough/ symptoms resolve. Prn cheratussin. F/u prn.

## 2018-11-08 RX ORDER — AMLODIPINE AND VALSARTAN 10; 320 MG/1; MG/1
TABLET ORAL
Qty: 30 TABLET | Refills: 2 | Status: SHIPPED | OUTPATIENT
Start: 2018-11-08 | End: 2019-01-16 | Stop reason: SDUPTHER

## 2018-12-10 RX ORDER — MONTELUKAST SODIUM 10 MG/1
TABLET ORAL
Qty: 30 TABLET | Refills: 3 | Status: SHIPPED | OUTPATIENT
Start: 2018-12-10 | End: 2019-01-17 | Stop reason: SDUPTHER

## 2018-12-17 ENCOUNTER — OFFICE VISIT (OUTPATIENT)
Dept: INTERNAL MEDICINE | Facility: CLINIC | Age: 78
End: 2018-12-17

## 2018-12-17 VITALS
WEIGHT: 130 LBS | SYSTOLIC BLOOD PRESSURE: 122 MMHG | BODY MASS INDEX: 22.31 KG/M2 | HEART RATE: 66 BPM | DIASTOLIC BLOOD PRESSURE: 70 MMHG | OXYGEN SATURATION: 96 % | TEMPERATURE: 98.1 F

## 2018-12-17 DIAGNOSIS — E78.5 HYPERLIPIDEMIA, UNSPECIFIED HYPERLIPIDEMIA TYPE: ICD-10-CM

## 2018-12-17 DIAGNOSIS — M06.9 RHEUMATOID ARTHRITIS OF HAND, UNSPECIFIED LATERALITY, UNSPECIFIED RHEUMATOID FACTOR PRESENCE: ICD-10-CM

## 2018-12-17 DIAGNOSIS — I10 ESSENTIAL HYPERTENSION: ICD-10-CM

## 2018-12-17 DIAGNOSIS — J40 BRONCHITIS: Primary | ICD-10-CM

## 2018-12-17 DIAGNOSIS — R73.01 IMPAIRED FASTING GLUCOSE: ICD-10-CM

## 2018-12-17 LAB
HCT VFR BLDA CALC: 39.8 %
HGB BLDA-MCNC: 12.5 G/DL
LYMPHOCYTES # BLD: 29.1 %
MCH, POC: 27.4
MCHC, POC: 31.4
MCV, POC: 87.4
MONOCYTES # BLD: 11.9 %
PLATELET # BLD: 261 10*3/MM3
PMV BLD: 8.9 FL
POC NEUTROPHIL: 59 %
RBC, POC: 4.55
RDW, POC: 15
WBC # BLD: 5.2 10*3/UL

## 2018-12-17 PROCEDURE — 85025 COMPLETE CBC W/AUTO DIFF WBC: CPT | Performed by: INTERNAL MEDICINE

## 2018-12-17 PROCEDURE — 99214 OFFICE O/P EST MOD 30 MIN: CPT | Performed by: INTERNAL MEDICINE

## 2018-12-17 PROCEDURE — 71046 X-RAY EXAM CHEST 2 VIEWS: CPT | Performed by: INTERNAL MEDICINE

## 2018-12-17 RX ORDER — GUAIFENESIN AND CODEINE PHOSPHATE 100; 10 MG/5ML; MG/5ML
5 SOLUTION ORAL 3 TIMES DAILY PRN
Qty: 150 ML | Refills: 1 | Status: SHIPPED | OUTPATIENT
Start: 2018-12-17 | End: 2018-12-26

## 2018-12-17 RX ORDER — LEVALBUTEROL INHALATION SOLUTION 0.63 MG/3ML
0.63 SOLUTION RESPIRATORY (INHALATION) EVERY 6 HOURS PRN
Status: SHIPPED | OUTPATIENT
Start: 2018-12-17

## 2018-12-17 RX ORDER — PREDNISONE 10 MG/1
10 TABLET ORAL DAILY
Qty: 15 TABLET | Refills: 0 | Status: SHIPPED | OUTPATIENT
Start: 2018-12-17 | End: 2019-06-28 | Stop reason: SDUPTHER

## 2018-12-17 RX ADMIN — LEVALBUTEROL INHALATION SOLUTION 0.63 MG: 0.63 SOLUTION RESPIRATORY (INHALATION) at 14:37

## 2018-12-17 NOTE — PROGRESS NOTES
"Chief Complaint   Patient presents with   • Cough       History of Present Illness   Janet Martinez is a 78 y.o. female presents for acute care. Patient has developed a \"hacking cough\". Started about 1 week ago. Symptoms worse at night. Does not feel new fatigue or \"sick\" today. Minimal sinus drainage. Patient has RA and is on enbrel for this. She is on humidified oxygen at night. Using nebulizer at least once daily. She reports that she is using anoro every evening before bed.         The following portions of the patient's history were reviewed and updated as appropriate: allergies, current medications, past family history, past medical history, past social history, past surgical history and problem list.  Current Outpatient Medications on File Prior to Visit   Medication Sig Dispense Refill   • albuterol (PROVENTIL) (2.5 MG/3ML) 0.083% nebulizer solution Take 2.5 mg by nebulization Every 4 (Four) Hours As Needed for Wheezing.     • amLODIPine-valsartan (EXFORGE)  MG per tablet TAKE 1 TABLET BY MOUTH EVERY DAY 30 tablet 2   • atorvastatin (LIPITOR) 20 MG tablet TAKE 1 TABLET BY MOUTH DAILY. 90 tablet 2   • Calcium Carbonate-Vitamin D (CALCIUM 600+D PO) Take 1 tablet by mouth Daily.     • DULoxetine (CYMBALTA) 30 MG capsule Take 3 capsules by mouth Daily. 270 capsule 2   • etanercept (ENBREL) 50 MG/ML solution prefilled syringe injection Inject 50 mg under the skin 1 (One) Time Per Week. THURSDAYS. TO HOLD 4 WEEKS     • fluticasone (FLONASE) 50 MCG/ACT nasal spray 2 sprays into the nostril(s) as directed by provider Daily. 1 bottle 5   • folic acid (FOLVITE) 1 MG tablet TAKE 1 TABLET BY MOUTH DAILY. 90 tablet 1   • ipratropium (ATROVENT) 0.06 % nasal spray 2 sprays into each nostril 4 (Four) Times a Day As Needed for Rhinitis.     • ipratropium-albuterol (DUO-NEB) 0.5-2.5 mg/3 ml nebulizer Take 3 mL by nebulization Every 4 (Four) Hours As Needed for Wheezing.     • montelukast (SINGULAIR) 10 MG tablet TAKE " 1 TABLET BY MOUTH EVERY DAY 30 tablet 3   • omeprazole (priLOSEC) 40 MG capsule Take 40 mg by mouth Every Evening.     • umeclidinium-vilanterol (ANORO ELLIPTA) 62.5-25 MCG/INH aerosol powder  inhaler Inhale 1 puff Daily.     • guaifenesin-codeine (GUAIFENESIN AC) 100-10 MG/5ML liquid Take 5 mL by mouth 3 (Three) Times a Day As Needed for Cough. 180 mL 1   • [DISCONTINUED] azithromycin (ZITHROMAX) 250 MG tablet Take 2 tablets the first day, then 1 tablet daily for 4 days. 6 tablet 0   • [DISCONTINUED] doxycycline (VIBRAMYCIN) 100 MG capsule Take 1 capsule by mouth 2 (Two) Times a Day. 14 capsule 0   • [DISCONTINUED] DULoxetine (CYMBALTA) 60 MG capsule TAKE 1 CAPSULE BY MOUTH DAILY. 90 capsule 0   • [DISCONTINUED] MethylPREDNISolone (MEDROL, SAI,) 4 MG tablet Take as directed on package instructions. 21 tablet 0     No current facility-administered medications on file prior to visit.      Review of Systems   Constitutional: Positive for fatigue. Negative for fever.   HENT: Negative.    Respiratory: Positive for cough and wheezing.    Cardiovascular: Negative.    Gastrointestinal: Negative.    Endocrine: Negative.    Genitourinary: Negative.    Musculoskeletal: Negative.    Skin: Negative.    Allergic/Immunologic: Negative.    Neurological: Negative.    Hematological: Negative.    Psychiatric/Behavioral: Negative.        Objective   Physical Exam   Constitutional: She is oriented to person, place, and time. She appears well-developed and well-nourished.   HENT:   Head: Normocephalic and atraumatic.   Right Ear: External ear normal.   Left Ear: External ear normal.   Mouth/Throat: Oropharynx is clear and moist.   Eyes: EOM are normal. Pupils are equal, round, and reactive to light.   Neck: Normal range of motion. Neck supple.   Cardiovascular: Normal rate, regular rhythm, normal heart sounds and intact distal pulses.   Pulmonary/Chest: She has wheezes.   Abdominal: Soft. Bowel sounds are normal.   Musculoskeletal:  Normal range of motion.   Neurological: She is alert and oriented to person, place, and time.   Skin: Skin is warm and dry.   Psychiatric: She has a normal mood and affect. Her behavior is normal. Judgment and thought content normal.   Nursing note and vitals reviewed.   cxr for persistent cough. Chronic changes. No acute noted. Appears unchanged from prior. Will send for overread.       /70   Pulse 66   Temp 98.1 °F (36.7 °C)   Wt 59 kg (130 lb)   SpO2 96%   BMI 22.31 kg/m²     Assessment/Plan   Diagnoses and all orders for this visit:    Bronchitis  -     POC CBC With / Auto Diff  -     levalbuterol (XOPENEX) nebulizer solution 0.63 mg; Take 3 mL by nebulization Every 6 (Six) Hours As Needed for Wheezing.  -     XR Chest PA & Lateral (In Office)    Hyperlipidemia, unspecified hyperlipidemia type  -     Comprehensive Metabolic Panel  -     Lipid Panel With LDL / HDL Ratio    Essential hypertension  -     Comprehensive Metabolic Panel  -     TSH    Impaired fasting glucose  -     Comprehensive Metabolic Panel    Rheumatoid arthritis of hand, unspecified laterality, unspecified rheumatoid factor presence (CMS/HCC)  -     Comprehensive Metabolic Panel  -     TSH  -     POC CBC With / Auto Diff    Other orders  -     guaifenesin-codeine (GUAIFENESIN AC) 100-10 MG/5ML liquid; Take 5 mL by mouth 3 (Three) Times a Day As Needed for Cough.  -     predniSONE (DELTASONE) 10 MG tablet; Take 1 tablet by mouth Daily. 3 x 3 days 2  X 2 days 1 x 2 days    patient w/ acute copd exacerbation/ bronchitic cough. She will take cheratussin prn for this. Short course prednisone. She will increase combivent to tid. She will have listed labs and f/u routinely.

## 2018-12-18 LAB
ALBUMIN SERPL-MCNC: 4.1 G/DL (ref 3.5–5.2)
ALBUMIN/GLOB SERPL: 1.4 G/DL
ALP SERPL-CCNC: 67 U/L (ref 39–117)
ALT SERPL-CCNC: 14 U/L (ref 1–33)
AST SERPL-CCNC: 15 U/L (ref 1–32)
BILIRUB SERPL-MCNC: 0.3 MG/DL (ref 0.1–1.2)
BUN SERPL-MCNC: 10 MG/DL (ref 8–23)
BUN/CREAT SERPL: 17.9 (ref 7–25)
CALCIUM SERPL-MCNC: 9.5 MG/DL (ref 8.6–10.5)
CHLORIDE SERPL-SCNC: 102 MMOL/L (ref 98–107)
CHOLEST SERPL-MCNC: 179 MG/DL (ref 0–200)
CO2 SERPL-SCNC: 25.4 MMOL/L (ref 22–29)
CREAT SERPL-MCNC: 0.56 MG/DL (ref 0.57–1)
GLOBULIN SER CALC-MCNC: 2.9 GM/DL
GLUCOSE SERPL-MCNC: 106 MG/DL (ref 65–99)
HDLC SERPL-MCNC: 43 MG/DL (ref 40–60)
LDLC SERPL CALC-MCNC: 111 MG/DL (ref 0–100)
LDLC/HDLC SERPL: 2.59 {RATIO}
POTASSIUM SERPL-SCNC: 3.9 MMOL/L (ref 3.5–5.2)
PROT SERPL-MCNC: 7 G/DL (ref 6–8.5)
SODIUM SERPL-SCNC: 140 MMOL/L (ref 136–145)
TRIGL SERPL-MCNC: 124 MG/DL (ref 0–150)
TSH SERPL DL<=0.005 MIU/L-ACNC: 2.66 MIU/ML (ref 0.27–4.2)
VLDLC SERPL CALC-MCNC: 24.8 MG/DL (ref 5–40)

## 2018-12-18 NOTE — PROGRESS NOTES
Your laboratory results are NORMAL. We will discuss these results in detail at your next office visit. Please call with any questions or concerns.  Sincerely,  Agata Burgess MD

## 2018-12-26 ENCOUNTER — TELEPHONE (OUTPATIENT)
Dept: INTERNAL MEDICINE | Facility: CLINIC | Age: 78
End: 2018-12-26

## 2018-12-26 RX ORDER — GUAIFENESIN AND CODEINE PHOSPHATE 100; 10 MG/5ML; MG/5ML
5 SOLUTION ORAL 3 TIMES DAILY PRN
Qty: 60 ML | Refills: 0 | Status: SHIPPED | OUTPATIENT
Start: 2018-12-26 | End: 2019-06-28 | Stop reason: SDUPTHER

## 2018-12-26 RX ORDER — OMEPRAZOLE 40 MG/1
40 CAPSULE, DELAYED RELEASE ORAL EVERY EVENING
Qty: 90 CAPSULE | Refills: 1 | Status: SHIPPED | OUTPATIENT
Start: 2018-12-26 | End: 2019-03-14 | Stop reason: SDUPTHER

## 2018-12-26 NOTE — TELEPHONE ENCOUNTER
Pt called and stated that she had finished all the meds that Burgess prescribed for her uri, still has a cough and out of cough medicine, wants to know if she can have a refill.     Refilled prilosec per patient's request.

## 2018-12-26 NOTE — TELEPHONE ENCOUNTER
May have 60cc refill of cough syrup (Guaifenesin AC). No refill on Prednisone. If not better, will need to be seen again.   Need to call in Rx as ERx is not working after multiple attempts.   FYI to Dr. Burgess.

## 2019-01-14 RX ORDER — FOLIC ACID 1 MG/1
TABLET ORAL
Qty: 90 TABLET | Refills: 0 | Status: SHIPPED | OUTPATIENT
Start: 2019-01-14 | End: 2019-03-14 | Stop reason: SDUPTHER

## 2019-01-17 RX ORDER — AMLODIPINE AND VALSARTAN 10; 320 MG/1; MG/1
TABLET ORAL
Qty: 30 TABLET | Refills: 2 | Status: SHIPPED | OUTPATIENT
Start: 2019-01-17 | End: 2019-03-14 | Stop reason: SDUPTHER

## 2019-01-17 RX ORDER — MONTELUKAST SODIUM 10 MG/1
10 TABLET ORAL DAILY
Qty: 90 TABLET | Refills: 3 | Status: SHIPPED | OUTPATIENT
Start: 2019-01-17 | End: 2019-01-18 | Stop reason: SDUPTHER

## 2019-01-18 RX ORDER — MONTELUKAST SODIUM 10 MG/1
10 TABLET ORAL DAILY
Qty: 90 TABLET | Refills: 3 | Status: SHIPPED | OUTPATIENT
Start: 2019-01-18 | End: 2019-03-14 | Stop reason: SDUPTHER

## 2019-02-08 RX ORDER — ATORVASTATIN CALCIUM 20 MG/1
20 TABLET, FILM COATED ORAL DAILY
Qty: 90 TABLET | Refills: 2 | Status: SHIPPED | OUTPATIENT
Start: 2019-02-08 | End: 2019-03-14 | Stop reason: SDUPTHER

## 2019-02-17 DIAGNOSIS — E55.9 VITAMIN D DEFICIENCY: ICD-10-CM

## 2019-02-17 DIAGNOSIS — I10 ESSENTIAL HYPERTENSION: Primary | ICD-10-CM

## 2019-02-17 DIAGNOSIS — R73.9 HYPERGLYCEMIA: ICD-10-CM

## 2019-02-17 DIAGNOSIS — M06.9 RHEUMATOID ARTHRITIS OF HAND, UNSPECIFIED LATERALITY, UNSPECIFIED RHEUMATOID FACTOR PRESENCE: ICD-10-CM

## 2019-02-17 DIAGNOSIS — J44.9 CHRONIC OBSTRUCTIVE PULMONARY DISEASE, UNSPECIFIED COPD TYPE (HCC): ICD-10-CM

## 2019-02-20 LAB
25(OH)D3+25(OH)D2 SERPL-MCNC: 25.3 NG/ML (ref 30–100)
BUN SERPL-MCNC: 9 MG/DL (ref 8–27)
BUN/CREAT SERPL: 15 (ref 12–28)
CALCIUM SERPL-MCNC: 9.3 MG/DL (ref 8.7–10.3)
CHLORIDE SERPL-SCNC: 102 MMOL/L (ref 96–106)
CO2 SERPL-SCNC: 22 MMOL/L (ref 20–29)
CREAT SERPL-MCNC: 0.61 MG/DL (ref 0.57–1)
GLUCOSE SERPL-MCNC: 174 MG/DL (ref 65–99)
HBA1C MFR BLD: 5.8 % (ref 4.8–5.6)
POTASSIUM SERPL-SCNC: 4.2 MMOL/L (ref 3.5–5.2)
SODIUM SERPL-SCNC: 143 MMOL/L (ref 134–144)

## 2019-02-21 ENCOUNTER — TELEPHONE (OUTPATIENT)
Dept: INTERNAL MEDICINE | Facility: CLINIC | Age: 79
End: 2019-02-21

## 2019-02-21 RX ORDER — DULOXETIN HYDROCHLORIDE 60 MG/1
120 CAPSULE, DELAYED RELEASE ORAL DAILY
Qty: 60 CAPSULE | Refills: 3 | Status: SHIPPED | OUTPATIENT
Start: 2019-02-21 | End: 2019-03-14 | Stop reason: SDUPTHER

## 2019-02-21 NOTE — TELEPHONE ENCOUNTER
Pt is getting a new rx policy and it will not cover Duloxetine 30 mg tid  It will only pay for bid  She is asking if there is something else to take that is as strong as the Duloxetine

## 2019-02-21 NOTE — TELEPHONE ENCOUNTER
Can try to increase dose to 60 mg 2 tab daily. If this is too strong she can dump a little out of one of the capsules. rx sent.

## 2019-03-14 RX ORDER — AMLODIPINE AND VALSARTAN 10; 320 MG/1; MG/1
1 TABLET ORAL DAILY
Qty: 90 TABLET | Refills: 2 | Status: SHIPPED | OUTPATIENT
Start: 2019-03-14 | End: 2019-06-28 | Stop reason: SDUPTHER

## 2019-03-14 RX ORDER — OMEPRAZOLE 40 MG/1
40 CAPSULE, DELAYED RELEASE ORAL EVERY EVENING
Qty: 90 CAPSULE | Refills: 1 | Status: SHIPPED | OUTPATIENT
Start: 2019-03-14 | End: 2020-03-12

## 2019-03-14 RX ORDER — DULOXETIN HYDROCHLORIDE 60 MG/1
120 CAPSULE, DELAYED RELEASE ORAL DAILY
Qty: 180 CAPSULE | Refills: 1 | Status: SHIPPED | OUTPATIENT
Start: 2019-03-14 | End: 2019-08-05 | Stop reason: SDUPTHER

## 2019-03-14 RX ORDER — FOLIC ACID 1 MG/1
1000 TABLET ORAL DAILY
Qty: 90 TABLET | Refills: 1 | Status: SHIPPED | OUTPATIENT
Start: 2019-03-14 | End: 2019-08-19 | Stop reason: SDUPTHER

## 2019-03-14 RX ORDER — MONTELUKAST SODIUM 10 MG/1
10 TABLET ORAL DAILY
Qty: 90 TABLET | Refills: 3 | Status: SHIPPED | OUTPATIENT
Start: 2019-03-14 | End: 2019-06-28 | Stop reason: SDUPTHER

## 2019-03-14 RX ORDER — ATORVASTATIN CALCIUM 20 MG/1
20 TABLET, FILM COATED ORAL DAILY
Qty: 90 TABLET | Refills: 2 | Status: SHIPPED | OUTPATIENT
Start: 2019-03-14 | End: 2019-10-01

## 2019-04-08 RX ORDER — FOLIC ACID 1 MG/1
TABLET ORAL
Qty: 90 TABLET | Refills: 0 | Status: SHIPPED | OUTPATIENT
Start: 2019-04-08 | End: 2019-06-28 | Stop reason: SDUPTHER

## 2019-05-20 RX ORDER — AMLODIPINE AND VALSARTAN 10; 320 MG/1; MG/1
TABLET ORAL
Qty: 30 TABLET | Refills: 2 | Status: SHIPPED | OUTPATIENT
Start: 2019-05-20 | End: 2019-06-28 | Stop reason: SDUPTHER

## 2019-06-13 DIAGNOSIS — E78.5 HYPERLIPIDEMIA, UNSPECIFIED HYPERLIPIDEMIA TYPE: ICD-10-CM

## 2019-06-13 DIAGNOSIS — Z00.00 HEALTHCARE MAINTENANCE: ICD-10-CM

## 2019-06-13 DIAGNOSIS — I10 ESSENTIAL HYPERTENSION: Primary | ICD-10-CM

## 2019-06-23 LAB
ALBUMIN SERPL-MCNC: 4.4 G/DL (ref 3.5–5.2)
ALBUMIN/GLOB SERPL: 1.8 G/DL
ALP SERPL-CCNC: 67 U/L (ref 39–117)
ALT SERPL-CCNC: 11 U/L (ref 1–33)
APPEARANCE UR: CLEAR
AST SERPL-CCNC: 15 U/L (ref 1–32)
BACTERIA #/AREA URNS HPF: NORMAL /HPF
BACTERIA UR CULT: NORMAL
BACTERIA UR CULT: NORMAL
BASOPHILS # BLD AUTO: 0.05 10*3/MM3 (ref 0–0.2)
BASOPHILS NFR BLD AUTO: 1 % (ref 0–1.5)
BILIRUB SERPL-MCNC: 0.4 MG/DL (ref 0.2–1.2)
BILIRUB UR QL STRIP: NEGATIVE
BUN SERPL-MCNC: 10 MG/DL (ref 8–23)
BUN/CREAT SERPL: 15.9 (ref 7–25)
CALCIUM SERPL-MCNC: 9.1 MG/DL (ref 8.6–10.5)
CHLORIDE SERPL-SCNC: 104 MMOL/L (ref 98–107)
CHOLEST SERPL-MCNC: 127 MG/DL (ref 0–200)
CO2 SERPL-SCNC: 26.9 MMOL/L (ref 22–29)
COLOR UR: YELLOW
CREAT SERPL-MCNC: 0.63 MG/DL (ref 0.57–1)
EOSINOPHIL # BLD AUTO: 0.97 10*3/MM3 (ref 0–0.4)
EOSINOPHIL NFR BLD AUTO: 18.7 % (ref 0.3–6.2)
EPI CELLS #/AREA URNS HPF: NORMAL /HPF
ERYTHROCYTE [DISTWIDTH] IN BLOOD BY AUTOMATED COUNT: 13.4 % (ref 12.3–15.4)
GLOBULIN SER CALC-MCNC: 2.5 GM/DL
GLUCOSE SERPL-MCNC: 185 MG/DL (ref 65–99)
GLUCOSE UR QL: NEGATIVE
HBA1C MFR BLD: 6.1 % (ref 4.8–5.6)
HCT VFR BLD AUTO: 38 % (ref 34–46.6)
HDLC SERPL-MCNC: 49 MG/DL (ref 40–60)
HGB BLD-MCNC: 12 G/DL (ref 12–15.9)
HGB UR QL STRIP: NEGATIVE
IMM GRANULOCYTES # BLD AUTO: 0 10*3/MM3 (ref 0–0.05)
IMM GRANULOCYTES NFR BLD AUTO: 0 % (ref 0–0.5)
KETONES UR QL STRIP: NEGATIVE
LDLC SERPL CALC-MCNC: 64 MG/DL (ref 0–100)
LEUKOCYTE ESTERASE UR QL STRIP: ABNORMAL
LYMPHOCYTES # BLD AUTO: 1.64 10*3/MM3 (ref 0.7–3.1)
LYMPHOCYTES NFR BLD AUTO: 31.6 % (ref 19.6–45.3)
MCH RBC QN AUTO: 27.7 PG (ref 26.6–33)
MCHC RBC AUTO-ENTMCNC: 31.6 G/DL (ref 31.5–35.7)
MCV RBC AUTO: 87.8 FL (ref 79–97)
MICRO URNS: ABNORMAL
MONOCYTES # BLD AUTO: 0.57 10*3/MM3 (ref 0.1–0.9)
MONOCYTES NFR BLD AUTO: 11 % (ref 5–12)
MUCOUS THREADS URNS QL MICRO: PRESENT /HPF
NEUTROPHILS # BLD AUTO: 1.96 10*3/MM3 (ref 1.7–7)
NEUTROPHILS NFR BLD AUTO: 37.7 % (ref 42.7–76)
NITRITE UR QL STRIP: NEGATIVE
NRBC BLD AUTO-RTO: 0 /100 WBC (ref 0–0.2)
PH UR STRIP: 6.5 [PH] (ref 5–7.5)
PLATELET # BLD AUTO: 256 10*3/MM3 (ref 140–450)
POTASSIUM SERPL-SCNC: 3.8 MMOL/L (ref 3.5–5.2)
PROT SERPL-MCNC: 6.9 G/DL (ref 6–8.5)
PROT UR QL STRIP: NEGATIVE
RBC # BLD AUTO: 4.33 10*6/MM3 (ref 3.77–5.28)
RBC #/AREA URNS HPF: NORMAL /HPF
SODIUM SERPL-SCNC: 143 MMOL/L (ref 136–145)
SP GR UR: 1.01 (ref 1–1.03)
TRIGL SERPL-MCNC: 68 MG/DL (ref 0–150)
TSH SERPL DL<=0.005 MIU/L-ACNC: 2.92 MIU/ML (ref 0.27–4.2)
URINALYSIS REFLEX: ABNORMAL
UROBILINOGEN UR STRIP-MCNC: 0.2 MG/DL (ref 0.2–1)
VLDLC SERPL CALC-MCNC: 13.6 MG/DL
WBC # BLD AUTO: 5.19 10*3/MM3 (ref 3.4–10.8)
WBC #/AREA URNS HPF: NORMAL /HPF

## 2019-06-23 NOTE — PROGRESS NOTES
Your laboratory results are look very good. We will discuss these results in detail at your next office visit. Please call with any questions or concerns.  Sincerely,  Agata dunn MD

## 2019-06-28 ENCOUNTER — OFFICE VISIT (OUTPATIENT)
Dept: INTERNAL MEDICINE | Facility: CLINIC | Age: 79
End: 2019-06-28

## 2019-06-28 VITALS
BODY MASS INDEX: 23.21 KG/M2 | HEIGHT: 63 IN | OXYGEN SATURATION: 98 % | WEIGHT: 131 LBS | DIASTOLIC BLOOD PRESSURE: 68 MMHG | SYSTOLIC BLOOD PRESSURE: 156 MMHG | HEART RATE: 72 BPM

## 2019-06-28 DIAGNOSIS — J44.9 CHRONIC OBSTRUCTIVE PULMONARY DISEASE, UNSPECIFIED COPD TYPE (HCC): ICD-10-CM

## 2019-06-28 DIAGNOSIS — I10 ESSENTIAL HYPERTENSION: ICD-10-CM

## 2019-06-28 DIAGNOSIS — R23.9 RECENT SKIN CHANGES: ICD-10-CM

## 2019-06-28 DIAGNOSIS — R06.00 DYSPNEA, UNSPECIFIED TYPE: ICD-10-CM

## 2019-06-28 DIAGNOSIS — R73.01 IFG (IMPAIRED FASTING GLUCOSE): ICD-10-CM

## 2019-06-28 DIAGNOSIS — Z00.00 HEALTHCARE MAINTENANCE: Primary | ICD-10-CM

## 2019-06-28 DIAGNOSIS — Z12.31 ENCOUNTER FOR SCREENING MAMMOGRAM FOR BREAST CANCER: ICD-10-CM

## 2019-06-28 PROCEDURE — 93000 ELECTROCARDIOGRAM COMPLETE: CPT | Performed by: INTERNAL MEDICINE

## 2019-06-28 PROCEDURE — G0439 PPPS, SUBSEQ VISIT: HCPCS | Performed by: INTERNAL MEDICINE

## 2019-06-28 PROCEDURE — 99214 OFFICE O/P EST MOD 30 MIN: CPT | Performed by: INTERNAL MEDICINE

## 2019-06-28 RX ORDER — VALSARTAN 160 MG/1
160 TABLET ORAL DAILY
Qty: 90 TABLET | Refills: 2 | Status: SHIPPED | OUTPATIENT
Start: 2019-06-28 | End: 2019-08-01 | Stop reason: SDUPTHER

## 2019-06-28 RX ORDER — MONTELUKAST SODIUM 10 MG/1
10 TABLET ORAL DAILY
Qty: 90 TABLET | Refills: 3 | Status: SHIPPED | OUTPATIENT
Start: 2019-06-28 | End: 2020-01-01

## 2019-06-28 RX ORDER — BUDESONIDE 0.5 MG/2ML
INHALANT ORAL
Refills: 12 | COMMUNITY
Start: 2019-05-16

## 2019-06-28 RX ORDER — FORMOTEROL FUMARATE DIHYDRATE 20 UG/2ML
SOLUTION RESPIRATORY (INHALATION)
Refills: 12 | COMMUNITY
Start: 2019-05-28

## 2019-06-28 NOTE — PROGRESS NOTES
"Subjective   AWV  COPD  Hypertension  Hyperlipidemia  RA  IFG    Janet Martinez is a 79 y.o. female who presents for an annual wellness visit, to discuss chronic issues and to address acute concerns. Patient has COPD. She is having exertional dyspnea. She reports that she predominantly stays at home. She is not engaging in any routine fitness. She is using anoro daily. She reports rare usage of albuterol. She has RA. enbrel with fairly good benefit. Nasal drainage. More pronounced in the evening. She previously was taking singulair but is no longer taking. \"I don't know why\". She also is not taking rxd exforge. bp is elevated.         Review of Systems   Constitutional: Negative.    HENT: Positive for rhinorrhea. Negative for sinus pressure, sinus pain and sneezing.    Eyes: Negative.    Respiratory: Positive for shortness of breath.    Cardiovascular: Negative.    Gastrointestinal: Negative.    Endocrine: Negative.    Genitourinary: Negative.    Musculoskeletal: Positive for arthralgias.        Right knee pain  Left knee sp tkr   Skin:        Mole on right hand   Neurological: Negative.    Hematological: Negative.    Psychiatric/Behavioral: Negative.        The following portions of the patient's history were reviewed and updated as appropriate: allergies, current medications, past family history, past medical history, past social history, past surgical history and problem list.     Patient Active Problem List   Diagnosis   • Arthritis of knee   • Anxiety   • Chronic obstructive pulmonary disease (CMS/HCC)   • Hyperlipidemia   • Hypertension   • Impaired fasting glucose   • Osteoporosis   • Rheumatoid arthritis (CMS/HCC)   • Status post total left knee replacement   • History of total knee arthroplasty   • Abnormal mammogram   • Hematuria   • Menopausal symptom   • Sebaceous cyst   • Hypokalemia   • Right foot pain   • Encounter for colonoscopy due to history of adenomatous colonic polyps   • Epigastric hernia "   • Depression       Past Medical History:   Diagnosis Date   • Abdominal hernia    • COPD (chronic obstructive pulmonary disease) (CMS/HCC)    • Depression    • Food allergy     Scallops: causes hives   • GERD (gastroesophageal reflux disease)    • Hiatal hernia    • History of bruising easily    • History of colon polyps 08/13/2013    PATH: Distal Transverse Colon, Fragments of Tubular Adenoma   • History of colon polyps 01/30/2013    PATH: Ascending Colon - Tubular Adenoma, Splenic Flexure Polyp @ 65 cm - Tubulovillous Adenoma, Descending Colon - Tubular Adenoma   • History of colon polyps 10/30/2017    PATH: Transverse Colon - Tubular Adenoma, Rectal Polyps - Fragments of Hyperplastic Polyp   • Hypercholesteremia    • Hypertension    • Joint pain     swelling   • On home oxygen therapy     O2 NC 2. LITERS/ NIGHT   • Osteoarthritis    • Osteoporosis    • PONV (postoperative nausea and vomiting)    • Rheumatoid arthritis (CMS/HCC)     DIAGNOSIS AGE 23 - FOLLOWED BY RHEUMATOLOGY DR INFANTE, NO PROBLEMS WITH FLEX/ EXTENSION        Past Surgical History:   Procedure Laterality Date   • BREAST BIOPSY Left     Benign pathology   • BRONCHOSCOPY N/A 1/3/2018    Procedure: BRONCHOSCOPY with lavage in right middle lobe and lingula.;  Surgeon: Trenton Garcia MD;  Location: [facility] ENDOSCOPY;  Service:    • CAROTID ENDARTERECTOMY Left 2006   • CATARACT EXTRACTION WITH INTRAOCULAR LENS IMPLANT Bilateral    • COLONOSCOPY N/A 10/30/2017    Procedure: COLONOSCOPY TO CECUM, TO TERMINAL ILEUM WITH COLD BIOPSY POLYPECTOMY;  Surgeon: Jossie Stanley MD;  Location: [facility] ENDOSCOPY;  Service:    • COLONOSCOPY N/A 08/13/2013    One 3 mm polyp in the transverse colon, [facility]   • COLONOSCOPY N/A 01/30/2013    One 3 mm polyp ascending colon; One 7 mm polyp descending colon; One 25 mm polyp splenic flexure, Diverticulosis Sigmoid Colon, NBDr. Jossie   • FOOT SURGERY  Bilateral     Bilateral (MULITPLE SURGERIES)   • HAND SURGERY Bilateral     Bilateral (MULTIPLE SURGERIES)   • KNEE ARTHROSCOPY W/ PARTIAL MEDIAL MENISCECTOMY Left 2016    Left Knee Arthroscopy w/ partial medial & lateral meniscectomies, Three compartment synovectomy w/ loose body removal, Chondroplasty of patella, Dr. 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 Atkins   • TOTAL KNEE ARTHROPLASTY Left 2016    Procedure: LT TOTAL KNEE ARTHROPLASTY WITH FELECIA NAVIGATION;  Surgeon: Florencio Frazier MD;  Location: [facility];  Service:    • VENTRAL HERNIA REPAIR N/A 2018    Procedure: VENTRAL HERNIA REPAIR LAPAROSCOPIC WITH BatonI ROBOT;  Surgeon: Josr Jennings MD;  Location: [facility] OR;  Service: [facility]       Family History   Problem Relation Age of Onset   • Heart disease Mother    • Hypertension Mother    • Lung disease Mother    • Diabetes Sister    • Hypertension Brother    • Diabetes Sister    • Malig Hyperthermia Neg Hx        Social History     Socioeconomic History   • Marital status:      Spouse name: Not on file   • Number of children: Not on file   • Years of education: Not on file   • Highest education level: Not on file   Tobacco Use   • Smoking status: Former Smoker     Packs/day: 1.50     Years: 30.00     Pack years: 45.00     Types: Cigarettes     Last attempt to quit:      Years since quittin.5   • Smokeless tobacco: Never Used   Substance and Sexual Activity   • Alcohol use: No     Comment: No alcohol for 30 years   • Drug use: No       Current Outpatient Medications on File Prior to Visit   Medication Sig Dispense Refill   • albuterol (PROVENTIL) (2.5 MG/3ML) 0.083% nebulizer solution Take 2.5 mg by nebulization Every 4 (Four) Hours As Needed for Wheezing.     • atorvastatin (LIPITOR) 20 MG tablet Take 1 tablet by mouth Daily. 90 tablet 2   • budesonide  (PULMICORT) 0.5 MG/2ML nebulizer solution USE 1 VIAL VIA NEBULIZER TWICE DAILY RINSE MOUTH AFTER USE  12   • Calcium Carbonate-Vitamin D (CALCIUM 600+D PO) Take 1 tablet by mouth Daily.     • DULoxetine (CYMBALTA) 60 MG capsule Take 2 capsules by mouth Daily. 180 capsule 1   • etanercept (ENBREL) 50 MG/ML solution prefilled syringe injection Inject 50 mg under the skin 1 (One) Time Per Week. THURSDAYS. TO HOLD 4 WEEKS     • fluticasone (FLONASE) 50 MCG/ACT nasal spray 2 sprays into the nostril(s) as directed by provider Daily. 1 bottle 5   • folic acid (FOLVITE) 1 MG tablet Take 1 tablet by mouth Daily. 90 tablet 1   • ipratropium (ATROVENT) 0.06 % nasal spray 2 sprays into each nostril 4 (Four) Times a Day As Needed for Rhinitis.     • ipratropium-albuterol (DUO-NEB) 0.5-2.5 mg/3 ml nebulizer Take 3 mL by nebulization Every 4 (Four) Hours As Needed for Wheezing.     • omeprazole (priLOSEC) 40 MG capsule Take 1 capsule by mouth Every Evening. 90 capsule 1   • PERFOROMIST 20 MCG/2ML nebulizer solution USE 1 VIAL VIA NEBULIZER TWICE DAILY  12   • umeclidinium-vilanterol (ANORO ELLIPTA) 62.5-25 MCG/INH aerosol powder  inhaler Inhale 1 puff Daily. 3 each 2   • [DISCONTINUED] montelukast (SINGULAIR) 10 MG tablet Take 1 tablet by mouth Daily. 90 tablet 3   • [DISCONTINUED] amLODIPine-valsartan (EXFORGE)  MG per tablet Take 1 tablet by mouth Daily. 90 tablet 2   • [DISCONTINUED] amLODIPine-valsartan (EXFORGE)  MG per tablet TAKE 1 TABLET BY MOUTH EVERY DAY 30 tablet 2   • [DISCONTINUED] folic acid (FOLVITE) 1 MG tablet TAKE 1 TABLET BY MOUTH DAILY. 90 tablet 0   • [DISCONTINUED] guaifenesin-codeine (GUAIFENESIN AC) 100-10 MG/5ML liquid Take 5 mL by mouth 3 (Three) Times a Day As Needed for Cough. 60 mL 0   • [DISCONTINUED] predniSONE (DELTASONE) 10 MG tablet Take 1 tablet by mouth Daily. 3 x 3 days 2  X 2 days 1 x 2 days 15 tablet 0     Current Facility-Administered Medications on File Prior to Visit  "  Medication Dose Route Frequency Provider Last Rate Last Dose   • levalbuterol (XOPENEX) nebulizer solution 0.63 mg  0.63 mg Nebulization Q6H PRN Agata Burgess MD   0.63 mg at 12/17/18 8517       Allergies   Allergen Reactions   • Gold-Containing Drug Products Unknown (See Comments)     Patient cannot recall reaction   • Hydrocodone Irritability     HYPERACTIVITY       Immunization History   Administered Date(s) Administered   • Flu Vaccine High Dose PF 65YR+ 11/07/2016, 10/10/2018   • Pneumococcal Conjugate 13-Valent (PCV13) 03/31/2015   • Pneumococcal Polysaccharide (PPSV23) 06/01/2010   • Shingrix 05/07/2019   • Tdap 01/01/2010       Objective     /68   Pulse 72   Ht 160 cm (63\")   Wt 59.4 kg (131 lb)   SpO2 98%   BMI 23.21 kg/m²     Physical Exam   Constitutional: She is oriented to person, place, and time. She appears well-developed and well-nourished.   HENT:   Head: Normocephalic and atraumatic.   Right Ear: External ear normal.   Left Ear: External ear normal.   Nose: Nose normal.   Mouth/Throat: Oropharynx is clear and moist.   Eyes: Conjunctivae and EOM are normal. Pupils are equal, round, and reactive to light.   Neck: Normal range of motion. Neck supple.   Cardiovascular: Normal rate, regular rhythm, normal heart sounds and intact distal pulses.   Pulmonary/Chest: Effort normal and breath sounds normal. Right breast exhibits no inverted nipple, no mass, no nipple discharge, no skin change and no tenderness. Left breast exhibits no inverted nipple, no mass, no nipple discharge, no skin change and no tenderness.   Abdominal: Soft. Bowel sounds are normal.   Genitourinary: Vagina normal and uterus normal.   Musculoskeletal: Normal range of motion.   Right knee crepitus but good rom.    Neurological: She is alert and oriented to person, place, and time.   Skin: Skin is warm and dry.   Irritated sk v ak v other left hand   Psychiatric: She has a normal mood and affect. Her behavior is normal. " Judgment and thought content normal.   Nursing note and vitals reviewed.      Assessment/Plan   Janet was seen today for annual exam.    Diagnoses and all orders for this visit:    Encounter for screening mammogram for breast cancer  -     Mammo screening digital tomosynthesis bilateral w CAD    Essential hypertension  -     ECG 12 Lead    Chronic obstructive pulmonary disease, unspecified COPD type (CMS/HCC)  -     Ambulatory Referral to Pulmonary Rehab    Dyspnea, unspecified type  -     Ambulatory Referral to Pulmonary Rehab    Healthcare maintenance    IFG (impaired fasting glucose)    Recent skin changes  -     Ambulatory Referral to Dermatology    Other orders  -     montelukast (SINGULAIR) 10 MG tablet; Take 1 tablet by mouth Daily.  -     valsartan (DIOVAN) 160 MG tablet; Take 1 tablet by mouth Daily.        Discussion    AWV.     Direct observation of cognitive ability was evaluated and is normal. Patient was given health advice or handouts on the following:  nutrition, fall prevention, exercise, weight management. Patient has hypertension. Will restart diovan and monitor bp. She has elevated glucose. She will reduce carbohydrates. Literature given on carb counting. suad needs a low sodium diet as well. She is to increase activity. Given copd will enroll in pulmonary rehab. Continue lipitor daily for lipid regulation. She will continue current therapy for RA. She has knee OA and will f/u w/ Dr. Frazier on this. She will f/u in 6 months or prn.                  Future Appointments   Date Time Provider Department Center   7/8/2019  9:20 AM Florencio Frazier MD [facility] [id number] None

## 2019-06-28 NOTE — PATIENT INSTRUCTIONS
Medicare Wellness  Personal Prevention Plan of Service     Date of Office Visit:  2019  Encounter Provider:  Agata Burgess MD  Place of Service:  [facility] INTERNAL MEDICINE  Patient Name: Janet Martinez  :  1940    As part of the Medicare Wellness portion of your visit today, we are providing you with this personalized preventive plan of services (PPPS). This plan is based upon recommendations of the [place] Preventive Services Task Force (USPSTF) and the Advisory Committee on Immunization Practices (ACIP).    This lists the preventive care services that should be considered, and provides dates of when you are due. Items listed as completed are up-to-date and do not require any further intervention.    Health Maintenance   Topic Date Due   • DXA SCAN  2017   • INFLUENZA VACCINE  2019   • TDAP/TD VACCINES (2 - Td) 2020   • MAMMOGRAM  2020   • LIPID PANEL  2020   • MEDICARE ANNUAL WELLNESS  2020   • COLONOSCOPY  10/30/2022   • PNEUMOCOCCAL VACCINES (65+ LOW/MEDIUM RISK)  Completed   • ZOSTER VACCINE  Discontinued       Orders Placed This Encounter   Procedures   • Mammo screening digital tomosynthesis bilateral w CAD     Order Specific Question:   Reason for Exam:     Answer:   breast cancer screening   • Ambulatory Referral to Pulmonary Rehab     Referral Priority:   Routine     Referral Type:   Rehabilitation - Outpatient     Referral Reason:   Specialty Services Required     Number of Visits Requested:   1   • Ambulatory Referral to Dermatology     Referral Priority:   Routine     Referral Type:   Consultation     Referral Reason:   Specialty Services Required     Requested Specialty:   Dermatology     Number of Visits Requested:   1   • ECG 12 Lead     Order Specific Question:   Reason for Exam:     Answer:   hm       No Follow-up on file.      Diabetes Mellitus and Nutrition, Adult  When you have diabetes (diabetes mellitus), it is  very important to have healthy eating habits because your blood sugar (glucose) levels are greatly affected by what you eat and drink. Eating healthy foods in the appropriate amounts, at about the same times every day, can help you:  · Control your blood glucose.  · Lower your risk of heart disease.  · Improve your blood pressure.  · Reach or maintain a healthy weight.    Every person with diabetes is different, and each person has different needs for a meal plan. Your health care provider may recommend that you work with a diet and nutrition specialist (dietitian) to make a meal plan that is best for you. Your meal plan may vary depending on factors such as:  · The calories you need.  · The medicines you take.  · Your weight.  · Your blood glucose, blood pressure, and cholesterol levels.  · Your activity level.  · Other health conditions you have, such as heart or kidney disease.    How do carbohydrates affect me?  Carbohydrates, also called carbs, affect your blood glucose level more than any other type of food. Eating carbs naturally raises the amount of glucose in your blood. Carb counting is a method for keeping track of how many carbs you eat. Counting carbs is important to keep your blood glucose at a healthy level, especially if you use insulin or take certain oral diabetes medicines.  It is important to know how many carbs you can safely have in each meal. This is different for every person. Your dietitian can help you calculate how many carbs you should have at each meal and for each snack.  Foods that contain carbs include:  · Bread, cereal, rice, pasta, and crackers.  · Potatoes and corn.  · Peas, beans, and lentils.  · Milk and yogurt.  · Fruit and juice.  · Desserts, such as cakes, cookies, ice cream, and candy.    How does alcohol affect me?  Alcohol can cause a sudden decrease in blood glucose (hypoglycemia), especially if you use insulin or take certain oral diabetes medicines. Hypoglycemia can be a  "life-threatening condition. Symptoms of hypoglycemia (sleepiness, dizziness, and confusion) are similar to symptoms of having too much alcohol.  If your health care provider says that alcohol is safe for you, follow these guidelines:  · Limit alcohol intake to no more than 1 drink per day for nonpregnant women and 2 drinks per day for men. One drink equals 12 oz of beer, 5 oz of wine, or 1½ oz of hard liquor.  · Do not drink on an empty stomach.  · Keep yourself hydrated with water, diet soda, or unsweetened iced tea.  · Keep in mind that regular soda, juice, and other mixers may contain a lot of sugar and must be counted as carbs.    What are tips for following this plan?  Reading food labels  · Start by checking the serving size on the \"Nutrition Facts\" label of packaged foods and drinks. The amount of calories, carbs, fats, and other nutrients listed on the label is based on one serving of the item. Many items contain more than one serving per package.  · Check the total grams (g) of carbs in one serving. You can calculate the number of servings of carbs in one serving by dividing the total carbs by 15. For example, if a food has 30 g of total carbs, it would be equal to 2 servings of carbs.  · Check the number of grams (g) of saturated and trans fats in one serving. Choose foods that have low or no amount of these fats.  · Check the number of milligrams (mg) of salt (sodium) in one serving. Most people should limit total sodium intake to less than 2,300 mg per day.  · Always check the nutrition information of foods labeled as \"low-fat\" or \"nonfat\". These foods may be higher in added sugar or refined carbs and should be avoided.  · Talk to your dietitian to identify your daily goals for nutrients listed on the label.  Shopping    · Avoid buying canned, premade, or processed foods. These foods tend to be high in fat, sodium, and added sugar.  · Shop around the outside edge of the grocery store. This includes fresh " fruits and vegetables, bulk grains, fresh meats, and fresh dairy.  Cooking  · Use low-heat cooking methods, such as baking, instead of high-heat cooking methods like deep frying.  · Cook using healthy oils, such as olive, canola, or sunflower oil.  · Avoid cooking with butter, cream, or high-fat meats.  Meal planning  · Eat meals and snacks regularly, preferably at the same times every day. Avoid going long periods of time without eating.  · Eat foods high in fiber, such as fresh fruits, vegetables, beans, and whole grains. Talk to your dietitian about how many servings of carbs you can eat at each meal.  · Eat 4-6 ounces (oz) of lean protein each day, such as lean meat, chicken, fish, eggs, or tofu. One oz of lean protein is equal to:  ? 1 oz of meat, chicken, or fish.  ? 1 egg.  ? ¼ cup of tofu.  · Eat some foods each day that contain healthy fats, such as avocado, nuts, seeds, and fish.  Lifestyle  · Check your blood glucose regularly.  · Exercise regularly as told by your health care provider. This may include:  ? 150 minutes of moderate-intensity or vigorous-intensity exercise each week. This could be brisk walking, biking, or water aerobics.  ? Stretching and doing strength exercises, such as yoga or weightlifting, at least 2 times a week.  · Take medicines as told by your health care provider.  · Do not use any products that contain nicotine or tobacco, such as cigarettes and e-cigarettes. If you need help quitting, ask your health care provider.  · Work with a counselor or diabetes educator to identify strategies to manage stress and any emotional and social challenges.  Questions to ask a health care provider  · Do I need to meet with a diabetes educator?  · Do I need to meet with a dietitian?  · What number can I call if I have questions?  · When are the best times to check my blood glucose?  Where to find more information:  · American Diabetes Association: diabetes.org  · Academy of Nutrition and  Dietetics: www.eatright.org  · National [place] of Diabetes and Digestive and Kidney Diseases (NIH): www.niddk.nih.gov  Summary  · A healthy meal plan will help you control your blood glucose and maintain a healthy lifestyle.  · Working with a diet and nutrition specialist (dietitian) can help you make a meal plan that is best for you.  · Keep in mind that carbohydrates (carbs) and alcohol have immediate effects on your blood glucose levels. It is important to count carbs and to use alcohol carefully.  This information is not intended to replace advice given to you by your health care provider. Make sure you discuss any questions you have with your health care provider.  Document Released: 09/14/2006 Document Revised: 07/18/2018 Document Reviewed: 01/22/2018  [employer] Patient Education © 2019 [employer]

## 2019-07-08 ENCOUNTER — OFFICE VISIT (OUTPATIENT)
Dept: ORTHOPEDIC SURGERY | Facility: CLINIC | Age: 79
End: 2019-07-08

## 2019-07-08 VITALS — WEIGHT: 131 LBS | HEIGHT: 63 IN | TEMPERATURE: 97.8 F | BODY MASS INDEX: 23.21 KG/M2

## 2019-07-08 DIAGNOSIS — M25.561 RIGHT KNEE PAIN, UNSPECIFIED CHRONICITY: Primary | ICD-10-CM

## 2019-07-08 DIAGNOSIS — M17.11 ARTHRITIS OF RIGHT KNEE: ICD-10-CM

## 2019-07-08 DIAGNOSIS — M06.9 RHEUMATOID ARTHRITIS INVOLVING RIGHT HAND, UNSPECIFIED RHEUMATOID FACTOR PRESENCE: ICD-10-CM

## 2019-07-08 PROCEDURE — 73562 X-RAY EXAM OF KNEE 3: CPT | Performed by: ORTHOPAEDIC SURGERY

## 2019-07-08 PROCEDURE — 20610 DRAIN/INJ JOINT/BURSA W/O US: CPT | Performed by: ORTHOPAEDIC SURGERY

## 2019-07-08 PROCEDURE — 99213 OFFICE O/P EST LOW 20 MIN: CPT | Performed by: ORTHOPAEDIC SURGERY

## 2019-07-08 RX ORDER — FOLIC ACID 1 MG/1
TABLET ORAL
Qty: 90 TABLET | Refills: 0 | Status: SHIPPED | OUTPATIENT
Start: 2019-07-08 | End: 2019-10-15 | Stop reason: SDUPTHER

## 2019-07-08 RX ORDER — METHYLPREDNISOLONE ACETATE 80 MG/ML
80 INJECTION, SUSPENSION INTRA-ARTICULAR; INTRALESIONAL; INTRAMUSCULAR; SOFT TISSUE
Status: COMPLETED | OUTPATIENT
Start: 2019-07-08 | End: 2019-07-08

## 2019-07-08 RX ADMIN — METHYLPREDNISOLONE ACETATE 80 MG: 80 INJECTION, SUSPENSION INTRA-ARTICULAR; INTRALESIONAL; INTRAMUSCULAR; SOFT TISSUE at 10:24

## 2019-07-08 NOTE — PROGRESS NOTES
Patient Name: Janet Martinez   YOB: 1940  Referring Primary Care Physician: Agata Burgess MD  BMI: Body mass index is 23.21 kg/m².    Chief Complaint:    Chief Complaint   Patient presents with   • Right Knee - Establish Care, Pain        HPI:     Janet Martinez is a 79 y.o. female who presents today for evaluation of   Chief Complaint   Patient presents with   • Right Knee - Establish Care, Pain   .  Patient is seen back today complaining of right knee pain.  She has a history of rheumatoid and osteoarthritis on Enbrel and had a left total knee a couple of years ago.  Test 3 or 4 months the right knee is bothering her more more it swells and causes pain and its achy and is more of a nuisance than anything.  She is currently not taking any anti-inflammatories but denies contraindications.  She has not been doing her therapy with it.  Is achy stiff gives way and relieved by rest    This problem is new to this examiner.     Subjective   Medications:   Home Medications:  Current Outpatient Medications on File Prior to Visit   Medication Sig   • albuterol (PROVENTIL) (2.5 MG/3ML) 0.083% nebulizer solution Take 2.5 mg by nebulization Every 4 (Four) Hours As Needed for Wheezing.   • atorvastatin (LIPITOR) 20 MG tablet Take 1 tablet by mouth Daily.   • budesonide (PULMICORT) 0.5 MG/2ML nebulizer solution USE 1 VIAL VIA NEBULIZER TWICE DAILY RINSE MOUTH AFTER USE   • Calcium Carbonate-Vitamin D (CALCIUM 600+D PO) Take 1 tablet by mouth Daily.   • DULoxetine (CYMBALTA) 60 MG capsule Take 2 capsules by mouth Daily.   • etanercept (ENBREL) 50 MG/ML solution prefilled syringe injection Inject 50 mg under the skin 1 (One) Time Per Week. THURSDAYS. TO HOLD 4 WEEKS   • fluticasone (FLONASE) 50 MCG/ACT nasal spray 2 sprays into the nostril(s) as directed by provider Daily.   • folic acid (FOLVITE) 1 MG tablet Take 1 tablet by mouth Daily.   • folic acid (FOLVITE) 1 MG tablet TAKE 1 TABLET BY MOUTH DAILY.   •  ipratropium (ATROVENT) 0.06 % nasal spray 2 sprays into each nostril 4 (Four) Times a Day As Needed for Rhinitis.   • ipratropium-albuterol (DUO-NEB) 0.5-2.5 mg/3 ml nebulizer Take 3 mL by nebulization Every 4 (Four) Hours As Needed for Wheezing.   • montelukast (SINGULAIR) 10 MG tablet Take 1 tablet by mouth Daily.   • omeprazole (priLOSEC) 40 MG capsule Take 1 capsule by mouth Every Evening.   • PERFOROMIST 20 MCG/2ML nebulizer solution USE 1 VIAL VIA NEBULIZER TWICE DAILY   • umeclidinium-vilanterol (ANORO ELLIPTA) 62.5-25 MCG/INH aerosol powder  inhaler Inhale 1 puff Daily.   • valsartan (DIOVAN) 160 MG tablet Take 1 tablet by mouth Daily.     Current Facility-Administered Medications on File Prior to Visit   Medication   • levalbuterol (XOPENEX) nebulizer solution 0.63 mg     Current Medications:  Scheduled Meds:   Continuous Infusions:   PRN Meds:.•  levalbuterol    I have reviewed the patient's medical history in detail and updated the computerized patient record.  Review and summarization of old records includes:    Past Medical History:   Diagnosis Date   • Abdominal hernia    • COPD (chronic obstructive pulmonary disease) (CMS/HCC)    • Depression    • Food allergy     Scallops: causes hives   • GERD (gastroesophageal reflux disease)    • Hiatal hernia    • History of bruising easily    • History of colon polyps 08/13/2013    PATH: Distal Transverse Colon, Fragments of Tubular Adenoma   • History of colon polyps 01/30/2013    PATH: Ascending Colon - Tubular Adenoma, Splenic Flexure Polyp @ 65 cm - Tubulovillous Adenoma, Descending Colon - Tubular Adenoma   • History of colon polyps 10/30/2017    PATH: Transverse Colon - Tubular Adenoma, Rectal Polyps - Fragments of Hyperplastic Polyp   • Hypercholesteremia    • Hypertension    • Joint pain     swelling   • On home oxygen therapy     O2 NC 2. LITERS/ NIGHT   • Osteoarthritis    • Osteoporosis    • PONV (postoperative nausea and vomiting)    • Rheumatoid  arthritis (CMS/HCC)     DIAGNOSIS AGE 23 - FOLLOWED BY RHEUMATOLOGY DR INFANTE, NO PROBLEMS WITH FLEX/ EXTENSION         Past Surgical History:   Procedure Laterality Date   • BREAST BIOPSY Left     Benign pathology   • BRONCHOSCOPY N/A 1/3/2018    Procedure: BRONCHOSCOPY with lavage in right middle lobe and lingula.;  Surgeon: Trenton Garcia MD;  Location: [facility] ENDOSCOPY;  Service:    • CAROTID ENDARTERECTOMY Left 2006   • CATARACT EXTRACTION WITH INTRAOCULAR LENS IMPLANT Bilateral    • COLONOSCOPY N/A 10/30/2017    Procedure: COLONOSCOPY TO CECUM, TO TERMINAL ILEUM WITH COLD BIOPSY POLYPECTOMY;  Surgeon: Jossie Stanley MD;  Location: [facility] ENDOSCOPY;  Service:    • COLONOSCOPY N/A 08/13/2013    One 3 mm polyp in the transverse colon, Diverticulosis in the sigmoid colon, [facility], Dr. Jossie Stanley   • COLONOSCOPY N/A 01/30/2013    One 3 mm polyp ascending colon; One 7 mm polyp descending colon; One 25 mm polyp splenic flexure, Diverticulosis Sigmoid Colon, [facility], Dr. Jossie Stanley   • FOOT SURGERY Bilateral     Bilateral (MULITPLE SURGERIES)   • HAND SURGERY Bilateral     Bilateral (MULTIPLE SURGERIES)   • KNEE ARTHROSCOPY W/ PARTIAL MEDIAL MENISCECTOMY Left 01/22/2016    Left Knee Arthroscopy w/ partial medial & lateral meniscectomies, Three compartment synovectomy w/ loose body removal, Chondroplasty of patella, Dr. 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 Atkins   • TOTAL KNEE ARTHROPLASTY Left 5/18/2016    Procedure: LT TOTAL KNEE ARTHROPLASTY WITH FELECIA NAVIGATION;  Surgeon: Florencio Frazier MD;  Location: [facility] MAIN OR;  Service:    • VENTRAL HERNIA REPAIR N/A 5/22/2018    Procedure: VENTRAL HERNIA REPAIR LAPAROSCOPIC WITH DAVINCI ROBOT;  Surgeon: Josr Jennings MD;  Location: [facility] MAIN OR;  Service: DaVinci        Social History     Occupational History   • Not on file   Tobacco Use   •  "Smoking status: Former Smoker     Packs/day: 1.50     Years: 30.00     Pack years: 45.00     Types: Cigarettes     Last attempt to quit:      Years since quittin.5   • Smokeless tobacco: Never Used   Substance and Sexual Activity   • Alcohol use: No     Comment: No alcohol for 30 years   • Drug use: No   • Sexual activity: Not on file      Social History     Social History Narrative   • Not on file        Family History   Problem Relation Age of Onset   • Heart disease Mother    • Hypertension Mother    • Lung disease Mother    • Diabetes Sister    • Hypertension Brother    • Diabetes Sister    • Malig Hyperthermia Neg Hx        ROS: 14 point review of systems was performed and all other systems were reviewed and are negative except for documented findings in HPI and today's encounter.     Allergies:   Allergies   Allergen Reactions   • Gold-Containing Drug Products Unknown (See Comments)     Patient cannot recall reaction   • Hydrocodone Irritability     HYPERACTIVITY     Constitutional:  Denies fever, shaking or chills   Eyes:  Denies change in visual acuity   HENT:  Denies nasal congestion or sore throat   Respiratory:  Denies cough or shortness of breath   Cardiovascular:  Denies chest pain or severe LE edema   GI:  Denies abdominal pain, nausea, vomiting, bloody stools or diarrhea   Musculoskeletal:  Numbness, tingling, pain, or loss of motor function only as noted above in history of present illness.  : Denies painful urination or hematuria  Integument:  Denies rash, lesion or ulceration   Neurologic:  Denies headache or focal weakness  Endocrine:  Denies lymphadenopathy  Psych:  Denies confusion or change in mental status   Hem:  Denies active bleeding    OBJECTIVE:  Physical Exam:   Temp 97.8 °F (36.6 °C) (Temporal)   Ht 160 cm (63\")   Wt 59.4 kg (131 lb)   BMI 23.21 kg/m²     General Appearance:    Alert, cooperative, in no acute distress                  Eyes: conjunctiva clear  ENT: external " ears and nose atraumatic  CV: no peripheral edema  Resp: normal respiratory effort  Skin: no rashes or wounds; normal turgor  Psych: mood and affect appropriate  Lymph: no nodes appreciated  Neuro: gross sensation intact  Vascular:  Palpable peripheral pulse in noted extremity  Musculoskeletal Extremities: M today shows evidence of rheumatoid arthritis in her hands with her ulnar drift of the MCPs her right knee has swelling and joint line tenderness diffusely and some stiffness but does she does hyperextend her left knee has good range of motion and good stability she walks without a limp    Radiology:   AP lateral 40 degree PA x-rays right knee taken the office today and compared to old films show arthritic changes that are severe and consistent with her diagnosis of rheumatoid and osteoarthritis mixed    Assessment:     ICD-10-CM ICD-9-CM   1. Right knee pain, unspecified chronicity M25.561 719.46   2. Arthritis of right knee M17.11 716.96   3. Rheumatoid arthritis involving right hand, unspecified rheumatoid factor presence (CMS/[facility]) M06.9 714.0        Large Joint Arthrocentesis: R knee  Date/Time: 7/8/2019 10:24 AM  Consent given by: patient  Site marked: site marked  Timeout: Immediately prior to procedure a time out was called to verify the correct patient, procedure, equipment, support staff and site/side marked as required   Supporting Documentation  Indications: pain and joint swelling   Procedure Details  Location: knee - R knee  Preparation: Patient was prepped and draped in the usual sterile fashion  Needle size: 22 G  Approach: anterolateral  Medications administered: 80 mg methylPREDNISolone acetate 80 MG/ML; 4 mL lidocaine (cardiac)  Patient tolerance: patient tolerated the procedure well with no immediate complications             Plan: Biomechanics of pertinent body area discussed.  Risks, benefits, alternatives, comparisons, and complications of accepted medicines, injections, recommendations,  surgical procedures, and therapies explained and education provided in laymen's terms. Natural history and expected course of this patient's diagnosis discussed along with evaluation of therapies. Questions answered. When appropriate I also discussed proper use of cane, walker, trekking poles.   EXERCISES:  Advice on benefits of, and types of regular/moderate exercise including biomechanical forces involved as it pertains to this complaint.  MEDICATIONS:  Prescription, OTC and Monitoring of Medications per orders to address ortho complaints; Evaluation and discussion of safety, precautions, side effects, and warnings given especially of long term NSAID or steroid therapy.    Cortisone Injection for DIAGNOSTIC and THERAPUTIC purposes.  See how the cortisone injection does have her try some medications ice heat liniments etc. also have her do refresher of her exercises she did after total knee replacement gave her handouts.  Recheck in about 6 weeks to see if she is better.  If she is not she may be a candidate for total knee with do it.  However if she is better she could simply delay that appointment for another 3 months we can inject at that time  7/8/2019    Much of this encounter note is an electronic transcription/translation of spoken language to printed text. The electronic translation of spoken language may permit erroneous, or at times, nonsensical words or phrases to be inadvertently transcribed; Although I have reviewed the note for such errors, some may still exist

## 2019-07-08 NOTE — PATIENT INSTRUCTIONS
Knee Exercises  Ask your health care provider which exercises are safe for you. Do exercises exactly as told by your health care provider and adjust them as directed. It is normal to feel mild stretching, pulling, tightness, or discomfort as you do these exercises, but you should stop right away if you feel sudden pain or your pain gets worse. Do not begin these exercises until told by your health care provider.  STRETCHING AND RANGE OF MOTION EXERCISES  These exercises warm up your muscles and joints and improve the movement and flexibility of your knee. These exercises also help to relieve pain, numbness, and tingling.  Exercise A: Knee Extension, Prone  1. Lie on your abdomen on a bed.  2. Place your left / right knee just beyond the edge of the surface so your knee is not on the bed. You can put a towel under your left / right thigh just above your knee for comfort.  3. Relax your leg muscles and allow gravity to straighten your knee. You should feel a stretch behind your left / right knee.  4. Hold this position for __________ seconds.  5. Scoot up so your knee is supported between repetitions.  Repeat __________ times. Complete this stretch __________ times a day.  Exercise B: Knee Flexion, Active    1. Lie on your back with both knees straight. If this causes back discomfort, bend your left / right knee so your foot is flat on the floor.  2. Slowly slide your left / right heel back toward your buttocks until you feel a gentle stretch in the front of your knee or thigh.  3. Hold this position for __________ seconds.  4. Slowly slide your left / right heel back to the starting position.  Repeat __________ times. Complete this exercise __________ times a day.  Exercise C: Quadriceps, Prone    1. Lie on your abdomen on a firm surface, such as a bed or padded floor.  2. Bend your left / right knee and hold your ankle. If you cannot reach your ankle or pant leg, loop a belt around your foot and grab the belt  Subjective:       Patient ID: Madyson Roy is a 76 y.o. female who was last seen in this office Visit date not found    Chief Complaint:   Chief Complaint   Patient presents with    Nephrolithiasis     annual visit with ultrasound          Kidney Stones  She had ureteroscopy for a right sided ureteral stone in March 2016.  The stent was removed afterwards in the office.  She then had Left ESWL in September 2016.    She has been dealing with stones for many years.  She is taking Potassium Citrate.    Urinary Frequency  This started yesterday.  She has noted some burning and frequency.  No flank pain or hematuria or fever.       ACTIVE MEDICAL ISSUES:  Patient Active Problem List   Diagnosis    Obesity (BMI 35.0-39.9 without comorbidity)    Arthritis of left hip    Hyperlipidemia LDL goal <130    Osteoporosis    Calculus of kidney       ALLERGIES AND MEDICATIONS: updated and reviewed.  Review of patient's allergies indicates:  No Known Allergies  Current Outpatient Medications   Medication Sig    aspirin (ECOTRIN) 81 MG EC tablet Take 81 mg by mouth once daily. Instructed to stop medication on 8/16/18 per Dr. Núñez    azelastine (ASTELIN) 137 mcg (0.1 %) nasal spray     cinnamon bark (CINNAMON ORAL) Take 1,000 mg by mouth once daily.    COD LIVER OIL ORAL Take by mouth. `Instructed to stop 7 days before procedure.    coenzyme Q10 (COQ-10) 100 mg capsule Take 100 mg by mouth once daily.    L. RHAMNOSUS GG/INULIN (CULTURELLE PROBIOTICS ORAL) Take 1 capsule by mouth once daily.    meclizine (ANTIVERT) 25 mg tablet TK 1 T PO  TID PRF DIZZINESS    multivitamin capsule Take 1 capsule by mouth once daily.    potassium citrate (UROCIT-K) 10 mEq (1,080 mg) TbSR Take 1 tablet (10 mEq total) by mouth 2 (two) times daily with meals.    pravastatin (PRAVACHOL) 40 MG tablet Take 40 mg by mouth once daily.    raloxifene (EVISTA) 60 mg tablet Take 60 mg by mouth once daily.    traMADol (ULTRAM) 50 mg tablet  "Take 50 mg by mouth every 6 (six) hours as needed for Pain.    ciprofloxacin HCl (CIPRO) 500 MG tablet Take 1 tablet (500 mg total) by mouth 2 (two) times daily. for 7 days     No current facility-administered medications for this visit.      Facility-Administered Medications Ordered in Other Visits   Medication    cefazolin (ANCEF) 2 gram in dextrose 5% 50 mL IVPB (premix)       Review of Systems   Constitutional: Negative for activity change, fatigue, fever and unexpected weight change.   Eyes: Negative for redness and visual disturbance.   Respiratory: Negative for chest tightness and shortness of breath.    Cardiovascular: Negative for chest pain and leg swelling.   Gastrointestinal: Negative for abdominal distention, abdominal pain, constipation, diarrhea, nausea and vomiting.   Genitourinary: Negative for difficulty urinating, dysuria, flank pain, frequency, hematuria, pelvic pain, urgency and vaginal bleeding.   Musculoskeletal: Negative for arthralgias and joint swelling.   Neurological: Negative for dizziness, weakness and headaches.   Psychiatric/Behavioral: Negative for confusion. The patient is not nervous/anxious.    All other systems reviewed and are negative.      Objective:      Vitals:    03/07/19 1117   BP: 130/78   Pulse: 100   Weight: 78.6 kg (173 lb 4.5 oz)   Height: 4' 11" (1.499 m)     Physical Exam   Nursing note and vitals reviewed.  Constitutional: She is oriented to person, place, and time. She appears well-developed.   HENT:   Head: Normocephalic.   Eyes: Conjunctivae are normal.   Neck: Normal range of motion. No tracheal deviation present. No thyromegaly present.   Cardiovascular: Normal rate, normal heart sounds and normal pulses.    Pulmonary/Chest: Effort normal and breath sounds normal. No respiratory distress. She has no wheezes.   Abdominal: Soft. She exhibits no distension and no mass. There is no hepatosplenomegaly. There is no tenderness. There is no rebound, no guarding and " instead.  3. Gently pull your heel toward your buttocks. Your knee should not slide out to the side. You should feel a stretch in the front of your thigh and knee.  4. Hold this position for __________ seconds.  Repeat __________ times. Complete this stretch __________ times a day.  Exercise D: Hamstring, Supine  1. Lie on your back.  2. Loop a belt or towel over the ball of your left / right foot. The ball of your foot is on the walking surface, right under your toes.  3. Straighten your left / right knee and slowly pull on the belt to raise your leg until you feel a gentle stretch behind your knee.  ? Do not let your left / right knee bend while you do this.  ? Keep your other leg flat on the floor.  4. Hold this position for __________ seconds.  Repeat __________ times. Complete this stretch __________ times a day.  STRENGTHENING EXERCISES  These exercises build strength and endurance in your knee. Endurance is the ability to use your muscles for a long time, even after they get tired.  Exercise E: Quadriceps, Isometric    1. Lie on your back with your left / right leg extended and your other knee bent. Put a rolled towel or small pillow under your knee if told by your health care provider.  2. Slowly tense the muscles in the front of your left / right thigh. You should see your kneecap slide up toward your hip or see increased dimpling just above the knee. This motion will push the back of the knee toward the floor.  3. For __________ seconds, keep the muscle as tight as you can without increasing your pain.  4. Relax the muscles slowly and completely.  Repeat __________ times. Complete this exercise __________ times a day.  Exercise F: Straight Leg Raises - Quadriceps  1. Lie on your back with your left / right leg extended and your other knee bent.  2. Tense the muscles in the front of your left / right thigh. You should see your kneecap slide up or see increased dimpling just above the knee. Your thigh may  even shake a bit.  3. Keep these muscles tight as you raise your leg 4-6 inches (10-15 cm) off the floor. Do not let your knee bend.  4. Hold this position for __________ seconds.  5. Keep these muscles tense as you lower your leg.  6. Relax your muscles slowly and completely after each repetition.  Repeat __________ times. Complete this exercise __________ times a day.  Exercise G: Hamstring, Isometric  1. Lie on your back on a firm surface.  2. Bend your left / right knee approximately __________ degrees.  3. Dig your left / right heel into the surface as if you are trying to pull it toward your buttocks. Tighten the muscles in the back of your thighs to dig as hard as you can without increasing any pain.  4. Hold this position for __________ seconds.  5. Release the tension gradually and allow your muscles to relax completely for __________ seconds after each repetition.  Repeat __________ times. Complete this exercise __________ times a day.  Exercise H: Hamstring Curls    If told by your health care provider, do this exercise while wearing ankle weights. Begin with __________ weights. Then increase the weight by 1 lb (0.5 kg) increments. Do not wear ankle weights that are more than __________.  1. Lie on your abdomen with your legs straight.  2. Bend your left / right knee as far as you can without feeling pain. Keep your hips flat against the floor.  3. Hold this position for __________ seconds.  4. Slowly lower your leg to the starting position.    Repeat __________ times. Complete this exercise __________ times a day.  Exercise I: Squats (Quadriceps)  1.  front of a table, with your feet and knees pointing straight ahead. You may rest your hands on the table for balance but not for support.  2. Slowly bend your knees and lower your hips like you are going to sit in a chair.  ? Keep your weight over your heels, not over your toes.  ? Keep your lower legs upright so they are parallel with the table  no CVA tenderness. No hernia.   Musculoskeletal: Normal range of motion. She exhibits no edema or tenderness.   Lymphadenopathy:     She has no cervical adenopathy.   Neurological: She is alert and oriented to person, place, and time.   Skin: Skin is warm and dry. No rash noted. No erythema.     Psychiatric: She has a normal mood and affect. Her behavior is normal. Judgment and thought content normal.       Urine dipstick shows positive for WBC's and positive for nitrates.  Micro exam: 10 WBC's per HPF.    CHERRY reviewed: right sided stone noted    Assessment:       1. Calculus of kidney    2. Nocturia more than twice per night    3. Incomplete emptying of bladder    4. Urinary frequency          Plan:       1. Calculus of kidney    Okay to continue Calcium supplementation    - potassium citrate (UROCIT-K) 10 mEq (1,080 mg) TbSR; Take 1 tablet (10 mEq total) by mouth 2 (two) times daily with meals.  Dispense: 180 tablet; Refill: 3  - X-Ray Abdomen AP 1 View; Future  - POCT urinalysis, dipstick or tablet reag    2. Nocturia more than twice per night  Limit evening fluids    3. Incomplete emptying of bladder  stable    4. Urinary frequency  Cipro   - Urine culture            Follow-up in about 6 months (around 9/7/2019) for Follow up, Review X-ray.     legs.  ? Do not let your hips go lower than your knees.  ? Do not bend lower than told by your health care provider.  ? If your knee pain increases, do not bend as low.  3. Hold the squat position for __________ seconds.  4. Slowly push with your legs to return to standing. Do not use your hands to pull yourself to standing.  Repeat __________ times. Complete this exercise __________ times a day.  Exercise J: Wall Slides (Quadriceps)    1. Lean your back against a smooth wall or door while you walk your feet out 18-24 inches (46-61 cm) from it.  2. Place your feet hip-width apart.  3. Slowly slide down the wall or door until your knees bend __________ degrees. Keep your knees over your heels, not over your toes. Keep your knees in line with your hips.  4. Hold for __________ seconds.  Repeat __________ times. Complete this exercise __________ times a day.  Exercise K: Straight Leg Raises - Hip Abductors  1. Lie on your side with your left / right leg in the top position. Lie so your head, shoulder, knee, and hip line up. You may bend your bottom knee to help you keep your balance.  2. Roll your hips slightly forward so your hips are stacked directly over each other and your left / right knee is facing forward.  3. Leading with your heel, lift your top leg 4-6 inches (10-15 cm). You should feel the muscles in your outer hip lifting.  ? Do not let your foot drift forward.  ? Do not let your knee roll toward the ceiling.  4. Hold this position for __________ seconds.  5. Slowly return your leg to the starting position.  6. Let your muscles relax completely after each repetition.  Repeat __________ times. Complete this exercise __________ times a day.  Exercise L: Straight Leg Raises - Hip Extensors  1. Lie on your abdomen on a firm surface. You can put a pillow under your hips if that is more comfortable.  2. Tense the muscles in your buttocks and lift your left / right leg about 4-6 inches (10-15 cm). Keep your knee  straight as you lift your leg.  3. Hold this position for __________ seconds.  4. Slowly lower your leg to the starting position.  5. Let your leg relax completely after each repetition.  Repeat __________ times. Complete this exercise __________ times a day.  This information is not intended to replace advice given to you by your health care provider. Make sure you discuss any questions you have with your health care provider.  Document Released: 11/01/2006 Document Revised: 09/11/2017 Document Reviewed: 10/23/2016  [employer] Interactive Patient Education © 2019 [employer]

## 2019-08-01 RX ORDER — VALSARTAN 160 MG/1
160 TABLET ORAL DAILY
Qty: 90 TABLET | Refills: 2 | Status: SHIPPED | OUTPATIENT
Start: 2019-08-01 | End: 2019-08-06 | Stop reason: DRUGHIGH

## 2019-08-05 ENCOUNTER — OFFICE VISIT (OUTPATIENT)
Dept: INTERNAL MEDICINE | Facility: CLINIC | Age: 79
End: 2019-08-05

## 2019-08-05 ENCOUNTER — HOSPITAL ENCOUNTER (EMERGENCY)
Facility: HOSPITAL | Age: 79
Discharge: HOME | End: 2019-08-05
Attending: EMERGENCY MEDICINE | Admitting: EMERGENCY MEDICINE

## 2019-08-05 VITALS
WEIGHT: 132 LBS | BODY MASS INDEX: 23.38 KG/M2 | DIASTOLIC BLOOD PRESSURE: 90 MMHG | OXYGEN SATURATION: 97 % | SYSTOLIC BLOOD PRESSURE: 210 MMHG | HEART RATE: 65 BPM

## 2019-08-05 VITALS
SYSTOLIC BLOOD PRESSURE: 169 MMHG | HEART RATE: 64 BPM | HEIGHT: 64 IN | RESPIRATION RATE: 16 BRPM | TEMPERATURE: 97.6 F | DIASTOLIC BLOOD PRESSURE: 80 MMHG | BODY MASS INDEX: 22.93 KG/M2 | WEIGHT: 134.3 LBS | OXYGEN SATURATION: 96 %

## 2019-08-05 DIAGNOSIS — I16.0 HYPERTENSIVE URGENCY: Primary | ICD-10-CM

## 2019-08-05 DIAGNOSIS — R22.2 ABDOMINAL WALL LUMP: ICD-10-CM

## 2019-08-05 DIAGNOSIS — I10 HYPERTENSION, UNSPECIFIED TYPE: Primary | ICD-10-CM

## 2019-08-05 DIAGNOSIS — I65.23 BILATERAL CAROTID ARTERY STENOSIS: ICD-10-CM

## 2019-08-05 LAB
ALBUMIN SERPL-MCNC: 3.7 G/DL (ref 3.5–5.2)
ALBUMIN/GLOB SERPL: 1 G/DL
ALP SERPL-CCNC: 64 U/L (ref 39–117)
ALT SERPL W P-5'-P-CCNC: 14 U/L (ref 1–33)
ANION GAP SERPL CALCULATED.3IONS-SCNC: 11 MMOL/L (ref 5–15)
AST SERPL-CCNC: 19 U/L (ref 1–32)
BASOPHILS # BLD AUTO: 0.03 10*3/MM3 (ref 0–0.2)
BASOPHILS NFR BLD AUTO: 0.7 % (ref 0–1.5)
BILIRUB SERPL-MCNC: 0.4 MG/DL (ref 0.2–1.2)
BUN BLD-MCNC: 7 MG/DL (ref 8–23)
BUN/CREAT SERPL: 13.7 (ref 7–25)
CALCIUM SPEC-SCNC: 8.9 MG/DL (ref 8.6–10.5)
CHLORIDE SERPL-SCNC: 102 MMOL/L (ref 98–107)
CO2 SERPL-SCNC: 30 MMOL/L (ref 22–29)
CREAT BLD-MCNC: 0.51 MG/DL (ref 0.57–1)
DEPRECATED RDW RBC AUTO: 45.7 FL (ref 37–54)
EOSINOPHIL # BLD AUTO: 0.34 10*3/MM3 (ref 0–0.4)
EOSINOPHIL NFR BLD AUTO: 7.7 % (ref 0.3–6.2)
ERYTHROCYTE [DISTWIDTH] IN BLOOD BY AUTOMATED COUNT: 14.1 % (ref 12.3–15.4)
GFR SERPL CREATININE-BSD FRML MDRD: 117 ML/MIN/1.73
GLOBULIN UR ELPH-MCNC: 3.6 GM/DL
GLUCOSE BLD-MCNC: 98 MG/DL (ref 65–99)
HCT VFR BLD AUTO: 38.5 % (ref 34–46.6)
HGB BLD-MCNC: 12.1 G/DL (ref 12–15.9)
IMM GRANULOCYTES # BLD AUTO: 0.01 10*3/MM3 (ref 0–0.05)
IMM GRANULOCYTES NFR BLD AUTO: 0.2 % (ref 0–0.5)
LYMPHOCYTES # BLD AUTO: 1.41 10*3/MM3 (ref 0.7–3.1)
LYMPHOCYTES NFR BLD AUTO: 31.8 % (ref 19.6–45.3)
MCH RBC QN AUTO: 27.5 PG (ref 26.6–33)
MCHC RBC AUTO-ENTMCNC: 31.4 G/DL (ref 31.5–35.7)
MCV RBC AUTO: 87.5 FL (ref 79–97)
MONOCYTES # BLD AUTO: 0.58 10*3/MM3 (ref 0.1–0.9)
MONOCYTES NFR BLD AUTO: 13.1 % (ref 5–12)
NEUTROPHILS # BLD AUTO: 2.06 10*3/MM3 (ref 1.7–7)
NEUTROPHILS NFR BLD AUTO: 46.5 % (ref 42.7–76)
NRBC BLD AUTO-RTO: 0 /100 WBC (ref 0–0.2)
PLATELET # BLD AUTO: 243 10*3/MM3 (ref 140–450)
PMV BLD AUTO: 11.5 FL (ref 6–12)
POTASSIUM BLD-SCNC: 3.5 MMOL/L (ref 3.5–5.2)
PROT SERPL-MCNC: 7.3 G/DL (ref 6–8.5)
RBC # BLD AUTO: 4.4 10*6/MM3 (ref 3.77–5.28)
SODIUM BLD-SCNC: 143 MMOL/L (ref 136–145)
TROPONIN T SERPL-MCNC: <0.01 NG/ML (ref 0–0.03)
WBC NRBC COR # BLD: 4.43 10*3/MM3 (ref 3.4–10.8)

## 2019-08-05 PROCEDURE — 96374 THER/PROPH/DIAG INJ IV PUSH: CPT

## 2019-08-05 PROCEDURE — 99283 EMERGENCY DEPT VISIT LOW MDM: CPT

## 2019-08-05 PROCEDURE — 99215 OFFICE O/P EST HI 40 MIN: CPT | Performed by: INTERNAL MEDICINE

## 2019-08-05 PROCEDURE — 25010000002 HYDRALAZINE PER 20 MG: Performed by: EMERGENCY MEDICINE

## 2019-08-05 PROCEDURE — 85025 COMPLETE CBC W/AUTO DIFF WBC: CPT | Performed by: EMERGENCY MEDICINE

## 2019-08-05 PROCEDURE — 84484 ASSAY OF TROPONIN QUANT: CPT | Performed by: EMERGENCY MEDICINE

## 2019-08-05 PROCEDURE — 80053 COMPREHEN METABOLIC PANEL: CPT | Performed by: EMERGENCY MEDICINE

## 2019-08-05 RX ORDER — HYDRALAZINE HYDROCHLORIDE 20 MG/ML
10 INJECTION INTRAMUSCULAR; INTRAVENOUS ONCE
Status: COMPLETED | OUTPATIENT
Start: 2019-08-05 | End: 2019-08-05

## 2019-08-05 RX ORDER — AMLODIPINE BESYLATE 5 MG/1
5 TABLET ORAL DAILY
Qty: 30 TABLET | Refills: 5 | Status: SHIPPED | OUTPATIENT
Start: 2019-08-05 | End: 2019-11-27 | Stop reason: SDUPTHER

## 2019-08-05 RX ORDER — SODIUM CHLORIDE 0.9 % (FLUSH) 0.9 %
10 SYRINGE (ML) INJECTION AS NEEDED
Status: DISCONTINUED | OUTPATIENT
Start: 2019-08-05 | End: 2019-08-05 | Stop reason: HOSPADM

## 2019-08-05 RX ORDER — DULOXETIN HYDROCHLORIDE 60 MG/1
60 CAPSULE, DELAYED RELEASE ORAL 2 TIMES DAILY
Qty: 60 CAPSULE | Refills: 3 | Status: SHIPPED | OUTPATIENT
Start: 2019-08-05

## 2019-08-05 RX ORDER — AMLODIPINE BESYLATE 2.5 MG/1
2.5 TABLET ORAL DAILY
Qty: 90 TABLET | Refills: 3 | Status: SHIPPED | OUTPATIENT
Start: 2019-08-05 | End: 2019-08-05 | Stop reason: DRUGHIGH

## 2019-08-05 RX ADMIN — HYDRALAZINE HYDROCHLORIDE 10 MG: 20 INJECTION INTRAMUSCULAR; INTRAVENOUS at 20:09

## 2019-08-05 NOTE — ED PROVIDER NOTES
" EMERGENCY DEPARTMENT ENCOUNTER    CHIEF COMPLAINT  Chief Complaint: Hypertension  History given by: Patient  History limited by: None  Room Number: 18/18  PMD: Agata Burgess MD      HPI:  Pt is a 79 y.o. female who presents complaining of hypertension that was reported to her by her PCP PTA while she was being seen for a \"lump\" on her abd. She states she does not regularly check her BP. She denies any chest pain, SOA, fever, nausea, vomiting, or HA. She was given Clonidine in her PCP office without significant improvement. Hx of HTN.    Duration: Reported to her PTA  Onset: Gradual  Timing: Constant  Intensity/Severity: Moderate  Progression: Unchanged  Associated Symptoms: None stated  Previous Episodes: Hx of HTN.  Treatment before arrival: She was given a Clonidine in her PCP office without significant improvement.    PAST MEDICAL HISTORY  Active Ambulatory Problems     Diagnosis Date Noted   • Arthritis of knee 03/23/2016   • Anxiety 04/05/2016   • Chronic obstructive pulmonary disease (CMS/HCC) 04/05/2016   • Hyperlipidemia 04/05/2016   • Hypertension 04/05/2016   • Impaired fasting glucose 04/05/2016   • Osteoporosis 04/05/2016   • Rheumatoid arthritis (CMS/HCC) 04/05/2016   • Status post total left knee replacement 06/03/2016   • History of total knee arthroplasty 07/21/2016   • Abnormal mammogram 03/16/2017   • Hematuria 03/16/2017   • Menopausal symptom 03/16/2017   • Sebaceous cyst 03/16/2017   • Hypokalemia 03/31/2017   • Right foot pain 03/31/2017   • Encounter for colonoscopy due to history of adenomatous colonic polyps 08/31/2017   • Epigastric hernia 04/18/2018   • Depression 06/11/2018     Resolved Ambulatory Problems     Diagnosis Date Noted   • Knee pain 04/05/2016   • Arthritis of knee, left 05/18/2016   • COPD exacerbation (CMS/HCC) 08/01/2016   • Sinus problem 03/16/2017   • Chest congestion 03/16/2017   • Cough 03/16/2017   • Persistent cough 01/03/2018     Past Medical History:   Diagnosis " Date   • Abdominal hernia    • COPD (chronic obstructive pulmonary disease) (CMS/HCC)    • Depression    • Food allergy    • GERD (gastroesophageal reflux disease)    • Hiatal hernia    • History of bruising easily    • History of colon polyps 08/13/2013   • History of colon polyps 01/30/2013   • History of colon polyps 10/30/2017   • Hypercholesteremia    • Hypertension    • Joint pain    • On home oxygen therapy    • Osteoarthritis    • Osteoporosis    • PONV (postoperative nausea and vomiting)    • Rheumatoid arthritis (CMS/HCC)        PAST SURGICAL HISTORY  Past Surgical History:   Procedure Laterality Date   • BREAST BIOPSY Left     Benign pathology   • BRONCHOSCOPY N/A 1/3/2018    Procedure: BRONCHOSCOPY with lavage in right middle lobe and lingula.;  Surgeon: Trenton Garcia MD;  Location: [facility] ENDOSCOPY;  Service:    • CAROTID ENDARTERECTOMY Left 2006   • CATARACT EXTRACTION WITH INTRAOCULAR LENS IMPLANT Bilateral    • COLONOSCOPY N/A 10/30/2017    Procedure: COLONOSCOPY TO CECUM, TO TERMINAL ILEUM WITH COLD BIOPSY POLYPECTOMY;  Surgeon: Jossie Stanley MD;  Location: [facility] ENDOSCOPY;  Service:    • COLONOSCOPY N/A 08/13/2013    One 3 mm polyp in the transverse colon, Diverticulosis in the sigmoid colon, [facility], Dr. Jossie Stanley   • COLONOSCOPY N/A 01/30/2013    One 3 mm polyp ascending colon; One 7 mm polyp descending colon; One 25 mm polyp splenic flexure, Diverticulosis Sigmoid Colon, [facility], Dr. Jossie Stanley   • FOOT SURGERY Bilateral     Bilateral (MULITPLE SURGERIES)   • HAND SURGERY Bilateral     Bilateral (MULTIPLE SURGERIES)   • KNEE ARTHROSCOPY W/ PARTIAL MEDIAL MENISCECTOMY Left 01/22/2016    Left Knee Arthroscopy w/ partial medial & lateral meniscectomies, Three compartment synovectomy w/ loose body removal, Chondroplasty of patella, Dr. 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 Atkins    • TOTAL KNEE ARTHROPLASTY Left 2016    Procedure: LT TOTAL KNEE ARTHROPLASTY WITH FELECIA NAVIGATION;  Surgeon: Florencio Frazier MD;  Location[facility] MAIN OR;  Service:    • VENTRAL HERNIA REPAIR N/A 2018    Procedure: VENTRAL HERNIA REPAIR LAPAROSCOPIC WITH DAVINCI ROBOT;  Surgeon: Josr Jennings MD;  [facility];  Service: DaVinci       FAMILY HISTORY  Family History   Problem Relation Age of Onset   • Heart disease Mother    • Hypertension Mother    • Lung disease Mother    • Diabetes Sister    • Hypertension Brother    • Diabetes Sister    • Malig Hyperthermia Neg Hx        SOCIAL HISTORY  Social History     Socioeconomic History   • Marital status:      Spouse name: Not on file   • Number of children: Not on file   • Years of education: Not on file   • Highest education level: Not on file   Tobacco Use   • Smoking status: Former Smoker     Packs/day: 1.50     Years: 30.00     Pack years: 45.00     Types: Cigarettes     Last attempt to quit:      Years since quittin.6   • Smokeless tobacco: Never Used   Substance and Sexual Activity   • Alcohol use: No     Comment: No alcohol for 30 years   • Drug use: No       ALLERGIES  Gold-containing drug products and Hydrocodone    REVIEW OF SYSTEMS  Review of Systems   Constitutional: Negative for fever.        She reports hypertension.   HENT: Negative for sore throat.    Eyes: Negative.    Respiratory: Negative for cough and shortness of breath.    Cardiovascular: Negative for chest pain.   Gastrointestinal: Negative for abdominal pain, diarrhea, nausea and vomiting.   Genitourinary: Negative for dysuria.   Musculoskeletal: Negative for neck pain.   Skin: Negative for rash.   Allergic/Immunologic: Negative.    Neurological: Negative for weakness, numbness and headaches.   Hematological: Negative.    Psychiatric/Behavioral: Negative.    All other systems reviewed and are negative.    PHYSICAL EXAM  ED Triage Vitals   Temp Heart  Rate Resp BP SpO2   08/05/19 1647 08/05/19 1647 08/05/19 1841 08/05/19 1647 08/05/19 1647   97.6 °F (36.4 °C) 68 16 (!) 188/78 98 %      Temp src Heart Rate Source Patient Position BP Location FiO2 (%)   -- 08/05/19 1841 08/05/19 1841 08/05/19 1841 --    Monitor Lying Left arm        Physical Exam   Constitutional: She is oriented to person, place, and time and well-developed, well-nourished, and in no distress. No distress.   HENT:   Head: Normocephalic and atraumatic.   Eyes: EOM are normal. Pupils are equal, round, and reactive to light.   Neck: Normal range of motion. Neck supple.   Cardiovascular: Normal rate, regular rhythm and normal heart sounds.   Pulmonary/Chest: Effort normal and breath sounds normal. No respiratory distress.   Abdominal: Soft. There is no tenderness. There is no rebound and no guarding.   Musculoskeletal: Normal range of motion. She exhibits no edema.   Neurological: She is alert and oriented to person, place, and time. She has normal sensation and normal strength.   Skin: Skin is warm and dry. No rash noted.   Psychiatric: Mood and affect normal.   Nursing note and vitals reviewed.      LAB RESULTS  Lab Results (last 24 hours)     Procedure Component Value Units Date/Time    CBC & Differential [[id number]] Collected:  08/05/19 2012    Specimen:  Blood Updated:  08/05/19 2026    Narrative:       The following orders were created for panel order CBC & Differential.  Procedure                               Abnormality         Status                     ---------                               -----------         ------                     CBC Auto Differential[[id number]]        Abnormal            Final result                 Please view results for these tests on the individual orders.    Comprehensive Metabolic Panel [[id number]]  (Abnormal) Collected:  08/05/19 2012    Specimen:  Blood Updated:  08/05/19 2052     Glucose 98 mg/dL      BUN 7 mg/dL      Creatinine 0.51 mg/dL      Sodium 143  mmol/L      Potassium 3.5 mmol/L      Chloride 102 mmol/L      CO2 30.0 mmol/L      Calcium 8.9 mg/dL      Total Protein 7.3 g/dL      Albumin 3.70 g/dL      ALT (SGPT) 14 U/L      AST (SGOT) 19 U/L      Alkaline Phosphatase 64 U/L      Total Bilirubin 0.4 mg/dL      eGFR Non African Amer 117 mL/min/1.73      Globulin 3.6 gm/dL      A/G Ratio 1.0 g/dL      BUN/Creatinine Ratio 13.7     Anion Gap 11.0 mmol/L     Narrative:       GFR Normal >60  Chronic Kidney Disease <60  Kidney Failure <15    Troponin [[id number]]  (Normal) Collected:  08/05/19 2012    Specimen:  Blood Updated:  08/05/19 2107     Troponin T <0.010 ng/mL     Narrative:       Troponin T Reference Range:  <= 0.03 ng/mL-   Negative for AMI  >0.03 ng/mL-     Abnormal for myocardial necrosis.  Clinicians would have to utilize clinical acumen, EKG, Troponin and serial changes to determine if it is an Acute Myocardial Infarction or myocardial injury due to an underlying chronic condition.     CBC Auto Differential [[id number]]  (Abnormal) Collected:  08/05/19 2012    Specimen:  Blood Updated:  08/05/19 2026     WBC 4.43 10*3/mm3      RBC 4.40 10*6/mm3      Hemoglobin 12.1 g/dL      Hematocrit 38.5 %      MCV 87.5 fL      MCH 27.5 pg      MCHC 31.4 g/dL      RDW 14.1 %      RDW-SD 45.7 fl      MPV 11.5 fL      Platelets 243 10*3/mm3      Neutrophil % 46.5 %      Lymphocyte % 31.8 %      Monocyte % 13.1 %      Eosinophil % 7.7 %      Basophil % 0.7 %      Immature Grans % 0.2 %      Neutrophils, Absolute 2.06 10*3/mm3      Lymphocytes, Absolute 1.41 10*3/mm3      Monocytes, Absolute 0.58 10*3/mm3      Eosinophils, Absolute 0.34 10*3/mm3      Basophils, Absolute 0.03 10*3/mm3      Immature Grans, Absolute 0.01 10*3/mm3      nRBC 0.0 /100 WBC           I ordered the above labs and reviewed the results.    PROGRESS AND CONSULTS     1925 Ordered CBC, troponin, and CMP for further evaluation. Ordered hydralazine for elevated BP.    2118 Rechecked with pt and  informed her of the results of her labs. Her BP is better controlled at this time. [facility] She should f/u with her PCP for BP rechecks. Pt understands and agrees with the plan, all questions answered.    MEDICAL DECISION MAKING  Results were reviewed/discussed with the patient and they were also made aware of online access. Pt also made aware that some labs, such as cultures, will not be resulted during ER visit and follow up with PMD is necessary.     MDM  Number of Diagnoses or Management Options     Amount and/or Complexity of Data Reviewed  Clinical lab tests: ordered and reviewed  Decide to obtain previous medical records or to obtain history from someone other than the patient: yes  Review and summarize past medical records: yes (Pt was last admitted on 5/22/18 after a ventral hernia repair. She has no recent ED visits.)    Patient Progress  Patient progress: stable         DIAGNOSIS  Final diagnoses:   Hypertension, unspecified type       DISPOSITION  DISCHARGE    Patient discharged in stable condition.    Reviewed implications of results, diagnosis, meds, responsibility to follow up, warning signs and symptoms of possible worsening, potential complications and reasons to return to ER, including new or worsening sxs.    Patient/Family voiced understanding of above instructions.    Discussed plan for discharge, as there is no emergent indication for admission. Patient referred to primary care provider for BP management due to today's BP. Pt/family is agreeable and understands need for follow up and repeat testing.  Pt is aware that discharge does not mean that nothing is wrong but it indicates no emergency is present that requires admission and they must continue care with follow-up as given below or physician of their choice.     FOLLOW-UP  Agata Burgess MD  [address] [id number] [phone]    Schedule an appointment as soon as possible for a visit             Medication List      New Prescriptions    amLODIPine 5 MG tablet  Commonly known as:  NORVASC  Take 1 tablet by mouth Daily.        Latest Documented Vital Signs:  As of 10:51 PM  BP- 169/80 HR- 64 Temp- 97.6 °F (36.4 °C) O2 sat- 96%    --  Documentation assistance provided by johnny Rivas MD for Dr. Rivas.  Information recorded by the scribe was done at my direction and has been verified and validated by me.     Caitlyn Baxter  08/05/19 5224       Elliot Rivas MD  08/05/19 9801

## 2019-08-05 NOTE — ED TRIAGE NOTES
Patient was in Dr. Guerra office for a lump on her side they took her blood pressure and sent her down here because it was high. Dr. Watson called down stated they gave her a Clondine but her blood pressure was still high

## 2019-08-05 NOTE — PROGRESS NOTES
"Chief Complaint   Patient presents with   • Cyst     on Left side of abdomen    • Hypertension       History of Present Illness   Janet Martinez is a 79 y.o. female presents for a new concern. She has noticed a new mass of her abdominal wall that has her concerned. It is not painful. She is unsure of duration. It does not bother her she is just concerned that it is there. Upon arrival to office it was found that she has elevated blood pressure. She has a history of hypertension that is routinely treated with diovan. She believes that she took this medication this morning. She has increased sodium consumption as she \"loves watermelon w/ salt\". She normally takes cymbalta 120 mg but only took 60 mg this am.     The following portions of the patient's history were reviewed and updated as appropriate: allergies, current medications, past family history, past medical history, past social history, past surgical history and problem list.  Current Outpatient Medications on File Prior to Visit   Medication Sig Dispense Refill   • albuterol (PROVENTIL) (2.5 MG/3ML) 0.083% nebulizer solution Take 2.5 mg by nebulization Every 4 (Four) Hours As Needed for Wheezing.     • atorvastatin (LIPITOR) 20 MG tablet Take 1 tablet by mouth Daily. 90 tablet 2   • budesonide (PULMICORT) 0.5 MG/2ML nebulizer solution USE 1 VIAL VIA NEBULIZER TWICE DAILY RINSE MOUTH AFTER USE  12   • Calcium Carbonate-Vitamin D (CALCIUM 600+D PO) Take 1 tablet by mouth Daily.     • etanercept (ENBREL) 50 MG/ML solution prefilled syringe injection Inject 50 mg under the skin 1 (One) Time Per Week. THURSDAYS. TO HOLD 4 WEEKS     • fluticasone (FLONASE) 50 MCG/ACT nasal spray 2 sprays into the nostril(s) as directed by provider Daily. 1 bottle 5   • folic acid (FOLVITE) 1 MG tablet Take 1 tablet by mouth Daily. 90 tablet 1   • ipratropium (ATROVENT) 0.06 % nasal spray 2 sprays into each nostril 4 (Four) Times a Day As Needed for Rhinitis.     • " ipratropium-albuterol (DUO-NEB) 0.5-2.5 mg/3 ml nebulizer Take 3 mL by nebulization Every 4 (Four) Hours As Needed for Wheezing.     • montelukast (SINGULAIR) 10 MG tablet Take 1 tablet by mouth Daily. 90 tablet 3   • omeprazole (priLOSEC) 40 MG capsule Take 1 capsule by mouth Every Evening. 90 capsule 1   • PERFOROMIST 20 MCG/2ML nebulizer solution USE 1 VIAL VIA NEBULIZER TWICE DAILY  12   • umeclidinium-vilanterol (ANORO ELLIPTA) 62.5-25 MCG/INH aerosol powder  inhaler Inhale 1 puff Daily. 3 each 2   • valsartan (DIOVAN) 160 MG tablet Take 1 tablet by mouth Daily. 90 tablet 2   • [DISCONTINUED] DULoxetine (CYMBALTA) 60 MG capsule Take 2 capsules by mouth Daily. 180 capsule 1   • folic acid (FOLVITE) 1 MG tablet TAKE 1 TABLET BY MOUTH DAILY. 90 tablet 0     Current Facility-Administered Medications on File Prior to Visit   Medication Dose Route Frequency Provider Last Rate Last Dose   • levalbuterol (XOPENEX) nebulizer solution 0.63 mg  0.63 mg Nebulization Q6H PRN Aagta Burgess MD   0.63 mg at 12/17/18 1437     Review of Systems   Constitutional: Negative.    HENT: Negative.    Eyes: Negative.    Respiratory: Negative.    Cardiovascular: Negative.    Gastrointestinal: Negative.    Endocrine: Negative.    Genitourinary: Negative.    Musculoskeletal: Negative.    Skin:        Nodule abdominal wall       Objective   Physical Exam   Constitutional: She is oriented to person, place, and time. She appears well-developed and well-nourished.   HENT:   Head: Normocephalic and atraumatic.   Right Ear: External ear normal.   Left Ear: External ear normal.   Mouth/Throat: Oropharynx is clear and moist.   Eyes: EOM are normal. Pupils are equal, round, and reactive to light.   Neck: Normal range of motion. Neck supple.   Cardiovascular: Normal rate, regular rhythm and normal heart sounds.   Pulmonary/Chest: Effort normal and breath sounds normal.   Abdominal: Soft. She exhibits mass.   Musculoskeletal: Normal range of  motion.   Neurological: She is alert and oriented to person, place, and time.   Skin: Skin is warm and dry.   Psychiatric: She has a normal mood and affect. Her behavior is normal. Judgment and thought content normal.   Nursing note and vitals reviewed.       BP (!) 210/90   Pulse 65   Wt 59.9 kg (132 lb)   SpO2 97%   BMI 23.38 kg/m²   Bilateral arms equal elevated bp.   Assessment/Plan   Diagnoses and all orders for this visit:    Hypertensive urgency  -     Duplex Carotid Ultrasound CAR; Future  -     Duplex Renal Artery - Bilateral Complete CAR; Future    Abdominal wall lump  -     US Soft Tissue; Future    Bilateral carotid artery stenosis  -     Duplex Carotid Ultrasound CAR; Future  -     Duplex Renal Artery - Bilateral Complete CAR; Future    Other orders  -     Discontinue: amLODIPine (NORVASC) 2.5 MG tablet; Take 1 tablet by mouth Daily.  -     DULoxetine (CYMBALTA) 60 MG capsule; Take 1 capsule by mouth 2 (Two) Times a Day.  -     amLODIPine (NORVASC) 5 MG tablet; Take 1 tablet by mouth Daily.      Patient w/ acute hypertension.  She was given clonidine 1 mg and bp was actually more elevated when retested at 20 min intervals for one hour total. She will be sent to the ER to get bp in better management prior to going home. Patient is on an ARB and will test a renal arterial ultrasound to ensure that there is no renal arterial stenosis. ? If any medications were missed today and she will need her second dose of cymbalta when she gets home. Will start amlodipine 5 mg daily. She will reduce dietary sodium. She will also get a carotid ultrasound in the near future.   Patient has what feels like a lipoma. Will get an ultrasound test of this.   She will f/u in 2 weeks for repeat bp testing. She is to increase hydration w/ water and avoid sodium.

## 2019-08-05 NOTE — PATIENT INSTRUCTIONS
"DASH Eating Plan  DASH stands for \"Dietary Approaches to Stop Hypertension.\" The DASH eating plan is a healthy eating plan that has been shown to reduce high blood pressure (hypertension). It may also reduce your risk for type 2 diabetes, heart disease, and stroke. The DASH eating plan may also help with weight loss.  What are tips for following this plan?    General guidelines  · Avoid eating more than 2,300 mg (milligrams) of salt (sodium) a day. If you have hypertension, you may need to reduce your sodium intake to 1,500 mg a day.  · Limit alcohol intake to no more than 1 drink a day for nonpregnant women and 2 drinks a day for men. One drink equals 12 oz of beer, 5 oz of wine, or 1½ oz of hard liquor.  · Work with your health care provider to maintain a healthy body weight or to lose weight. Ask what an ideal weight is for you.  · Get at least 30 minutes of exercise that causes your heart to beat faster (aerobic exercise) most days of the week. Activities may include walking, swimming, or biking.  · Work with your health care provider or diet and nutrition specialist (dietitian) to adjust your eating plan to your individual calorie needs.  Reading food labels    · Check food labels for the amount of sodium per serving. Choose foods with less than 5 percent of the Daily Value of sodium. Generally, foods with less than 300 mg of sodium per serving fit into this eating plan.  · To find whole grains, look for the word \"whole\" as the first word in the ingredient list.  Shopping  · Buy products labeled as \"low-sodium\" or \"no salt added.\"  · Buy fresh foods. Avoid canned foods and premade or frozen meals.  Cooking  · Avoid adding salt when cooking. Use salt-free seasonings or herbs instead of table salt or sea salt. Check with your health care provider or pharmacist before using salt substitutes.  · Do not palafox foods. Cook foods using healthy methods such as baking, boiling, grilling, and broiling instead.  · Cook with " heart-healthy oils, such as olive, canola, soybean, or sunflower oil.  Meal planning  · Eat a balanced diet that includes:  ? 5 or more servings of fruits and vegetables each day. At each meal, try to fill half of your plate with fruits and vegetables.  ? Up to 6-8 servings of whole grains each day.  ? Less than 6 oz of lean meat, poultry, or fish each day. A 3-oz serving of meat is about the same size as a deck of cards. One egg equals 1 oz.  ? 2 servings of low-fat dairy each day.  ? A serving of nuts, seeds, or beans 5 times each week.  ? Heart-healthy fats. Healthy fats called Omega-3 fatty acids are found in foods such as flaxseeds and coldwater fish, like sardines, salmon, and mackerel.  · Limit how much you eat of the following:  ? Canned or prepackaged foods.  ? Food that is high in trans fat, such as fried foods.  ? Food that is high in saturated fat, such as fatty meat.  ? Sweets, desserts, sugary drinks, and other foods with added sugar.  ? Full-fat dairy products.  · Do not salt foods before eating.  · Try to eat at least 2 vegetarian meals each week.  · Eat more home-cooked food and less restaurant, buffet, and fast food.  · When eating at a restaurant, ask that your food be prepared with less salt or no salt, if possible.  What foods are recommended?  The items listed may not be a complete list. Talk with your dietitian about what dietary choices are best for you.  Grains  Whole-grain or whole-wheat bread. Whole-grain or whole-wheat pasta. Brown rice. Oatmeal. Quinoa. Bulgur. Whole-grain and low-sodium cereals. Karly bread. Low-fat, low-sodium crackers. Whole-wheat flour tortillas.  Vegetables  Fresh or frozen vegetables (raw, steamed, roasted, or grilled). Low-sodium or reduced-sodium tomato and vegetable juice. Low-sodium or reduced-sodium tomato sauce and tomato paste. Low-sodium or reduced-sodium canned vegetables.  Fruits  All fresh, dried, or frozen fruit. Canned fruit in natural juice (without  added sugar).  Meat and other protein foods  Skinless chicken or turkey. Ground chicken or turkey. Pork with fat trimmed off. Fish and seafood. Egg whites. Dried beans, peas, or lentils. Unsalted nuts, nut butters, and seeds. Unsalted canned beans. Lean cuts of beef with fat trimmed off. Low-sodium, lean deli meat.  Dairy  Low-fat (1%) or fat-free (skim) milk. Fat-free, low-fat, or reduced-fat cheeses. Nonfat, low-sodium ricotta or cottage cheese. Low-fat or nonfat yogurt. Low-fat, low-sodium cheese.  Fats and oils  Soft margarine without trans fats. Vegetable oil. Low-fat, reduced-fat, or light mayonnaise and salad dressings (reduced-sodium). Canola, safflower, olive, soybean, and sunflower oils. Avocado.  Seasoning and other foods  Herbs. Spices. Seasoning mixes without salt. Unsalted popcorn and pretzels. Fat-free sweets.  What foods are not recommended?  The items listed may not be a complete list. Talk with your dietitian about what dietary choices are best for you.  Grains  Baked goods made with fat, such as croissants, muffins, or some breads. Dry pasta or rice meal packs.  Vegetables  Creamed or fried vegetables. Vegetables in a cheese sauce. Regular canned vegetables (not low-sodium or reduced-sodium). Regular canned tomato sauce and paste (not low-sodium or reduced-sodium). Regular tomato and vegetable juice (not low-sodium or reduced-sodium). Pickles. Olives.  Fruits  Canned fruit in a light or heavy syrup. Fried fruit. Fruit in cream or butter sauce.  Meat and other protein foods  Fatty cuts of meat. Ribs. Fried meat. Garcia. Sausage. Bologna and other processed lunch meats. Salami. Fatback. Hotdogs. Bratwurst. Salted nuts and seeds. Canned beans with added salt. Canned or smoked fish. Whole eggs or egg yolks. Chicken or turkey with skin.  Dairy  Whole or 2% milk, cream, and half-and-half. Whole or full-fat cream cheese. Whole-fat or sweetened yogurt. Full-fat cheese. Nondairy creamers. Whipped toppings.  Processed cheese and cheese spreads.  Fats and oils  Butter. Stick margarine. Lard. Shortening. Ghee. Garcia fat. Tropical oils, such as coconut, palm kernel, or palm oil.  Seasoning and other foods  Salted popcorn and pretzels. Onion salt, garlic salt, seasoned salt, table salt, and sea salt. Worcestershire sauce. Tartar sauce. Barbecue sauce. Teriyaki sauce. Soy sauce, including reduced-sodium. Steak sauce. Canned and packaged gravies. Fish sauce. Oyster sauce. Cocktail sauce. Horseradish that you find on the shelf. Ketchup. Mustard. Meat flavorings and tenderizers. Bouillon cubes. Hot sauce and Tabasco sauce. Premade or packaged marinades. Premade or packaged taco seasonings. Relishes. Regular salad dressings.  Where to find more information:  · National Heart, Lung, and Blood [place]: www.nhlbi.nih.gov  · American Heart Association: www.heart.org  Summary  · The DASH eating plan is a healthy eating plan that has been shown to reduce high blood pressure (hypertension). It may also reduce your risk for type 2 diabetes, heart disease, and stroke.  · With the DASH eating plan, you should limit salt (sodium) intake to 2,300 mg a day. If you have hypertension, you may need to reduce your sodium intake to 1,500 mg a day.  · When on the DASH eating plan, aim to eat more fresh fruits and vegetables, whole grains, lean proteins, low-fat dairy, and heart-healthy fats.  · Work with your health care provider or diet and nutrition specialist (dietitian) to adjust your eating plan to your individual calorie needs.  This information is not intended to replace advice given to you by your health care provider. Make sure you discuss any questions you have with your health care provider.  Document Released: 12/06/2012 Document Revised: 12/11/2017 Document Reviewed: 12/11/2017  [employer] Interactive Patient Education © 2019 [employer]

## 2019-08-06 ENCOUNTER — TELEPHONE (OUTPATIENT)
Dept: INTERNAL MEDICINE | Facility: CLINIC | Age: 79
End: 2019-08-06

## 2019-08-06 RX ORDER — VALSARTAN 320 MG/1
320 TABLET ORAL DAILY
Qty: 90 TABLET | Refills: 2 | Status: SHIPPED | OUTPATIENT
Start: 2019-08-06 | End: 2021-01-01 | Stop reason: SDUPTHER

## 2019-08-08 ENCOUNTER — HOSPITAL ENCOUNTER (OUTPATIENT)
Dept: ULTRASOUND IMAGING | Facility: HOSPITAL | Age: 79
Discharge: HOME | End: 2019-08-08

## 2019-08-08 ENCOUNTER — TELEPHONE (OUTPATIENT)
Dept: INTERNAL MEDICINE | Facility: CLINIC | Age: 79
End: 2019-08-08

## 2019-08-08 ENCOUNTER — HOSPITAL ENCOUNTER (OUTPATIENT)
Dept: MAMMOGRAPHY | Facility: HOSPITAL | Age: 79
Discharge: HOME | End: 2019-08-08
Admitting: INTERNAL MEDICINE

## 2019-08-08 DIAGNOSIS — R22.2 ABDOMINAL WALL LUMP: ICD-10-CM

## 2019-08-08 PROCEDURE — 77067 SCR MAMMO BI INCL CAD: CPT

## 2019-08-08 PROCEDURE — 77063 BREAST TOMOSYNTHESIS BI: CPT

## 2019-08-08 PROCEDURE — 76705 ECHO EXAM OF ABDOMEN: CPT

## 2019-08-08 NOTE — TELEPHONE ENCOUNTER
Pt in today for bp check   175 75   Pt did forget to take her amlodipine   Spoke with dr Cevallos he said to take med and rest for 10 minutes and recheck   Pt took 5 mg of amlodipine at 2.11pm waited 15 minutes   Bhumika took 184 90  Per dr cevallos wait another 15 minutes and recheck  Rechecked at 2.50 184 88  Per dr cevallos take meds in the am wait a couple hours check bp and call into [facility]  Pt informed and understands  She is sware if elevated she will need to come in

## 2019-08-09 ENCOUNTER — TELEPHONE (OUTPATIENT)
Dept: INTERNAL MEDICINE | Facility: CLINIC | Age: 79
End: 2019-08-09

## 2019-08-09 NOTE — TELEPHONE ENCOUNTER
No ziplining  She should continue current meds  She may take additional 1/2 tab amlodipine if bp remains >160/100

## 2019-08-09 NOTE — TELEPHONE ENCOUNTER
"Pt called to give her bp reading for today   168/70  She wants to know if she can go \"zip lining\" on Sunday     "

## 2019-08-09 NOTE — TELEPHONE ENCOUNTER
[facility] called to say current dx will not cover and you need add HTN to the bilateral renal doppler order

## 2019-08-12 ENCOUNTER — TELEPHONE (OUTPATIENT)
Dept: INTERNAL MEDICINE | Facility: CLINIC | Age: 79
End: 2019-08-12

## 2019-08-12 DIAGNOSIS — I10 HYPERTENSION, UNSPECIFIED TYPE: Primary | ICD-10-CM

## 2019-08-12 NOTE — TELEPHONE ENCOUNTER
She may increase amlodipine to 1 1/2 tablet daily and continue daily diovan. Low salt. Increase water.

## 2019-08-13 ENCOUNTER — HOSPITAL ENCOUNTER (OUTPATIENT)
Dept: CARDIOLOGY | Facility: HOSPITAL | Age: 79
Discharge: HOME | End: 2019-08-13

## 2019-08-13 ENCOUNTER — HOSPITAL ENCOUNTER (OUTPATIENT)
Dept: CARDIOLOGY | Facility: HOSPITAL | Age: 79
Discharge: HOME | End: 2019-08-13
Admitting: INTERNAL MEDICINE

## 2019-08-13 DIAGNOSIS — I16.0 HYPERTENSIVE URGENCY: ICD-10-CM

## 2019-08-13 DIAGNOSIS — I65.23 BILATERAL CAROTID ARTERY STENOSIS: ICD-10-CM

## 2019-08-13 LAB
BH CV ECHO MEAS - DIST REN A EDV LEFT: 27.4 CM/SEC
BH CV ECHO MEAS - DIST REN A PSV LEFT: 96.8 CM/SEC
BH CV ECHO MEAS - DIST REN A RI LEFT: 0.72
BH CV ECHO MEAS - MID REN A EDV LEFT: 30.2 CM/SEC
BH CV ECHO MEAS - MID REN A PSV LEFT: 108 CM/SEC
BH CV ECHO MEAS - MID REN A RI LEFT: 0.72
BH CV ECHO MEAS - PROX REN A EDV LEFT: 23.6 CM/SEC
BH CV ECHO MEAS - PROX REN A PSV LEFT: 114 CM/SEC
BH CV ECHO MEAS - PROX REN A RI LEFT: 0.79
BH CV VAS BP LEFT ARM: NORMAL MMHG
BH CV VAS BP RIGHT ARM: NORMAL MMHG
BH CV VAS RENAL AORTIC MID PSV: 72.4 CM/S
BH CV VAS RENAL HILUM LEFT EDV: 17.3 CM/S
BH CV VAS RENAL HILUM LEFT PSV: 82 CM/S
BH CV VAS RENAL HILUM RIGHT EDV: 14 CM/S
BH CV VAS RENAL HILUM RIGHT PSV: 55.1 CM/S
BH CV XLRA MEAS - KID L LEFT: 9.9 CM
BH CV XLRA MEAS - RENAL A ORG RI LEFT: 0.76
BH CV XLRA MEAS DIST REN A EDV RIGHT: 27.2 CM/SEC
BH CV XLRA MEAS DIST REN A PSV RIGHT: 139 CM/SEC
BH CV XLRA MEAS DIST REN A RI RIGHT: 0.8
BH CV XLRA MEAS KID L RIGHT: 10.3 CM
BH CV XLRA MEAS KID W RIGHT: 4.1 CM
BH CV XLRA MEAS LEFT BULB EDV: -7.6 CM/SEC
BH CV XLRA MEAS LEFT BULB PSV: -78.7 CM/SEC
BH CV XLRA MEAS LEFT CCA RATIO VEL: 80.6 CM/SEC
BH CV XLRA MEAS LEFT DIST CCA EDV: 21.8 CM/SEC
BH CV XLRA MEAS LEFT DIST CCA PSV: 80.6 CM/SEC
BH CV XLRA MEAS LEFT DIST ICA EDV: -27.4 CM/SEC
BH CV XLRA MEAS LEFT DIST ICA PSV: -85.5 CM/SEC
BH CV XLRA MEAS LEFT ICA RATIO VEL: -86.3 CM/SEC
BH CV XLRA MEAS LEFT ICA/CCA RATIO: -1.1
BH CV XLRA MEAS LEFT MID ICA EDV: -27.5 CM/SEC
BH CV XLRA MEAS LEFT MID ICA PSV: -86.3 CM/SEC
BH CV XLRA MEAS LEFT PROX CCA EDV: 20.9 CM/SEC
BH CV XLRA MEAS LEFT PROX CCA PSV: 77.8 CM/SEC
BH CV XLRA MEAS LEFT PROX ECA EDV: -11.4 CM/SEC
BH CV XLRA MEAS LEFT PROX ECA PSV: -68.3 CM/SEC
BH CV XLRA MEAS LEFT PROX ICA EDV: -9.2 CM/SEC
BH CV XLRA MEAS LEFT PROX ICA PSV: -59.4 CM/SEC
BH CV XLRA MEAS LEFT PROX SCLA PSV: 199 CM/SEC
BH CV XLRA MEAS LEFT VERTEBRAL A EDV: 18 CM/SEC
BH CV XLRA MEAS LEFT VERTEBRAL A PSV: 92 CM/SEC
BH CV XLRA MEAS MID REN A EDV RIGHT: 29.9 CM/SEC
BH CV XLRA MEAS MID REN A PSV RIGHT: 152 CM/SEC
BH CV XLRA MEAS MID REN A RI RIGHT: 0.8
BH CV XLRA MEAS PROX REN A EDV RIGHT: 30 CM/SEC
BH CV XLRA MEAS PROX REN A PSV RIGHT: 163 CM/SEC
BH CV XLRA MEAS PROX REN A RI RIGHT: 0.86
BH CV XLRA MEAS RAR LEFT: 1.8
BH CV XLRA MEAS RAR RIGHT: 2.3
BH CV XLRA MEAS RENAL A ORG EDV LEFT: -31 CM/SEC
BH CV XLRA MEAS RENAL A ORG EDV RIGHT: 23.3 CM/SEC
BH CV XLRA MEAS RENAL A ORG PSV LEFT: -129 CM/SEC
BH CV XLRA MEAS RENAL A ORG PSV RIGHT: 128 CM/SEC
BH CV XLRA MEAS RENAL A ORG RI RIGHT: 0.82
BH CV XLRA MEAS RIGHT CCA RATIO VEL: 86.3 CM/SEC
BH CV XLRA MEAS RIGHT DIST CCA EDV: 18.4 CM/SEC
BH CV XLRA MEAS RIGHT DIST CCA PSV: 86.3 CM/SEC
BH CV XLRA MEAS RIGHT DIST ICA EDV: -24.8 CM/SEC
BH CV XLRA MEAS RIGHT DIST ICA PSV: -88.5 CM/SEC
BH CV XLRA MEAS RIGHT ICA RATIO VEL: 143 CM/SEC
BH CV XLRA MEAS RIGHT ICA/CCA RATIO: 1.7
BH CV XLRA MEAS RIGHT MID ICA EDV: 33.2 CM/SEC
BH CV XLRA MEAS RIGHT MID ICA PSV: 143 CM/SEC
BH CV XLRA MEAS RIGHT PROX CCA EDV: 15.6 CM/SEC
BH CV XLRA MEAS RIGHT PROX CCA PSV: 72.2 CM/SEC
BH CV XLRA MEAS RIGHT PROX ECA EDV: -9.2 CM/SEC
BH CV XLRA MEAS RIGHT PROX ECA PSV: -77.1 CM/SEC
BH CV XLRA MEAS RIGHT PROX ICA EDV: -21.2 CM/SEC
BH CV XLRA MEAS RIGHT PROX ICA PSV: -105 CM/SEC
BH CV XLRA MEAS RIGHT PROX SCLA PSV: 132 CM/SEC
BH CV XLRA MEAS RIGHT VERTEBRAL A EDV: 9.5 CM/SEC
BH CV XLRA MEAS RIGHT VERTEBRAL A PSV: 62.6 CM/SEC
LEFT ARM BP: NORMAL MMHG
LEFT KIDNEY WIDTH: 5 CM
LEFT RENAL UPPER PARENCHYMA MAX: 62 CM/S
LEFT RENAL UPPER PARENCHYMA MIN: 17 CM/S
LEFT RENAL UPPER PARENCHYMA RI: 0.73
RIGHT ARM BP: NORMAL MMHG
RIGHT RENAL UPPER PARENCHYMA MAX: 39 CM/S
RIGHT RENAL UPPER PARENCHYMA MIN: 8.3 CM/S
RIGHT RENAL UPPER PARENCHYMA RI: 0.79

## 2019-08-13 PROCEDURE — 93975 VASCULAR STUDY: CPT

## 2019-08-13 PROCEDURE — 93880 EXTRACRANIAL BILAT STUDY: CPT

## 2019-08-15 ENCOUNTER — TELEPHONE (OUTPATIENT)
Dept: INTERNAL MEDICINE | Facility: CLINIC | Age: 79
End: 2019-08-15

## 2019-08-15 NOTE — TELEPHONE ENCOUNTER
Her testing is normal. Small amount of plaque in her carotid arteries otherwise negative. When she was in for office visit her area of the abdomen was not tender. This appears to be nonharmful soft tissue. Is she now having pain?

## 2019-08-19 ENCOUNTER — OFFICE VISIT (OUTPATIENT)
Dept: ORTHOPEDIC SURGERY | Facility: CLINIC | Age: 79
End: 2019-08-19

## 2019-08-19 ENCOUNTER — OFFICE VISIT (OUTPATIENT)
Dept: INTERNAL MEDICINE | Facility: CLINIC | Age: 79
End: 2019-08-19

## 2019-08-19 VITALS — BODY MASS INDEX: 22.36 KG/M2 | WEIGHT: 131 LBS | TEMPERATURE: 98.1 F | HEIGHT: 64 IN

## 2019-08-19 VITALS
OXYGEN SATURATION: 91 % | HEART RATE: 84 BPM | DIASTOLIC BLOOD PRESSURE: 86 MMHG | HEIGHT: 64 IN | WEIGHT: 131 LBS | SYSTOLIC BLOOD PRESSURE: 136 MMHG | TEMPERATURE: 97.7 F | BODY MASS INDEX: 22.36 KG/M2

## 2019-08-19 DIAGNOSIS — M17.11 ARTHRITIS OF RIGHT KNEE: Primary | ICD-10-CM

## 2019-08-19 DIAGNOSIS — J44.9 CHRONIC OBSTRUCTIVE PULMONARY DISEASE, UNSPECIFIED COPD TYPE (HCC): ICD-10-CM

## 2019-08-19 DIAGNOSIS — J40 BRONCHITIS: Primary | ICD-10-CM

## 2019-08-19 DIAGNOSIS — R06.02 SHORTNESS OF BREATH: ICD-10-CM

## 2019-08-19 PROCEDURE — 99214 OFFICE O/P EST MOD 30 MIN: CPT | Performed by: INTERNAL MEDICINE

## 2019-08-19 PROCEDURE — 99212 OFFICE O/P EST SF 10 MIN: CPT | Performed by: ORTHOPAEDIC SURGERY

## 2019-08-19 PROCEDURE — 71046 X-RAY EXAM CHEST 2 VIEWS: CPT | Performed by: INTERNAL MEDICINE

## 2019-08-19 RX ORDER — DOXYCYCLINE HYCLATE 100 MG/1
100 CAPSULE ORAL 2 TIMES DAILY
Qty: 14 CAPSULE | Refills: 0 | Status: SHIPPED | OUTPATIENT
Start: 2019-08-19 | End: 2019-09-23

## 2019-08-19 RX ORDER — PREDNISONE 10 MG/1
10 TABLET ORAL DAILY
Qty: 20 TABLET | Refills: 0 | Status: SHIPPED | OUTPATIENT
Start: 2019-08-19 | End: 2019-09-16

## 2019-08-19 NOTE — PROGRESS NOTES
Patient Name: Janet Martinez   YOB: 1940  Referring Primary Care Physician: Agata Burgess MD  BMI: Body mass index is 22.49 kg/m².    Chief Complaint:    Chief Complaint   Patient presents with   • Right Knee - Follow-up, Pain        HPI:     Janet Martinez is a 79 y.o. female who presents today for evaluation of   Chief Complaint   Patient presents with   • Right Knee - Follow-up, Pain   .  She follows up today and was having severe right knee pain last time.  She has a history of rheumatoid.  We injected her knee told her there is some therapy and she is doing much better today has been helped by it.  She complains of crepitation in her knee but is not really hurting.  Wanted asked some questions about activities such as golf and to check in.  Initially we thought about injecting her Visco supplementation or surgery had she not responded to the treatment    This problem is not new to this examiner.     Subjective   Medications:   Home Medications:  Current Outpatient Medications on File Prior to Visit   Medication Sig   • albuterol (PROVENTIL) (2.5 MG/3ML) 0.083% nebulizer solution Take 2.5 mg by nebulization Every 4 (Four) Hours As Needed for Wheezing.   • amLODIPine (NORVASC) 5 MG tablet Take 1 tablet by mouth Daily.   • atorvastatin (LIPITOR) 20 MG tablet Take 1 tablet by mouth Daily.   • budesonide (PULMICORT) 0.5 MG/2ML nebulizer solution USE 1 VIAL VIA NEBULIZER TWICE DAILY RINSE MOUTH AFTER USE   • Calcium Carbonate-Vitamin D (CALCIUM 600+D PO) Take 1 tablet by mouth Daily.   • DULoxetine (CYMBALTA) 60 MG capsule Take 1 capsule by mouth 2 (Two) Times a Day.   • etanercept (ENBREL) 50 MG/ML solution prefilled syringe injection Inject 50 mg under the skin 1 (One) Time Per Week. THURSDAYS. TO HOLD 4 WEEKS   • fluticasone (FLONASE) 50 MCG/ACT nasal spray 2 sprays into the nostril(s) as directed by provider Daily.   • folic acid (FOLVITE) 1 MG tablet Take 1 tablet by mouth Daily.   • folic  acid (FOLVITE) 1 MG tablet TAKE 1 TABLET BY MOUTH DAILY.   • ipratropium (ATROVENT) 0.06 % nasal spray 2 sprays into each nostril 4 (Four) Times a Day As Needed for Rhinitis.   • ipratropium-albuterol (DUO-NEB) 0.5-2.5 mg/3 ml nebulizer Take 3 mL by nebulization Every 4 (Four) Hours As Needed for Wheezing.   • montelukast (SINGULAIR) 10 MG tablet Take 1 tablet by mouth Daily.   • omeprazole (priLOSEC) 40 MG capsule Take 1 capsule by mouth Every Evening.   • PERFOROMIST 20 MCG/2ML nebulizer solution USE 1 VIAL VIA NEBULIZER TWICE DAILY   • umeclidinium-vilanterol (ANORO ELLIPTA) 62.5-25 MCG/INH aerosol powder  inhaler Inhale 1 puff Daily.   • valsartan (DIOVAN) 320 MG tablet Take 1 tablet by mouth Daily.     Current Facility-Administered Medications on File Prior to Visit   Medication   • levalbuterol (XOPENEX) nebulizer solution 0.63 mg     Current Medications:  Scheduled Meds:   Continuous Infusions:   PRN Meds:.•  levalbuterol    I have reviewed the patient's medical history in detail and updated the computerized patient record.  Review and summarization of old records includes:    Past Medical History:   Diagnosis Date   • Abdominal hernia    • COPD (chronic obstructive pulmonary disease) (CMS/[facility])    • Depression    • Food allergy     Scallops: causes hives   • GERD (gastroesophageal reflux disease)    • Hiatal hernia    • History of bruising easily    • History of colon polyps 08/13/2013    PATH: Distal Transverse Colon, Fragments of Tubular Adenoma   • History of colon polyps 01/30/2013    PATH: Ascending Colon - Tubular Adenoma, Splenic Flexure Polyp @ 65 cm - Tubulovillous Adenoma, Descending Colon - Tubular Adenoma   • History of colon polyps 10/30/2017    PATH: Transverse Colon - Tubular Adenoma, Rectal Polyps - Fragments of Hyperplastic Polyp   • Hypercholesteremia    • Hypertension    • Joint pain     swelling   • On home oxygen therapy     O2 NC 2. LITERS/ NIGHT   • Osteoarthritis    • Osteoporosis    •  PONV (postoperative nausea and vomiting)    • Rheumatoid arthritis (CMS/HCC)     DIAGNOSIS AGE 23 - FOLLOWED BY RHEUMATOLOGY DR INFANTE, NO PROBLEMS WITH FLEX/ EXTENSION         Past Surgical History:   Procedure Laterality Date   • BREAST BIOPSY Left     Benign pathology   • BRONCHOSCOPY N/A 1/3/2018    Procedure: BRONCHOSCOPY with lavage in right middle lobe and lingula.;  Surgeon: Trenton Garcia MD;  Location: [facility] ENDOSCOPY;  Service:    • CAROTID ENDARTERECTOMY Left 2006   • CATARACT EXTRACTION WITH INTRAOCULAR LENS IMPLANT Bilateral    • COLONOSCOPY N/A 10/30/2017    Procedure: COLONOSCOPY TO CECUM, TO TERMINAL ILEUM WITH COLD BIOPSY POLYPECTOMY;  Surgeon: Jossie Stanley MD;  Location: [facility] ENDOSCOPY;  Service:    • COLONOSCOPY N/A 08/13/2013    One 3 mm polyp in the transverse colon, Diverticulosis in the sigmoid colon, [facility], Dr. Jossie Stanley   • COLONOSCOPY N/A 01/30/2013    One 3 mm polyp ascending colon; One 7 mm polyp descending colon; One 25 mm polyp splenic flexure, Diverticulosis Sigmoid Colon, [facility], Dr. Jossie Stanley   • FOOT SURGERY Bilateral     Bilateral (MULITPLE SURGERIES)   • HAND SURGERY Bilateral     Bilateral (MULTIPLE SURGERIES)   • KNEE ARTHROSCOPY W/ PARTIAL MEDIAL MENISCECTOMY Left 01/22/2016    Left Knee Arthroscopy w/ partial medial & lateral meniscectomies, Three compartment synovectomy w/ loose body removal, Chondroplasty of patella, Dr. 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 Atkins   • TOTAL KNEE ARTHROPLASTY Left 5/18/2016    Procedure: LT TOTAL KNEE ARTHROPLASTY WITH FELECIA NAVIGATION;  Surgeon: Florencio Frazier MD;  Location: [facility] MAIN OR;  Service:    • VENTRAL HERNIA REPAIR N/A 5/22/2018    Procedure: VENTRAL HERNIA REPAIR LAPAROSCOPIC WITH DAVINCI ROBOT;  Surgeon: Josr Jennings MD;  Location: [facility] MAIN OR;  Service: DaVinci        Social History  "    Occupational History   • Not on file   Tobacco Use   • Smoking status: Former Smoker     Packs/day: 1.50     Years: 30.00     Pack years: 45.00     Types: Cigarettes     Last attempt to quit:      Years since quittin.6   • Smokeless tobacco: Never Used   Substance and Sexual Activity   • Alcohol use: No     Comment: No alcohol for 30 years   • Drug use: No   • Sexual activity: Not on file      Social History     Social History Narrative   • Not on file        Family History   Problem Relation Age of Onset   • Heart disease Mother    • Hypertension Mother    • Lung disease Mother    • Diabetes Sister    • Hypertension Brother    • Diabetes Sister    • Malig Hyperthermia Neg Hx        ROS: 14 point review of systems was performed and all other systems were reviewed and are negative except for documented findings in HPI and today's encounter.     Allergies:   Allergies   Allergen Reactions   • Gold-Containing Drug Products Unknown (See Comments)     Patient cannot recall reaction   • Hydrocodone Irritability     HYPERACTIVITY     Constitutional:  Denies fever, shaking or chills   Eyes:  Denies change in visual acuity   HENT:  Denies nasal congestion or sore throat   Respiratory:  Denies cough or shortness of breath   Cardiovascular:  Denies chest pain or severe LE edema   GI:  Denies abdominal pain, nausea, vomiting, bloody stools or diarrhea   Musculoskeletal:  Numbness, tingling, pain, or loss of motor function only as noted above in history of present illness.  : Denies painful urination or hematuria  Integument:  Denies rash, lesion or ulceration   Neurologic:  Denies headache or focal weakness  Endocrine:  Denies lymphadenopathy  Psych:  Denies confusion or change in mental status   Hem:  Denies active bleeding    OBJECTIVE:  Physical Exam:   Temp 98.1 °F (36.7 °C) (Temporal)   Ht 162.6 cm (64\")   Wt 59.4 kg (131 lb)   BMI 22.49 kg/m²     General Appearance:    Alert, cooperative, in no acute " distress                  Eyes: conjunctiva clear  ENT: external ears and nose atraumatic  CV: no peripheral edema  Resp: normal respiratory effort  Skin: no rashes or wounds; normal turgor  Psych: mood and affect appropriate  Lymph: no nodes appreciated  Neuro: gross sensation intact  Vascular:  Palpable peripheral pulse in noted extremity  Musculoskeletal Extremities: She has deformity in her knee consistent with rheumatoid as well as in her hands and metacarpophalangeal joints.  She has crepitation that an arc of motion is no optically tender to joint palpation and she has a large effusion.  She can however ambulate well without a significant limp    Radiology:   Previous AP lateral 40 degree PA x-rays July 2019 reviewed show end-stage arthritis without comparison view readily available    Assessment:     ICD-10-CM ICD-9-CM   1. Arthritis of right knee M17.11 716.96        Procedures       Plan: Biomechanics of pertinent body area discussed.  Risks, benefits, alternatives, comparisons, and complications of accepted medicines, injections, recommendations, surgical procedures, and therapies explained and education provided in laymen's terms. Natural history and expected course of this patient's diagnosis discussed along with evaluation of therapies. Questions answered. When appropriate I also discussed proper use of cane, walker, trekking poles.   EXERCISES:  Advice on benefits of, and types of regular/moderate exercise including biomechanical forces involved as it pertains to this complaint.  RICE: Rest, ice, compression, and elevation therapy, Cryotherapy/brachy therapy, and or OTC linaments as indicated with instructions.    Could do another cortisone shot about 6 weeks if she needed we will see her then with x-rays and she could delay if not needed.      8/19/2019    Much of this encounter note is an electronic transcription/translation of spoken language to printed text. The electronic translation of spoken  language may permit erroneous, or at times, nonsensical words or phrases to be inadvertently transcribed; Although I have reviewed the note for such errors, some may still exist

## 2019-08-19 NOTE — PROGRESS NOTES
Chief Complaint   Patient presents with   • Cough     productive cough; yellow mucus; x 3 days    • Headache   • Fatigue   • Shortness of Breath       History of Present Illness   Janet Martinez is a 79 y.o. female presents for acute care. She reports that she started having sinus drainage and feeling poorly about 48 hours ago. This then developed into a cough and fatigue. She had a nebulizer treatment w/ some benefit at home. She is short of breath. Walking o2 sat in office of 91%. Spouse reports that he has noticed that she is more short of air in general when walking recently.   She is using performist and budesonide twice daily nebulized. She is using supplemental oxygen at night.         The following portions of the patient's history were reviewed and updated as appropriate: allergies, current medications, past family history, past medical history, past social history, past surgical history and problem list.  Current Outpatient Medications on File Prior to Visit   Medication Sig Dispense Refill   • albuterol (PROVENTIL) (2.5 MG/3ML) 0.083% nebulizer solution Take 2.5 mg by nebulization Every 4 (Four) Hours As Needed for Wheezing.     • amLODIPine (NORVASC) 5 MG tablet Take 1 tablet by mouth Daily. 30 tablet 5   • atorvastatin (LIPITOR) 20 MG tablet Take 1 tablet by mouth Daily. 90 tablet 2   • budesonide (PULMICORT) 0.5 MG/2ML nebulizer solution USE 1 VIAL VIA NEBULIZER TWICE DAILY RINSE MOUTH AFTER USE  12   • Calcium Carbonate-Vitamin D (CALCIUM 600+D PO) Take 1 tablet by mouth Daily.     • DULoxetine (CYMBALTA) 60 MG capsule Take 1 capsule by mouth 2 (Two) Times a Day. 60 capsule 3   • etanercept (ENBREL) 50 MG/ML solution prefilled syringe injection Inject 50 mg under the skin 1 (One) Time Per Week. THURSDAYS. TO HOLD 4 WEEKS     • fluticasone (FLONASE) 50 MCG/ACT nasal spray 2 sprays into the nostril(s) as directed by provider Daily. 1 bottle 5   • folic acid (FOLVITE) 1 MG tablet TAKE 1 TABLET BY MOUTH  DAILY. 90 tablet 0   • ipratropium (ATROVENT) 0.06 % nasal spray 2 sprays into each nostril 4 (Four) Times a Day As Needed for Rhinitis.     • ipratropium-albuterol (DUO-NEB) 0.5-2.5 mg/3 ml nebulizer Take 3 mL by nebulization Every 4 (Four) Hours As Needed for Wheezing.     • montelukast (SINGULAIR) 10 MG tablet Take 1 tablet by mouth Daily. 90 tablet 3   • omeprazole (priLOSEC) 40 MG capsule Take 1 capsule by mouth Every Evening. 90 capsule 1   • PERFOROMIST 20 MCG/2ML nebulizer solution USE 1 VIAL VIA NEBULIZER TWICE DAILY  12   • umeclidinium-vilanterol (ANORO ELLIPTA) 62.5-25 MCG/INH aerosol powder  inhaler Inhale 1 puff Daily. 3 each 2   • valsartan (DIOVAN) 320 MG tablet Take 1 tablet by mouth Daily. 90 tablet 2   • [DISCONTINUED] folic acid (FOLVITE) 1 MG tablet Take 1 tablet by mouth Daily. 90 tablet 1     Current Facility-Administered Medications on File Prior to Visit   Medication Dose Route Frequency Provider Last Rate Last Dose   • levalbuterol (XOPENEX) nebulizer solution 0.63 mg  0.63 mg Nebulization Q6H PRN Agata Burgess MD   0.63 mg at 12/17/18 1437     Review of Systems   Constitutional: Positive for fatigue.   HENT: Negative.    Eyes: Negative.    Respiratory: Positive for cough.    Cardiovascular: Negative.    Gastrointestinal: Negative.    Endocrine: Negative.    Genitourinary: Negative.    Musculoskeletal: Negative.    Skin: Negative.    Allergic/Immunologic: Negative.    Neurological: Negative.    Hematological: Negative.    Psychiatric/Behavioral: Negative.        Objective   Physical Exam   Constitutional: She is oriented to person, place, and time. She appears well-developed and well-nourished.   Ill appearing. Persistent cough. Not acutely distressed.    HENT:   Head: Normocephalic and atraumatic.   Right Ear: External ear normal.   Left Ear: External ear normal.   Pharyngeal erythema   Eyes: EOM are normal. Pupils are equal, round, and reactive to light.   Neck: Normal range of motion.  "Neck supple.   Cardiovascular: Normal rate, regular rhythm and normal heart sounds.   Pulmonary/Chest: Effort normal.   Wheezing bilateral lower lung   Abdominal: Soft. Bowel sounds are normal.   Musculoskeletal: Normal range of motion.   Neurological: She is alert and oriented to person, place, and time.   Skin: Skin is warm and dry.   Psychiatric: She has a normal mood and affect. Her behavior is normal. Judgment and thought content normal.   Nursing note and vitals reviewed.     cxr w. Infiltrate ? Calcification LLL. Sent to radiology for over read.   /86 (BP Location: Left arm, Patient Position: Sitting, Cuff Size: Adult)   Pulse 84   Temp 97.7 °F (36.5 °C) (Oral)   Ht 162.6 cm (64\")   Wt 59.4 kg (131 lb)   SpO2 91%   BMI 22.49 kg/m²     Assessment/Plan   Diagnoses and all orders for this visit:    Bronchitis  -     XR Chest PA & Lateral (In Office)    Chronic obstructive pulmonary disease, unspecified COPD type (CMS/[facility])  -     Ambulatory Referral to Pulmonary Rehab    Shortness of breath  -     Ambulatory Referral to Pulmonary Rehab    Other orders  -     doxycycline (VIBRAMYCIN) 100 MG capsule; Take 1 capsule by mouth 2 (Two) Times a Day.  -     predniSONE (DELTASONE) 10 MG tablet; Take 1 tablet by mouth Daily. 4 x 2 d 3 x 2 d 2 x 2 d 1 x 2d    patient w/ acute bronchitis/ possibly CAP. She will use supplemental oxygen if needed. She will start doxycycline bid for 7 days. She will take prednisone as directed. She will f/u in 1 week or prn.              "

## 2019-08-20 DIAGNOSIS — R91.1 PULMONARY NODULE: Primary | ICD-10-CM

## 2019-08-22 ENCOUNTER — HOSPITAL ENCOUNTER (OUTPATIENT)
Dept: CT IMAGING | Facility: HOSPITAL | Age: 79
Discharge: HOME | End: 2019-08-22
Admitting: INTERNAL MEDICINE

## 2019-08-22 DIAGNOSIS — R91.1 PULMONARY NODULE: ICD-10-CM

## 2019-08-22 PROCEDURE — 82565 ASSAY OF CREATININE: CPT

## 2019-08-22 PROCEDURE — 25010000002 IOPAMIDOL 61 % SOLUTION: Performed by: INTERNAL MEDICINE

## 2019-08-22 PROCEDURE — 71260 CT THORAX DX C+: CPT

## 2019-08-22 RX ADMIN — IOPAMIDOL 75 ML: 612 INJECTION, SOLUTION INTRAVENOUS at 15:03

## 2019-08-23 LAB — CREAT BLDA-MCNC: 0.4 MG/DL (ref 0.6–1.3)

## 2019-08-26 ENCOUNTER — OFFICE VISIT (OUTPATIENT)
Dept: INTERNAL MEDICINE | Facility: CLINIC | Age: 79
End: 2019-08-26

## 2019-08-26 VITALS
TEMPERATURE: 98.3 F | HEIGHT: 64 IN | SYSTOLIC BLOOD PRESSURE: 132 MMHG | WEIGHT: 128 LBS | RESPIRATION RATE: 15 BRPM | OXYGEN SATURATION: 98 % | BODY MASS INDEX: 21.85 KG/M2 | DIASTOLIC BLOOD PRESSURE: 82 MMHG | HEART RATE: 66 BPM

## 2019-08-26 DIAGNOSIS — R91.1 PULMONARY NODULE: Primary | ICD-10-CM

## 2019-08-26 DIAGNOSIS — R59.1 LYMPHADENOPATHY: ICD-10-CM

## 2019-08-26 DIAGNOSIS — R93.89 ABNORMAL CT OF THE CHEST: ICD-10-CM

## 2019-08-26 DIAGNOSIS — J40 BRONCHITIS: ICD-10-CM

## 2019-08-26 PROBLEM — Z12.11 ENCOUNTER FOR COLONOSCOPY DUE TO HISTORY OF ADENOMATOUS COLONIC POLYPS: Status: RESOLVED | Noted: 2017-08-31 | Resolved: 2019-08-26

## 2019-08-26 PROBLEM — N95.1 MENOPAUSAL SYMPTOM: Status: RESOLVED | Noted: 2017-03-16 | Resolved: 2019-08-26

## 2019-08-26 PROBLEM — Z86.010 ENCOUNTER FOR COLONOSCOPY DUE TO HISTORY OF ADENOMATOUS COLONIC POLYPS: Status: RESOLVED | Noted: 2017-08-31 | Resolved: 2019-08-26

## 2019-08-26 PROBLEM — M79.671 RIGHT FOOT PAIN: Status: RESOLVED | Noted: 2017-03-31 | Resolved: 2019-08-26

## 2019-08-26 PROBLEM — R92.8 ABNORMAL MAMMOGRAM: Status: RESOLVED | Noted: 2017-03-16 | Resolved: 2019-08-26

## 2019-08-26 PROCEDURE — 99214 OFFICE O/P EST MOD 30 MIN: CPT | Performed by: INTERNAL MEDICINE

## 2019-08-26 NOTE — PROGRESS NOTES
Chief Complaint   Patient presents with   • Bronchitis     . also abnormal cxr       History of Present Illness   Janet Martinez is a 79 y.o. female presents for a follow up on bronchitis. She reports that she feels much improved today. Cough is greatly reduced. No fevers. cxr w/ pulm nodule. Ct scan was advised. enlg ln thought to be reactive v other. PET advised and if wnl she is to get a repeat ct chest in 6 months.     The following portions of the patient's history were reviewed and updated as appropriate: allergies, current medications, past family history, past medical history, past social history, past surgical history and problem list.  Current Outpatient Medications on File Prior to Visit   Medication Sig Dispense Refill   • albuterol (PROVENTIL) (2.5 MG/3ML) 0.083% nebulizer solution Take 2.5 mg by nebulization Every 4 (Four) Hours As Needed for Wheezing.     • amLODIPine (NORVASC) 5 MG tablet Take 1 tablet by mouth Daily. 30 tablet 5   • atorvastatin (LIPITOR) 20 MG tablet Take 1 tablet by mouth Daily. 90 tablet 2   • budesonide (PULMICORT) 0.5 MG/2ML nebulizer solution USE 1 VIAL VIA NEBULIZER TWICE DAILY RINSE MOUTH AFTER USE  12   • Calcium Carbonate-Vitamin D (CALCIUM 600+D PO) Take 1 tablet by mouth Daily.     • doxycycline (VIBRAMYCIN) 100 MG capsule Take 1 capsule by mouth 2 (Two) Times a Day. 14 capsule 0   • DULoxetine (CYMBALTA) 60 MG capsule Take 1 capsule by mouth 2 (Two) Times a Day. 60 capsule 3   • etanercept (ENBREL) 50 MG/ML solution prefilled syringe injection Inject 50 mg under the skin 1 (One) Time Per Week. THURSDAYS. TO HOLD 4 WEEKS     • fluticasone (FLONASE) 50 MCG/ACT nasal spray 2 sprays into the nostril(s) as directed by provider Daily. 1 bottle 5   • folic acid (FOLVITE) 1 MG tablet TAKE 1 TABLET BY MOUTH DAILY. 90 tablet 0   • ipratropium (ATROVENT) 0.06 % nasal spray 2 sprays into each nostril 4 (Four) Times a Day As Needed for Rhinitis.     • ipratropium-albuterol  (DUO-NEB) 0.5-2.5 mg/3 ml nebulizer Take 3 mL by nebulization Every 4 (Four) Hours As Needed for Wheezing.     • montelukast (SINGULAIR) 10 MG tablet Take 1 tablet by mouth Daily. 90 tablet 3   • omeprazole (priLOSEC) 40 MG capsule Take 1 capsule by mouth Every Evening. 90 capsule 1   • PERFOROMIST 20 MCG/2ML nebulizer solution USE 1 VIAL VIA NEBULIZER TWICE DAILY  12   • predniSONE (DELTASONE) 10 MG tablet Take 1 tablet by mouth Daily. 4 x 2 d 3 x 2 d 2 x 2 d 1 x 2d 20 tablet 0   • umeclidinium-vilanterol (ANORO ELLIPTA) 62.5-25 MCG/INH aerosol powder  inhaler Inhale 1 puff Daily. 3 each 2   • valsartan (DIOVAN) 320 MG tablet Take 1 tablet by mouth Daily. 90 tablet 2     Current Facility-Administered Medications on File Prior to Visit   Medication Dose Route Frequency Provider Last Rate Last Dose   • levalbuterol (XOPENEX) nebulizer solution 0.63 mg  0.63 mg Nebulization Q6H PRN Agata Burgess MD   0.63 mg at 12/17/18 1437     Review of Systems   Constitutional: Positive for fatigue.   HENT: Negative.    Eyes: Negative.    Respiratory: Positive for cough.         Improved   Cardiovascular: Negative.    Gastrointestinal: Negative.    Endocrine: Negative.    Genitourinary: Negative.    Musculoskeletal: Negative.    Skin: Negative.    Neurological: Negative.    Hematological: Negative.    Psychiatric/Behavioral: Negative.        Objective   Physical Exam   Constitutional: She is oriented to person, place, and time. She appears well-developed and well-nourished.   HENT:   Head: Normocephalic and atraumatic.   Right Ear: External ear normal.   Left Ear: External ear normal.   Nose: Nose normal.   Mouth/Throat: Oropharynx is clear and moist.   Eyes: EOM are normal. Pupils are equal, round, and reactive to light.   Neck: Neck supple.   Cardiovascular: Normal rate, regular rhythm and normal heart sounds.   Pulmonary/Chest: Effort normal and breath sounds normal. No respiratory distress.   Abdominal: Soft. Bowel sounds are  "normal.   Musculoskeletal: Normal range of motion.   Neurological: She is alert and oriented to person, place, and time.   Skin: Skin is warm and dry.   Psychiatric: She has a normal mood and affect. Her behavior is normal. Judgment and thought content normal.   Nursing note and vitals reviewed.       /82   Pulse 66   Temp 98.3 °F (36.8 °C) (Oral)   Resp 15   Ht 162.6 cm (64.02\")   Wt 58.1 kg (128 lb)   SpO2 98%   BMI 21.96 kg/m²     Assessment/Plan   Diagnoses and all orders for this visit:    Pulmonary nodule  -     NM PET Skull Base To Mid Thigh; Future    Lymphadenopathy  -     NM PET Skull Base To Mid Thigh; Future    Abnormal CT of the chest  -     NM PET Skull Base To Mid Thigh; Future    Bronchitis      Patient with recent bronchitis. She is doing well with improved breathing. She had a nodule noted on ct scan w/ enlarged lymph nodes. Will test a PET scan. She will have a repeat ct scan in 6 months for her pulm nodule. She will f/u here routinely.            "

## 2019-09-03 ENCOUNTER — HOSPITAL ENCOUNTER (OUTPATIENT)
Dept: PET IMAGING | Facility: HOSPITAL | Age: 79
Discharge: HOME | End: 2019-09-03

## 2019-09-03 ENCOUNTER — HOSPITAL ENCOUNTER (OUTPATIENT)
Dept: PET IMAGING | Facility: HOSPITAL | Age: 79
Discharge: HOME | End: 2019-09-03
Admitting: INTERNAL MEDICINE

## 2019-09-03 DIAGNOSIS — R59.1 LYMPHADENOPATHY: ICD-10-CM

## 2019-09-03 DIAGNOSIS — R93.89 ABNORMAL CT OF THE CHEST: ICD-10-CM

## 2019-09-03 DIAGNOSIS — R91.1 PULMONARY NODULE: ICD-10-CM

## 2019-09-03 LAB — GLUCOSE BLDC GLUCOMTR-MCNC: 139 MG/DL (ref 70–130)

## 2019-09-03 PROCEDURE — A9552 F18 FDG: HCPCS | Performed by: INTERNAL MEDICINE

## 2019-09-03 PROCEDURE — 0 FLUDEOXYGLUCOSE F18 SOLUTION: Performed by: INTERNAL MEDICINE

## 2019-09-03 PROCEDURE — 82962 GLUCOSE BLOOD TEST: CPT

## 2019-09-03 PROCEDURE — 78815 PET IMAGE W/CT SKULL-THIGH: CPT

## 2019-09-03 RX ADMIN — FLUDEOXYGLUCOSE F18 1 DOSE: 300 INJECTION INTRAVENOUS at 07:22

## 2019-09-05 NOTE — TELEPHONE ENCOUNTER
Samples given of Stiolto    ROS:  GENERAL: No fever, no chills  EYES: no change in vision  HEENT: no trouble swallowing, no trouble speaking  CARDIAC: no chest pain  PULMONARY: no cough, no shortness of breath  GI: no abdominal pain, no nausea, no vomiting, no diarrhea, no constipation  : No dysuria, no frequency, no change in appearance, or odor of urine  SKIN: no rashes  NEURO: no headache, no weakness  MSK: No joint pain

## 2019-09-10 ENCOUNTER — HOSPITAL ENCOUNTER (OUTPATIENT)
Dept: CT IMAGING | Facility: HOSPITAL | Age: 79
Discharge: HOME | End: 2019-09-10
Admitting: INTERNAL MEDICINE

## 2019-09-10 DIAGNOSIS — K86.89 PANCREATIC MASS: Primary | ICD-10-CM

## 2019-09-10 DIAGNOSIS — K86.89 PANCREATIC MASS: ICD-10-CM

## 2019-09-10 PROCEDURE — 25010000002 IOPAMIDOL 61 % SOLUTION: Performed by: INTERNAL MEDICINE

## 2019-09-10 PROCEDURE — 74177 CT ABD & PELVIS W/CONTRAST: CPT

## 2019-09-10 PROCEDURE — 82565 ASSAY OF CREATININE: CPT

## 2019-09-10 RX ADMIN — IOPAMIDOL 85 ML: 612 INJECTION, SOLUTION INTRAVENOUS at 15:27

## 2019-09-11 DIAGNOSIS — K86.89 PANCREATIC MASS: Primary | ICD-10-CM

## 2019-09-11 LAB — CREAT BLDA-MCNC: 0.5 MG/DL (ref 0.6–1.3)

## 2019-09-12 ENCOUNTER — TELEPHONE (OUTPATIENT)
Dept: GASTROENTEROLOGY | Facility: CLINIC | Age: 79
End: 2019-09-12

## 2019-09-12 DIAGNOSIS — K86.89 PANCREATIC MASS: Primary | ICD-10-CM

## 2019-09-12 NOTE — TELEPHONE ENCOUNTER
----- Message from Phani Cortes MD sent at 9/12/2019  7:30 AM EDT -----  Please schedule for EUS with me on 9/17    ----- Message -----  From: Agata Burgess MD  Sent: 9/11/2019   2:04 PM  To: Phani Cortes MD    This is the patient w/ the new pancreatic mass. Needs FNA by endoscopic u/s  Thanks!

## 2019-09-16 ENCOUNTER — OFFICE VISIT (OUTPATIENT)
Dept: SURGERY | Facility: CLINIC | Age: 79
End: 2019-09-16

## 2019-09-16 ENCOUNTER — LAB (OUTPATIENT)
Dept: LAB | Facility: HOSPITAL | Age: 79
End: 2019-09-16

## 2019-09-16 VITALS — BODY MASS INDEX: 21.68 KG/M2 | WEIGHT: 127 LBS | OXYGEN SATURATION: 98 % | HEART RATE: 73 BPM | HEIGHT: 64 IN

## 2019-09-16 DIAGNOSIS — D37.6 NEOPLASM OF UNCERTAIN BEHAVIOR OF LIVER, GALLBLADDER, AND BILE DUCTS: ICD-10-CM

## 2019-09-16 DIAGNOSIS — K86.89 PANCREATIC MASS: ICD-10-CM

## 2019-09-16 DIAGNOSIS — K86.89 PANCREATIC MASS: Primary | ICD-10-CM

## 2019-09-16 LAB
ALBUMIN SERPL-MCNC: 4.3 G/DL (ref 3.5–5.2)
ALBUMIN/GLOB SERPL: 1.3 G/DL
ALP SERPL-CCNC: 74 U/L (ref 39–117)
ALT SERPL W P-5'-P-CCNC: 17 U/L (ref 1–33)
ANION GAP SERPL CALCULATED.3IONS-SCNC: 11.5 MMOL/L (ref 5–15)
AST SERPL-CCNC: 22 U/L (ref 1–32)
BILIRUB SERPL-MCNC: 0.5 MG/DL (ref 0.2–1.2)
BUN BLD-MCNC: 5 MG/DL (ref 8–23)
BUN/CREAT SERPL: 9.6 (ref 7–25)
CALCIUM SPEC-SCNC: 8.9 MG/DL (ref 8.6–10.5)
CANCER AG19-9 SERPL-ACNC: 18.7 U/ML
CHLORIDE SERPL-SCNC: 104 MMOL/L (ref 98–107)
CO2 SERPL-SCNC: 28.5 MMOL/L (ref 22–29)
CREAT BLD-MCNC: 0.52 MG/DL (ref 0.57–1)
DEPRECATED RDW RBC AUTO: 44 FL (ref 37–54)
ERYTHROCYTE [DISTWIDTH] IN BLOOD BY AUTOMATED COUNT: 13.9 % (ref 12.3–15.4)
GFR SERPL CREATININE-BSD FRML MDRD: 114 ML/MIN/1.73
GLOBULIN UR ELPH-MCNC: 3.4 GM/DL
GLUCOSE BLD-MCNC: 104 MG/DL (ref 65–99)
HCT VFR BLD AUTO: 40.8 % (ref 34–46.6)
HGB BLD-MCNC: 12.8 G/DL (ref 12–15.9)
MCH RBC QN AUTO: 27.4 PG (ref 26.6–33)
MCHC RBC AUTO-ENTMCNC: 31.4 G/DL (ref 31.5–35.7)
MCV RBC AUTO: 87.4 FL (ref 79–97)
PLATELET # BLD AUTO: 266 10*3/MM3 (ref 140–450)
PMV BLD AUTO: 11 FL (ref 6–12)
POTASSIUM BLD-SCNC: 3.3 MMOL/L (ref 3.5–5.2)
PROT SERPL-MCNC: 7.7 G/DL (ref 6–8.5)
RBC # BLD AUTO: 4.67 10*6/MM3 (ref 3.77–5.28)
SODIUM BLD-SCNC: 144 MMOL/L (ref 136–145)
WBC NRBC COR # BLD: 6.78 10*3/MM3 (ref 3.4–10.8)

## 2019-09-16 PROCEDURE — 85027 COMPLETE CBC AUTOMATED: CPT

## 2019-09-16 PROCEDURE — 99204 OFFICE O/P NEW MOD 45 MIN: CPT | Performed by: SURGERY

## 2019-09-16 PROCEDURE — 86301 IMMUNOASSAY TUMOR CA 19-9: CPT

## 2019-09-16 PROCEDURE — 36415 COLL VENOUS BLD VENIPUNCTURE: CPT

## 2019-09-16 PROCEDURE — 80053 COMPREHEN METABOLIC PANEL: CPT

## 2019-09-16 RX ORDER — OMEPRAZOLE 40 MG/1
CAPSULE, DELAYED RELEASE ORAL
Qty: 90 CAPSULE | Refills: 1 | Status: SHIPPED | OUTPATIENT
Start: 2019-09-16 | End: 2019-09-23 | Stop reason: SDUPTHER

## 2019-09-17 ENCOUNTER — ANESTHESIA EVENT (OUTPATIENT)
Dept: GASTROENTEROLOGY | Facility: HOSPITAL | Age: 79
End: 2019-09-17

## 2019-09-17 ENCOUNTER — ANESTHESIA (OUTPATIENT)
Dept: GASTROENTEROLOGY | Facility: HOSPITAL | Age: 79
End: 2019-09-17

## 2019-09-17 ENCOUNTER — HOSPITAL ENCOUNTER (OUTPATIENT)
Facility: HOSPITAL | Age: 79
Setting detail: HOSPITAL OUTPATIENT SURGERY
Discharge: HOME | End: 2019-09-17
Attending: INTERNAL MEDICINE | Admitting: INTERNAL MEDICINE

## 2019-09-17 VITALS
HEART RATE: 61 BPM | RESPIRATION RATE: 16 BRPM | HEIGHT: 64 IN | SYSTOLIC BLOOD PRESSURE: 188 MMHG | BODY MASS INDEX: 21.68 KG/M2 | TEMPERATURE: 98.2 F | DIASTOLIC BLOOD PRESSURE: 76 MMHG | OXYGEN SATURATION: 100 % | WEIGHT: 127 LBS

## 2019-09-17 DIAGNOSIS — K86.89 PANCREATIC MASS: ICD-10-CM

## 2019-09-17 PROCEDURE — 88305 TISSUE EXAM BY PATHOLOGIST: CPT | Performed by: INTERNAL MEDICINE

## 2019-09-17 PROCEDURE — 43238 EGD US FINE NEEDLE BX/ASPIR: CPT | Performed by: INTERNAL MEDICINE

## 2019-09-17 PROCEDURE — 25010000002 FENTANYL CITRATE (PF) 100 MCG/2ML SOLUTION: Performed by: ANESTHESIOLOGY

## 2019-09-17 PROCEDURE — 25010000002 PROPOFOL 10 MG/ML EMULSION: Performed by: ANESTHESIOLOGY

## 2019-09-17 PROCEDURE — 88173 CYTOPATH EVAL FNA REPORT: CPT | Performed by: INTERNAL MEDICINE

## 2019-09-17 RX ORDER — PROPOFOL 10 MG/ML
VIAL (ML) INTRAVENOUS CONTINUOUS PRN
Status: DISCONTINUED | OUTPATIENT
Start: 2019-09-17 | End: 2019-09-17

## 2019-09-17 RX ORDER — PROPOFOL 10 MG/ML
VIAL (ML) INTRAVENOUS AS NEEDED
Status: DISCONTINUED | OUTPATIENT
Start: 2019-09-17 | End: 2019-09-17 | Stop reason: SURG

## 2019-09-17 RX ORDER — LIDOCAINE HYDROCHLORIDE 10 MG/ML
0.5 INJECTION, SOLUTION INFILTRATION; PERINEURAL ONCE AS NEEDED
Status: DISCONTINUED | OUTPATIENT
Start: 2019-09-17 | End: 2019-09-17 | Stop reason: HOSPADM

## 2019-09-17 RX ORDER — SODIUM CHLORIDE, SODIUM LACTATE, POTASSIUM CHLORIDE, CALCIUM CHLORIDE 600; 310; 30; 20 MG/100ML; MG/100ML; MG/100ML; MG/100ML
1000 INJECTION, SOLUTION INTRAVENOUS CONTINUOUS
Status: DISCONTINUED | OUTPATIENT
Start: 2019-09-17 | End: 2019-09-17 | Stop reason: HOSPADM

## 2019-09-17 RX ORDER — SODIUM CHLORIDE 0.9 % (FLUSH) 0.9 %
10 SYRINGE (ML) INJECTION AS NEEDED
Status: DISCONTINUED | OUTPATIENT
Start: 2019-09-17 | End: 2019-09-17 | Stop reason: HOSPADM

## 2019-09-17 RX ORDER — PROPOFOL 10 MG/ML
VIAL (ML) INTRAVENOUS CONTINUOUS PRN
Status: DISCONTINUED | OUTPATIENT
Start: 2019-09-17 | End: 2019-09-17 | Stop reason: SURG

## 2019-09-17 RX ORDER — FENTANYL CITRATE 50 UG/ML
INJECTION, SOLUTION INTRAMUSCULAR; INTRAVENOUS AS NEEDED
Status: DISCONTINUED | OUTPATIENT
Start: 2019-09-17 | End: 2019-09-17 | Stop reason: SURG

## 2019-09-17 RX ADMIN — FENTANYL CITRATE 40 MCG: 50 INJECTION INTRAMUSCULAR; INTRAVENOUS at 10:00

## 2019-09-17 RX ADMIN — SODIUM CHLORIDE, POTASSIUM CHLORIDE, SODIUM LACTATE AND CALCIUM CHLORIDE 1000 ML: 600; 310; 30; 20 INJECTION, SOLUTION INTRAVENOUS at 09:46

## 2019-09-17 RX ADMIN — PROPOFOL 100 MCG/KG/MIN: 10 INJECTION, EMULSION INTRAVENOUS at 10:00

## 2019-09-17 RX ADMIN — PROPOFOL 100 MG: 10 INJECTION, EMULSION INTRAVENOUS at 10:00

## 2019-09-17 NOTE — ANESTHESIA POSTPROCEDURE EVALUATION
"Patient: Janet Martinez    Procedure Summary     Date:  09/17/19 Room / Location:  [facility] / [facility]    Anesthesia Start:  0959 Anesthesia Stop:  1051    Procedure:  ENDOSCOPIC ULTRASOUND (UPPER) FINE NEEDLE BIOPSY (N/A ) Diagnosis:       Pancreatic mass      (Pancreatic mass [K86.9])    Surgeon:  Phani Cortes MD Provider:  Yeimy Howe MD    Anesthesia Type:  MAC ASA Status:  3          Anesthesia Type: MAC  Last vitals  BP   (!) 188/76 (09/17/19 1116)   Temp   36.8 °C (98.2 °F) (09/17/19 0942)   Pulse   61 (09/17/19 1116)   Resp   16 (09/17/19 1116)     SpO2   100 % (09/17/19 1116)     Post Anesthesia Care and Evaluation    Patient location during evaluation: bedside  Patient participation: complete - patient participated  Level of consciousness: awake and alert  Pain management: adequate  Airway patency: patent  Anesthetic complications: No anesthetic complications    Cardiovascular status: acceptable  Respiratory status: acceptable  Hydration status: acceptable    Comments: BP (!) 188/76 (BP Location: Left arm, Patient Position: Lying)   Pulse 61   Temp 36.8 °C (98.2 °F) (Oral)   Resp 16   Ht 162.6 cm (64\")   Wt 57.6 kg (127 lb)   SpO2 100%   BMI 21.80 kg/m²         "

## 2019-09-17 NOTE — ANESTHESIA PREPROCEDURE EVALUATION
Anesthesia Evaluation     history of anesthetic complications: PONV               Airway   Mallampati: II  TM distance: >3 FB  Neck ROM: full  Dental    (+) upper dentures    Pulmonary    (+) a smoker Former, COPD (night time O2) severe,   (-) shortness of breath, recent URI, sleep apnea  Cardiovascular     (+) hypertension, hyperlipidemia,   (-) dysrhythmias, angina, CHF      Neuro/Psych  GI/Hepatic/Renal/Endo    (+)  hiatal hernia, GERD,    (-) liver disease, no renal disease, diabetes    ROS Comment: Pancreatic Mass    Musculoskeletal     Abdominal    Substance History      OB/GYN          Other   (+) arthritis (RA)                   Anesthesia Plan    ASA 3     MAC     intravenous induction   Anesthetic plan, all risks, benefits, and alternatives have been provided, discussed and informed consent has been obtained with: patient.

## 2019-09-17 NOTE — H&P
[facility]  Pre Procedure History & Physical    Chief Complaint:   Pancreatic mass    Subjective     HPI:   79-year-old lady here for endoscopic ultrasound for evaluation of pancreatic mass.  This was found initially on PET scan done for follow-up of a pulmonary nodule.  She had a follow-up CT scan pancreatic protocol which confirms a 2.7 cm mass in the head of the pancreas causing upstream pancreatic ductal dilation.    Past Medical History:   Past Medical History:   Diagnosis Date   • Abdominal hernia    • COPD (chronic obstructive pulmonary disease) (CMS/HCC)    • COPD (chronic obstructive pulmonary disease) (CMS/HCC)    • Depression    • Food allergy     Scallops: causes hives   • GERD (gastroesophageal reflux disease)    • Hiatal hernia    • History of bruising easily    • History of colon polyps 08/13/2013    PATH: Distal Transverse Colon, Fragments of Tubular Adenoma   • History of colon polyps 01/30/2013    PATH: Ascending Colon - Tubular Adenoma, Splenic Flexure Polyp @ 65 cm - Tubulovillous Adenoma, Descending Colon - Tubular Adenoma   • History of colon polyps 10/30/2017    PATH: Transverse Colon - Tubular Adenoma, Rectal Polyps - Fragments of Hyperplastic Polyp   • Hypercholesteremia    • Hypertension    • Joint pain     swelling   • On home oxygen therapy     O2 NC 2. LITERS/ NIGHT   • Osteoarthritis    • Osteoporosis    • PONV (postoperative nausea and vomiting)    • Rheumatoid arthritis (CMS/HCC)     DIAGNOSIS AGE 23 - FOLLOWED BY RHEUMATOLOGY DR INFANTE, NO PROBLEMS WITH FLEX/ EXTENSION        Past Surgical History:  Past Surgical History:   Procedure Laterality Date   • BREAST BIOPSY Left     Benign pathology   • BRONCHOSCOPY N/A 1/3/2018    Procedure: BRONCHOSCOPY with lavage in right middle lobe and lingula.;  Surgeon: Trenton Garcia MD;  Location: [facility] ENDOSCOPY;  Service:    • CAROTID ENDARTERECTOMY Left 2006   • CATARACT EXTRACTION WITH INTRAOCULAR LENS IMPLANT  Bilateral    • COLONOSCOPY N/A 10/30/2017    Procedure: COLONOSCOPY TO CECUM, TO TERMINAL ILEUM WITH COLD BIOPSY POLYPECTOMY;  Surgeon: Jossie Stanley MD;  Location: [facility] ENDOSCOPY;  Service:    • COLONOSCOPY N/A 08/13/2013    One 3 mm polyp in the transverse colon, Diverticulosis in the sigmoid colon, [facility], Dr. Jossie Stanley   • COLONOSCOPY N/A 01/30/2013    One 3 mm polyp ascending colon; One 7 mm polyp descending colon; One 25 mm polyp splenic flexure, Diverticulosis Sigmoid Colon, [facility], Dr. Jossie Stanley   • FOOT SURGERY Bilateral     Bilateral (MULITPLE SURGERIES)   • HAND SURGERY Bilateral     Bilateral (MULTIPLE SURGERIES)   • KNEE ARTHROSCOPY W/ PARTIAL MEDIAL MENISCECTOMY Left 01/22/2016    Left Knee Arthroscopy w/ partial medial & lateral meniscectomies, Three compartment synovectomy w/ loose body removal, Chondroplasty of patella, Dr. 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 Atkins   • TOTAL KNEE ARTHROPLASTY Left 5/18/2016    Procedure: LT TOTAL KNEE ARTHROPLASTY WITH FELECIA NAVIGATION;  Surgeon: Florencio Frazier MD;  Location: [facility] OR;  Service:    • VENTRAL HERNIA REPAIR N/A 5/22/2018    Procedure: VENTRAL HERNIA REPAIR LAPAROSCOPIC WITH DAVINCI ROBOT;  Surgeon: Josr Jennings MD;  Location: [facility] OR;  Service: DaVinci       Family History:  Family History   Problem Relation Age of Onset   • Heart disease Mother    • Hypertension Mother    • Lung disease Mother    • Diabetes Sister    • Hypertension Brother    • Diabetes Sister    • Malig Hyperthermia Neg Hx        Social History:   reports that she quit smoking about 26 years ago. Her smoking use included cigarettes. She has a 45.00 pack-year smoking history. She has never used smokeless tobacco. She reports that she does not drink alcohol or use drugs.    Medications:   Facility-Administered Medications Prior to Admission    Medication Dose Route Frequency Provider Last Rate Last Dose   • levalbuterol (XOPENEX) nebulizer solution 0.63 mg  0.63 mg Nebulization Q6H PRN Agata Burgess MD   0.63 mg at 12/17/18 1437     Medications Prior to Admission   Medication Sig Dispense Refill Last Dose   • albuterol (PROVENTIL) (2.5 MG/3ML) 0.083% nebulizer solution Take 2.5 mg by nebulization Every 4 (Four) Hours As Needed for Wheezing.   9/16/2019 at Unknown time   • amLODIPine (NORVASC) 5 MG tablet Take 1 tablet by mouth Daily. 30 tablet 5 9/16/2019 at Unknown time   • atorvastatin (LIPITOR) 20 MG tablet Take 1 tablet by mouth Daily. 90 tablet 2 9/16/2019 at Unknown time   • budesonide (PULMICORT) 0.5 MG/2ML nebulizer solution USE 1 VIAL VIA NEBULIZER TWICE DAILY RINSE MOUTH AFTER USE  12 9/16/2019 at Unknown time   • Calcium Carbonate-Vitamin D (CALCIUM 600+D PO) Take 1 tablet by mouth Daily.   9/16/2019 at Unknown time   • doxycycline (VIBRAMYCIN) 100 MG capsule Take 1 capsule by mouth 2 (Two) Times a Day. 14 capsule 0 9/16/2019 at Unknown time   • DULoxetine (CYMBALTA) 60 MG capsule Take 1 capsule by mouth 2 (Two) Times a Day. 60 capsule 3 9/16/2019 at Unknown time   • etanercept (ENBREL) 50 MG/ML solution prefilled syringe injection Inject 50 mg under the skin 1 (One) Time Per Week. THURSDAYS. TO HOLD 4 WEEKS   9/16/2019 at Unknown time   • fluticasone (FLONASE) 50 MCG/ACT nasal spray 2 sprays into the nostril(s) as directed by provider Daily. 1 bottle 5 9/16/2019 at Unknown time   • folic acid (FOLVITE) 1 MG tablet TAKE 1 TABLET BY MOUTH DAILY. 90 tablet 0 9/16/2019 at Unknown time   • ipratropium (ATROVENT) 0.06 % nasal spray 2 sprays into each nostril 4 (Four) Times a Day As Needed for Rhinitis.   9/16/2019 at Unknown time   • ipratropium-albuterol (DUO-NEB) 0.5-2.5 mg/3 ml nebulizer Take 3 mL by nebulization Every 4 (Four) Hours As Needed for Wheezing.   9/16/2019 at Unknown time   • montelukast (SINGULAIR) 10 MG tablet Take 1 tablet by  "mouth Daily. 90 tablet 3 9/16/2019 at Unknown time   • omeprazole (priLOSEC) 40 MG capsule Take 1 capsule by mouth Every Evening. 90 capsule 1 9/16/2019 at Unknown time   • omeprazole (priLOSEC) 40 MG capsule TAKE 1 CAPSULE BY MOUTH EVERY DAY IN THE EVENING 90 capsule 1 9/16/2019 at Unknown time   • PERFOROMIST 20 MCG/2ML nebulizer solution USE 1 VIAL VIA NEBULIZER TWICE DAILY  12 9/16/2019 at Unknown time   • umeclidinium-vilanterol (ANORO ELLIPTA) 62.5-25 MCG/INH aerosol powder  inhaler Inhale 1 puff Daily. 3 each 2 9/16/2019 at Unknown time   • valsartan (DIOVAN) 320 MG tablet Take 1 tablet by mouth Daily. 90 tablet 2 9/16/2019 at Unknown time       Allergies:  Gold-containing drug products and Hydrocodone    ROS:    Pertinent items are noted in HPI     Objective     Blood pressure 154/85, pulse 64, temperature 98.2 °F (36.8 °C), temperature source Oral, resp. rate 16, height 162.6 cm (64\"), weight 57.6 kg (127 lb), SpO2 97 %, not currently breastfeeding.    Physical Exam   Constitutional: Pt is oriented to person, place, and time and well-developed, well-nourished, and in no distress.   Mouth/Throat: Oropharynx is clear and moist.   Neck: Normal range of motion.   Cardiovascular: Normal rate, regular rhythm and normal heart sounds.    Pulmonary/Chest: Effort normal and breath sounds normal.   Abdominal: Soft. Nontender  Skin: Skin is warm and dry.   Psychiatric: Mood, memory, affect and judgment normal.     Assessment/Plan     Diagnosis:  Pancreatic mass    Anticipated Surgical Procedure:  EGD/EUS with FNA    The risks, benefits, and alternatives of this procedure have been discussed with the patient or the responsible party- the patient understands and agrees to proceed.                                                          "

## 2019-09-17 NOTE — DISCHARGE INSTRUCTIONS
For the next 24 hours patient needs to be with a responsible adult.    For 24 hours DO NOT drive, operate machinery, appliances, drink alcohol, make important decisions or sign legal documents.    Start with a light or bland diet if you are feeling sick to your stomach otherwise advance to regular diet as tolerated.    Follow recommendations on procedure report if provided by your doctor.    Call Dr Cortes for problems [phone]    Problems may include but not limited to: large amounts of bleeding, trouble breathing, repeated vomiting, severe unrelieved pain, fever or chills.

## 2019-09-19 ENCOUNTER — APPOINTMENT (OUTPATIENT)
Dept: CT IMAGING | Facility: HOSPITAL | Age: 79
End: 2019-09-19

## 2019-09-19 ENCOUNTER — HOSPITAL ENCOUNTER (OUTPATIENT)
Facility: HOSPITAL | Age: 79
Setting detail: OBSERVATION
LOS: 1 days | Discharge: HOSPICE | End: 2019-09-20
Attending: EMERGENCY MEDICINE | Admitting: INTERNAL MEDICINE

## 2019-09-19 DIAGNOSIS — R10.10 PAIN OF UPPER ABDOMEN: Primary | ICD-10-CM

## 2019-09-19 DIAGNOSIS — K86.89 PANCREATIC MASS: ICD-10-CM

## 2019-09-19 DIAGNOSIS — E87.6 HYPOKALEMIA: ICD-10-CM

## 2019-09-19 PROBLEM — C25.9 PANCREATIC CANCER (HCC): Status: ACTIVE | Noted: 2019-09-19

## 2019-09-19 LAB
ALBUMIN SERPL-MCNC: 3.9 G/DL (ref 3.5–5.2)
ALBUMIN/GLOB SERPL: 1.1 G/DL
ALP SERPL-CCNC: 73 U/L (ref 39–117)
ALT SERPL W P-5'-P-CCNC: 11 U/L (ref 1–33)
ANION GAP SERPL CALCULATED.3IONS-SCNC: 10.7 MMOL/L (ref 5–15)
ANION GAP SERPL CALCULATED.3IONS-SCNC: 14.3 MMOL/L (ref 5–15)
AST SERPL-CCNC: 12 U/L (ref 1–32)
BASOPHILS # BLD AUTO: 0.04 10*3/MM3 (ref 0–0.2)
BASOPHILS NFR BLD AUTO: 0.3 % (ref 0–1.5)
BILIRUB SERPL-MCNC: 0.5 MG/DL (ref 0.2–1.2)
BILIRUB UR QL STRIP: NEGATIVE
BUN BLD-MCNC: 6 MG/DL (ref 8–23)
BUN BLD-MCNC: 7 MG/DL (ref 8–23)
BUN/CREAT SERPL: 13.3 (ref 7–25)
BUN/CREAT SERPL: 13.7 (ref 7–25)
CALCIUM SPEC-SCNC: 8.6 MG/DL (ref 8.6–10.5)
CALCIUM SPEC-SCNC: 9.2 MG/DL (ref 8.6–10.5)
CHLORIDE SERPL-SCNC: 97 MMOL/L (ref 98–107)
CHLORIDE SERPL-SCNC: 98 MMOL/L (ref 98–107)
CLARITY UR: CLEAR
CO2 SERPL-SCNC: 24.7 MMOL/L (ref 22–29)
CO2 SERPL-SCNC: 26.3 MMOL/L (ref 22–29)
COLOR UR: YELLOW
CREAT BLD-MCNC: 0.45 MG/DL (ref 0.57–1)
CREAT BLD-MCNC: 0.51 MG/DL (ref 0.57–1)
CYTO UR: NORMAL
DEPRECATED RDW RBC AUTO: 40.3 FL (ref 37–54)
DEPRECATED RDW RBC AUTO: 40.8 FL (ref 37–54)
EOSINOPHIL # BLD AUTO: 0.31 10*3/MM3 (ref 0–0.4)
EOSINOPHIL NFR BLD AUTO: 2.5 % (ref 0.3–6.2)
ERYTHROCYTE [DISTWIDTH] IN BLOOD BY AUTOMATED COUNT: 13.5 % (ref 12.3–15.4)
ERYTHROCYTE [DISTWIDTH] IN BLOOD BY AUTOMATED COUNT: 13.5 % (ref 12.3–15.4)
GFR SERPL CREATININE-BSD FRML MDRD: 117 ML/MIN/1.73
GFR SERPL CREATININE-BSD FRML MDRD: 135 ML/MIN/1.73
GLOBULIN UR ELPH-MCNC: 3.4 GM/DL
GLUCOSE BLD-MCNC: 105 MG/DL (ref 65–99)
GLUCOSE BLD-MCNC: 123 MG/DL (ref 65–99)
GLUCOSE UR STRIP-MCNC: NEGATIVE MG/DL
HCT VFR BLD AUTO: 37.8 % (ref 34–46.6)
HCT VFR BLD AUTO: 38.6 % (ref 34–46.6)
HGB BLD-MCNC: 12.7 G/DL (ref 12–15.9)
HGB BLD-MCNC: 12.8 G/DL (ref 12–15.9)
HGB UR QL STRIP.AUTO: NEGATIVE
IMM GRANULOCYTES # BLD AUTO: 0.05 10*3/MM3 (ref 0–0.05)
IMM GRANULOCYTES NFR BLD AUTO: 0.4 % (ref 0–0.5)
KETONES UR QL STRIP: NEGATIVE
LAB AP CASE REPORT: NORMAL
LAB AP DIAGNOSIS COMMENT: NORMAL
LAB AP NON-GYN SPECIMEN ADEQUACY: NORMAL
LEUKOCYTE ESTERASE UR QL STRIP.AUTO: NEGATIVE
LIPASE SERPL-CCNC: 125 U/L (ref 13–60)
LYMPHOCYTES # BLD AUTO: 1.64 10*3/MM3 (ref 0.7–3.1)
LYMPHOCYTES NFR BLD AUTO: 13.3 % (ref 19.6–45.3)
MCH RBC QN AUTO: 27.6 PG (ref 26.6–33)
MCH RBC QN AUTO: 27.9 PG (ref 26.6–33)
MCHC RBC AUTO-ENTMCNC: 33.2 G/DL (ref 31.5–35.7)
MCHC RBC AUTO-ENTMCNC: 33.6 G/DL (ref 31.5–35.7)
MCV RBC AUTO: 83.1 FL (ref 79–97)
MCV RBC AUTO: 83.2 FL (ref 79–97)
MONOCYTES # BLD AUTO: 1.44 10*3/MM3 (ref 0.1–0.9)
MONOCYTES NFR BLD AUTO: 11.7 % (ref 5–12)
NEUTROPHILS # BLD AUTO: 8.86 10*3/MM3 (ref 1.7–7)
NEUTROPHILS NFR BLD AUTO: 71.8 % (ref 42.7–76)
NITRITE UR QL STRIP: NEGATIVE
NRBC BLD AUTO-RTO: 0 /100 WBC (ref 0–0.2)
PATH REPORT.FINAL DX SPEC: NORMAL
PATH REPORT.GROSS SPEC: NORMAL
PH UR STRIP.AUTO: 7 [PH] (ref 5–8)
PLATELET # BLD AUTO: 268 10*3/MM3 (ref 140–450)
PLATELET # BLD AUTO: 269 10*3/MM3 (ref 140–450)
PMV BLD AUTO: 11.4 FL (ref 6–12)
PMV BLD AUTO: 11.7 FL (ref 6–12)
POTASSIUM BLD-SCNC: 2.9 MMOL/L (ref 3.5–5.2)
POTASSIUM BLD-SCNC: 3.1 MMOL/L (ref 3.5–5.2)
PROT SERPL-MCNC: 7.3 G/DL (ref 6–8.5)
PROT UR QL STRIP: NEGATIVE
RBC # BLD AUTO: 4.55 10*6/MM3 (ref 3.77–5.28)
RBC # BLD AUTO: 4.64 10*6/MM3 (ref 3.77–5.28)
SODIUM BLD-SCNC: 135 MMOL/L (ref 136–145)
SODIUM BLD-SCNC: 136 MMOL/L (ref 136–145)
SP GR UR STRIP: 1.01 (ref 1–1.03)
UROBILINOGEN UR QL STRIP: NORMAL
WBC NRBC COR # BLD: 10.99 10*3/MM3 (ref 3.4–10.8)
WBC NRBC COR # BLD: 12.34 10*3/MM3 (ref 3.4–10.8)

## 2019-09-19 PROCEDURE — 96376 TX/PRO/DX INJ SAME DRUG ADON: CPT

## 2019-09-19 PROCEDURE — 74177 CT ABD & PELVIS W/CONTRAST: CPT

## 2019-09-19 PROCEDURE — 99285 EMERGENCY DEPT VISIT HI MDM: CPT

## 2019-09-19 PROCEDURE — 25010000002 ONDANSETRON PER 1 MG: Performed by: NURSE PRACTITIONER

## 2019-09-19 PROCEDURE — 25010000002 MORPHINE PER 10 MG: Performed by: EMERGENCY MEDICINE

## 2019-09-19 PROCEDURE — 80053 COMPREHEN METABOLIC PANEL: CPT | Performed by: EMERGENCY MEDICINE

## 2019-09-19 PROCEDURE — 96375 TX/PRO/DX INJ NEW DRUG ADDON: CPT

## 2019-09-19 PROCEDURE — 94799 UNLISTED PULMONARY SVC/PX: CPT

## 2019-09-19 PROCEDURE — 83690 ASSAY OF LIPASE: CPT | Performed by: EMERGENCY MEDICINE

## 2019-09-19 PROCEDURE — 36415 COLL VENOUS BLD VENIPUNCTURE: CPT | Performed by: NURSE PRACTITIONER

## 2019-09-19 PROCEDURE — 25010000002 IOPAMIDOL 61 % SOLUTION: Performed by: EMERGENCY MEDICINE

## 2019-09-19 PROCEDURE — 25010000002 ONDANSETRON PER 1 MG: Performed by: EMERGENCY MEDICINE

## 2019-09-19 PROCEDURE — 85025 COMPLETE CBC W/AUTO DIFF WBC: CPT | Performed by: EMERGENCY MEDICINE

## 2019-09-19 PROCEDURE — 81003 URINALYSIS AUTO W/O SCOPE: CPT | Performed by: EMERGENCY MEDICINE

## 2019-09-19 PROCEDURE — 96374 THER/PROPH/DIAG INJ IV PUSH: CPT

## 2019-09-19 PROCEDURE — 96361 HYDRATE IV INFUSION ADD-ON: CPT

## 2019-09-19 PROCEDURE — 85027 COMPLETE CBC AUTOMATED: CPT | Performed by: NURSE PRACTITIONER

## 2019-09-19 PROCEDURE — 94640 AIRWAY INHALATION TREATMENT: CPT

## 2019-09-19 PROCEDURE — 25810000003 SODIUM CHLORIDE 0.9 % WITH KCL 20 MEQ 20-0.9 MEQ/L-% SOLUTION: Performed by: HOSPITALIST

## 2019-09-19 PROCEDURE — 25010000002 MORPHINE PER 10 MG: Performed by: HOSPITALIST

## 2019-09-19 RX ORDER — BUDESONIDE 0.5 MG/2ML
0.5 INHALANT ORAL
Status: DISCONTINUED | OUTPATIENT
Start: 2019-09-19 | End: 2019-09-20 | Stop reason: HOSPADM

## 2019-09-19 RX ORDER — ONDANSETRON 2 MG/ML
4 INJECTION INTRAMUSCULAR; INTRAVENOUS ONCE
Status: COMPLETED | OUTPATIENT
Start: 2019-09-19 | End: 2019-09-19

## 2019-09-19 RX ORDER — NITROGLYCERIN 0.4 MG/1
0.4 TABLET SUBLINGUAL
Status: DISCONTINUED | OUTPATIENT
Start: 2019-09-19 | End: 2019-09-19

## 2019-09-19 RX ORDER — SODIUM CHLORIDE 0.9 % (FLUSH) 0.9 %
10 SYRINGE (ML) INJECTION EVERY 12 HOURS SCHEDULED
Status: DISCONTINUED | OUTPATIENT
Start: 2019-09-19 | End: 2019-09-20 | Stop reason: HOSPADM

## 2019-09-19 RX ORDER — DULOXETIN HYDROCHLORIDE 60 MG/1
60 CAPSULE, DELAYED RELEASE ORAL 2 TIMES DAILY
Status: DISCONTINUED | OUTPATIENT
Start: 2019-09-19 | End: 2019-09-20 | Stop reason: HOSPADM

## 2019-09-19 RX ORDER — ARFORMOTEROL TARTRATE 15 UG/2ML
15 SOLUTION RESPIRATORY (INHALATION)
Status: DISCONTINUED | OUTPATIENT
Start: 2019-09-19 | End: 2019-09-20 | Stop reason: HOSPADM

## 2019-09-19 RX ORDER — SODIUM CHLORIDE AND POTASSIUM CHLORIDE 150; 900 MG/100ML; MG/100ML
100 INJECTION, SOLUTION INTRAVENOUS CONTINUOUS
Status: DISCONTINUED | OUTPATIENT
Start: 2019-09-19 | End: 2019-09-20

## 2019-09-19 RX ORDER — VALSARTAN 320 MG/1
320 TABLET ORAL DAILY
Status: DISCONTINUED | OUTPATIENT
Start: 2019-09-19 | End: 2019-09-20 | Stop reason: HOSPADM

## 2019-09-19 RX ORDER — MORPHINE SULFATE 2 MG/ML
2 INJECTION, SOLUTION INTRAMUSCULAR; INTRAVENOUS EVERY 4 HOURS PRN
Status: DISCONTINUED | OUTPATIENT
Start: 2019-09-19 | End: 2019-09-20 | Stop reason: HOSPADM

## 2019-09-19 RX ORDER — MORPHINE SULFATE 2 MG/ML
2 INJECTION, SOLUTION INTRAMUSCULAR; INTRAVENOUS ONCE
Status: COMPLETED | OUTPATIENT
Start: 2019-09-19 | End: 2019-09-19

## 2019-09-19 RX ORDER — SODIUM CHLORIDE 9 MG/ML
100 INJECTION, SOLUTION INTRAVENOUS CONTINUOUS
Status: DISCONTINUED | OUTPATIENT
Start: 2019-09-19 | End: 2019-09-19

## 2019-09-19 RX ORDER — PANTOPRAZOLE SODIUM 40 MG/1
40 TABLET, DELAYED RELEASE ORAL EVERY MORNING
Status: DISCONTINUED | OUTPATIENT
Start: 2019-09-19 | End: 2019-09-20 | Stop reason: HOSPADM

## 2019-09-19 RX ORDER — IPRATROPIUM BROMIDE AND ALBUTEROL SULFATE 2.5; .5 MG/3ML; MG/3ML
3 SOLUTION RESPIRATORY (INHALATION)
Status: DISCONTINUED | OUTPATIENT
Start: 2019-09-19 | End: 2019-09-20 | Stop reason: HOSPADM

## 2019-09-19 RX ORDER — SODIUM CHLORIDE 0.9 % (FLUSH) 0.9 %
10 SYRINGE (ML) INJECTION AS NEEDED
Status: DISCONTINUED | OUTPATIENT
Start: 2019-09-19 | End: 2019-09-20 | Stop reason: HOSPADM

## 2019-09-19 RX ORDER — OXYCODONE HYDROCHLORIDE 5 MG/1
5 TABLET ORAL EVERY 4 HOURS PRN
Status: DISCONTINUED | OUTPATIENT
Start: 2019-09-19 | End: 2019-09-20 | Stop reason: HOSPADM

## 2019-09-19 RX ORDER — ONDANSETRON 2 MG/ML
4 INJECTION INTRAMUSCULAR; INTRAVENOUS EVERY 6 HOURS PRN
Status: DISCONTINUED | OUTPATIENT
Start: 2019-09-19 | End: 2019-09-20 | Stop reason: HOSPADM

## 2019-09-19 RX ORDER — ALBUTEROL SULFATE 2.5 MG/3ML
2.5 SOLUTION RESPIRATORY (INHALATION) EVERY 4 HOURS PRN
Status: DISCONTINUED | OUTPATIENT
Start: 2019-09-19 | End: 2019-09-20 | Stop reason: HOSPADM

## 2019-09-19 RX ORDER — FOLIC ACID 1 MG/1
1000 TABLET ORAL DAILY
Status: DISCONTINUED | OUTPATIENT
Start: 2019-09-19 | End: 2019-09-20 | Stop reason: HOSPADM

## 2019-09-19 RX ORDER — POTASSIUM CHLORIDE 750 MG/1
40 CAPSULE, EXTENDED RELEASE ORAL ONCE
Status: COMPLETED | OUTPATIENT
Start: 2019-09-19 | End: 2019-09-19

## 2019-09-19 RX ORDER — ATORVASTATIN CALCIUM 20 MG/1
20 TABLET, FILM COATED ORAL DAILY
Status: DISCONTINUED | OUTPATIENT
Start: 2019-09-19 | End: 2019-09-20 | Stop reason: HOSPADM

## 2019-09-19 RX ORDER — AMLODIPINE BESYLATE 5 MG/1
5 TABLET ORAL DAILY
Status: DISCONTINUED | OUTPATIENT
Start: 2019-09-19 | End: 2019-09-20 | Stop reason: HOSPADM

## 2019-09-19 RX ORDER — MONTELUKAST SODIUM 10 MG/1
10 TABLET ORAL DAILY
Status: DISCONTINUED | OUTPATIENT
Start: 2019-09-19 | End: 2019-09-20 | Stop reason: HOSPADM

## 2019-09-19 RX ADMIN — VALSARTAN 320 MG: 320 TABLET ORAL at 12:38

## 2019-09-19 RX ADMIN — DULOXETINE HYDROCHLORIDE 60 MG: 60 CAPSULE, DELAYED RELEASE ORAL at 20:21

## 2019-09-19 RX ADMIN — MORPHINE SULFATE 2 MG: 2 INJECTION, SOLUTION INTRAMUSCULAR; INTRAVENOUS at 01:44

## 2019-09-19 RX ADMIN — IOPAMIDOL 85 ML: 612 INJECTION, SOLUTION INTRAVENOUS at 03:08

## 2019-09-19 RX ADMIN — POTASSIUM CHLORIDE 40 MEQ: 750 CAPSULE, EXTENDED RELEASE ORAL at 04:09

## 2019-09-19 RX ADMIN — ATORVASTATIN CALCIUM 20 MG: 20 TABLET, FILM COATED ORAL at 12:38

## 2019-09-19 RX ADMIN — OXYCODONE HYDROCHLORIDE 5 MG: 5 TABLET ORAL at 17:50

## 2019-09-19 RX ADMIN — POTASSIUM CHLORIDE AND SODIUM CHLORIDE 100 ML/HR: 900; 150 INJECTION, SOLUTION INTRAVENOUS at 11:02

## 2019-09-19 RX ADMIN — BUDESONIDE 0.5 MG: 0.5 SUSPENSION RESPIRATORY (INHALATION) at 20:00

## 2019-09-19 RX ADMIN — ONDANSETRON 4 MG: 2 INJECTION INTRAMUSCULAR; INTRAVENOUS at 04:00

## 2019-09-19 RX ADMIN — BUDESONIDE 0.5 MG: 0.5 SUSPENSION RESPIRATORY (INHALATION) at 11:49

## 2019-09-19 RX ADMIN — PANTOPRAZOLE SODIUM 40 MG: 40 TABLET, DELAYED RELEASE ORAL at 12:38

## 2019-09-19 RX ADMIN — AMLODIPINE BESYLATE 5 MG: 5 TABLET ORAL at 12:39

## 2019-09-19 RX ADMIN — OXYCODONE HYDROCHLORIDE 5 MG: 5 TABLET ORAL at 12:39

## 2019-09-19 RX ADMIN — IPRATROPIUM BROMIDE AND ALBUTEROL SULFATE 3 ML: 2.5; .5 SOLUTION RESPIRATORY (INHALATION) at 11:49

## 2019-09-19 RX ADMIN — MORPHINE SULFATE 2 MG: 2 INJECTION, SOLUTION INTRAMUSCULAR; INTRAVENOUS at 08:21

## 2019-09-19 RX ADMIN — DULOXETINE HYDROCHLORIDE 60 MG: 60 CAPSULE, DELAYED RELEASE ORAL at 12:38

## 2019-09-19 RX ADMIN — POTASSIUM CHLORIDE AND SODIUM CHLORIDE 100 ML/HR: 900; 150 INJECTION, SOLUTION INTRAVENOUS at 22:11

## 2019-09-19 RX ADMIN — SODIUM CHLORIDE 1000 ML: 9 INJECTION, SOLUTION INTRAVENOUS at 01:44

## 2019-09-19 RX ADMIN — MORPHINE SULFATE 2 MG: 2 INJECTION, SOLUTION INTRAMUSCULAR; INTRAVENOUS at 04:00

## 2019-09-19 RX ADMIN — OXYCODONE HYDROCHLORIDE 5 MG: 5 TABLET ORAL at 22:11

## 2019-09-19 RX ADMIN — SODIUM CHLORIDE, PRESERVATIVE FREE 10 ML: 5 INJECTION INTRAVENOUS at 08:21

## 2019-09-19 RX ADMIN — ARFORMOTEROL TARTRATE 15 MCG: 15 SOLUTION RESPIRATORY (INHALATION) at 20:00

## 2019-09-19 RX ADMIN — FOLIC ACID 1000 MCG: 1 TABLET ORAL at 12:38

## 2019-09-19 RX ADMIN — ONDANSETRON 4 MG: 2 INJECTION INTRAMUSCULAR; INTRAVENOUS at 07:50

## 2019-09-19 RX ADMIN — ONDANSETRON 4 MG: 2 INJECTION INTRAMUSCULAR; INTRAVENOUS at 01:44

## 2019-09-19 RX ADMIN — SODIUM CHLORIDE 100 ML/HR: 9 INJECTION, SOLUTION INTRAVENOUS at 05:00

## 2019-09-19 NOTE — H&P
HISTORY AND PHYSICAL   [facility]        Patient Identification:  Name: Janet Martinez  Age: 79 y.o.  Sex: female  :  1940  MRN: [id number]                     Primary Care Physician: Agata Burgess MD    Chief Complaint: Abdominal pain    History of Present Illness:   Mrs Martinez is a wonderfully pleasant 79-year-old female who had recently undergone endoscopic ultrasound-guided biopsy by  with GI on 2019.  The report is showing a adenocarcinoma likely consistent with pancreatic etiology.  Patient was aware of this diagnosis and states she had been doing just fine as the other day she was zip lining.  She had the onset of abdominal pain it is in her epigastric region and secondary to this came to the ER for further evaluation and symptom treatment.  She does have issues with some nausea but she has not really had any vomiting or diarrhea.  She denies any altered mentation or confusion.  She denies any chest pain troubles breathing above baseline she is oxygen dependent at night via nasal cannula due to her advanced COPD.  Unfortunate this patient also has advanced rheumatoid arthritis as is evident by bilateral ulnar deviation deformities.  Patient would like to advance diet at this time and she is very clear in regards to her wishes that she does not want further surgical or oncological consultation.  She denies any fever chills or night sweats.  She feels better after achieving pain control with IV morphine.    Past Medical History:  Past Medical History:   Diagnosis Date   • Abdominal hernia    • Cancer (CMS/HCC)    • COPD (chronic obstructive pulmonary disease) (CMS/HCC)    • COPD (chronic obstructive pulmonary disease) (CMS/HCC)    • Depression    • Food allergy     Scallops: causes hives   • GERD (gastroesophageal reflux disease)    • Hiatal hernia    • History of bruising easily    • History of colon polyps 2013    PATH: Distal Transverse Colon, Fragments of  Tubular Adenoma   • History of colon polyps 01/30/2013    PATH: Ascending Colon - Tubular Adenoma, Splenic Flexure Polyp @ 65 cm - Tubulovillous Adenoma, Descending Colon - Tubular Adenoma   • History of colon polyps 10/30/2017    PATH: Transverse Colon - Tubular Adenoma, Rectal Polyps - Fragments of Hyperplastic Polyp   • Hypercholesteremia    • Hypertension    • Joint pain     swelling   • On home oxygen therapy     O2 NC 2. LITERS/ NIGHT   • Osteoarthritis    • Osteoporosis    • Pancreatic cancer (CMS/HCC)    • PONV (postoperative nausea and vomiting)    • Rheumatoid arthritis (CMS/HCC)     DIAGNOSIS AGE 23 - FOLLOWED BY RHEUMATOLOGY DR INFANTE, NO PROBLEMS WITH FLEX/ EXTENSION      Past Surgical History:  Past Surgical History:   Procedure Laterality Date   • BREAST BIOPSY Left     Benign pathology   • BRONCHOSCOPY N/A 1/3/2018    Procedure: BRONCHOSCOPY with lavage in right middle lobe and lingula.;  Surgeon: Trenton Garcia MD;  Location: [facility] ENDOSCOPY;  Service:    • CAROTID ENDARTERECTOMY Left 2006   • CATARACT EXTRACTION WITH INTRAOCULAR LENS IMPLANT Bilateral    • COLONOSCOPY N/A 10/30/2017    Procedure: COLONOSCOPY TO CECUM, TO TERMINAL ILEUM WITH COLD BIOPSY POLYPECTOMY;  Surgeon: Jossie Stanley MD;  Location: [facility] ENDOSCOPY;  Service:    • COLONOSCOPY N/A 08/13/2013    One 3 mm polyp in the transverse colon, Diverticulosis in the sigmoid colon, [facility], Dr. Jossie Stanley   • COLONOSCOPY N/A 01/30/2013    One 3 mm polyp ascending colon; One 7 mm polyp descending colon; One 25 mm polyp splenic flexure, Diverticulosis Sigmoid Colon, [facility], Dr. Jossie Stanley   • FOOT SURGERY Bilateral     Bilateral (MULITPLE SURGERIES)   • HAND SURGERY Bilateral     Bilateral (MULTIPLE SURGERIES)   • KNEE ARTHROSCOPY W/ PARTIAL MEDIAL MENISCECTOMY Left 01/22/2016    Left Knee Arthroscopy w/ partial medial & lateral meniscectomies, Three compartment synovectomy w/ loose body removal, Chondroplasty of patella,   Florencio Frazier   • LEFT OOPHORECTOMY Left 1950    bening tumor   • SINUS SURGERY N/A 2011   • TOE FUSION Right 2017    with screws and pins-Dr. Atkins   • TOTAL KNEE ARTHROPLASTY Left 5/18/2016    Procedure: LT TOTAL KNEE ARTHROPLASTY WITH FELECIA NAVIGATION;  Surgeon: Florencio Frazier MD;  Location: [facility] OR;  Service:    • VENTRAL HERNIA REPAIR N/A 5/22/2018    Procedure: VENTRAL HERNIA REPAIR LAPAROSCOPIC WITH DAVINCI ROBOT;  Surgeon: Josr Jennings MD;  Location: [facility] OR;  Service: [facility]      Home Meds:  Facility-Administered Medications Prior to Admission   Medication Dose Route Frequency Provider Last Rate Last Dose   • levalbuterol (XOPENEX) nebulizer solution 0.63 mg  0.63 mg Nebulization Q6H PRN Agata Burgess MD   0.63 mg at 12/17/18 1437     Medications Prior to Admission   Medication Sig Dispense Refill Last Dose   • albuterol (PROVENTIL) (2.5 MG/3ML) 0.083% nebulizer solution Take 2.5 mg by nebulization Every 4 (Four) Hours As Needed for Wheezing.   9/16/2019 at Unknown time   • amLODIPine (NORVASC) 5 MG tablet Take 1 tablet by mouth Daily. 30 tablet 5 9/16/2019 at Unknown time   • atorvastatin (LIPITOR) 20 MG tablet Take 1 tablet by mouth Daily. 90 tablet 2 9/16/2019 at Unknown time   • budesonide (PULMICORT) 0.5 MG/2ML nebulizer solution USE 1 VIAL VIA NEBULIZER TWICE DAILY RINSE MOUTH AFTER USE  12 9/16/2019 at Unknown time   • Calcium Carbonate-Vitamin D (CALCIUM 600+D PO) Take 1 tablet by mouth Daily.   9/16/2019 at Unknown time   • doxycycline (VIBRAMYCIN) 100 MG capsule Take 1 capsule by mouth 2 (Two) Times a Day. 14 capsule 0 9/16/2019 at Unknown time   • DULoxetine (CYMBALTA) 60 MG capsule Take 1 capsule by mouth 2 (Two) Times a Day. 60 capsule 3 9/16/2019 at Unknown time   • etanercept (ENBREL) 50 MG/ML solution prefilled syringe injection Inject 50 mg under the skin 1 (One) Time Per Week. THURSDAYS. TO HOLD 4 WEEKS   9/16/2019 at Unknown time   • fluticasone (FLONASE) 50  MCG/ACT nasal spray 2 sprays into the nostril(s) as directed by provider Daily. 1 bottle 5 9/16/2019 at Unknown time   • folic acid (FOLVITE) 1 MG tablet TAKE 1 TABLET BY MOUTH DAILY. 90 tablet 0 9/16/2019 at Unknown time   • ipratropium (ATROVENT) 0.06 % nasal spray 2 sprays into each nostril 4 (Four) Times a Day As Needed for Rhinitis.   9/16/2019 at Unknown time   • ipratropium-albuterol (DUO-NEB) 0.5-2.5 mg/3 ml nebulizer Take 3 mL by nebulization Every 4 (Four) Hours As Needed for Wheezing.   9/16/2019 at Unknown time   • montelukast (SINGULAIR) 10 MG tablet Take 1 tablet by mouth Daily. 90 tablet 3 9/16/2019 at Unknown time   • omeprazole (priLOSEC) 40 MG capsule Take 1 capsule by mouth Every Evening. 90 capsule 1 9/16/2019 at Unknown time   • omeprazole (priLOSEC) 40 MG capsule TAKE 1 CAPSULE BY MOUTH EVERY DAY IN THE EVENING 90 capsule 1 9/16/2019 at Unknown time   • PERFOROMIST 20 MCG/2ML nebulizer solution USE 1 VIAL VIA NEBULIZER TWICE DAILY  12 9/16/2019 at Unknown time   • umeclidinium-vilanterol (ANORO ELLIPTA) 62.5-25 MCG/INH aerosol powder  inhaler Inhale 1 puff Daily. 3 each 2 9/16/2019 at Unknown time   • valsartan (DIOVAN) 320 MG tablet Take 1 tablet by mouth Daily. 90 tablet 2 9/16/2019 at Unknown time       Allergies:  Allergies   Allergen Reactions   • Gold-Containing Drug Products Unknown (See Comments)     Patient cannot recall reaction   • Hydrocodone Irritability     HYPERACTIVITY     Immunizations:  Immunization History   Administered Date(s) Administered   • Flu Vaccine High Dose PF 65YR+ 11/07/2016, 10/10/2018   • Pneumococcal Conjugate 13-Valent (PCV13) 03/31/2015   • Pneumococcal Polysaccharide (PPSV23) 06/01/2010   • Shingrix 05/07/2019   • Tdap 01/01/2010     Social History:   Social History     Social History Narrative   • Not on file     Social History     Socioeconomic History   • Marital status:      Spouse name: Not on file   • Number of children: Not on file   • Years of  "education: Not on file   • Highest education level: Not on file   Tobacco Use   • Smoking status: Former Smoker     Packs/day: 1.50     Years: 30.00     Pack years: 45.00     Types: Cigarettes     Last attempt to quit:      Years since quittin.7   • Smokeless tobacco: Never Used   Substance and Sexual Activity   • Alcohol use: No     Comment: No alcohol for 30 years   • Drug use: No   • Sexual activity: Defer       Family History:  Family History   Problem Relation Age of Onset   • Heart disease Mother    • Hypertension Mother    • Lung disease Mother    • Diabetes Sister    • Hypertension Brother    • Diabetes Sister    • Malig Hyperthermia Neg Hx         Review of Systems  See history of present illness and past medical history.  Patient denies fever chills night sweats.  Denies any vomiting diarrhea but admits to some nausea and abdominal pain.  Denies any altered mentation focal changes to vision smell taste or sound.  Denies chest pain palpitations cough or shortness of breath above baseline.  Denies dysuria bruising bleeding or any loss of consciousness or focal loss of function.  Remainder of ROS is negative.    Objective:  tMax 24 hrs: Temp (24hrs), Av.3 °F (36.8 °C), Min:97.4 °F (36.3 °C), Max:99 °F (37.2 °C)    Vitals Ranges:   Temp:  [97.4 °F (36.3 °C)-99 °F (37.2 °C)] 99 °F (37.2 °C)  Heart Rate:  [73-86] 79  Resp:  [16] 16  BP: (132-173)/(65-95) 148/65      Exam:  /65 (BP Location: Right arm, Patient Position: Lying)   Pulse 79   Temp 99 °F (37.2 °C) (Oral)   Resp 16   Ht 162.6 cm (64\")   Wt 57.5 kg (126 lb 11.2 oz)   SpO2 96%   BMI 21.75 kg/m²     General Appearance:    Alert, cooperative, AOx3, nontoxic-appearing, interactive and very pleasant.  No family at bedside.   Head:    Normocephalic, without obvious abnormality, atraumatic   Eyes:    PERRL, conjunctiva/corneas clear, EOM's intact, both eyes   Ears:    Normal external ear canals, both ears   Nose:   Nares normal, " septum midline, mucosa normal, no drainage    or sinus tenderness   Throat:   Lips, mucosa, and tongue normal   Neck:   Supple, no JVD       Lungs:     Clear to auscultation bilaterally, respirations unlabored   Chest Wall:    No tenderness or deformity    Heart:    Regular rate and rhythm, S1 and S2 normal   Abdomen:     Soft, some mild epigastric tenderness palpation but no rebound or guarding, bowel sounds active all four quadrants   Extremities:  Moving all, no cyanosis or edema, advanced bilateral ulnar deviation of hands deformities   Pulses:   2+ and symmetric all extremities           Neurologic:   CNII-XII intact, normal strength      .    Data Review:  Labs in chart were reviewed.             Imaging Results (all)     Procedure Component Value Units Date/Time    CT Abdomen Pelvis With Contrast [[id number]] Collected:  09/19/19 0341     Updated:  09/19/19 0544    Narrative:       CT SCANS ABDOMEN AND PELVIS WITH IV CONTRAST.     HISTORY: Abdominal pain, unspecified     COMPARISON: 09/10/2019.     TECHNIQUE: Radiation dose reduction techniques were utilized, including  automated exposure control and exposure modulation based on body size.  Axial images were obtained from the lung bases to the symphysis pubis  with IV contrast only.. Oral contrast was not administered per request.     FINDINGS : Fat-containing diaphragmatic hernias bilaterally are stable.  Low density rounded renal lesions felt to reflect small cysts are  stable. Some left renal cortical scarring is unchanged as well.      The previously described low density pancreatic head mass best seen on  image 52 is stable at 2.7 cm. As before there is dilatation of the more  distal main pancreatic duct, approximately 9-10 mm. The gallbladder is  again distended, 5 cm diameter but without calcified gallstones or  intrahepatic biliary dilatation.      Stable left adrenal enlargement, likely hyperplasia. Remaining solid  organs have an unremarkable  appearance. Aorta nonaneurysmal. The  appendix is not identified with certainty on this exam without oral  contrast. Mild diverticulosis. The GI tract not opacified for assessment  but non obstructive in appearance. There is urinary bladder distention.             Impression:       IMPRESSION :   1. Stable pancreatic head mass and proximal ductal dilatation.  2. Stable left renal scarring and low-density cortical lesions, likely  cysts.  3. Colonic diverticulosis.  4. Stable left adrenal hyperplasia        This report was finalized on 9/19/2019 5:41 AM by Christian Oconnor M.D.               Assessment:    Pancreatic cancer (CMS/HCC)    Chronic obstructive pulmonary disease (CMS/HCC)    Hypertension    Rheumatoid arthritis (CMS/HCC)    Hypokalemia    Pain of upper abdomen      Plan:    Recent EUS biopsy demonstrates adenocarcinoma the pancreas   -Alert and oriented x3 and very clear in regards to her wishes and once with life.  We previously discussed general surgery and oncological consultations and she does not want either.  She declines any aspects of surgical intervention or chemotherapy.  At this juncture she wants to focus on enjoying what time left she has in life and does not want to pursue it living her life in the hospital or undergoing procedures or chemo or radiation.  Secondary to these wishes I have discontinued consults with GI/oncology/general surgery.  She is receiving adequate pain control with use of morphine and I will transition to try to control her pain with p.o. oxycodone.  She states her allergy to hydrocodone was more than to just count of amped her up but there was no true allergy.  Oxycodone will be utilized to avoid Tylenol component.  Advance diet to full liquid diet and get hospice to see the patient as the goal will be home with hospice tomorrow.  Patient has discussed all the above with her family as she has a spouse of 51+ years and as well as her remaining child and even though they  are not a big fan of the decision they are respecting this patient's wishes and I will do the same.  Continue IV fluids though just to add supplemental potassium given hypokalemia and will repeat labs in a.m. with mag level.  DC telemetry.    Clay Bolaños MD  9/19/2019  9:28 AM

## 2019-09-19 NOTE — PLAN OF CARE
Problem: Patient Care Overview  Goal: Plan of Care Review   09/19/19 0910   Coping/Psychosocial   Plan of Care Reviewed With patient   Coping/Psychosocial   Patient Agreement with Plan of Care agrees   OTHER   Outcome Summary Patient has been pleasant and cooperative during shift. Patient treated for pain and nausea. Patient SOA with sats in mid 80's. Started on 2L O2. Consults cancelled and patient will be seen by hospice. Will continue to monitor and assist patient as needed.       Problem: Pain, Acute (Adult)  Goal: Acceptable Pain Control/Comfort Level  Outcome: Ongoing (interventions implemented as appropriate)      Problem: Nausea/Vomiting (Adult)  Goal: Symptom Relief  Outcome: Ongoing (interventions implemented as appropriate)    Goal: Adequate Hydration  Outcome: Ongoing (interventions implemented as appropriate)

## 2019-09-19 NOTE — ED TRIAGE NOTES
Pt complains of upper abdominal pain which started around 1700 tonight. Pt had a Endoscopic biopsy of her panceras by Dr Mcgregor on Tuesday.    Pt denies fever, N/V/D or constipation at present.

## 2019-09-19 NOTE — PROGRESS NOTES
Continued Stay Note  [facility]     Patient Name: Janet Martinez  MRN: [id number]  Today's Date: 9/19/2019    Admit Date: 9/19/2019    Discharge Plan     Row Name 09/19/19 1521       Plan    Plan  Plan home with [facility].   LUCY Silva RN    Patient/Family in Agreement with Plan  yes    Plan Comments  Spoke to Jewels with [facility].  Pt has been accepted and [facility] will be at [facility] at 1030 to assist with discharge needs.  Plan home with [facility].  LUCY Silva RN        Discharge Codes    No documentation.             Abida Silva, RN

## 2019-09-19 NOTE — PROGRESS NOTES
Discharge Planning Assessment  [facility]     Patient Name: Janet Martinez  MRN: [id number]  Today's Date: 9/19/2019    Admit Date: 9/19/2019    Discharge Needs Assessment     Row Name 09/19/19 0911       Living Environment    Lives With  spouse    Name(s) of Who Lives With Patient  Spouse  ( Óscar Martinez [phone])    Current Living Arrangements  home/apartment/condo    Primary Care Provided by  self    Provides Primary Care For  no one    Family Caregiver if Needed  spouse    Family Caregiver Names  Spouse  ( Óscar Martinez [phone])    Quality of Family Relationships  involved    Able to Return to Prior Arrangements  yes    Living Arrangement Comments  Pt lives in a patio home with spouse  ( Óscar Martinez [phone]).       Resource/Environmental Concerns    Resource/Environmental Concerns  none    Transportation Concerns  car, none       Transition Planning    Patient/Family Anticipates Transition to  home with family    Patient/Family Anticipated Services at Transition  hospice care    Transportation Anticipated  family or friend will provide       Discharge Needs Assessment    Readmission Within the Last 30 Days  no previous admission in last 30 days    Concerns to be Addressed  denies needs/concerns at this time    Equipment Currently Used at Home  commode;oxygen    Anticipated Changes Related to Illness  none    Equipment Needed After Discharge  commode;oxygen    Offered/Gave Vendor List  -- Pt requested information for [facility]        Discharge Plan     Row Name 09/19/19 0913       Plan    Plan  Plan home.  Pt requesting [facility] when appropriate.  LUCY Silva RN    Patient/Family in Agreement with Plan  yes    Plan Comments  FACE SHEET VERIFIED/ IM LETTER SIGNED.  Spoke to pt at bedside.  Pt's PCP is Dr. Agata Burgess.  Pt lives in a patio home with spouse  ( Óscar Martinez [phone]).   Pt is independent with ADL's.  Pt has home O2 thru [employer].  Pt also has commode and other  DME's from previous surgeries.  Pt gets prescriptions at [facility] [address]  Pt denies any issues affording medications.  Pt is not current with HH.  Pt has not been in SNF.  Pt states she has discussed with Dr. Bolaños [facility] and that is her wish.   Plan home.  Pt requesting [facility] when appropriate.   LUCY Silva RN        Destination      No service coordination in this encounter.      Durable Medical Equipment      No service coordination in this encounter.      Dialysis/Infusion      No service coordination in this encounter.      Home Medical Care      No service coordination in this encounter.      Therapy      No service coordination in this encounter.      Community Resources      No service coordination in this encounter.          Demographic Summary     Row Name 09/19/19 0911       General Information    Admission Type  inpatient    Arrived From  emergency department    Required Notices Provided  Important Message from Medicare    Referral Source  admission list    Reason for Consult  discharge planning    Preferred Language  English     Used During This Interaction  no        Functional Status     Row Name 09/19/19 0911       Functional Status    Usual Activity Tolerance  good    Current Activity Tolerance  moderate       Functional Status, IADL    Medications  independent    Meal Preparation  independent    Housekeeping  independent    Laundry  independent    Shopping  independent       Mental Status    General Appearance WDL  WDL       Mental Status Summary    Recent Changes in Mental Status/Cognitive Functioning  no changes        Psychosocial    No documentation.       Abuse/Neglect    No documentation.       Legal    No documentation.       Substance Abuse    No documentation.       Patient Forms    No documentation.           Abida Silva, KENZIE

## 2019-09-19 NOTE — PLAN OF CARE
Problem: Patient Care Overview  Goal: Plan of Care Review  Outcome: Ongoing (interventions implemented as appropriate)   09/19/19 1550   Coping/Psychosocial   Plan of Care Reviewed With patient   Coping/Psychosocial   Patient Agreement with Plan of Care agrees   OTHER   Outcome Summary Patient is being assesed on oral pain medication and has been seen by hospice. A nurse from hospice will be here tomorrow at 1030. Patient is a DNR. Patient being treated for pain every four hours. Patient self turning. Will continue to monitor and assess patient as needed.

## 2019-09-19 NOTE — PROGRESS NOTES
Called by RN for abdominal pain    Ordered Morphine 2mg q4 prn as received relief in ER w/o aversion given hydrocodone allergy     Reviewed path from 9/17 EUS demonstrating adenocarcnoma performed by Dr Cortes     NP to see this am as will I     I placed consults for Gen Surg/Onc to evaluate     IVF changed to include KCl    Further recs to follow

## 2019-09-19 NOTE — CONSULTS
Arrived on unit, received report from RN & CM. Met with patient & spouse at bedside. Discussed patient's disease progression, code status, & goals of care. Patient very clear with her wishes to be a DNR & go home to be comfortable & not seek any oncology treatment. Explanation of [facility] services provided. Answered all questions. Patient & spouse agreeable to hospice services upon discharge. Plans for patient to discharge tomorrow. [facility] RN scheduled for 10:30a tomorrow to assist with discharge needs. Updated RN & CM.   Please call [facility] with any questions.    Thank you for this referral.    Jewels Valdes, RN, BSN  Referral & Admissions Coordinator  [facility]  [phone]

## 2019-09-19 NOTE — ED PROVIDER NOTES
EMERGENCY DEPARTMENT ENCOUNTER    CHIEF COMPLAINT  Chief Complaint: Abdominal Pain   History given by: Patient and pt's spouse  History limited by: none   Room Number: E651/1  PMD: Agata Burgess MD      HPI:  Pt is a 79 y.o. female who presents complaining of epigastric abd pain that began at 1700 today, without exacerbating factors. Pt denies N/V/D. Pt states she had biopsy of pancreas performed by Dr. Barnoe on 9/17/19. Per pt's spouse, they are still awaiting results of biopsy, but states GI suspected that the mass was CA. Pt states she has hx of oophorectomy but no hx of cholecystectomy.     Duration:  More than 6 hours   Onset: gradual   Timing: constant   Location: epigastric abd   Radiation: none   Quality: pain   Intensity/Severity: mild to moderate   Progression: unchanged   Associated Symptoms: none   Aggravating Factors: none   Alleviating Factors: none   Previous Episodes: none   Treatment before arrival: none     PAST MEDICAL HISTORY  Active Ambulatory Problems     Diagnosis Date Noted   • Arthritis of knee 03/23/2016   • Anxiety 04/05/2016   • Chronic obstructive pulmonary disease (CMS/HCC) 04/05/2016   • Hyperlipidemia 04/05/2016   • Hypertension 04/05/2016   • Impaired fasting glucose 04/05/2016   • Osteoporosis 04/05/2016   • Rheumatoid arthritis (CMS/HCC) 04/05/2016   • Status post total left knee replacement 06/03/2016   • Hematuria 03/16/2017   • Sebaceous cyst 03/16/2017   • Hypokalemia 03/31/2017   • Epigastric hernia 04/18/2018   • Depression 06/11/2018   • Pancreatic mass 09/12/2019     Resolved Ambulatory Problems     Diagnosis Date Noted   • Knee pain 04/05/2016   • Arthritis of knee, left 05/18/2016   • History of total knee arthroplasty 07/21/2016   • COPD exacerbation (CMS/HCC) 08/01/2016   • Abnormal mammogram 03/16/2017   • Menopausal symptom 03/16/2017   • Sinus problem 03/16/2017   • Chest congestion 03/16/2017   • Cough 03/16/2017   • Right foot pain 03/31/2017   • Encounter for  colonoscopy due to history of adenomatous colonic polyps 08/31/2017   • Persistent cough 01/03/2018     Past Medical History:   Diagnosis Date   • Abdominal hernia    • Cancer (CMS/HCC)    • COPD (chronic obstructive pulmonary disease) (CMS/HCC)    • COPD (chronic obstructive pulmonary disease) (CMS/HCC)    • Depression    • Food allergy    • GERD (gastroesophageal reflux disease)    • Hiatal hernia    • History of bruising easily    • History of colon polyps 08/13/2013   • History of colon polyps 01/30/2013   • History of colon polyps 10/30/2017   • Hypercholesteremia    • Hypertension    • Joint pain    • On home oxygen therapy    • Osteoarthritis    • Osteoporosis    • Pancreatic cancer (CMS/HCC)    • PONV (postoperative nausea and vomiting)    • Rheumatoid arthritis (CMS/HCC)        PAST SURGICAL HISTORY  Past Surgical History:   Procedure Laterality Date   • BREAST BIOPSY Left     Benign pathology   • BRONCHOSCOPY N/A 1/3/2018    Procedure: BRONCHOSCOPY with lavage in right middle lobe and lingula.;  Surgeon: Trenton Garcia MD;  Location: [facility] ENDOSCOPY;  Service:    • CAROTID ENDARTERECTOMY Left 2006   • CATARACT EXTRACTION WITH INTRAOCULAR LENS IMPLANT Bilateral    • COLONOSCOPY N/A 10/30/2017    Procedure: COLONOSCOPY TO CECUM, TO TERMINAL ILEUM WITH COLD BIOPSY POLYPECTOMY;  Surgeon: Jossie Stanley MD;  Location: [facility] ENDOSCOPY;  Service:    • COLONOSCOPY N/A 08/13/2013    One 3 mm polyp in the transverse colon, Diverticulosis in the sigmoid colon, [facility], Dr. Jossie Stanley   • COLONOSCOPY N/A 01/30/2013    One 3 mm polyp ascending colon; One 7 mm polyp descending colon; One 25 mm polyp splenic flexure, Diverticulosis Sigmoid Colon, [facility], Dr. Jossie Stanley   • FOOT SURGERY Bilateral     Bilateral (MULITPLE SURGERIES)   • HAND SURGERY Bilateral     Bilateral (MULTIPLE SURGERIES)   • KNEE ARTHROSCOPY W/ PARTIAL MEDIAL MENISCECTOMY Left 01/22/2016    Left Knee Arthroscopy w/ partial medial & lateral  meniscectomies, Three compartment synovectomy w/ loose body removal, Chondroplasty of patella, Dr. 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 Atknis   • TOTAL KNEE ARTHROPLASTY Left 2016    Procedure: LT TOTAL KNEE ARTHROPLASTY WITH FELECIA NAVIGATION;  Surgeon: Florencio Frazier MD;  Location: [facility] OR;  Service:    • VENTRAL HERNIA REPAIR N/A 2018    Procedure: VENTRAL HERNIA REPAIR LAPAROSCOPIC WITH DAVINCI ROBOT;  Surgeon: Josr Jennings MD;  Location: [facility] MAIN OR;  Service: DaVinci       FAMILY HISTORY  Family History   Problem Relation Age of Onset   • Heart disease Mother    • Hypertension Mother    • Lung disease Mother    • Diabetes Sister    • Hypertension Brother    • Diabetes Sister    • Malig Hyperthermia Neg Hx        SOCIAL HISTORY  Social History     Socioeconomic History   • Marital status:      Spouse name: Not on file   • Number of children: Not on file   • Years of education: Not on file   • Highest education level: Not on file   Tobacco Use   • Smoking status: Former Smoker     Packs/day: 1.50     Years: 30.00     Pack years: 45.00     Types: Cigarettes     Last attempt to quit:      Years since quittin.7   • Smokeless tobacco: Never Used   Substance and Sexual Activity   • Alcohol use: No     Comment: No alcohol for 30 years   • Drug use: No   • Sexual activity: Defer       ALLERGIES  Gold-containing drug products and Hydrocodone    REVIEW OF SYSTEMS  Review of Systems   Constitutional: Negative for fever.   HENT: Negative for sore throat.    Eyes: Negative.    Respiratory: Negative for cough and shortness of breath.    Cardiovascular: Negative for chest pain.   Gastrointestinal: Positive for abdominal pain (epigastric ). Negative for diarrhea and vomiting.   Genitourinary: Negative for dysuria.   Musculoskeletal: Negative for neck pain.   Skin: Negative  for rash.   Allergic/Immunologic: Negative.    Neurological: Negative for weakness, numbness and headaches.   Hematological: Negative.    Psychiatric/Behavioral: Negative.    All other systems reviewed and are negative.      PHYSICAL EXAM  ED Triage Vitals   Temp Heart Rate Resp BP SpO2   09/19/19 0105 09/19/19 0105 09/19/19 0105 09/19/19 0112 09/19/19 0105   97.4 °F (36.3 °C) 86 16 156/95 95 %      Temp src Heart Rate Source Patient Position BP Location FiO2 (%)   09/19/19 0105 -- 09/19/19 0112 09/19/19 0112 --   Tympanic  Lying Right arm        Physical Exam   Constitutional: She is oriented to person, place, and time. No distress.   HENT:   Head: Normocephalic and atraumatic.   Eyes: EOM are normal. Pupils are equal, round, and reactive to light.   Neck: Normal range of motion. Neck supple.   Cardiovascular: Normal rate, regular rhythm and normal heart sounds.   Pulmonary/Chest: Effort normal and breath sounds normal. No respiratory distress.   Abdominal: Soft. There is tenderness in the right upper quadrant and epigastric area. There is no rebound and no guarding.   Musculoskeletal: Normal range of motion. She exhibits no edema.   Neurological: She is alert and oriented to person, place, and time. She has normal sensation and normal strength.   Skin: Skin is warm and dry. No rash noted.   Psychiatric: Mood and affect normal.   Nursing note and vitals reviewed.      LAB RESULTS  Lab Results (last 24 hours)     Procedure Component Value Units Date/Time    CBC & Differential [[id number]] Collected:  09/19/19 0123    Specimen:  Blood Updated:  09/19/19 0209    Narrative:       The following orders were created for panel order CBC & Differential.  Procedure                               Abnormality         Status                     ---------                               -----------         ------                     CBC Auto Differential[[id number]]        Abnormal            Final result                 Please view  results for these tests on the individual orders.    Comprehensive Metabolic Panel [[id number]]  (Abnormal) Collected:  09/19/19 0123    Specimen:  Blood Updated:  09/19/19 0250     Glucose 123 mg/dL      BUN 7 mg/dL      Creatinine 0.51 mg/dL      Sodium 136 mmol/L      Potassium 2.9 mmol/L      Chloride 97 mmol/L      CO2 24.7 mmol/L      Calcium 9.2 mg/dL      Total Protein 7.3 g/dL      Albumin 3.90 g/dL      ALT (SGPT) 11 U/L      AST (SGOT) 12 U/L      Alkaline Phosphatase 73 U/L      Total Bilirubin 0.5 mg/dL      eGFR Non African Amer 117 mL/min/1.73      Globulin 3.4 gm/dL      A/G Ratio 1.1 g/dL      BUN/Creatinine Ratio 13.7     Anion Gap 14.3 mmol/L     Narrative:       GFR Normal >60  Chronic Kidney Disease <60  Kidney Failure <15    Lipase [[id number]]  (Abnormal) Collected:  09/19/19 0123    Specimen:  Blood Updated:  09/19/19 0250     Lipase 125 U/L     CBC Auto Differential [[id number]]  (Abnormal) Collected:  09/19/19 0123    Specimen:  Blood Updated:  09/19/19 0209     WBC 12.34 10*3/mm3      RBC 4.64 10*6/mm3      Hemoglobin 12.8 g/dL      Hematocrit 38.6 %      MCV 83.2 fL      MCH 27.6 pg      MCHC 33.2 g/dL      RDW 13.5 %      RDW-SD 40.3 fl      MPV 11.4 fL      Platelets 269 10*3/mm3      Neutrophil % 71.8 %      Lymphocyte % 13.3 %      Monocyte % 11.7 %      Eosinophil % 2.5 %      Basophil % 0.3 %      Immature Grans % 0.4 %      Neutrophils, Absolute 8.86 10*3/mm3      Lymphocytes, Absolute 1.64 10*3/mm3      Monocytes, Absolute 1.44 10*3/mm3      Eosinophils, Absolute 0.31 10*3/mm3      Basophils, Absolute 0.04 10*3/mm3      Immature Grans, Absolute 0.05 10*3/mm3      nRBC 0.0 /100 WBC     Urinalysis With Microscopic If Indicated (No Culture) - Urine, Clean Catch [[id number]]  (Normal) Collected:  09/19/19 0235    Specimen:  Urine, Clean Catch Updated:  09/19/19 0320     Color, UA Yellow     Appearance, UA Clear     pH, UA 7.0     Specific Gravity, UA 1.007     Glucose, UA Negative      Ketones, UA Negative     Bilirubin, UA Negative     Blood, UA Negative     Protein, UA Negative     Leuk Esterase, UA Negative     Nitrite, UA Negative     Urobilinogen, UA 0.2 E.U./dL    Narrative:       Urine microscopic not indicated.    Basic Metabolic Panel [[id number]]  (Abnormal) Collected:  09/19/19 0544    Specimen:  Blood Updated:  09/19/19 0637     Glucose 105 mg/dL      BUN 6 mg/dL      Creatinine 0.45 mg/dL      Sodium 135 mmol/L      Potassium 3.1 mmol/L      Chloride 98 mmol/L      CO2 26.3 mmol/L      Calcium 8.6 mg/dL      eGFR Non African Amer 135 mL/min/1.73      BUN/Creatinine Ratio 13.3     Anion Gap 10.7 mmol/L     Narrative:       GFR Normal >60  Chronic Kidney Disease <60  Kidney Failure <15    CBC (No Diff) [[id number]] Collected:  09/19/19 0544    Specimen:  Blood Updated:  09/19/19 0604          I ordered the above labs and reviewed the results    RADIOLOGY  CT Abdomen Pelvis With Contrast   Final Result   IMPRESSION :    1. Stable pancreatic head mass and proximal ductal dilatation.   2. Stable left renal scarring and low-density cortical lesions, likely   cysts.   3. Colonic diverticulosis.   4. Stable left adrenal hyperplasia           This report was finalized on 9/19/2019 5:41 AM by Christian Oconnor M.D.               I ordered the above noted radiological studies. Interpreted by radiologist. Reviewed by me in PACS.       PROCEDURES  Procedures      PROGRESS AND CONSULTS     0110: Labs ordered for further evaluation.     0125: Upon pt exam, discussed plan for lab workup and CT Abd/Pelvis in ED due to recent procedure. Offered pt pain medication at this time.     0134: CT Abd/Pelvis ordered for further evaluation. Morphine and Zofran ordered for pain management.     0351: Pt rechecked and states the pain improved in ED but has since returned. Discussed with pt the results of labs and CT scan, with evidence of unchanged CT scan but hypokalemia seen on labs. Informed pt of plan for  admission, further hypokalemia treatment, and pain management. Discussed plan for GI follow up as well. Pt understands and agrees with the plan, all questions answered.    0358: Call placed to A. Micro-K ordered for hypokalemia. Morphine and Zofran ordered for further pain management.     0404: Discussed pt's case with MARIBEL Young ([facility] - on call for Dr. Davis) who agreed to admit pt to telemetry for further workup, pain control, and probable consult with GI.       MEDICAL DECISION MAKING  Results were reviewed/discussed with the patient and they were also made aware of online access. Pt also made aware that some labs, such as cultures, will not be resulted during ER visit and follow up with PMD is necessary.     MDM  Number of Diagnoses or Management Options     Amount and/or Complexity of Data Reviewed  Clinical lab tests: ordered and reviewed (Potassium - 2.9  WBC - 12.34  Lipase - 125  )  Tests in the radiology section of CPT®: ordered and reviewed (CT_ unchanged pancreatic mass )  Discuss the patient with other providers: yes (Rehana Judd (MARIBEL on call for [facility]) )           DIAGNOSIS  Final diagnoses:   Pain of upper abdomen   Hypokalemia   Pancreatic mass       DISPOSITION  ADMISSION    Discussed treatment plan and reason for admission with pt/family and admitting physician.  Pt/family voiced understanding of the plan for admission for further testing/treatment as needed.         Latest Documented Vital Signs:  As of 6:42 AM  BP- 132/79 HR- 79 Temp- 98.4 °F (36.9 °C) (Oral) O2 sat- 96%    --  Documentation assistance provided by johnny Hernandez for Dr. Rivas.  Information recorded by the johnny was done at my direction and has been verified and validated by me.            Kena Hernandez  09/19/19 2271       Elliot Rivas MD  09/19/19 6926

## 2019-09-19 NOTE — ED NOTES
Nursing report ED to floor  Janet LEWIS [place]  79 y.o.  female    HPI (triage note):   Chief Complaint   Patient presents with   • Abdominal Pain   • Nausea       Admitting doctor:   Allan Davis MD    Admitting diagnosis:   The primary encounter diagnosis was Pain of upper abdomen. Diagnoses of Hypokalemia and Pancreatic mass were also pertinent to this visit.    Code status:   Current Code Status     Date Active Code Status Order ID Comments User Context       9/19/2019 04:08 CPR [id number]  English, Rehana C, APRN ED       Questions for Current Code Status     Question Answer Comment    Code Status CPR     Medical Interventions (Level of Support Prior to Arrest) Full           Allergies:   Gold-containing drug products and Hydrocodone    Weight:       09/19/19  0119   Weight: 59.1 kg (130 lb 6.4 oz)       Most recent vitals:   Vitals:    09/19/19 0143 09/19/19 0243 09/19/19 0343 09/19/19 0344   BP: 164/78 171/95 173/77    BP Location:       Patient Position: Lying Lying     Pulse: 73 78  80   Resp: 16      Temp:       TempSrc:       SpO2: 93% 97%  94%   Weight:       Height:           Active LDAs/IV Access:   Lines, Drains & Airways    Active LDAs     Name:   Placement date:   Placement time:   Site:   Days:    Peripheral IV 09/19/19 0122 Left Antecubital   09/19/19 0122    Antecubital   less than 1                Labs (abnormal labs have a star):   Labs Reviewed   COMPREHENSIVE METABOLIC PANEL - Abnormal; Notable for the following components:       Result Value    Glucose 123 (*)     BUN 7 (*)     Creatinine 0.51 (*)     Potassium 2.9 (*)     Chloride 97 (*)     All other components within normal limits    Narrative:     GFR Normal >60  Chronic Kidney Disease <60  Kidney Failure <15   LIPASE - Abnormal; Notable for the following components:    Lipase 125 (*)     All other components within normal limits   CBC WITH AUTO DIFFERENTIAL - Abnormal; Notable for the following components:    WBC 12.34 (*)      Lymphocyte % 13.3 (*)     Neutrophils, Absolute 8.86 (*)     Monocytes, Absolute 1.44 (*)     All other components within normal limits   URINALYSIS W/ MICROSCOPIC IF INDICATED (NO CULTURE) - Normal    Narrative:     Urine microscopic not indicated.   BASIC METABOLIC PANEL   CBC (NO DIFF)   CBC AND DIFFERENTIAL    Narrative:     The following orders were created for panel order CBC & Differential.  Procedure                               Abnormality         Status                     ---------                               -----------         ------                     CBC Auto Differential[[id number]]        Abnormal            Final result                 Please view results for these tests on the individual orders.       EKG:   No orders to display       Meds given in ED:   Medications   sodium chloride 0.9 % flush 10 mL (not administered)   sodium chloride 0.9 % flush 10 mL (not administered)   sodium chloride 0.9 % flush 10 mL (not administered)   nitroglycerin (NITROSTAT) SL tablet 0.4 mg (not administered)   sodium chloride 0.9 % infusion (not administered)   ondansetron (ZOFRAN) injection 4 mg (not administered)   sodium chloride 0.9 % bolus 1,000 mL (0 mL Intravenous Stopped 9/19/19 0300)   morphine injection 2 mg (2 mg Intravenous Given 9/19/19 0144)   ondansetron (ZOFRAN) injection 4 mg (4 mg Intravenous Given 9/19/19 0144)   iopamidol (ISOVUE-300) 61 % injection 100 mL (85 mL Intravenous Given by Other 9/19/19 0308)   morphine injection 2 mg (2 mg Intravenous Given 9/19/19 0400)   ondansetron (ZOFRAN) injection 4 mg (4 mg Intravenous Given 9/19/19 0400)   potassium chloride (MICRO-K) CR capsule 40 mEq (40 mEq Oral Given 9/19/19 0409)       Imaging results:  Ct Abdomen Pelvis With Contrast    Result Date: 9/19/2019  IMPRESSION : 1. Stable pancreatic head mass and proximal ductal dilatation. 2. Stable left renal scarring and low-density cortical lesions, likely cysts. 3. Colonic diverticulosis. 4. Stable  left adrenal hyperplasia           Ambulatory status:   - stand by assist.     Social issues:   Social History     Socioeconomic History   • Marital status:      Spouse name: Not on file   • Number of children: Not on file   • Years of education: Not on file   • Highest education level: Not on file   Tobacco Use   • Smoking status: Former Smoker     Packs/day: 1.50     Years: 30.00     Pack years: 45.00     Types: Cigarettes     Last attempt to quit:      Years since quittin.7   • Smokeless tobacco: Never Used   Substance and Sexual Activity   • Alcohol use: No     Comment: No alcohol for 30 years   • Drug use: No        Waqar Rivera RN  19 [phone]

## 2019-09-19 NOTE — PROGRESS NOTES
Continued Stay Note  [facility]     Patient Name: Janet Martinez  MRN: [id number]  Today's Date: 9/19/2019    Admit Date: 9/19/2019    Discharge Plan     Row Name 09/19/19 1215       Plan    Plan  Plan home.  Pt requesting [facility] when appropriate.   LUCY Silva RN    Patient/Family in Agreement with Plan  yes    Plan Comments  Pt with [facility],  Called consult to Ann Marie  ( [phone]) .  [facility] to schedule an appointment with pt and .  Plan home.  Pt requesting [facility] when appropriate.   LUCY Silva RN        Discharge Codes    No documentation.             Abida Silva, RN

## 2019-09-19 NOTE — PROGRESS NOTES
Continued Stay Note  [facility]     Patient Name: Janet Martinez  MRN: [id number]  Today's Date: 9/19/2019    Admit Date: 9/19/2019    Discharge Plan     Row Name 09/19/19 0913       Plan    Plan  Plan home.  Pt requesting [facility] when appropriate.  LUCY Silva RN    Patient/Family in Agreement with Plan  yes    Plan Comments  FACE SHEET VERIFIED/ IM LETTER SIGNED.  Spoke to pt at bedside.  Pt's PCP is Dr. Agata Burgess.  Pt lives in a patio home with spouse  ( Óscar Martinez [phone]).   Pt is independent with ADL's.  Pt has home O2 thru [employer].  Pt also has commode and other DME's from previous surgeries.  Pt gets prescriptions at [facility] ( [address]).  Pt denies any issues affording medications.  Pt is not current with HH.  Pt has not been in SNF.  Pt states she has discussed with Dr. Bolaños [facility] and that is her wish.   Plan home.  Pt requesting [facility] when appropriate.   LUCY Silva RN        Discharge Codes    No documentation.             Abida Silva, KENZIE

## 2019-09-20 VITALS
SYSTOLIC BLOOD PRESSURE: 139 MMHG | HEIGHT: 64 IN | HEART RATE: 68 BPM | RESPIRATION RATE: 18 BRPM | OXYGEN SATURATION: 89 % | WEIGHT: 126.7 LBS | TEMPERATURE: 98.1 F | DIASTOLIC BLOOD PRESSURE: 96 MMHG | BODY MASS INDEX: 21.63 KG/M2

## 2019-09-20 LAB
ANION GAP SERPL CALCULATED.3IONS-SCNC: 15.4 MMOL/L (ref 5–15)
BUN BLD-MCNC: 3 MG/DL (ref 8–23)
BUN/CREAT SERPL: 7.7 (ref 7–25)
CALCIUM SPEC-SCNC: 7.9 MG/DL (ref 8.6–10.5)
CHLORIDE SERPL-SCNC: 102 MMOL/L (ref 98–107)
CO2 SERPL-SCNC: 22.6 MMOL/L (ref 22–29)
CREAT BLD-MCNC: 0.39 MG/DL (ref 0.57–1)
GFR SERPL CREATININE-BSD FRML MDRD: >150 ML/MIN/1.73
GLUCOSE BLD-MCNC: 88 MG/DL (ref 65–99)
MAGNESIUM SERPL-MCNC: 1.5 MG/DL (ref 1.6–2.4)
POTASSIUM BLD-SCNC: 3.2 MMOL/L (ref 3.5–5.2)
SODIUM BLD-SCNC: 140 MMOL/L (ref 136–145)

## 2019-09-20 PROCEDURE — 80048 BASIC METABOLIC PNL TOTAL CA: CPT | Performed by: HOSPITALIST

## 2019-09-20 PROCEDURE — 96361 HYDRATE IV INFUSION ADD-ON: CPT

## 2019-09-20 PROCEDURE — 25810000003 SODIUM CHLORIDE 0.9 % WITH KCL 20 MEQ 20-0.9 MEQ/L-% SOLUTION: Performed by: HOSPITALIST

## 2019-09-20 PROCEDURE — 94799 UNLISTED PULMONARY SVC/PX: CPT

## 2019-09-20 PROCEDURE — G0378 HOSPITAL OBSERVATION PER HR: HCPCS

## 2019-09-20 PROCEDURE — 83735 ASSAY OF MAGNESIUM: CPT | Performed by: HOSPITALIST

## 2019-09-20 RX ORDER — MAGNESIUM SULFATE HEPTAHYDRATE 40 MG/ML
4 INJECTION, SOLUTION INTRAVENOUS AS NEEDED
Status: DISCONTINUED | OUTPATIENT
Start: 2019-09-20 | End: 2019-09-20 | Stop reason: HOSPADM

## 2019-09-20 RX ORDER — MAGNESIUM SULFATE HEPTAHYDRATE 40 MG/ML
2 INJECTION, SOLUTION INTRAVENOUS AS NEEDED
Status: DISCONTINUED | OUTPATIENT
Start: 2019-09-20 | End: 2019-09-20 | Stop reason: HOSPADM

## 2019-09-20 RX ORDER — OXYCODONE HYDROCHLORIDE 5 MG/1
5 TABLET ORAL EVERY 4 HOURS PRN
Qty: 35 TABLET | Refills: 0 | Status: SHIPPED | OUTPATIENT
Start: 2019-09-20 | End: 2019-09-29

## 2019-09-20 RX ORDER — MAGNESIUM OXIDE 400 MG/1
400 TABLET ORAL DAILY
Qty: 30 TABLET | Refills: 0 | Status: SHIPPED | OUTPATIENT
Start: 2019-09-20 | End: 2020-01-01 | Stop reason: SDUPTHER

## 2019-09-20 RX ORDER — POTASSIUM CHLORIDE 750 MG/1
40 CAPSULE, EXTENDED RELEASE ORAL AS NEEDED
Status: DISCONTINUED | OUTPATIENT
Start: 2019-09-20 | End: 2019-09-20 | Stop reason: HOSPADM

## 2019-09-20 RX ORDER — POTASSIUM CHLORIDE 7.45 MG/ML
10 INJECTION INTRAVENOUS
Status: DISCONTINUED | OUTPATIENT
Start: 2019-09-20 | End: 2019-09-20 | Stop reason: HOSPADM

## 2019-09-20 RX ORDER — POTASSIUM CHLORIDE 1.5 G/1.77G
40 POWDER, FOR SOLUTION ORAL AS NEEDED
Status: DISCONTINUED | OUTPATIENT
Start: 2019-09-20 | End: 2019-09-20 | Stop reason: HOSPADM

## 2019-09-20 RX ADMIN — DULOXETINE HYDROCHLORIDE 60 MG: 60 CAPSULE, DELAYED RELEASE ORAL at 07:53

## 2019-09-20 RX ADMIN — ARFORMOTEROL TARTRATE 15 MCG: 15 SOLUTION RESPIRATORY (INHALATION) at 07:12

## 2019-09-20 RX ADMIN — MONTELUKAST SODIUM 10 MG: 10 TABLET, FILM COATED ORAL at 07:54

## 2019-09-20 RX ADMIN — BUDESONIDE 0.5 MG: 0.5 SUSPENSION RESPIRATORY (INHALATION) at 07:12

## 2019-09-20 RX ADMIN — POTASSIUM CHLORIDE AND SODIUM CHLORIDE 100 ML/HR: 900; 150 INJECTION, SOLUTION INTRAVENOUS at 06:08

## 2019-09-20 RX ADMIN — VALSARTAN 320 MG: 320 TABLET ORAL at 07:54

## 2019-09-20 RX ADMIN — AMLODIPINE BESYLATE 5 MG: 5 TABLET ORAL at 07:54

## 2019-09-20 RX ADMIN — FOLIC ACID 1000 MCG: 1 TABLET ORAL at 07:54

## 2019-09-20 RX ADMIN — PANTOPRAZOLE SODIUM 40 MG: 40 TABLET, DELAYED RELEASE ORAL at 06:08

## 2019-09-20 RX ADMIN — POTASSIUM CHLORIDE 40 MEQ: 750 CAPSULE, EXTENDED RELEASE ORAL at 11:22

## 2019-09-20 RX ADMIN — SODIUM CHLORIDE, PRESERVATIVE FREE 10 ML: 5 INJECTION INTRAVENOUS at 07:55

## 2019-09-20 RX ADMIN — ATORVASTATIN CALCIUM 20 MG: 20 TABLET, FILM COATED ORAL at 07:54

## 2019-09-20 RX ADMIN — OXYCODONE HYDROCHLORIDE 5 MG: 5 TABLET ORAL at 06:08

## 2019-09-20 RX ADMIN — OXYCODONE HYDROCHLORIDE 5 MG: 5 TABLET ORAL at 11:22

## 2019-09-20 NOTE — PLAN OF CARE
Problem: Patient Care Overview  Goal: Plan of Care Review  Outcome: Ongoing (interventions implemented as appropriate)   09/20/19 0310   Coping/Psychosocial   Plan of Care Reviewed With patient   OTHER   Outcome Summary pt resting quietly in bed. pain meds every 4 hours prn for abdominal pain. pt up ad blake. plan is to be discharged this AM. will continue to monitor.    Plan of Care Review   Progress no change     Goal: Individualization and Mutuality  Outcome: Ongoing (interventions implemented as appropriate)    Goal: Discharge Needs Assessment  Outcome: Ongoing (interventions implemented as appropriate)      Problem: Pain, Acute (Adult)  Goal: Identify Related Risk Factors and Signs and Symptoms  Outcome: Outcome(s) achieved Date Met: 09/20/19    Goal: Acceptable Pain Control/Comfort Level  Outcome: Ongoing (interventions implemented as appropriate)      Problem: Nausea/Vomiting (Adult)  Goal: Identify Related Risk Factors and Signs and Symptoms  Outcome: Outcome(s) achieved Date Met: 09/20/19    Goal: Symptom Relief  Outcome: Ongoing (interventions implemented as appropriate)    Goal: Adequate Hydration  Outcome: Ongoing (interventions implemented as appropriate)

## 2019-09-20 NOTE — DISCHARGE SUMMARY
Date of Admission: 9/19/2019  Date of Discharge:  9/20/2019  Primary Care Physician: Agata Burgess MD     Discharge Diagnosis:  Active Hospital Problems    Diagnosis  POA   • **Pancreatic cancer (CMS/HCC) [C25.9]  Yes   • Pain of upper abdomen [R10.10]  Yes   • Hypokalemia [E87.6]  Yes   • Rheumatoid arthritis (CMS/HCC) [M06.9]  Yes   • Hypertension [I10]  Yes   • Chronic obstructive pulmonary disease (CMS/HCC) [J44.9]  Yes      Resolved Hospital Problems   No resolved problems to display.       Presenting Problem/History of Present Illness:  Hypokalemia [E87.6]  Pancreatic mass [K86.9]  Pain of upper abdomen [R10.10]  Pain of upper abdomen [R10.10]     Mrs Martinez is a wonderfully pleasant 79-year-old female who had recently undergone endoscopic ultrasound-guided biopsy by  with GI on 9/17/2019.  The report is showing a adenocarcinoma likely consistent with pancreatic etiology.  Patient was aware of this diagnosis and states she had been doing just fine as the other day she was zip lining.  She had the onset of abdominal pain it is in her epigastric region and secondary to this came to the ER for further evaluation and symptom treatment.  She does have issues with some nausea but she has not really had any vomiting or diarrhea.  She denies any altered mentation or confusion.  She denies any chest pain troubles breathing above baseline she is oxygen dependent at night via nasal cannula due to her advanced COPD.  Unfortunate this patient also has advanced rheumatoid arthritis as is evident by bilateral ulnar deviation deformities.  Patient would like to advance diet at this time and she is very clear in regards to her wishes that she does not want further surgical or oncological consultation.  She denies any fever chills or night sweats.  She feels better after achieving pain control with IV morphine.    Hospital Course:  The patient is a 79 y.o. female who presented with epigastric pain found to  have pancreatic mass on CT.  She underwent fine-needle aspiration of the mass which is consistent with adenocarcinoma.  Patient wished to pursue comfort and palliative measures.  Her pain has been controlled with oral pain medication.  Hospice was consulted and met with her yesterday and followed up with her this morning.  Plan is for her to go home with home hospice at written a prescription for oxycodone and also oral magnesium supplementation.  She did receive potassium replacement here today prior to discharge.  She plans to follow-up with her primary care provider as an outpatient to discuss her progression as well as with hospice.  We discussed that if she does have worsening pain/difficulty tolerating oral intake she could follow-up with surgery to see if palliative stent placement would be beneficial.  At this time she is hungry and tolerating oral intake.  She had normal bilirubin level on admission so I do not think acute biliary obstruction is the cause for pain and is instead pancreatitis related to cancer.    Exam Today:  General AA NAD  HEENT EOMI, no scleral icterus  CV RRR  Lungs CTA B  Abdomen epigastric tenderness without guarding  abd no cyanosis or edema  Neuro cranial nerves II through XII grossly intact    Results:  Fine Needle Aspiration: [id number]   Order: [id number]   Status:  Final result   Visible to patient:  No (Not Released) Next appt:  09/23/2019 at 01:00 PM in Internal Medicine (Agata Burgess MD) Dx:  Pancreatic mass   Component    Case Report   Medical Cytology Report                           Case: [id number]                                  Authorizing Provider:  Phani Cortes MD  Collected:           09/17/2019 10:25 AM           Ordering Location:     [facility]  Received:            09/17/2019 11:23 AM                                  ENDO SUITES                                                                   Pathologist:           Surendra Newman  MD                                                          Specimen:    Pancreas, FNA-Pancreas                                                                     Final Diagnosis   1. Pancreas FNA, Smears (2) and Cell Block (1):                 A. Atypical glandular cells present consistent with adenocarcinoma.           CT Abdomen/Pelvis  1. Stable pancreatic head mass and proximal ductal dilatation.  2. Stable left renal scarring and low-density cortical lesions, likely  cysts.  3. Colonic diverticulosis.  4. Stable left adrenal hyperplasia    Procedures Performed:         Consults:   Consults     Date and Time Order Name Status Description    9/19/2019 0358 [facility] (on-call MD unless specified) Details Completed            Discharge Disposition:  Hospice/Home    Discharge Medications:     Discharge Medications      New Medications      Instructions Start Date   magnesium oxide 400 MG tablet  Commonly known as:  MAG-OX   400 mg, Oral, Daily      oxyCODONE 5 MG immediate release tablet  Commonly known as:  ROXICODONE   5 mg, Oral, Every 4 Hours PRN         Continue These Medications      Instructions Start Date   albuterol (2.5 MG/3ML) 0.083% nebulizer solution  Commonly known as:  PROVENTIL   2.5 mg, Nebulization, Every 4 Hours PRN      amLODIPine 5 MG tablet  Commonly known as:  NORVASC   5 mg, Oral, Daily      atorvastatin 20 MG tablet  Commonly known as:  LIPITOR   20 mg, Oral, Daily      budesonide 0.5 MG/2ML nebulizer solution  Commonly known as:  PULMICORT   USE 1 VIAL VIA NEBULIZER TWICE DAILY RINSE MOUTH AFTER USE      CALCIUM 600+D PO   1 tablet, Oral, Daily      doxycycline 100 MG capsule  Commonly known as:  VIBRAMYCIN   100 mg, Oral, 2 Times Daily      DULoxetine 60 MG capsule  Commonly known as:  CYMBALTA   60 mg, Oral, 2 Times Daily      etanercept 50 MG/ML solution prefilled syringe injection  Commonly known as:  ENBREL   50 mg, Subcutaneous, Weekly, THURSDAYS. TO HOLD 4 WEEKS      fluticasone 50  MCG/ACT nasal spray  Commonly known as:  FLONASE   2 sprays, Nasal, Daily      folic acid 1 MG tablet  Commonly known as:  FOLVITE   TAKE 1 TABLET BY MOUTH DAILY.      ipratropium 0.06 % nasal spray  Commonly known as:  ATROVENT   2 sprays, Nasal, 4 Times Daily PRN      ipratropium-albuterol 0.5-2.5 mg/3 ml nebulizer  Commonly known as:  DUO-NEB   3 mL, Nebulization, Every 4 Hours PRN      montelukast 10 MG tablet  Commonly known as:  SINGULAIR   10 mg, Oral, Daily      omeprazole 40 MG capsule  Commonly known as:  priLOSEC   40 mg, Oral, Every Evening      omeprazole 40 MG capsule  Commonly known as:  priLOSEC   TAKE 1 CAPSULE BY MOUTH EVERY DAY IN THE EVENING      PERFOROMIST 20 MCG/2ML nebulizer solution  Generic drug:  formoterol   USE 1 VIAL VIA NEBULIZER TWICE DAILY      umeclidinium-vilanterol 62.5-25 MCG/INH aerosol powder  inhaler  Commonly known as:  ANORO ELLIPTA   1 puff, Inhalation, Daily - RT      valsartan 320 MG tablet  Commonly known as:  DIOVAN   320 mg, Oral, Daily             Discharge Diet:   Diet Instructions     Advance Diet As Tolerated            Activity at Discharge:   Activity Instructions     Activity as Tolerated            Follow-up Appointments:   Contact information for follow-up providers     Agata Burgess MD Follow up.    Specialty:  Internal Medicine  Contact information:  [address]  [address] [address] [phone]                   Contact information for after-discharge care     Home Medical Care     [facility] Follow up.    Service:  Home Hospice  Contact information:  [address]  [facility] [address] [phone]                             Test Results Pending at Discharge:       Benjamin Guerrero MD  09/20/19  11:46 AM    Time Spent on Discharge Activities: >30 minutes

## 2019-09-20 NOTE — PROGRESS NOTES
She reports pain improved.  Plan replacement of potassium and magnesium today.  Hospice to follow-up today at about 1030 and then plan to discharge home with home hospice.

## 2019-09-20 NOTE — PROGRESS NOTES
Case Management Discharge Note    Final Note: Pt discharged home with Nhung Silva RN    Destination      No service has been selected for the patient.      Durable Medical Equipment      No service has been selected for the patient.      Dialysis/Infusion      No service has been selected for the patient.      Home Medical Care - Selection Complete      Service Provider Request Status Selected Services Address Phone Number Fax Number    [facility] Selected Home H[facility] [address], [facility] [address] [phone]      Therapy      No service has been selected for the patient.      Community Resources      No service has been selected for the patient.        Transportation Services  Private: Car    Final Discharge Disposition Code: 50 - home with hospice

## 2019-09-20 NOTE — DISCHARGE PLACEMENT REQUEST
"Allie Martinez (79 y.o. Female)     Date of Birth Social Security Number Address Home Phone MRN    [id number]  [id number] [facility] [address] [phone] [id number]    [employer] Marital Status          None        Admission Date Admission Type Admitting Provider Attending Provider Department, Room/Bed    9/19/19 Emergency Allan Davis MD Baumann, Patrick D, MD [address], E651/1    Discharge Date Discharge Disposition Discharge Destination                       Attending Provider:  Benjamin Guerrero MD    Allergies:  Gold-containing Drug Products, Hydrocodone    Isolation:  None   Infection:  None   Code Status:  CPR    Ht:  162.6 cm (64\")   Wt:  57.5 kg (126 lb 11.2 oz)    Admission Cmt:  None   Principal Problem:  Pancreatic cancer (CMS/HCC) [C25.9]                 Active Insurance as of 9/19/2019     Primary Coverage     Payor Plan Insurance Group Employer/Plan Group    MEDICARE MEDICARE A & B      Payor Plan Address Payor Plan Phone Number Payor Plan Fax Number Effective Dates    [address] [phone]  6/1/2007 - None Entered    [facility] [id number]       Subscriber Name Subscriber Birth Date Member ID       ALLIE MARTINEZ 1940 [id number]           Secondary Coverage     Payor Plan Insurance Group Employer/Plan Group    [facility] SUPP [id number]     Payor Plan Address Payor Plan Phone Number Payor Plan Fax Number Effective Dates    [address]   3/1/2019 - None Entered    [facility] [id number]       Subscriber Name Subscriber Birth Date Member ID       ALLIE MARTINEZ 1940 [id number]                 Emergency Contacts      (Rel.) Home Phone Work Phone Mobile Phone    Óscar Martinez (Spouse) [phone] -- [phone]    Mian Martinez (Son) [phone] -- [phone]              "

## 2019-09-23 ENCOUNTER — OFFICE VISIT (OUTPATIENT)
Dept: INTERNAL MEDICINE | Facility: CLINIC | Age: 79
End: 2019-09-23

## 2019-09-23 VITALS
SYSTOLIC BLOOD PRESSURE: 153 MMHG | HEART RATE: 70 BPM | DIASTOLIC BLOOD PRESSURE: 75 MMHG | WEIGHT: 127 LBS | BODY MASS INDEX: 21.68 KG/M2 | HEIGHT: 64 IN | OXYGEN SATURATION: 98 %

## 2019-09-23 DIAGNOSIS — I10 ESSENTIAL HYPERTENSION: ICD-10-CM

## 2019-09-23 DIAGNOSIS — C25.0 MALIGNANT NEOPLASM OF HEAD OF PANCREAS (HCC): Primary | ICD-10-CM

## 2019-09-23 DIAGNOSIS — E78.5 HYPERLIPIDEMIA, UNSPECIFIED HYPERLIPIDEMIA TYPE: ICD-10-CM

## 2019-09-23 PROCEDURE — 99214 OFFICE O/P EST MOD 30 MIN: CPT | Performed by: INTERNAL MEDICINE

## 2019-09-23 NOTE — PROGRESS NOTES
Chief Complaint   Patient presents with   • Pancreatic Cancer   • Hypertension   • Arthritis       History of Present Illness   Janet Martinez is a 79 y.o. female presents for follow up evaluation. Patient recently diagnosed with pancreatic cancer. She has determined that she does not want any intervention at this time and cancelled referrals to surgical specialist or oncologist. However, she is not sure that she fully is aware of treatment options or possible outcomes from that. She did have a PET scan that had possible mediastinal lymph node.   Patient has RA. She is on enbrel for this.   She has dyslipidemia. She is on lipitor daily for this related to a cad.       The following portions of the patient's history were reviewed and updated as appropriate: allergies, current medications, past family history, past medical history, past social history, past surgical history and problem list.  Current Outpatient Medications on File Prior to Visit   Medication Sig Dispense Refill   • albuterol (PROVENTIL) (2.5 MG/3ML) 0.083% nebulizer solution Take 2.5 mg by nebulization Every 4 (Four) Hours As Needed for Wheezing.     • amLODIPine (NORVASC) 5 MG tablet Take 1 tablet by mouth Daily. 30 tablet 5   • atorvastatin (LIPITOR) 20 MG tablet Take 1 tablet by mouth Daily. 90 tablet 2   • budesonide (PULMICORT) 0.5 MG/2ML nebulizer solution USE 1 VIAL VIA NEBULIZER TWICE DAILY RINSE MOUTH AFTER USE  12   • Calcium Carbonate-Vitamin D (CALCIUM 600+D PO) Take 1 tablet by mouth Daily.     • DULoxetine (CYMBALTA) 60 MG capsule Take 1 capsule by mouth 2 (Two) Times a Day. 60 capsule 3   • etanercept (ENBREL) 50 MG/ML solution prefilled syringe injection Inject 50 mg under the skin 1 (One) Time Per Week. THURSDAYS. TO HOLD 4 WEEKS     • fluticasone (FLONASE) 50 MCG/ACT nasal spray 2 sprays into the nostril(s) as directed by provider Daily. 1 bottle 5   • folic acid (FOLVITE) 1 MG tablet TAKE 1 TABLET BY MOUTH DAILY. 90 tablet 0   •  ipratropium (ATROVENT) 0.06 % nasal spray 2 sprays into each nostril 4 (Four) Times a Day As Needed for Rhinitis.     • ipratropium-albuterol (DUO-NEB) 0.5-2.5 mg/3 ml nebulizer Take 3 mL by nebulization Every 4 (Four) Hours As Needed for Wheezing.     • magnesium oxide (MAG-OX) 400 MG tablet Take 1 tablet by mouth Daily. 30 tablet 0   • montelukast (SINGULAIR) 10 MG tablet Take 1 tablet by mouth Daily. 90 tablet 3   • omeprazole (priLOSEC) 40 MG capsule Take 1 capsule by mouth Every Evening. 90 capsule 1   • oxyCODONE (ROXICODONE) 5 MG immediate release tablet Take 1 tablet by mouth Every 4 (Four) Hours As Needed for Moderate Pain  or Severe Pain  for up to 9 days. 35 tablet 0   • PERFOROMIST 20 MCG/2ML nebulizer solution USE 1 VIAL VIA NEBULIZER TWICE DAILY  12   • umeclidinium-vilanterol (ANORO ELLIPTA) 62.5-25 MCG/INH aerosol powder  inhaler Inhale 1 puff Daily. 3 each 2   • valsartan (DIOVAN) 320 MG tablet Take 1 tablet by mouth Daily. 90 tablet 2   • doxycycline (VIBRAMYCIN) 100 MG capsule Take 1 capsule by mouth 2 (Two) Times a Day. 14 capsule 0   • omeprazole (priLOSEC) 40 MG capsule TAKE 1 CAPSULE BY MOUTH EVERY DAY IN THE EVENING 90 capsule 1     Current Facility-Administered Medications on File Prior to Visit   Medication Dose Route Frequency Provider Last Rate Last Dose   • levalbuterol (XOPENEX) nebulizer solution 0.63 mg  0.63 mg Nebulization Q6H PRN Agata Burgess MD   0.63 mg at 12/17/18 1437     Review of Systems   Constitutional: Negative.    HENT: Negative.    Eyes: Negative.    Respiratory: Negative.    Cardiovascular: Negative.    Gastrointestinal: Negative.    Endocrine: Negative.    Genitourinary: Negative.    Musculoskeletal: Negative.    Skin: Negative.    Allergic/Immunologic: Negative.    Neurological: Negative.    Hematological: Negative.    Psychiatric/Behavioral: Negative.        Objective   Physical Exam   Constitutional: She is oriented to person, place, and time. She appears  "well-developed and well-nourished.   HENT:   Head: Normocephalic and atraumatic.   Right Ear: External ear normal.   Left Ear: External ear normal.   Nose: Nose normal.   Mouth/Throat: Oropharynx is clear and moist.   Eyes: Conjunctivae and EOM are normal. Pupils are equal, round, and reactive to light.   Neck: Normal range of motion. Neck supple.   Cardiovascular: Normal rate, regular rhythm, normal heart sounds and intact distal pulses.   Pulmonary/Chest: Effort normal and breath sounds normal. No respiratory distress.   Abdominal: Soft. Bowel sounds are normal.   Musculoskeletal: Normal range of motion.   Neurological: She is alert and oriented to person, place, and time.   Skin: Skin is warm and dry.   Psychiatric: She has a normal mood and affect. Her behavior is normal. Judgment and thought content normal.   Nursing note and vitals reviewed.       /75   Pulse 70   Ht 162.6 cm (64\")   Wt 57.6 kg (127 lb)   SpO2 98%   BMI 21.80 kg/m²     Assessment/Plan   Diagnoses and all orders for this visit:    Malignant neoplasm of head of pancreas (CMS/HCC)  -     Cancel: Ambulatory Referral to Hematology / Oncology  -     Ambulatory Referral to Hematology / Oncology    Essential hypertension    Hyperlipidemia, unspecified hyperlipidemia type    patient w/ pancreatic cancer. She originally has felt she does not want any treatment for this. However, she is now interested in getting more information. Advised that she may need a bronchoscopy to determine if her mediastinal adenopathy is cancerous or not. She may also need to d/c enbrel if decides to treat her cancer. She will continue her treatment for bp and lipid regulation. However, if she does not choose to treat cancer will d/c lipitor. Encouraged to maintain a good pain regiment with oxycodone. Will f/u routinely.              "

## 2019-09-24 DIAGNOSIS — C25.9 MALIGNANT NEOPLASM OF PANCREAS, UNSPECIFIED LOCATION OF MALIGNANCY (HCC): Primary | ICD-10-CM

## 2019-09-25 ENCOUNTER — APPOINTMENT (OUTPATIENT)
Dept: LAB | Facility: HOSPITAL | Age: 79
End: 2019-09-25

## 2019-09-25 ENCOUNTER — CONSULT (OUTPATIENT)
Dept: ONCOLOGY | Facility: CLINIC | Age: 79
End: 2019-09-25

## 2019-09-25 ENCOUNTER — TELEPHONE (OUTPATIENT)
Dept: INTERNAL MEDICINE | Facility: CLINIC | Age: 79
End: 2019-09-25

## 2019-09-25 VITALS
RESPIRATION RATE: 16 BRPM | OXYGEN SATURATION: 94 % | SYSTOLIC BLOOD PRESSURE: 179 MMHG | HEART RATE: 65 BPM | BODY MASS INDEX: 22.36 KG/M2 | DIASTOLIC BLOOD PRESSURE: 79 MMHG | TEMPERATURE: 97.9 F | WEIGHT: 126.2 LBS | HEIGHT: 63 IN

## 2019-09-25 DIAGNOSIS — K86.89 PANCREATIC MASS: Primary | ICD-10-CM

## 2019-09-25 DIAGNOSIS — C25.0 MALIGNANT NEOPLASM OF HEAD OF PANCREAS (HCC): Primary | ICD-10-CM

## 2019-09-25 DIAGNOSIS — R10.10 PAIN OF UPPER ABDOMEN: ICD-10-CM

## 2019-09-25 DIAGNOSIS — J44.9 CHRONIC OBSTRUCTIVE PULMONARY DISEASE, UNSPECIFIED COPD TYPE (HCC): ICD-10-CM

## 2019-09-25 LAB
BASOPHILS # BLD AUTO: 0.07 10*3/MM3 (ref 0–0.2)
BASOPHILS NFR BLD AUTO: 0.9 % (ref 0–1.5)
DEPRECATED RDW RBC AUTO: 41.2 FL (ref 37–54)
EOSINOPHIL # BLD AUTO: 0.62 10*3/MM3 (ref 0–0.4)
EOSINOPHIL NFR BLD AUTO: 8.1 % (ref 0.3–6.2)
ERYTHROCYTE [DISTWIDTH] IN BLOOD BY AUTOMATED COUNT: 13.3 % (ref 12.3–15.4)
HCT VFR BLD AUTO: 38 % (ref 34–46.6)
HGB BLD-MCNC: 12.7 G/DL (ref 12–15.9)
IMM GRANULOCYTES # BLD AUTO: 0.05 10*3/MM3 (ref 0–0.05)
IMM GRANULOCYTES NFR BLD AUTO: 0.7 % (ref 0–0.5)
LYMPHOCYTES # BLD AUTO: 2.14 10*3/MM3 (ref 0.7–3.1)
LYMPHOCYTES NFR BLD AUTO: 28 % (ref 19.6–45.3)
MCH RBC QN AUTO: 28.2 PG (ref 26.6–33)
MCHC RBC AUTO-ENTMCNC: 33.4 G/DL (ref 31.5–35.7)
MCV RBC AUTO: 84.3 FL (ref 79–97)
MONOCYTES # BLD AUTO: 1.12 10*3/MM3 (ref 0.1–0.9)
MONOCYTES NFR BLD AUTO: 14.6 % (ref 5–12)
NEUTROPHILS # BLD AUTO: 3.65 10*3/MM3 (ref 1.7–7)
NEUTROPHILS NFR BLD AUTO: 47.7 % (ref 42.7–76)
NRBC BLD AUTO-RTO: 0 /100 WBC (ref 0–0.2)
PLATELET # BLD AUTO: 317 10*3/MM3 (ref 140–450)
PMV BLD AUTO: 10.7 FL (ref 6–12)
RBC # BLD AUTO: 4.51 10*6/MM3 (ref 3.77–5.28)
WBC NRBC COR # BLD: 7.65 10*3/MM3 (ref 3.4–10.8)

## 2019-09-25 PROCEDURE — 99205 OFFICE O/P NEW HI 60 MIN: CPT | Performed by: INTERNAL MEDICINE

## 2019-09-25 PROCEDURE — 85025 COMPLETE CBC W/AUTO DIFF WBC: CPT | Performed by: INTERNAL MEDICINE

## 2019-09-25 PROCEDURE — 36415 COLL VENOUS BLD VENIPUNCTURE: CPT | Performed by: INTERNAL MEDICINE

## 2019-09-25 PROCEDURE — G0463 HOSPITAL OUTPT CLINIC VISIT: HCPCS | Performed by: INTERNAL MEDICINE

## 2019-09-25 NOTE — TELEPHONE ENCOUNTER
Patient  just wanted to let you know that they went to see Dr. Cassidy today. Mr. Martinez stated that he told him that surgery was not the best option because she will be on insulin for the rest of her life. Dr. Cassidy note is in the chart.     FYI - Patient is scheduled with Dr. Luis Fernando Turner tomorrow at 11:15 am. Patient and  is aware of location and time of appointment.

## 2019-09-25 NOTE — PROGRESS NOTES
Subjective     REASON FOR CONSULTATION:    Provide an opinion on any further workup or treatment on pancreatic cancer.                             REQUESTING PHYSICIAN: Agata Burgess MD    RECORDS OBTAINED:  Records of the patients history including those obtained from the referring provider were reviewed and summarized in detail.    HISTORY OF PRESENT ILLNESS:  The patient is a 79 y.o. year old female who presents today as a new patient to our clinic for evaluation of newly diagnosed pancreatic cancer. Patient is accompanied by her  today who helped with the history.    The patient reports she is feeling well overall today. She does experience epigastric and lower abdominal pain since having her endoscopy and fine-needle biopsy of the pancreatic mass. The patient has been taking her prescribed pain medication as needed, a few times a day, and before bedtime to help her sleep. She rates her pain regularly at a 5/10 scale. The patient states she has been having urgent bowel movements every morning which are orange for the last 3 mornings which she states is new for her. She notes beforehand she previously had a bowel movement every 3 days. She denies any nausea, vomiting or weight loss.     The patient has COPD which is well managed with her inhaler. She does state she becomes short of breath easily, however she notes she is able to complete house chores and other tasks.     The patient has been on Enbrel for rheumatoid arthritis for 20 years and was informed she would have to hold her medication for chemotherapy treatment.     The patient reports that she has decided she did not want to undergo chemotherapy, radiation, or surgery as she does not believe that her quality of life will be improved or even be maintained with the options available to her.     The patient previously had a chest X-ray performed on 08/19/2019 for evaluation of a severe cough which revealed a subtle nodule projecting over the  medial aspect of the left lung base not previously seen on study performed on 12/17/2018.  The patient then underwent a CT Chest on 08/22/2019 which showed a small pleural-based nodule of the upper left lobe with nonspecific mediastinal adenopathy. There was also mild prominence of the pancreatic duct, thinning of the pancreas, without a specific cause identified on the exam. A PET levy was then performed on 09/03/2019 which demonstrated intensely FDG avid mass-like enlargement of the pancreatic head with dilation of the main pancreatic duct, as well as mildly FDG avid mediastinal adenopathy.     CT Abdomen & Pelvis performed on 09/10/2019 reported approximately 2.7 cm pancreatic head mass with significant pancreatic ductal dilation. The mass is situated medial to the 1st portion of the duodenum. There were no pathologically enlarged nodes or evidence for liver metastases.     The patient underwent an endoscopic ultrasound (upper) EUS and fine needle biopsy on 09/17/2019 and the pathology evaluation revealed atypical glandular cells consistent with adenocarcinoma.  She has no elevation of tumor marker CA 19-9 at 18.7 units/mL on 9/16/2019.     The patient was admitted to the hospital on 09/19/2019 to 09/20/2019 with new onset epigastric abdominal pain and nausea. CT Abdomen & Pelvis performed on 09/19/2019 reported stable pancreatic head mass and proximal ductal dilation, stable left renal scarring and low-density cortical lesions, likely cysts, colonic diverticulosis, and stable adrenal hyperplasia. The patient had previously stated in the hospital that she did not want further surgical or oncological consultation however, she was instructed that if she experienced worsening pain she could follow up with surgery and oncology to see if palliative treatment could be beneficial. The patient was discharged to home hospice with a written prescription for oxycodone and oral magnesium supplementation. She received  potassium replacement in the hospital prior to discharge.    Patient and her  reports they already had a contact with home hospice care.     Laboratory study performed today, 09/25/2019, reported completely normal CBC.            Past Medical History:   Diagnosis Date   • Abdominal hernia    • Cancer (CMS/HCC)    • COPD (chronic obstructive pulmonary disease) (CMS/HCC)    • Depression    • Food allergy     Scallops: causes hives   • GERD (gastroesophageal reflux disease)    • Hiatal hernia    • History of bruising easily    • History of colon polyps 08/13/2013    PATH: Distal Transverse Colon, Fragments of Tubular Adenoma   • History of colon polyps 01/30/2013    PATH: Ascending Colon - Tubular Adenoma, Splenic Flexure Polyp @ 65 cm - Tubulovillous Adenoma, Descending Colon - Tubular Adenoma   • History of colon polyps 10/30/2017    PATH: Transverse Colon - Tubular Adenoma, Rectal Polyps - Fragments of Hyperplastic Polyp   • Hypercholesteremia    • Hypertension    • Joint pain     swelling   • On home oxygen therapy     O2 NC 2. LITERS/ NIGHT   • Osteoarthritis    • Osteoporosis    • Pancreatic cancer (CMS/HCC)    • PONV (postoperative nausea and vomiting)    • Rheumatoid arthritis (CMS/HCC)     DIAGNOSIS AGE 23 - FOLLOWED BY RHEUMATOLOGY DR INFANTE, NO PROBLEMS WITH FLEX/ EXTENSION         Past Surgical History:   Procedure Laterality Date   • BREAST BIOPSY Left     Benign pathology   • BRONCHOSCOPY N/A 1/3/2018    Procedure: BRONCHOSCOPY with lavage in right middle lobe and lingula.;  Surgeon: Trenton Garcia MD;  [facility] [facility];  Service:    • CAROTID ENDARTERECTOMY Left 2006   • CATARACT EXTRACTION WITH INTRAOCULAR LENS IMPLANT Bilateral    • COLONOSCOPY N/A 10/30/2017    Procedure: COLONOSCOPY TO CECUM, TO TERMINAL ILEUM WITH COLD BIOPSY POLYPECTOMY;  Surgeon: Jossie Stanley MD;  Location[place] [facility];  Service:    • COLONOSCOPY N/A 08/13/2013    One 3 mm polyp in the transverse  colon, Diverticulosis in the sigmoid colon, [facility], Dr. Jossie Stanley   • COLONOSCOPY N/A 01/30/2013    One 3 mm polyp ascending colon; One 7 mm polyp descending colon; One 25 mm polyp splenic flexure, Diverticulosis Sigmoid Colon, [facility], Dr. Jossie Stanley   • FOOT SURGERY Bilateral     Bilateral (MULITPLE SURGERIES)   • HAND SURGERY Bilateral     Bilateral (MULTIPLE SURGERIES)   • KNEE ARTHROSCOPY W/ PARTIAL MEDIAL MENISCECTOMY Left 01/22/2016    Left Knee Arthroscopy w/ partial medial & lateral meniscectomies, Three compartment synovectomy w/ loose body removal, Chondroplasty of patella, Dr. Florencio Frazier   • LEFT OOPHORECTOMY Left 1950    bening tumor   • NECK SURGERY     • SINUS SURGERY N/A 2011   • TOE FUSION Right 2017    with screws and pins-Dr. Atkins   • TOTAL KNEE ARTHROPLASTY Left 5/18/2016    Procedure: LT TOTAL KNEE ARTHROPLASTY WITH FELECIA NAVIGATION;  Surgeon: Florencio Frazier MD;  Location: [facility];  Service:    • VENTRAL HERNIA REPAIR N/A 5/22/2018    Procedure: VENTRAL HERNIA REPAIR LAPAROSCOPIC WITH DAVINCI ROBOT;  Surgeon: Josr Jennings MD;  Location: [facility] OR;  Service: DaVinci      OBGYN HISTORY:  G:3 P:3      Current Outpatient Medications on File Prior to Visit   Medication Sig Dispense Refill   • albuterol (PROVENTIL) (2.5 MG/3ML) 0.083% nebulizer solution Take 2.5 mg by nebulization Every 4 (Four) Hours As Needed for Wheezing.     • amLODIPine (NORVASC) 5 MG tablet Take 1 tablet by mouth Daily. 30 tablet 5   • atorvastatin (LIPITOR) 20 MG tablet Take 1 tablet by mouth Daily. 90 tablet 2   • budesonide (PULMICORT) 0.5 MG/2ML nebulizer solution USE 1 VIAL VIA NEBULIZER TWICE DAILY RINSE MOUTH AFTER USE  12   • Calcium Carbonate-Vitamin D (CALCIUM 600+D PO) Take 1 tablet by mouth Daily.     • DULoxetine (CYMBALTA) 60 MG capsule Take 1 capsule by mouth 2 (Two) Times a Day. 60 capsule 3   • etanercept (ENBREL) 50 MG/ML solution prefilled syringe injection Inject 50 mg under the  skin 1 (One) Time Per Week. THURSDAYS. TO HOLD 4 WEEKS     • fluticasone (FLONASE) 50 MCG/ACT nasal spray 2 sprays into the nostril(s) as directed by provider Daily. 1 bottle 5   • folic acid (FOLVITE) 1 MG tablet TAKE 1 TABLET BY MOUTH DAILY. 90 tablet 0   • ipratropium (ATROVENT) 0.06 % nasal spray 2 sprays into each nostril 4 (Four) Times a Day As Needed for Rhinitis.     • ipratropium-albuterol (DUO-NEB) 0.5-2.5 mg/3 ml nebulizer Take 3 mL by nebulization Every 4 (Four) Hours As Needed for Wheezing.     • magnesium oxide (MAG-OX) 400 MG tablet Take 1 tablet by mouth Daily. 30 tablet 0   • montelukast (SINGULAIR) 10 MG tablet Take 1 tablet by mouth Daily. 90 tablet 3   • omeprazole (priLOSEC) 40 MG capsule Take 1 capsule by mouth Every Evening. 90 capsule 1   • oxyCODONE (ROXICODONE) 5 MG immediate release tablet Take 1 tablet by mouth Every 4 (Four) Hours As Needed for Moderate Pain  or Severe Pain  for up to 9 days. 35 tablet 0   • PERFOROMIST 20 MCG/2ML nebulizer solution USE 1 VIAL VIA NEBULIZER TWICE DAILY  12   • umeclidinium-vilanterol (ANORO ELLIPTA) 62.5-25 MCG/INH aerosol powder  inhaler Inhale 1 puff Daily. 3 each 2   • valsartan (DIOVAN) 320 MG tablet Take 1 tablet by mouth Daily. 90 tablet 2     Current Facility-Administered Medications on File Prior to Visit   Medication Dose Route Frequency Provider Last Rate Last Dose   • levalbuterol (XOPENEX) nebulizer solution 0.63 mg  0.63 mg Nebulization Q6H PRN Agata Burgess MD   0.63 mg at 12/17/18 9477        ALLERGIES:    Allergies   Allergen Reactions   • Gold-Containing Drug Products Unknown (See Comments)     Patient cannot recall reaction   • Hydrocodone Irritability     HYPERACTIVITY        Social History     Socioeconomic History   • Marital status:      Spouse name: Óscar   • Number of children: Not on file   • Years of education: Not on file   • Highest education level: Not on file   Occupational History     Employer: RETIRED   Tobacco  "Use   • Smoking status: Former Smoker     Packs/day: 1.50     Years: 30.00     Pack years: 45.00     Types: Cigarettes     Last attempt to quit:      Years since quittin.7   • Smokeless tobacco: Never Used   Substance and Sexual Activity   • Alcohol use: No     Comment: No alcohol for 30 years   • Drug use: No   • Sexual activity: Defer    , enjoys golfing.  Patient has two  children.     Family History   Problem Relation Age of Onset   • Heart disease Mother    • Hypertension Mother    • Lung disease Mother    • Diabetes Sister    • Hypertension Brother    • Diabetes Sister    • Malig Hyperthermia Neg Hx         Review of Systems   Constitutional: Negative for appetite change, chills, diaphoresis, fatigue, fever and unexpected weight change.   HENT: Negative for hearing loss, sore throat and trouble swallowing.    Respiratory: Negative for cough, chest tightness, shortness of breath and wheezing.    Cardiovascular: Negative for chest pain, palpitations and leg swelling.   Gastrointestinal: Positive for abdominal pain (Bandlike distribution across epigastric area). Negative for abdominal distention, constipation, diarrhea and nausea.   Genitourinary: Negative for dysuria, frequency, hematuria and urgency.   Musculoskeletal: Negative for arthralgias and joint swelling.   Skin: Negative for rash and wound.   Neurological: Negative for dizziness, seizures, syncope, speech difficulty, weakness, numbness and headaches.   Hematological: Negative for adenopathy. Does not bruise/bleed easily.   Psychiatric/Behavioral: Negative for behavioral problems and confusion.      Objective     Vitals:    19 0841   BP: 179/79   Pulse: 65   Resp: 16   Temp: 97.9 °F (36.6 °C)   SpO2: 94%   Weight: 57.2 kg (126 lb 3.2 oz)   Height: 160 cm (62.99\")   PainSc: 0-No pain     Current Status 2019   ECOG score 0       Physical Exam    GENERAL:  Well-developed, well-nourished  elder female in no acute " distress.   SKIN:  Warm, dry without rashes, purpura or petechiae.  EYES:  Pupils equal, round and reactive to light.  EOMs intact.  Conjunctivae normal.  EARS:  Hearing intact.  NOSE:  No nasal discharge.  MOUTH:  Tongue is well-papillated; no stomatitis or ulcers.  Lips normal.  THROAT:  Oropharynx without lesions or exudates.  NECK:  Supple with good range of motion; no thyromegaly or masses.  LYMPHATICS:  No cervical, supraclavicular, axillary or inguinal adenopathy.  CHEST:  Lungs clear to auscultation. Good airflow.  CARDIAC:  Regular rate and rhythm without murmurs, rubs or gallops. Normal S1,S2.  ABDOMEN:  Soft, nontender with no hepatosplenomegaly or masses. Bowel sounds normal.  EXTREMITIES:  No clubbing, cyanosis or edema.  NEUROLOGICAL:  Cranial Nerves II-XII grossly intact.  No focal neurological deficits.  PSYCHIATRIC:  Normal affect and mood.      RECENT LABS:  Lab Results   Component Value Date    WBC 7.65 09/25/2019    HGB 12.7 09/25/2019    HCT 38.0 09/25/2019    MCV 84.3 09/25/2019     09/25/2019     Lab Results   Component Value Date    NEUTROABS 3.65 09/25/2019     Glucose   Date Value Ref Range Status   09/20/2019 88 65 - 99 mg/dL Final     BUN   Date Value Ref Range Status   09/20/2019 3 (L) 8 - 23 mg/dL Final     Creatinine   Date Value Ref Range Status   09/20/2019 0.39 (L) 0.57 - 1.00 mg/dL Final   09/10/2019 0.50 (L) 0.60 - 1.30 mg/dL Final     Comment:     Serial Number: [id number]:  [id number]     Sodium   Date Value Ref Range Status   09/20/2019 140 136 - 145 mmol/L Final     Potassium   Date Value Ref Range Status   09/20/2019 3.2 (L) 3.5 - 5.2 mmol/L Final     Chloride   Date Value Ref Range Status   09/20/2019 102 98 - 107 mmol/L Final     CO2   Date Value Ref Range Status   09/20/2019 22.6 22.0 - 29.0 mmol/L Final     Calcium   Date Value Ref Range Status   09/20/2019 7.9 (L) 8.6 - 10.5 mg/dL Final     Total Protein   Date Value Ref Range Status   09/19/2019 7.3 6.0 - 8.5  g/dL Final     Albumin   Date Value Ref Range Status   09/19/2019 3.90 3.50 - 5.20 g/dL Final     ALT (SGPT)   Date Value Ref Range Status   09/19/2019 11 1 - 33 U/L Final     AST (SGOT)   Date Value Ref Range Status   09/19/2019 12 1 - 32 U/L Final     Alkaline Phosphatase   Date Value Ref Range Status   09/19/2019 73 39 - 117 U/L Final     Total Bilirubin   Date Value Ref Range Status   09/19/2019 0.5 0.2 - 1.2 mg/dL Final     eGFR Non  Amer   Date Value Ref Range Status   09/20/2019 >150 >60 mL/min/1.73 Final     A/G Ratio   Date Value Ref Range Status   06/21/2019 1.8 g/dL Final     BUN/Creatinine Ratio   Date Value Ref Range Status   09/20/2019 7.7 7.0 - 25.0 Final     Anion Gap   Date Value Ref Range Status   09/20/2019 15.4 (H) 5.0 - 15.0 mmol/L Final     IMAGE STUDY:  1.  F-18 FDG PET FROM SKULL BASE TO MID THIGH WITH PET/CT FUSION 9/3/2019     HISTORY: Abnormal CT scan with pulmonary nodule     TECHNIQUE: Radiation dose reduction techniques were utilized, including  automated exposure control and exposure modulation based on body size.   Blood glucose level at time of injection was 139 mg/dL.  6.7 mCi of F-18  FDG were injected and PET was performed from skull base to mid thigh. CT  was obtained for localization and attenuation correction. Time at  injection 0722. PET start time 0850.      Compared with chest CT 08/22/2019 and abdominal CT 02/01/2013.     FINDINGS:      There is no cervical adenopathy demonstrating FDG uptake above that of  blood pool. The thyroid, submandibular and parotid glands demonstrate  symmetric FDG uptake.     There is a small hiatal hernia. There is no hilar or axillary adenopathy  demonstrating FDG uptake above that of blood pool.     Mildly FDG avid mediastinal adenopathy is present with representative  nodes as below:  *  Precarinal lymph node measuring 1.3 cm with a max SUV of 3.4.  *  Aortic pulmonary window lymph node measuring 1.2 cm with max SUV of  3.     The heart  is normal in size. Mild to moderate coronary artery  calcification is present.      Moderate to severe emphysema is present.     There is no pulmonary consolidation, pleural effusion or pneumothorax.  No FDG avid pulmonary nodules are present. Subcentimeter pleural-based  nodule within the left upper lobe is below PET resolution and unchanged.     No focal FDG abnormality is visualized within the liver, gallbladder,  spleen, adrenal glands or kidneys. There is no hydronephrosis.     No abdominopelvic adenopathy demonstrates FDG uptake above that of blood  pool. Moderate to severe atherosclerotic calcification of the abdominal  aorta and its major branches is present.     There is no free intraperitoneal air or fluid.     Old right clavicular fracture. No suspicious FDG avid lytic or blastic  osseous lesion is present. Mild anterolisthesis of L4 and L5 is present.     The main pancreatic duct is dilated, measuring approximately 0.8 cm. The  common bile duct is also more distended when compared to 02/01/2013.  There is an intense FDG avid focus overlying the area of the pancreatic  head with apparent ill-defined enlargement in this area measuring  approximately 2.4 cm. The pancreatic body and tail is relatively  atrophic when compared to the pancreatic head; however, findings are  difficult to evaluate on noncontrast PET/CT.     IMPRESSION:  1.  Intensely FDG avid mass-like enlargement of the pancreatic head with  dilation of the main pancreatic duct. Findings are most concerning for  primary pancreatic malignancy and further evaluation with tissue  sampling is recommended.  2.  Mildly FDG avid mediastinal adenopathy. Unfortunately, findings are  nonspecific but raise concern for metastatic disease. They appear to be  amenable to bronchoscopic biopsy if clinically indicated.  3.  Other findings as above.         Assessment/Plan   1. Newly diagnosed pancreatic carcinoma: The patient previously had a chest X-ray  performed on 08/19/2019 for evaluation of a severe cough which revealed a subtle nodule projecting over the medial aspect of the left lung base not previously seen on study performed on 12/17/2018. The patient then underwent a CT Chest on 08/22/2019 which showed a small pleural-based nodule of the upper left lobe with nonspecific mediastinal adenopathy. There was also mild prominence of the pancreatic duct, thinning of the pancreas, without a specific cause identified on the exam. A PET levy was then performed on 09/03/2019 which demonstrated intensely FDG avid mass-like enlargement of the pancreatic head with dilation of the main pancreatic duct, as well as mildly FDG avid mediastinal adenopathy. CT Abdomen & Pelvis performed on 09/10/2019 reported approximately 2.7 cm pancreatic head mass with significant pancreatic ductal dilation. The mass is situated medial to the 1st portion of the duodenum. There were no pathologically enlarged nodes or evidence for liver metastases. The patient underwent an endoscopic ultrasound (upper) fine needle biopsy on 09/17/2019 and the pathology report showed atypical glandular cells consistent with adenocarcinoma.   · Laboratory study performed on 9/16/2019 showed normal CA 19-9 of 18.7 U/mL.  · I explained to the patient that the PET showed mediastinal adenopathy, however biopsy would be difficult due to the size and location of the lymph nodes.  There is lymph nodes also his only slightly elevated activity, could even be benign lymph nodes.  · I discussed with the patient that chemotherapy and/or concurrent chemoradiation therapy to the pancreas may be an option. I further explained that the only way to cure pancreatic cancer is completed surgical resection and that chemotherapy or radiation treatment would be to control the disease. I discussed with the patient and her  at length that the best systematic treatment to treat pancreatic cancer is with a mixture of 3  chemotherapy medications FOLFIRINOX regimen, however I do not believe that she would tolerate this treatment well.  I also has suspicion that she would tolerate Abraxane plus Gemzar doublets chemotherapy, also because of the side effects especially bone marrow suppression.  · I further discussed that she could consider chemotherapy with single agent Gemzar in combination with radiation and a sandwich type schedule.  I further discussed the side effects at length, including nausea, loss of appetite, fatigue, and weight loss, bone marrow suppression, flu type symptoms etc. I explained that I feel this is the best way for her to proceed as this would result in the least amount of side effects. We would administer 2 weeks on and one week off for 3 cycles (6 doses),   Then followed by concurrent chemoradiotherapy using single agent daily Gemzar, and then further treatment with weekly Gemzar.  We would also have to repeat PET imaging in order to evaluate efficacy of chemotherapy treatment.  · I also explained that there are presently some clinical trials to treat pancreatic cancer however they are not currently available at our clinic. In addition, the patient is not currently eligible for a clinical trial for second line chemotherapy as she has not undergone first line chemotherapy treatment at this time.   · I explained to the patient that without any treatment, she would be expected to live anywhere between 6 months to 1 year and that although she has not been experiencing many side effects now, more side effects are likely to occur, such as obstructive jaundice with jaundiced skin and eyes, whitish stools, and very yellow urine, and the liver failure.  She will also suffer from pain as her pain are likely to worsen. She will at some point require a stent placement to relieve the obstructive jaundice for which she should be referred to Dr. Cortes. I explained to the patient that she will most likely not be able to  have the stent placed preventatively.   · I discussed that she is likely not a good candidate for complete Whipple surgery due to comorbidities, which result in her development of diabetes and would require insulin and medication to improve digestion.   · The patient's  and the patient state that they have conjointly decided to focus on quality of life rather than longevity at this time.     PLAN:   1. The patient declines any oncological intervention at this time. However, she will discuss the options of treatment available with her family members and will follow up with me to decide if she would like to proceed with any oncological treatment.   2. Follow-up with me in 2 weeks to further discuss the possibility of proceeding with chemotherapy and radiation treatment.     Thank you for allowing me to participate in the care of this patient     Patient is accompanied by her  today who helped with the history.    I personally reviewed the recent CT Chest, Abdomen and Pelivs, NM PET scan, and X-ray imaging from the last two months and I concur with the assessments. I also shared those images with the patient.     I spent more than 80 minutes in patient care today, more than 50% of which was spent in counseling the patient on treatment options.    I, Aide Howe, acted as scribe for Morris Cassidy MD PhD  in documenting the service or procedure. To the best of my knowledge, I recorded what was dictated by Morris Cassidy MD PhD    I reviewed the note scribed by Ms. Aide Howe and made appropriate corrections.       Morris Cassidy MD PhD  09/25/2019    Agata Marie MD

## 2019-09-26 NOTE — TELEPHONE ENCOUNTER
If they do not feel that surgery is a good option she can cancel the appointment or just see what the surgeon says.

## 2019-10-01 ENCOUNTER — TELEPHONE (OUTPATIENT)
Dept: INTERNAL MEDICINE | Facility: CLINIC | Age: 79
End: 2019-10-01

## 2019-10-01 ENCOUNTER — OFFICE VISIT (OUTPATIENT)
Dept: INTERNAL MEDICINE | Facility: CLINIC | Age: 79
End: 2019-10-01

## 2019-10-01 VITALS
HEART RATE: 68 BPM | WEIGHT: 126 LBS | DIASTOLIC BLOOD PRESSURE: 86 MMHG | BODY MASS INDEX: 22.32 KG/M2 | SYSTOLIC BLOOD PRESSURE: 138 MMHG | HEIGHT: 63 IN

## 2019-10-01 DIAGNOSIS — C25.0 MALIGNANT NEOPLASM OF HEAD OF PANCREAS (HCC): Primary | ICD-10-CM

## 2019-10-01 DIAGNOSIS — J44.9 CHRONIC OBSTRUCTIVE PULMONARY DISEASE, UNSPECIFIED COPD TYPE (HCC): ICD-10-CM

## 2019-10-01 PROCEDURE — 90653 IIV ADJUVANT VACCINE IM: CPT | Performed by: INTERNAL MEDICINE

## 2019-10-01 PROCEDURE — 99214 OFFICE O/P EST MOD 30 MIN: CPT | Performed by: INTERNAL MEDICINE

## 2019-10-01 PROCEDURE — G0008 ADMIN INFLUENZA VIRUS VAC: HCPCS | Performed by: INTERNAL MEDICINE

## 2019-10-01 RX ORDER — GUAIFENESIN AND CODEINE PHOSPHATE 100; 10 MG/5ML; MG/5ML
5 SOLUTION ORAL 3 TIMES DAILY PRN
Qty: 150 ML | Refills: 3 | Status: SHIPPED | OUTPATIENT
Start: 2019-10-01 | End: 2019-10-01 | Stop reason: SDUPTHER

## 2019-10-01 RX ORDER — METHYLPREDNISOLONE 4 MG/1
TABLET ORAL
Qty: 21 EACH | Refills: 0 | Status: SHIPPED | OUTPATIENT
Start: 2019-10-01 | End: 2019-11-04

## 2019-10-01 RX ORDER — GUAIFENESIN AND CODEINE PHOSPHATE 100; 10 MG/5ML; MG/5ML
5 SOLUTION ORAL 3 TIMES DAILY PRN
Qty: 150 ML | Refills: 3 | Status: SHIPPED | OUTPATIENT
Start: 2019-10-01 | End: 2019-12-30

## 2019-10-01 NOTE — PROGRESS NOTES
Chief Complaint   Patient presents with   • Cough   • Pancreatic Cancer       History of Present Illness   Janet Martinez is a 79 y.o. female presents for follow up evaluation. She was recently diagnosed with pancreatic cancer. She has been to oncology and a surgical specialist. She has decided to decline these therapies and only focus on [facility] care. Her pain is regulated by [facility] and she is taking oxycodone with good benefit. She has a cough. This is chronic. She does not feel ill.     The following portions of the patient's history were reviewed and updated as appropriate: allergies, current medications, past family history, past medical history, past social history, past surgical history and problem list.  Current Outpatient Medications on File Prior to Visit   Medication Sig Dispense Refill   • albuterol (PROVENTIL) (2.5 MG/3ML) 0.083% nebulizer solution Take 2.5 mg by nebulization Every 4 (Four) Hours As Needed for Wheezing.     • amLODIPine (NORVASC) 5 MG tablet Take 1 tablet by mouth Daily. 30 tablet 5   • atorvastatin (LIPITOR) 20 MG tablet Take 1 tablet by mouth Daily. 90 tablet 2   • budesonide (PULMICORT) 0.5 MG/2ML nebulizer solution USE 1 VIAL VIA NEBULIZER TWICE DAILY RINSE MOUTH AFTER USE  12   • Calcium Carbonate-Vitamin D (CALCIUM 600+D PO) Take 1 tablet by mouth Daily.     • DULoxetine (CYMBALTA) 60 MG capsule Take 1 capsule by mouth 2 (Two) Times a Day. 60 capsule 3   • etanercept (ENBREL) 50 MG/ML solution prefilled syringe injection Inject 50 mg under the skin 1 (One) Time Per Week. THURSDAYS. TO HOLD 4 WEEKS     • fluticasone (FLONASE) 50 MCG/ACT nasal spray 2 sprays into the nostril(s) as directed by provider Daily. 1 bottle 5   • folic acid (FOLVITE) 1 MG tablet TAKE 1 TABLET BY MOUTH DAILY. 90 tablet 0   • ipratropium (ATROVENT) 0.06 % nasal spray 2 sprays into each nostril 4 (Four) Times a Day As Needed for Rhinitis.     • ipratropium-albuterol (DUO-NEB) 0.5-2.5 mg/3 ml nebulizer  Take 3 mL by nebulization Every 4 (Four) Hours As Needed for Wheezing.     • magnesium oxide (MAG-OX) 400 MG tablet Take 1 tablet by mouth Daily. 30 tablet 0   • montelukast (SINGULAIR) 10 MG tablet Take 1 tablet by mouth Daily. 90 tablet 3   • omeprazole (priLOSEC) 40 MG capsule Take 1 capsule by mouth Every Evening. 90 capsule 1   • PERFOROMIST 20 MCG/2ML nebulizer solution USE 1 VIAL VIA NEBULIZER TWICE DAILY  12   • umeclidinium-vilanterol (ANORO ELLIPTA) 62.5-25 MCG/INH aerosol powder  inhaler Inhale 1 puff Daily. 3 each 2   • valsartan (DIOVAN) 320 MG tablet Take 1 tablet by mouth Daily. 90 tablet 2     Current Facility-Administered Medications on File Prior to Visit   Medication Dose Route Frequency Provider Last Rate Last Dose   • levalbuterol (XOPENEX) nebulizer solution 0.63 mg  0.63 mg Nebulization Q6H PRN Agata Burgess MD   0.63 mg at 12/17/18 1437     Review of Systems   Constitutional: Positive for fatigue.   HENT: Negative.    Eyes: Negative.    Respiratory: Positive for cough. Negative for shortness of breath and wheezing.    Cardiovascular: Negative.    Gastrointestinal: Negative.    Endocrine: Negative.    Genitourinary: Negative.    Musculoskeletal: Positive for arthralgias.   Skin: Negative.    Allergic/Immunologic: Negative.    Neurological: Negative.    Hematological: Negative.    Psychiatric/Behavioral: Negative.        Objective   Physical Exam   Constitutional: She is oriented to person, place, and time. She appears well-developed and well-nourished.   Alert and pleasant. No distress.    HENT:   Head: Normocephalic and atraumatic.   Right Ear: External ear normal.   Left Ear: External ear normal.   Mouth/Throat: Oropharynx is clear and moist.   Eyes: EOM are normal. Pupils are equal, round, and reactive to light.   Neck: Normal range of motion. Neck supple.   Cardiovascular: Normal rate, regular rhythm and normal heart sounds.   Pulmonary/Chest: Effort normal and breath sounds normal.  "  Abdominal: Soft. Bowel sounds are normal.   Musculoskeletal: Normal range of motion.   Neurological: She is alert and oriented to person, place, and time.   Skin: Skin is warm and dry.   Psychiatric: She has a normal mood and affect. Her behavior is normal. Judgment and thought content normal.   Nursing note and vitals reviewed.       /86   Pulse 68   Ht 160 cm (63\")   Wt 57.2 kg (126 lb)   BMI 22.32 kg/m²     Assessment/Plan   Diagnoses and all orders for this visit:    Malignant neoplasm of head of pancreas (CMS/HCC)    Chronic obstructive pulmonary disease, unspecified COPD type (CMS/HCC)    Other orders  -     Discontinue: guaifenesin-codeine (GUAIFENESIN AC) 100-10 MG/5ML liquid; Take 5 mL by mouth 3 (Three) Times a Day As Needed for Cough.  -     guaifenesin-codeine (GUAIFENESIN AC) 100-10 MG/5ML liquid; Take 5 mL by mouth 3 (Three) Times a Day As Needed for Cough.  -     methylPREDNISolone (MEDROL, SAI,) 4 MG tablet; Take as directed on package instructions.      Patient with malignant pancreatic cancer. She will continue comfort measures. She will continue current meds for bp regulation. She will d/c lipitor. She will take codeine cough med prn cough. She has B wheezes. Will give medrol for possible copd exacerbation. If develops fever or no improvement will add abx. She was given rx for oxygen concentrator. She will f/u in 1-2 months or prn.            "

## 2019-10-04 ENCOUNTER — TELEPHONE (OUTPATIENT)
Dept: INTERNAL MEDICINE | Facility: CLINIC | Age: 79
End: 2019-10-04

## 2019-10-04 RX ORDER — SACCHAROMYCES BOULARDII 250 MG
250 CAPSULE ORAL 2 TIMES DAILY
Qty: 20 CAPSULE | Refills: 5 | Status: SHIPPED | OUTPATIENT
Start: 2019-10-04 | End: 2021-01-01

## 2019-10-04 RX ORDER — MONTELUKAST SODIUM 4 MG/1
1 TABLET, CHEWABLE ORAL 2 TIMES DAILY
Qty: 60 TABLET | Refills: 4 | Status: SHIPPED | OUTPATIENT
Start: 2019-10-04 | End: 2020-01-27

## 2019-10-04 NOTE — TELEPHONE ENCOUNTER
t is asking for something for diarrhea to be snt to local pharmacy          Got a call from [facility] about the portable oxygen that was helping her to get. She LM that she can help at this time because it should be ordered under her Pulm dr for COPD and not her cancer. It will not be covered under the dx of pancreatic cancer.

## 2019-10-10 ENCOUNTER — APPOINTMENT (OUTPATIENT)
Dept: OTHER | Facility: HOSPITAL | Age: 79
End: 2019-10-10

## 2019-10-10 ENCOUNTER — APPOINTMENT (OUTPATIENT)
Dept: ONCOLOGY | Facility: CLINIC | Age: 79
End: 2019-10-10

## 2019-10-15 RX ORDER — FOLIC ACID 1 MG/1
TABLET ORAL
Qty: 90 TABLET | Refills: 0 | Status: SHIPPED | OUTPATIENT
Start: 2019-10-15 | End: 2020-01-15

## 2019-10-28 ENCOUNTER — OFFICE VISIT (OUTPATIENT)
Dept: INTERNAL MEDICINE | Facility: CLINIC | Age: 79
End: 2019-10-28

## 2019-10-28 VITALS
DIASTOLIC BLOOD PRESSURE: 80 MMHG | HEIGHT: 64 IN | HEART RATE: 72 BPM | SYSTOLIC BLOOD PRESSURE: 150 MMHG | BODY MASS INDEX: 20.49 KG/M2 | OXYGEN SATURATION: 95 % | WEIGHT: 120 LBS

## 2019-10-28 DIAGNOSIS — R10.10 PAIN OF UPPER ABDOMEN: ICD-10-CM

## 2019-10-28 DIAGNOSIS — R19.7 DIARRHEA, UNSPECIFIED TYPE: ICD-10-CM

## 2019-10-28 DIAGNOSIS — C25.0 MALIGNANT NEOPLASM OF HEAD OF PANCREAS (HCC): Primary | ICD-10-CM

## 2019-10-28 PROCEDURE — 99214 OFFICE O/P EST MOD 30 MIN: CPT | Performed by: INTERNAL MEDICINE

## 2019-10-28 RX ORDER — PANCRELIPASE 60000; 12000; 38000 [USP'U]/1; [USP'U]/1; [USP'U]/1
12000 CAPSULE, DELAYED RELEASE PELLETS ORAL DAILY
Refills: 3 | COMMUNITY
Start: 2019-10-24

## 2019-10-28 RX ORDER — OXYCODONE AND ACETAMINOPHEN 10; 325 MG/1; MG/1
1 TABLET ORAL EVERY 6 HOURS PRN
COMMUNITY

## 2019-10-28 NOTE — PROGRESS NOTES
Chief Complaint   Patient presents with   • Pancreatic Cancer       History of Present Illness   Janet Martinez is a 79 y.o. female presents for follow up evaluation. She has pancreatic cancer. She has decided not to treat this and is following with [facility] at this time. She reports that she is having abdominal cramping w/ loose stooling. She was started on colestipol but did not have benefit. She is now taking creon w/ some benefit. She is taking oxycodone every other day for pain and reports that this does relieve the pain when she takes it.      The following portions of the patient's history were reviewed and updated as appropriate: allergies, current medications, past family history, past medical history, past social history, past surgical history and problem list.  Current Outpatient Medications on File Prior to Visit   Medication Sig Dispense Refill   • albuterol (PROVENTIL) (2.5 MG/3ML) 0.083% nebulizer solution Take 2.5 mg by nebulization Every 4 (Four) Hours As Needed for Wheezing.     • amLODIPine (NORVASC) 5 MG tablet Take 1 tablet by mouth Daily. 30 tablet 5   • budesonide (PULMICORT) 0.5 MG/2ML nebulizer solution USE 1 VIAL VIA NEBULIZER TWICE DAILY RINSE MOUTH AFTER USE  12   • Calcium Carbonate-Vitamin D (CALCIUM 600+D PO) Take 1 tablet by mouth Daily.     • colestipol (COLESTID) 1 g tablet Take 1 tablet by mouth 2 (Two) Times a Day. 60 tablet 4   • CREON 26346 units capsule delayed-release particles capsule Take 12,000 units of lipase by mouth Daily. before a meal  3   • DULoxetine (CYMBALTA) 60 MG capsule Take 1 capsule by mouth 2 (Two) Times a Day. 60 capsule 3   • etanercept (ENBREL) 50 MG/ML solution prefilled syringe injection Inject 50 mg under the skin 1 (One) Time Per Week. THURSDAYS. TO HOLD 4 WEEKS     • fluticasone (FLONASE) 50 MCG/ACT nasal spray 2 sprays into the nostril(s) as directed by provider Daily. 1 bottle 5   • folic acid (FOLVITE) 1 MG tablet TAKE 1 TABLET BY MOUTH DAILY. 90  tablet 0   • guaifenesin-codeine (GUAIFENESIN AC) 100-10 MG/5ML liquid Take 5 mL by mouth 3 (Three) Times a Day As Needed for Cough. 150 mL 3   • ipratropium (ATROVENT) 0.06 % nasal spray 2 sprays into each nostril 4 (Four) Times a Day As Needed for Rhinitis.     • ipratropium-albuterol (DUO-NEB) 0.5-2.5 mg/3 ml nebulizer Take 3 mL by nebulization Every 4 (Four) Hours As Needed for Wheezing.     • magnesium oxide (MAG-OX) 400 MG tablet Take 1 tablet by mouth Daily. 30 tablet 0   • methylPREDNISolone (MEDROL, SAI,) 4 MG tablet Take as directed on package instructions. 21 each 0   • montelukast (SINGULAIR) 10 MG tablet Take 1 tablet by mouth Daily. 90 tablet 3   • omeprazole (priLOSEC) 40 MG capsule Take 1 capsule by mouth Every Evening. 90 capsule 1   • OXYCODONE HCL ER PO Take 5 mg by mouth Daily.     • oxyCODONE-acetaminophen (PERCOCET)  MG per tablet Take 1 tablet by mouth Every 6 (Six) Hours As Needed for Moderate Pain .     • PERFOROMIST 20 MCG/2ML nebulizer solution USE 1 VIAL VIA NEBULIZER TWICE DAILY  12   • saccharomyces boulardii (FLORASTOR) 250 MG capsule Take 1 capsule by mouth 2 (Two) Times a Day. 20 capsule 5   • umeclidinium-vilanterol (ANORO ELLIPTA) 62.5-25 MCG/INH aerosol powder  inhaler Inhale 1 puff Daily. 3 each 2   • valsartan (DIOVAN) 320 MG tablet Take 1 tablet by mouth Daily. 90 tablet 2     Current Facility-Administered Medications on File Prior to Visit   Medication Dose Route Frequency Provider Last Rate Last Dose   • levalbuterol (XOPENEX) nebulizer solution 0.63 mg  0.63 mg Nebulization Q6H PRN Agata Burgess MD   0.63 mg at 12/17/18 1437     Review of Systems   Constitutional: Negative.    HENT: Negative.    Eyes: Negative.    Respiratory: Negative.    Cardiovascular: Negative.    Gastrointestinal: Positive for abdominal pain and diarrhea.   Endocrine: Negative.    Genitourinary: Negative.    Musculoskeletal: Negative.    Skin: Negative.    Allergic/Immunologic: Negative.   "  Neurological: Negative.    Hematological: Negative.    Psychiatric/Behavioral: Negative.        Objective   Physical Exam   Constitutional: She is oriented to person, place, and time. She appears well-developed and well-nourished.   HENT:   Head: Normocephalic and atraumatic.   Right Ear: External ear normal.   Left Ear: External ear normal.   Mouth/Throat: Oropharynx is clear and moist.   Eyes: EOM are normal. Pupils are equal, round, and reactive to light.   Neck: Normal range of motion. Neck supple.   Cardiovascular: Normal rate, regular rhythm, normal heart sounds and intact distal pulses.   Pulmonary/Chest: Effort normal and breath sounds normal.   Abdominal: Soft. Bowel sounds are normal.   Musculoskeletal: Normal range of motion.   Neurological: She is alert and oriented to person, place, and time.   Skin: Skin is warm and dry.   Psychiatric: She has a normal mood and affect. Her behavior is normal. Judgment and thought content normal.   Nursing note and vitals reviewed.       /80   Pulse 72   Ht 162.6 cm (64\")   Wt 54.4 kg (120 lb)   SpO2 95%   BMI 20.60 kg/m²     Assessment/Plan   Diagnoses and all orders for this visit:    Malignant neoplasm of head of pancreas (CMS/HCC)    Pain of upper abdomen    Diarrhea, unspecified type    Other orders  -     CREON 61429 units capsule delayed-release particles capsule; Take 12,000 units of lipase by mouth Daily. before a meal  -     oxyCODONE-acetaminophen (PERCOCET)  MG per tablet; Take 1 tablet by mouth Every 6 (Six) Hours As Needed for Moderate Pain .  -     OXYCODONE HCL ER PO; Take 5 mg by mouth Daily.      Patient w/ pancreatic cancer. She has been to oncology and surgical oncology and has declined therapy at this time. She is encouraged creon prior to meals. She is encouraged to take oxycodone early in the course of having pain. She may f/u w/ oncology if she would like to discuss possible treatment options at greater detail. She reports that " her mood is stable w/ cymbalta and will continue this medication.

## 2019-11-04 ENCOUNTER — OFFICE VISIT (OUTPATIENT)
Dept: ONCOLOGY | Facility: CLINIC | Age: 79
End: 2019-11-04

## 2019-11-04 ENCOUNTER — LAB (OUTPATIENT)
Dept: LAB | Facility: HOSPITAL | Age: 79
End: 2019-11-04

## 2019-11-04 VITALS
HEART RATE: 72 BPM | SYSTOLIC BLOOD PRESSURE: 129 MMHG | BODY MASS INDEX: 21.33 KG/M2 | WEIGHT: 120.4 LBS | DIASTOLIC BLOOD PRESSURE: 72 MMHG | OXYGEN SATURATION: 94 % | HEIGHT: 63 IN | TEMPERATURE: 98.4 F | RESPIRATION RATE: 16 BRPM

## 2019-11-04 DIAGNOSIS — C25.0 MALIGNANT NEOPLASM OF HEAD OF PANCREAS (HCC): Primary | ICD-10-CM

## 2019-11-04 PROBLEM — G89.3 CANCER ASSOCIATED PAIN: Status: ACTIVE | Noted: 2019-11-04

## 2019-11-04 LAB
ALBUMIN SERPL-MCNC: 3.8 G/DL (ref 3.5–5.2)
ALBUMIN/GLOB SERPL: 1.3 G/DL (ref 1.1–2.4)
ALP SERPL-CCNC: 69 U/L (ref 38–116)
ALT SERPL W P-5'-P-CCNC: 12 U/L (ref 0–33)
ANION GAP SERPL CALCULATED.3IONS-SCNC: 13.3 MMOL/L (ref 5–15)
AST SERPL-CCNC: 18 U/L (ref 0–32)
BASOPHILS # BLD AUTO: 0.04 10*3/MM3 (ref 0–0.2)
BASOPHILS NFR BLD AUTO: 0.7 % (ref 0–1.5)
BILIRUB SERPL-MCNC: 0.4 MG/DL (ref 0.2–1.2)
BUN BLD-MCNC: 5 MG/DL (ref 6–20)
BUN/CREAT SERPL: 9.6 (ref 7.3–30)
CALCIUM SPEC-SCNC: 8.8 MG/DL (ref 8.5–10.2)
CHLORIDE SERPL-SCNC: 99 MMOL/L (ref 98–107)
CO2 SERPL-SCNC: 28.7 MMOL/L (ref 22–29)
CREAT BLD-MCNC: 0.52 MG/DL (ref 0.6–1.1)
DEPRECATED RDW RBC AUTO: 44.9 FL (ref 37–54)
EOSINOPHIL # BLD AUTO: 0.37 10*3/MM3 (ref 0–0.4)
EOSINOPHIL NFR BLD AUTO: 6.9 % (ref 0.3–6.2)
ERYTHROCYTE [DISTWIDTH] IN BLOOD BY AUTOMATED COUNT: 14 % (ref 12.3–15.4)
GFR SERPL CREATININE-BSD FRML MDRD: 114 ML/MIN/1.73
GLOBULIN UR ELPH-MCNC: 3 GM/DL (ref 1.8–3.5)
GLUCOSE BLD-MCNC: 178 MG/DL (ref 74–124)
HCT VFR BLD AUTO: 37.3 % (ref 34–46.6)
HGB BLD-MCNC: 12.2 G/DL (ref 12–15.9)
IMM GRANULOCYTES # BLD AUTO: 0.01 10*3/MM3 (ref 0–0.05)
IMM GRANULOCYTES NFR BLD AUTO: 0.2 % (ref 0–0.5)
LYMPHOCYTES # BLD AUTO: 1.75 10*3/MM3 (ref 0.7–3.1)
LYMPHOCYTES NFR BLD AUTO: 32.5 % (ref 19.6–45.3)
MCH RBC QN AUTO: 28.4 PG (ref 26.6–33)
MCHC RBC AUTO-ENTMCNC: 32.7 G/DL (ref 31.5–35.7)
MCV RBC AUTO: 86.9 FL (ref 79–97)
MONOCYTES # BLD AUTO: 0.8 10*3/MM3 (ref 0.1–0.9)
MONOCYTES NFR BLD AUTO: 14.8 % (ref 5–12)
NEUTROPHILS # BLD AUTO: 2.42 10*3/MM3 (ref 1.7–7)
NEUTROPHILS NFR BLD AUTO: 44.9 % (ref 42.7–76)
NRBC BLD AUTO-RTO: 0 /100 WBC (ref 0–0.2)
PLATELET # BLD AUTO: 248 10*3/MM3 (ref 140–450)
PMV BLD AUTO: 11.3 FL (ref 6–12)
POTASSIUM BLD-SCNC: 3 MMOL/L (ref 3.5–4.7)
PROT SERPL-MCNC: 6.8 G/DL (ref 6.3–8)
RBC # BLD AUTO: 4.29 10*6/MM3 (ref 3.77–5.28)
SODIUM BLD-SCNC: 141 MMOL/L (ref 134–145)
WBC NRBC COR # BLD: 5.39 10*3/MM3 (ref 3.4–10.8)

## 2019-11-04 PROCEDURE — 85025 COMPLETE CBC W/AUTO DIFF WBC: CPT

## 2019-11-04 PROCEDURE — 99215 OFFICE O/P EST HI 40 MIN: CPT | Performed by: INTERNAL MEDICINE

## 2019-11-04 PROCEDURE — 80053 COMPREHEN METABOLIC PANEL: CPT

## 2019-11-04 PROCEDURE — 36415 COLL VENOUS BLD VENIPUNCTURE: CPT

## 2019-11-04 PROCEDURE — G0463 HOSPITAL OUTPT CLINIC VISIT: HCPCS | Performed by: INTERNAL MEDICINE

## 2019-11-04 RX ORDER — ATORVASTATIN CALCIUM 20 MG/1
20 TABLET, FILM COATED ORAL DAILY
Refills: 2 | COMMUNITY
Start: 2019-10-10 | End: 2020-01-06

## 2019-11-04 NOTE — PROGRESS NOTES
Subjective     REASON FOR FOLLOWUP:    Provide an opinion on any further workup or treatment on pancreatic cancer.                               HISTORY OF PRESENT ILLNESS:  The patient is a 79 y.o. year old female who presents today for reevaluation, accompanied by her , who helped with history and discussion.    Patient was originally seen on 9/25/2019, for newly diagnosed pancreatic cancer.  Patient at that time declined treatment such as chemotherapy, radiation therapy.  She regarded quality of life is more important and that she does not think treatment would be beneficial to her.  Patient reports she has good performance status ECOG 0.  She has no nausea vomiting.  She has been eating reasonably well, no recent weight loss.  She does continue taking Norco as needed usually once a day.  She currently has no pain.  Patient also reports intermittent diarrhea, however improved after she was started on Creon.    According to her , patient actually was seen by Dr. Turner at the [facility].  After discussion with Dr. Turner about Whipple surgery, patient declined surgical intervention for her pancreas cancer.     Her  reports recently they talked to another patient who had a stage IV pancreatic cancer for the past 2 years who is actively receiving systematic chemotherapy, her  brought patient here for rediscussion of possible systematic therapy.  They also have a lot of questions in terms of how quickly the tumor was growing, whether that she would respond to the treatment, and the patient certainly voiced that she wants to live at least until October 2020, when her granddaughter will get .      I discussed with them today about systematic therapy with single agent Gemzar followed by concurrent chemoradiotherapy with Gemzar and then further chemotherapy with Gemzar.  We also discussed about monitoring of response to systematic treatment.      After discussion,  patient is willing to consider systematic chemotherapy followed by chemoradiation, to be started after Thanksgiving holiday.  With that in mind, I recommended to obtain PET scan examination and laboratory study to document a new baseline.        Past Medical History:   Diagnosis Date   • Abdominal hernia    • Cancer (CMS/HCC)    • COPD (chronic obstructive pulmonary disease) (CMS/HCC)    • Depression    • Food allergy     Scallops: causes hives   • GERD (gastroesophageal reflux disease)    • Hiatal hernia    • History of bruising easily    • History of colon polyps 08/13/2013    PATH: Distal Transverse Colon, Fragments of Tubular Adenoma   • History of colon polyps 01/30/2013    PATH: Ascending Colon - Tubular Adenoma, Splenic Flexure Polyp @ 65 cm - Tubulovillous Adenoma, Descending Colon - Tubular Adenoma   • History of colon polyps 10/30/2017    PATH: Transverse Colon - Tubular Adenoma, Rectal Polyps - Fragments of Hyperplastic Polyp   • Hypercholesteremia    • Hypertension    • Joint pain     swelling   • On home oxygen therapy     O2 NC 2. LITERS/ NIGHT   • Osteoarthritis    • Osteoporosis    • Pancreatic cancer (CMS/HCC)    • PONV (postoperative nausea and vomiting)    • Rheumatoid arthritis (CMS/HCC)     DIAGNOSIS AGE 23 - FOLLOWED BY RHEUMATOLOGY DR INFANTE, NO PROBLEMS WITH FLEX/ EXTENSION         Past Surgical History:   Procedure Laterality Date   • BREAST BIOPSY Left     Benign pathology   • BRONCHOSCOPY N/A 1/3/2018    Procedure: BRONCHOSCOPY with lavage in right middle lobe and lingula.;  Surgeon: Trenton Garcia MD;  Location: [facility] ENDOSCOPY;  Service:    • CAROTID ENDARTERECTOMY Left 2006   • CATARACT EXTRACTION WITH INTRAOCULAR LENS IMPLANT Bilateral    • COLONOSCOPY N/A 10/30/2017    Procedure: COLONOSCOPY TO CECUM, TO TERMINAL ILEUM WITH COLD BIOPSY POLYPECTOMY;  Surgeon: Jossie Stanley MD;  Location: [facility] ENDOSCOPY;  Service:    • COLONOSCOPY N/A 08/13/2013    One 3 mm polyp in the  transverse colon, Diverticulosis in the sigmoid colon, [facility], Dr. Jossie Stanley   • COLONOSCOPY N/A 01/30/2013    One 3 mm polyp ascending colon; One 7 mm polyp descending colon; One 25 mm polyp splenic flexure, Diverticulosis Sigmoid Colon, [facility], Dr. Jossie Stanley   • FOOT SURGERY Bilateral     Bilateral (MULITPLE SURGERIES)   • HAND SURGERY Bilateral     Bilateral (MULTIPLE SURGERIES)   • KNEE ARTHROSCOPY W/ PARTIAL MEDIAL MENISCECTOMY Left 01/22/2016    Left Knee Arthroscopy w/ partial medial & lateral meniscectomies, Three compartment synovectomy w/ loose body removal, Chondroplasty of patella, Dr. Florencio Frazier   • LEFT OOPHORECTOMY Left 1950    bening tumor   • NECK SURGERY     • SINUS SURGERY N/A 2011   • TOE FUSION Right 2017    with screws and pins-Dr. Atkins   • TOTAL KNEE ARTHROPLASTY Left 5/18/2016    Procedure: LT TOTAL KNEE ARTHROPLASTY WITH FELECIA NAVIGATION;  Surgeon: Florencio Frazier MD;  Location: [facility] OR;  Service:    • VENTRAL HERNIA REPAIR N/A 5/22/2018    Procedure: VENTRAL HERNIA REPAIR LAPAROSCOPIC WITH DAVINCI ROBOT;  Surgeon: Josr Jennings MD;  Location: [facility] OR;  Service: DaVinci      OBGYN HISTORY:  G:3 P:3    ONCOLOGY HISTORY:   The patient is a 79 y.o. year old female who presents on 9/25/2019 as a new patient to our clinic for evaluation of newly diagnosed pancreatic cancer. Patient is accompanied by her  today who helped with the history.    The patient reports she is feeling well overall. She does experience epigastric and lower abdominal pain since having her endoscopy and fine-needle biopsy of the pancreatic mass. The patient has been taking her prescribed pain medication as needed, a few times a day, and before bedtime to help her sleep. She rates her pain regularly at a 5/10 scale. The patient states she has been having urgent bowel movements every morning which are orange for the last 3 mornings which she states is new for her. She notes beforehand  she previously had a bowel movement every 3 days. She denies any nausea, vomiting or weight loss.     The patient has COPD which is well managed with her inhaler. She does state she becomes short of breath easily, however she notes she is able to complete house chores and other tasks.     The patient has been on Enbrel for rheumatoid arthritis for 20 years and was informed she would have to hold her medication for chemotherapy treatment.     The patient reports that she has decided she did not want to undergo chemotherapy, radiation, or surgery as she does not believe that her quality of life will be improved or even be maintained with the options available to her.     The patient previously had a chest X-ray performed on 08/19/2019 for evaluation of a severe cough which revealed a subtle nodule projecting over the medial aspect of the left lung base not previously seen on study performed on 12/17/2018.  The patient then underwent a CT Chest on 08/22/2019 which showed a small pleural-based nodule of the upper left lobe with nonspecific mediastinal adenopathy. There was also mild prominence of the pancreatic duct, thinning of the pancreas, without a specific cause identified on the exam. A PET levy was then performed on 09/03/2019 which demonstrated intensely FDG avid mass-like enlargement of the pancreatic head with dilation of the main pancreatic duct, as well as mildly FDG avid mediastinal adenopathy.     CT Abdomen & Pelvis performed on 09/10/2019 reported approximately 2.7 cm pancreatic head mass with significant pancreatic ductal dilation. The mass is situated medial to the 1st portion of the duodenum. There were no pathologically enlarged nodes or evidence for liver metastases.     The patient underwent an endoscopic ultrasound (upper) EUS and fine needle biopsy on 09/17/2019 and the pathology evaluation revealed atypical glandular cells consistent with adenocarcinoma.  She has no elevation of tumor marker CA  19-9 at 18.7 units/mL on 9/16/2019.     The patient was admitted to the hospital on 09/19/2019 to 09/20/2019 with new onset epigastric abdominal pain and nausea. CT Abdomen & Pelvis performed on 09/19/2019 reported stable pancreatic head mass and proximal ductal dilation, stable left renal scarring and low-density cortical lesions, likely cysts, colonic diverticulosis, and stable adrenal hyperplasia. The patient had previously stated in the hospital that she did not want further surgical or oncological consultation however, she was instructed that if she experienced worsening pain she could follow up with surgery and oncology to see if palliative treatment could be beneficial. The patient was discharged to home hospice with a written prescription for oxycodone and oral magnesium supplementation. She received potassium replacement in the hospital prior to discharge.    Patient and her  reports they already had a contact with home hospice care.     Laboratory study performed, 09/25/2019, reported completely normal CBC.          Current Outpatient Medications on File Prior to Visit   Medication Sig Dispense Refill   • albuterol (PROVENTIL) (2.5 MG/3ML) 0.083% nebulizer solution Take 2.5 mg by nebulization Every 4 (Four) Hours As Needed for Wheezing.     • amLODIPine (NORVASC) 5 MG tablet Take 1 tablet by mouth Daily. 30 tablet 5   • atorvastatin (LIPITOR) 20 MG tablet Take 20 mg by mouth Daily.  2   • budesonide (PULMICORT) 0.5 MG/2ML nebulizer solution USE 1 VIAL VIA NEBULIZER TWICE DAILY RINSE MOUTH AFTER USE  12   • Calcium Carbonate-Vitamin D (CALCIUM 600+D PO) Take 1 tablet by mouth Daily.     • colestipol (COLESTID) 1 g tablet Take 1 tablet by mouth 2 (Two) Times a Day. 60 tablet 4   • CREON 36740 units capsule delayed-release particles capsule Take 12,000 units of lipase by mouth Daily. before a meal  3   • DULoxetine (CYMBALTA) 60 MG capsule Take 1 capsule by mouth 2 (Two) Times a Day. 60 capsule 3   •  etanercept (ENBREL) 50 MG/ML solution prefilled syringe injection Inject 50 mg under the skin 1 (One) Time Per Week. THURSDAYS. TO HOLD 4 WEEKS     • fluticasone (FLONASE) 50 MCG/ACT nasal spray 2 sprays into the nostril(s) as directed by provider Daily. 1 bottle 5   • folic acid (FOLVITE) 1 MG tablet TAKE 1 TABLET BY MOUTH DAILY. 90 tablet 0   • guaifenesin-codeine (GUAIFENESIN AC) 100-10 MG/5ML liquid Take 5 mL by mouth 3 (Three) Times a Day As Needed for Cough. 150 mL 3   • ipratropium (ATROVENT) 0.06 % nasal spray 2 sprays into each nostril 4 (Four) Times a Day As Needed for Rhinitis.     • ipratropium-albuterol (DUO-NEB) 0.5-2.5 mg/3 ml nebulizer Take 3 mL by nebulization Every 4 (Four) Hours As Needed for Wheezing.     • magnesium oxide (MAG-OX) 400 MG tablet Take 1 tablet by mouth Daily. 30 tablet 0   • montelukast (SINGULAIR) 10 MG tablet Take 1 tablet by mouth Daily. 90 tablet 3   • omeprazole (priLOSEC) 40 MG capsule Take 1 capsule by mouth Every Evening. 90 capsule 1   • oxyCODONE-acetaminophen (PERCOCET)  MG per tablet Take 1 tablet by mouth Every 6 (Six) Hours As Needed for Moderate Pain .     • PERFOROMIST 20 MCG/2ML nebulizer solution USE 1 VIAL VIA NEBULIZER TWICE DAILY  12   • saccharomyces boulardii (FLORASTOR) 250 MG capsule Take 1 capsule by mouth 2 (Two) Times a Day. 20 capsule 5   • umeclidinium-vilanterol (ANORO ELLIPTA) 62.5-25 MCG/INH aerosol powder  inhaler Inhale 1 puff Daily. 3 each 2   • valsartan (DIOVAN) 320 MG tablet Take 1 tablet by mouth Daily. 90 tablet 2   • [DISCONTINUED] methylPREDNISolone (MEDROL, SAI,) 4 MG tablet Take as directed on package instructions. 21 each 0   • [DISCONTINUED] OXYCODONE HCL ER PO Take 5 mg by mouth Daily.       Current Facility-Administered Medications on File Prior to Visit   Medication Dose Route Frequency Provider Last Rate Last Dose   • levalbuterol (XOPENEX) nebulizer solution 0.63 mg  0.63 mg Nebulization Q6H PRN Agata Burgess MD   0.63 mg  at 18 1437        ALLERGIES:    Allergies   Allergen Reactions   • Gold-Containing Drug Products Unknown (See Comments)     Patient cannot recall reaction   • Hydrocodone Irritability     HYPERACTIVITY        Social History     Socioeconomic History   • Marital status:      Spouse name: Óscar   • Number of children: 3   • Years of education: High school   • Highest education level: Not on file   Occupational History     Employer: RETIRED   Tobacco Use   • Smoking status: Former Smoker     Packs/day: 1.50     Years: 30.00     Pack years: 45.00     Types: Cigarettes     Last attempt to quit:      Years since quittin.8   • Smokeless tobacco: Never Used   Substance and Sexual Activity   • Alcohol use: No     Comment: No alcohol for 30 years   • Drug use: No   • Sexual activity: Defer    , enjoys golfing.  Patient has two  children.     Family History   Problem Relation Age of Onset   • Heart disease Mother    • Hypertension Mother    • Lung disease Mother    • Diabetes Sister    • Hypertension Brother    • Multiple sclerosis Brother    • Diabetes Sister    • Malig Hyperthermia Neg Hx         Review of Systems   Constitutional: Negative for appetite change, chills, diaphoresis, fatigue, fever and unexpected weight change.   HENT: Negative for hearing loss, sore throat and trouble swallowing.    Eyes: Negative for visual disturbance.   Respiratory: Negative for cough, chest tightness, shortness of breath and wheezing.    Cardiovascular: Negative for chest pain, palpitations and leg swelling.   Gastrointestinal: Positive for abdominal pain (Bandlike distribution across epigastric area) and diarrhea. Negative for abdominal distention, blood in stool and nausea.   Endocrine: Negative for cold intolerance and polyuria.   Genitourinary: Negative for dysuria, frequency, hematuria and urgency.   Musculoskeletal: Negative for arthralgias and joint swelling.   Skin: Negative for color change,  "rash and wound.   Allergic/Immunologic: Negative for immunocompromised state.   Neurological: Negative for dizziness, seizures, numbness and headaches.   Hematological: Negative for adenopathy. Does not bruise/bleed easily.   Psychiatric/Behavioral: Negative for behavioral problems and confusion.      Objective     Vitals:    11/04/19 1549   BP: 129/72   Pulse: 72   Resp: 16   Temp: 98.4 °F (36.9 °C)   SpO2: 94%   Weight: 54.6 kg (120 lb 6.4 oz)   Height: 160 cm (62.99\")   PainSc: 0-No pain     Current Status 11/4/2019   ECOG score 0       Physical Exam    Physical Exam   Constitutional: She is oriented to person, place, and time. She appears well-developed and well-nourished. No distress.   HENT:   Head: Normocephalic and atraumatic.   Eyes: Conjunctivae and EOM are normal. Pupils are equal, round, and reactive to light.   Neck: No tracheal deviation present.   Cardiovascular: Normal rate, regular rhythm and normal heart sounds.  Exam reveals no friction rub.    No murmur heard.  Pulmonary/Chest: Effort normal and breath sounds normal. She has no wheezes.   Abdominal: Soft. Bowel sounds are normal.   Lymphadenopathy:     She has no cervical adenopathy.   Neurological: She is alert and oriented to person, place, and time.   Skin: Skin is warm and dry.   Psychiatric: She has a normal mood and affect. Thought content normal.       RECENT LABS:  Lab Results   Component Value Date    WBC 5.39 11/04/2019    HGB 12.2 11/04/2019    HCT 37.3 11/04/2019    MCV 86.9 11/04/2019     11/04/2019     Lab Results   Component Value Date    NEUTROABS 2.42 11/04/2019     Glucose   Date Value Ref Range Status   09/20/2019 88 65 - 99 mg/dL Final     BUN   Date Value Ref Range Status   09/20/2019 3 (L) 8 - 23 mg/dL Final     Creatinine   Date Value Ref Range Status   09/20/2019 0.39 (L) 0.57 - 1.00 mg/dL Final   09/10/2019 0.50 (L) 0.60 - 1.30 mg/dL Final     Comment:     Serial Number: [id number]:  [id number]     Sodium   Date " Value Ref Range Status   09/20/2019 140 136 - 145 mmol/L Final     Potassium   Date Value Ref Range Status   09/20/2019 3.2 (L) 3.5 - 5.2 mmol/L Final     Chloride   Date Value Ref Range Status   09/20/2019 102 98 - 107 mmol/L Final     CO2   Date Value Ref Range Status   09/20/2019 22.6 22.0 - 29.0 mmol/L Final     Calcium   Date Value Ref Range Status   09/20/2019 7.9 (L) 8.6 - 10.5 mg/dL Final     Total Protein   Date Value Ref Range Status   09/19/2019 7.3 6.0 - 8.5 g/dL Final     Albumin   Date Value Ref Range Status   09/19/2019 3.90 3.50 - 5.20 g/dL Final     ALT (SGPT)   Date Value Ref Range Status   09/19/2019 11 1 - 33 U/L Final     AST (SGOT)   Date Value Ref Range Status   09/19/2019 12 1 - 32 U/L Final     Alkaline Phosphatase   Date Value Ref Range Status   09/19/2019 73 39 - 117 U/L Final     Total Bilirubin   Date Value Ref Range Status   09/19/2019 0.5 0.2 - 1.2 mg/dL Final     eGFR Non  Amer   Date Value Ref Range Status   09/20/2019 >150 >60 mL/min/1.73 Final     A/G Ratio   Date Value Ref Range Status   06/21/2019 1.8 g/dL Final     BUN/Creatinine Ratio   Date Value Ref Range Status   09/20/2019 7.7 7.0 - 25.0 Final     Anion Gap   Date Value Ref Range Status   09/20/2019 15.4 (H) 5.0 - 15.0 mmol/L Final              Assessment/Plan   1. Newly diagnosed pancreatic carcinoma: The patient previously had a chest X-ray performed on 08/19/2019 for evaluation of a severe cough which revealed a subtle nodule projecting over the medial aspect of the left lung base not previously seen on study performed on 12/17/2018. The patient then underwent a CT Chest on 08/22/2019 which showed a small pleural-based nodule of the upper left lobe with nonspecific mediastinal adenopathy. There was also mild prominence of the pancreatic duct, thinning of the pancreas, without a specific cause identified on the exam. A PET levy was then performed on 09/03/2019 which demonstrated intensely FDG avid mass-like  enlargement of the pancreatic head with dilation of the main pancreatic duct, as well as mildly FDG avid mediastinal adenopathy. CT Abdomen & Pelvis performed on 09/10/2019 reported approximately 2.7 cm pancreatic head mass with significant pancreatic ductal dilation. The mass is situated medial to the 1st portion of the duodenum. There were no pathologically enlarged nodes or evidence for liver metastases. The patient underwent an endoscopic ultrasound (upper) fine needle biopsy on 09/17/2019 and the pathology report showed atypical glandular cells consistent with adenocarcinoma.   · Laboratory study performed on 9/16/2019 showed normal CA 19-9 of 18.7 U/mL.  · I explained to the patient that the PET showed mediastinal adenopathy, however biopsy would be difficult due to the size and location of the lymph nodes.  There is lymph nodes also his only slightly elevated activity, could even be benign lymph nodes.  · I discussed with the patient that chemotherapy and/or concurrent chemoradiation therapy to the pancreas may be an option. I further explained that the only way to cure pancreatic cancer is completed surgical resection and that chemotherapy or radiation treatment would be to control the disease. I discussed with the patient and her  at length that the best systematic treatment to treat pancreatic cancer is with a mixture of 3 chemotherapy medications FOLFIRINOX regimen, however I do not believe that she would tolerate this treatment well.  I also has suspicion that she would tolerate Abraxane plus Gemzar doublets chemotherapy, also because of the side effects especially bone marrow suppression.  · I further discussed that she could consider chemotherapy with single agent Gemzar in combination with radiation and a sandwich type schedule.  I further discussed the side effects at length, including nausea, loss of appetite, fatigue, and weight loss, bone marrow suppression, flu type symptoms etc. I  explained that I feel this is the best way for her to proceed as this would result in the least amount of side effects. We would administer 2 weeks on and one week off for 3 cycles (6 doses),   Then followed by concurrent chemoradiotherapy using single agent daily Gemzar, and then further treatment with weekly Gemzar.  We would also have to repeat PET imaging in order to evaluate efficacy of chemotherapy treatment.  · I also explained that there are presently some clinical trials to treat pancreatic cancer however they are not currently available at our clinic. In addition, the patient is not currently eligible for a clinical trial for second line chemotherapy as she has not undergone first line chemotherapy treatment at this time.   · I explained to the patient that without any treatment, she would be expected to live anywhere between 6 months to 1 year and that although she has not been experiencing many side effects now, more side effects are likely to occur, such as obstructive jaundice with jaundiced skin and eyes, whitish stools, and very yellow urine, and the liver failure.  She will also suffer from pain as her pain are likely to worsen. She will at some point require a stent placement to relieve the obstructive jaundice for which she should be referred to Dr. Cortes. I explained to the patient that she will most likely not be able to have the stent placed preventatively.   · I discussed that she is likely not a good candidate for complete Whipple surgery due to comorbidities, which result in her development of diabetes and would require insulin and medication to improve digestion.   · The patient's  and the patient state that they have conjointly decided to focus on quality of life rather than longevity at this time.     PLAN:   1. Obtain PET scan examination in 2-3 weeks.  We will get the laboratory studies also CBC CMP and CA 19-9.  2. Follow-up with me in 3 weeks to further discuss the study  results and finalize treatment plans for chemotherapy and radiation treatment.     Patient is accompanied by her  today who helped with the history.      I spent more than 40 minutes in patient care today, more than 50% of which was spent in counseling the patient on treatment options.        Morris Cassidy MD PhD  11/4/2019.      Agata Marie MD

## 2019-11-22 ENCOUNTER — HOSPITAL ENCOUNTER (OUTPATIENT)
Dept: PET IMAGING | Facility: HOSPITAL | Age: 79
Discharge: HOME | End: 2019-11-22
Admitting: INTERNAL MEDICINE

## 2019-11-22 ENCOUNTER — HOSPITAL ENCOUNTER (OUTPATIENT)
Dept: PET IMAGING | Facility: HOSPITAL | Age: 79
Discharge: HOME | End: 2019-11-22

## 2019-11-22 ENCOUNTER — LAB (OUTPATIENT)
Dept: LAB | Facility: HOSPITAL | Age: 79
End: 2019-11-22

## 2019-11-22 DIAGNOSIS — C25.0 MALIGNANT NEOPLASM OF HEAD OF PANCREAS (HCC): ICD-10-CM

## 2019-11-22 LAB
ALBUMIN SERPL-MCNC: 3.9 G/DL (ref 3.5–5.2)
ALBUMIN/GLOB SERPL: 1.3 G/DL (ref 1.1–2.4)
ALP SERPL-CCNC: 61 U/L (ref 38–116)
ALT SERPL W P-5'-P-CCNC: 10 U/L (ref 0–33)
ANION GAP SERPL CALCULATED.3IONS-SCNC: 14 MMOL/L (ref 5–15)
AST SERPL-CCNC: 15 U/L (ref 0–32)
BASOPHILS # BLD AUTO: 0.05 10*3/MM3 (ref 0–0.2)
BASOPHILS NFR BLD AUTO: 1 % (ref 0–1.5)
BILIRUB SERPL-MCNC: 0.4 MG/DL (ref 0.2–1.2)
BUN BLD-MCNC: 7 MG/DL (ref 6–20)
BUN/CREAT SERPL: 13 (ref 7.3–30)
CALCIUM SPEC-SCNC: 8.8 MG/DL (ref 8.5–10.2)
CANCER AG19-9 SERPL-ACNC: 17.7 U/ML
CHLORIDE SERPL-SCNC: 100 MMOL/L (ref 98–107)
CO2 SERPL-SCNC: 27 MMOL/L (ref 22–29)
CREAT BLD-MCNC: 0.54 MG/DL (ref 0.6–1.1)
DEPRECATED RDW RBC AUTO: 46.6 FL (ref 37–54)
EOSINOPHIL # BLD AUTO: 0.78 10*3/MM3 (ref 0–0.4)
EOSINOPHIL NFR BLD AUTO: 16 % (ref 0.3–6.2)
ERYTHROCYTE [DISTWIDTH] IN BLOOD BY AUTOMATED COUNT: 14.5 % (ref 12.3–15.4)
GFR SERPL CREATININE-BSD FRML MDRD: 109 ML/MIN/1.73
GLOBULIN UR ELPH-MCNC: 3.1 GM/DL (ref 1.8–3.5)
GLUCOSE BLD-MCNC: 110 MG/DL (ref 74–124)
GLUCOSE BLDC GLUCOMTR-MCNC: 142 MG/DL (ref 70–130)
HCT VFR BLD AUTO: 38.2 % (ref 34–46.6)
HGB BLD-MCNC: 12.2 G/DL (ref 12–15.9)
IMM GRANULOCYTES # BLD AUTO: 0.01 10*3/MM3 (ref 0–0.05)
IMM GRANULOCYTES NFR BLD AUTO: 0.2 % (ref 0–0.5)
LYMPHOCYTES # BLD AUTO: 1.74 10*3/MM3 (ref 0.7–3.1)
LYMPHOCYTES NFR BLD AUTO: 35.7 % (ref 19.6–45.3)
MCH RBC QN AUTO: 28.1 PG (ref 26.6–33)
MCHC RBC AUTO-ENTMCNC: 31.9 G/DL (ref 31.5–35.7)
MCV RBC AUTO: 88 FL (ref 79–97)
MONOCYTES # BLD AUTO: 0.8 10*3/MM3 (ref 0.1–0.9)
MONOCYTES NFR BLD AUTO: 16.4 % (ref 5–12)
NEUTROPHILS # BLD AUTO: 1.5 10*3/MM3 (ref 1.7–7)
NEUTROPHILS NFR BLD AUTO: 30.7 % (ref 42.7–76)
NRBC BLD AUTO-RTO: 0 /100 WBC (ref 0–0.2)
PLATELET # BLD AUTO: 243 10*3/MM3 (ref 140–450)
PMV BLD AUTO: 12.1 FL (ref 6–12)
POTASSIUM BLD-SCNC: 3 MMOL/L (ref 3.5–4.7)
PROT SERPL-MCNC: 7 G/DL (ref 6.3–8)
RBC # BLD AUTO: 4.34 10*6/MM3 (ref 3.77–5.28)
SODIUM BLD-SCNC: 141 MMOL/L (ref 134–145)
WBC NRBC COR # BLD: 4.88 10*3/MM3 (ref 3.4–10.8)

## 2019-11-22 PROCEDURE — 0 FLUDEOXYGLUCOSE F18 SOLUTION: Performed by: INTERNAL MEDICINE

## 2019-11-22 PROCEDURE — 36415 COLL VENOUS BLD VENIPUNCTURE: CPT

## 2019-11-22 PROCEDURE — 82962 GLUCOSE BLOOD TEST: CPT

## 2019-11-22 PROCEDURE — 78815 PET IMAGE W/CT SKULL-THIGH: CPT

## 2019-11-22 PROCEDURE — A9552 F18 FDG: HCPCS | Performed by: INTERNAL MEDICINE

## 2019-11-22 PROCEDURE — 86301 IMMUNOASSAY TUMOR CA 19-9: CPT | Performed by: INTERNAL MEDICINE

## 2019-11-22 PROCEDURE — 85025 COMPLETE CBC W/AUTO DIFF WBC: CPT

## 2019-11-22 PROCEDURE — 80053 COMPREHEN METABOLIC PANEL: CPT

## 2019-11-22 RX ADMIN — FLUDEOXYGLUCOSE F18 1 DOSE: 300 INJECTION INTRAVENOUS at 09:07

## 2019-11-26 ENCOUNTER — APPOINTMENT (OUTPATIENT)
Dept: LAB | Facility: HOSPITAL | Age: 79
End: 2019-11-26

## 2019-11-26 ENCOUNTER — OFFICE VISIT (OUTPATIENT)
Dept: ONCOLOGY | Facility: CLINIC | Age: 79
End: 2019-11-26

## 2019-11-26 VITALS
RESPIRATION RATE: 16 BRPM | DIASTOLIC BLOOD PRESSURE: 82 MMHG | HEART RATE: 81 BPM | SYSTOLIC BLOOD PRESSURE: 169 MMHG | WEIGHT: 118.1 LBS | HEIGHT: 63 IN | OXYGEN SATURATION: 96 % | BODY MASS INDEX: 20.93 KG/M2 | TEMPERATURE: 98 F

## 2019-11-26 DIAGNOSIS — C25.0 MALIGNANT NEOPLASM OF HEAD OF PANCREAS (HCC): Primary | ICD-10-CM

## 2019-11-26 DIAGNOSIS — R59.9 ENLARGEMENT OF LYMPH NODE: ICD-10-CM

## 2019-11-26 PROCEDURE — 99215 OFFICE O/P EST HI 40 MIN: CPT | Performed by: INTERNAL MEDICINE

## 2019-11-26 PROCEDURE — G0463 HOSPITAL OUTPT CLINIC VISIT: HCPCS | Performed by: INTERNAL MEDICINE

## 2019-11-26 NOTE — PROGRESS NOTES
Subjective     REASON FOR FOLLOWUP:    1. Provide an opinion on any further workup or treatment on pancreatic cancer.   2.  Patient declined systematic chemotherapy in early October 2019                               HISTORY OF PRESENT ILLNESS:  The patient is a 79 y.o. year old female who presents today for reevaluation, to discuss the results of PET scan examination obtained on 11/22/2019.  Patient is accompanied by her , who helped with history and discussion.    Patient was originally seen on 9/25/2019, for newly diagnosed pancreatic cancer.  Patient at that time declined treatment such as chemotherapy, radiation therapy.  She regarded quality of life is more important and that she does not think treatment would be beneficial to her.  Patient reports she has good performance status ECOG 0.  She has no nausea vomiting.  She has been eating reasonably well, no recent weight loss.  She does continue taking Norco as needed usually once a day.  She currently has no pain.  Patient also reports intermittent diarrhea, however improved after she was started on Creon.    According to her , patient actually was seen by Dr. Turner at the [facility].  After discussion with Dr. Turner about Whipple surgery, patient declined surgical intervention for her pancreas cancer.     Her  reports recently they talked to another patient who had a stage IV pancreatic cancer for the past 2 years who is actively receiving systematic chemotherapy, her  brought patient here for rediscussion of possible systematic therapy.  They also have a lot of questions in terms of how quickly the tumor was growing, whether that she would respond to the treatment, and the patient certainly voiced that she wants to live at least until October 2020, when her granddaughter will get .      I discussed with them about systematic therapy with single agent Gemzar followed by concurrent chemoradiotherapy with  Gemzar and then further chemotherapy with Gemzar.  We also discussed about monitoring of response to systematic treatment.      After discussion, patient is willing to consider systematic chemotherapy followed by chemoradiation, to be started after Thanksgiving holiday.  With that in mind, I recommended to obtain PET scan examination and laboratory study to document a new baseline.      PET scan examination on 11/22/2019 reported improved hypermetabolic activity in the pancreas.     Patient reports baseline performance status is ECOG 1.  Denies nausea vomiting.  Denies abdominal pain or back pain.  Denies jaundice.      Past Medical History:   Diagnosis Date   • Abdominal hernia    • Cancer (CMS/HCC)    • COPD (chronic obstructive pulmonary disease) (CMS/HCC)    • Depression    • Food allergy     Scallops: causes hives   • GERD (gastroesophageal reflux disease)    • Hiatal hernia    • History of bruising easily    • History of colon polyps 08/13/2013    PATH: Distal Transverse Colon, Fragments of Tubular Adenoma   • History of colon polyps 01/30/2013    PATH: Ascending Colon - Tubular Adenoma, Splenic Flexure Polyp @ 65 cm - Tubulovillous Adenoma, Descending Colon - Tubular Adenoma   • History of colon polyps 10/30/2017    PATH: Transverse Colon - Tubular Adenoma, Rectal Polyps - Fragments of Hyperplastic Polyp   • Hypercholesteremia    • Hypertension    • Joint pain     swelling   • On home oxygen therapy     O2 NC 2. LITERS/ NIGHT   • Osteoarthritis    • Osteoporosis    • Pancreatic cancer (CMS/HCC)    • PONV (postoperative nausea and vomiting)    • Rheumatoid arthritis (CMS/HCC)     DIAGNOSIS AGE 23 - FOLLOWED BY RHEUMATOLOGY DR INFANTE, NO PROBLEMS WITH FLEX/ EXTENSION         Past Surgical History:   Procedure Laterality Date   • BREAST BIOPSY Left     Benign pathology   • BRONCHOSCOPY N/A 1/3/2018    Procedure: BRONCHOSCOPY with lavage in right middle lobe and lingula.;  Surgeon: Trenton Garcia MD;   Location: [facility] ENDOSCOPY;  Service:    • CAROTID ENDARTERECTOMY Left 2006   • CATARACT EXTRACTION WITH INTRAOCULAR LENS IMPLANT Bilateral    • COLONOSCOPY N/A 10/30/2017    Procedure: COLONOSCOPY TO CECUM, TO TERMINAL ILEUM WITH COLD BIOPSY POLYPECTOMY;  Surgeon: Jossie Stanley MD;  Location: [facility] ENDOSCOPY;  Service:    • COLONOSCOPY N/A 08/13/2013    One 3 mm polyp in the transverse colon, Diverticulosis in the sigmoid colon, [facility], Dr. Jossie Stanley   • COLONOSCOPY N/A 01/30/2013    One 3 mm polyp ascending colon; One 7 mm polyp descending colon; One 25 mm polyp splenic flexure, Diverticulosis Sigmoid Colon, [facility], Dr. Jossie Stanley   • FOOT SURGERY Bilateral     Bilateral (MULITPLE SURGERIES)   • HAND SURGERY Bilateral     Bilateral (MULTIPLE SURGERIES)   • KNEE ARTHROSCOPY W/ PARTIAL MEDIAL MENISCECTOMY Left 01/22/2016    Left Knee Arthroscopy w/ partial medial & lateral meniscectomies, Three compartment synovectomy w/ loose body removal, Chondroplasty of patella, Dr. Florencio Frazier   • LEFT OOPHORECTOMY Left 1950    bening tumor   • NECK SURGERY     • SINUS SURGERY N/A 2011   • TOE FUSION Right 2017    with screws and pins-Dr. Atkins   • TOTAL KNEE ARTHROPLASTY Left 5/18/2016    Procedure: LT TOTAL KNEE ARTHROPLASTY WITH FELECIA NAVIGATION;  Surgeon: Florencio Frazier MD;  Location: [facility] OR;  Service:    • VENTRAL HERNIA REPAIR N/A 5/22/2018    Procedure: VENTRAL HERNIA REPAIR LAPAROSCOPIC WITH DAVINCI ROBOT;  Surgeon: Josr Jennings MD;  Location: [facility] OR;  Service: DaVinci      OBGYN HISTORY:  G:3 P:3    ONCOLOGY HISTORY:   The patient is a 79 y.o. year old female who presents on 9/25/2019 as a new patient to our clinic for evaluation of newly diagnosed pancreatic cancer. Patient is accompanied by her  today who helped with the history.    The patient reports she is feeling well overall. She does experience epigastric and lower abdominal pain since having her endoscopy and  fine-needle biopsy of the pancreatic mass. The patient has been taking her prescribed pain medication as needed, a few times a day, and before bedtime to help her sleep. She rates her pain regularly at a 5/10 scale. The patient states she has been having urgent bowel movements every morning which are orange for the last 3 mornings which she states is new for her. She notes beforehand she previously had a bowel movement every 3 days. She denies any nausea, vomiting or weight loss.     The patient has COPD which is well managed with her inhaler. She does state she becomes short of breath easily, however she notes she is able to complete house chores and other tasks.     The patient has been on Enbrel for rheumatoid arthritis for 20 years and was informed she would have to hold her medication for chemotherapy treatment.     The patient reports that she has decided she did not want to undergo chemotherapy, radiation, or surgery as she does not believe that her quality of life will be improved or even be maintained with the options available to her.     The patient previously had a chest X-ray performed on 08/19/2019 for evaluation of a severe cough which revealed a subtle nodule projecting over the medial aspect of the left lung base not previously seen on study performed on 12/17/2018.  The patient then underwent a CT Chest on 08/22/2019 which showed a small pleural-based nodule of the upper left lobe with nonspecific mediastinal adenopathy. There was also mild prominence of the pancreatic duct, thinning of the pancreas, without a specific cause identified on the exam. A PET levy was then performed on 09/03/2019 which demonstrated intensely FDG avid mass-like enlargement of the pancreatic head with dilation of the main pancreatic duct, as well as mildly FDG avid mediastinal adenopathy.     CT Abdomen & Pelvis performed on 09/10/2019 reported approximately 2.7 cm pancreatic head mass with significant pancreatic ductal  dilation. The mass is situated medial to the 1st portion of the duodenum. There were no pathologically enlarged nodes or evidence for liver metastases.     The patient underwent an endoscopic ultrasound (upper) EUS and fine needle biopsy on 09/17/2019 and the pathology evaluation revealed atypical glandular cells consistent with adenocarcinoma.  She has no elevation of tumor marker CA 19-9 at 18.7 units/mL on 9/16/2019.     The patient was admitted to the hospital on 09/19/2019 to 09/20/2019 with new onset epigastric abdominal pain and nausea. CT Abdomen & Pelvis performed on 09/19/2019 reported stable pancreatic head mass and proximal ductal dilation, stable left renal scarring and low-density cortical lesions, likely cysts, colonic diverticulosis, and stable adrenal hyperplasia. The patient had previously stated in the hospital that she did not want further surgical or oncological consultation however, she was instructed that if she experienced worsening pain she could follow up with surgery and oncology to see if palliative treatment could be beneficial. The patient was discharged to home hospice with a written prescription for oxycodone and oral magnesium supplementation. She received potassium replacement in the hospital prior to discharge.    Patient and her  reports they already had a contact with home hospice care.     Laboratory study performed, 09/25/2019, reported completely normal CBC.          Current Outpatient Medications on File Prior to Visit   Medication Sig Dispense Refill   • albuterol (PROVENTIL) (2.5 MG/3ML) 0.083% nebulizer solution Take 2.5 mg by nebulization Every 4 (Four) Hours As Needed for Wheezing.     • amLODIPine (NORVASC) 5 MG tablet Take 1 tablet by mouth Daily. 30 tablet 5   • atorvastatin (LIPITOR) 20 MG tablet Take 20 mg by mouth Daily.  2   • budesonide (PULMICORT) 0.5 MG/2ML nebulizer solution USE 1 VIAL VIA NEBULIZER TWICE DAILY RINSE MOUTH AFTER USE  12   • Calcium  Carbonate-Vitamin D (CALCIUM 600+D PO) Take 1 tablet by mouth Daily.     • colestipol (COLESTID) 1 g tablet Take 1 tablet by mouth 2 (Two) Times a Day. 60 tablet 4   • CREON 79625 units capsule delayed-release particles capsule Take 12,000 units of lipase by mouth Daily. before a meal  3   • DULoxetine (CYMBALTA) 60 MG capsule Take 1 capsule by mouth 2 (Two) Times a Day. 60 capsule 3   • etanercept (ENBREL) 50 MG/ML solution prefilled syringe injection Inject 50 mg under the skin 1 (One) Time Per Week. THURSDAYS. TO HOLD 4 WEEKS     • fluticasone (FLONASE) 50 MCG/ACT nasal spray 2 sprays into the nostril(s) as directed by provider Daily. 1 bottle 5   • folic acid (FOLVITE) 1 MG tablet TAKE 1 TABLET BY MOUTH DAILY. 90 tablet 0   • guaifenesin-codeine (GUAIFENESIN AC) 100-10 MG/5ML liquid Take 5 mL by mouth 3 (Three) Times a Day As Needed for Cough. 150 mL 3   • ipratropium (ATROVENT) 0.06 % nasal spray 2 sprays into each nostril 4 (Four) Times a Day As Needed for Rhinitis.     • ipratropium-albuterol (DUO-NEB) 0.5-2.5 mg/3 ml nebulizer Take 3 mL by nebulization Every 4 (Four) Hours As Needed for Wheezing.     • magnesium oxide (MAG-OX) 400 MG tablet Take 1 tablet by mouth Daily. 30 tablet 0   • montelukast (SINGULAIR) 10 MG tablet Take 1 tablet by mouth Daily. 90 tablet 3   • omeprazole (priLOSEC) 40 MG capsule Take 1 capsule by mouth Every Evening. 90 capsule 1   • oxyCODONE-acetaminophen (PERCOCET)  MG per tablet Take 1 tablet by mouth Every 6 (Six) Hours As Needed for Moderate Pain .     • PERFOROMIST 20 MCG/2ML nebulizer solution USE 1 VIAL VIA NEBULIZER TWICE DAILY  12   • saccharomyces boulardii (FLORASTOR) 250 MG capsule Take 1 capsule by mouth 2 (Two) Times a Day. 20 capsule 5   • umeclidinium-vilanterol (ANORO ELLIPTA) 62.5-25 MCG/INH aerosol powder  inhaler Inhale 1 puff Daily. 3 each 2   • valsartan (DIOVAN) 320 MG tablet Take 1 tablet by mouth Daily. 90 tablet 2     Current Facility-Administered  Medications on File Prior to Visit   Medication Dose Route Frequency Provider Last Rate Last Dose   • levalbuterol (XOPENEX) nebulizer solution 0.63 mg  0.63 mg Nebulization Q6H PRN Agata Burgess MD   0.63 mg at 18 3047        ALLERGIES:    Allergies   Allergen Reactions   • Gold-Containing Drug Products Unknown (See Comments)     Patient cannot recall reaction   • Hydrocodone Irritability     HYPERACTIVITY        Social History     Socioeconomic History   • Marital status:      Spouse name: Óscar   • Number of children: 3   • Years of education: High school   • Highest education level: Not on file   Occupational History     Employer: RETIRED   Tobacco Use   • Smoking status: Former Smoker     Packs/day: 1.50     Years: 30.00     Pack years: 45.00     Types: Cigarettes     Last attempt to quit:      Years since quittin.9   • Smokeless tobacco: Never Used   Substance and Sexual Activity   • Alcohol use: No     Comment: No alcohol for 30 years   • Drug use: No   • Sexual activity: Defer    , enjoys golfing.  Patient has two  children.     Family History   Problem Relation Age of Onset   • Heart disease Mother    • Hypertension Mother    • Lung disease Mother    • Diabetes Sister    • Hypertension Brother    • Multiple sclerosis Brother    • Diabetes Sister    • Malig Hyperthermia Neg Hx         Review of Systems   Constitutional: Negative for appetite change, chills, diaphoresis, fatigue, fever and unexpected weight change.   HENT: Negative for hearing loss, sore throat and trouble swallowing.    Eyes: Negative for visual disturbance.   Respiratory: Negative for cough, chest tightness, shortness of breath and wheezing.    Cardiovascular: Negative for chest pain, palpitations and leg swelling.   Gastrointestinal: Positive for abdominal pain (Bandlike distribution across epigastric area) and diarrhea. Negative for abdominal distention, blood in stool and nausea.   Endocrine:  "Negative for cold intolerance and polyuria.   Genitourinary: Negative for dysuria, frequency, hematuria and urgency.   Musculoskeletal: Negative for arthralgias and joint swelling.   Skin: Negative for color change, rash and wound.   Allergic/Immunologic: Negative for immunocompromised state.   Neurological: Negative for dizziness, seizures, numbness and headaches.   Hematological: Negative for adenopathy. Does not bruise/bleed easily.   Psychiatric/Behavioral: Negative for behavioral problems and confusion.      Objective     Vitals:    11/26/19 1331   BP: 169/82   Pulse: 81   Resp: 16   Temp: 98 °F (36.7 °C)   TempSrc: Oral   SpO2: 96%   Weight: 53.6 kg (118 lb 1.6 oz)   Height: 160 cm (62.99\")   PainSc: 0-No pain     Current Status 11/26/2019   ECOG score 1       Physical Exam      GENERAL:  Well-developed, well-nourished in no acute distress.   SKIN:  Warm, dry without rashes, purpura or petechiae.  HEAD:  Normocephalic.  EYES:  Pupils equal, round and reactive to light.  EOMs intact.  Conjunctivae normal.  EARS:  Hearing intact.  NOSE:  Septum midline.  No excoriations or nasal discharge.  MOUTH:  Tongue is well-papillated; no stomatitis or ulcers.  Lips normal.  THROAT:  Oropharynx without lesions or exudates.  NECK:  Supple with good range of motion; no thyromegaly or masses, no JVD.  LYMPHATICS:  No cervical, supraclavicular, axillary or inguinal adenopathy.  CHEST:  Lungs clear to percussion and auscultation. Good airflow.  CARDIAC:  Regular rate and rhythm without murmurs, rubs or gallops. Normal S1,S2.  ABDOMEN:  Soft, nontender with no organomegaly or masses.  EXTREMITIES:  No clubbing, cyanosis or edema.  NEUROLOGICAL:  Cranial Nerves II-XII grossly intact.  No focal neurological deficits.  PSYCHIATRIC:  Normal affect and mood.          RECENT LABS:  Lab Results   Component Value Date    WBC 4.88 11/22/2019    HGB 12.2 11/22/2019    HCT 38.2 11/22/2019    MCV 88.0 11/22/2019     11/22/2019 "     Lab Results   Component Value Date    NEUTROABS 1.50 (L) 11/22/2019     Glucose   Date Value Ref Range Status   11/22/2019 110 74 - 124 mg/dL Final     BUN   Date Value Ref Range Status   11/22/2019 7 6 - 20 mg/dL Final     Creatinine   Date Value Ref Range Status   11/22/2019 0.54 (L) 0.60 - 1.10 mg/dL Final   09/10/2019 0.50 (L) 0.60 - 1.30 mg/dL Final     Comment:     Serial Number: [id number]:  [id number]     Sodium   Date Value Ref Range Status   11/22/2019 141 134 - 145 mmol/L Final     Potassium   Date Value Ref Range Status   11/22/2019 3.0 (L) 3.5 - 4.7 mmol/L Final     Chloride   Date Value Ref Range Status   11/22/2019 100 98 - 107 mmol/L Final     CO2   Date Value Ref Range Status   11/22/2019 27.0 22.0 - 29.0 mmol/L Final     Calcium   Date Value Ref Range Status   11/22/2019 8.8 8.5 - 10.2 mg/dL Final     Total Protein   Date Value Ref Range Status   11/22/2019 7.0 6.3 - 8.0 g/dL Final     Albumin   Date Value Ref Range Status   11/22/2019 3.90 3.50 - 5.20 g/dL Final     ALT (SGPT)   Date Value Ref Range Status   11/22/2019 10 0 - 33 U/L Final     AST (SGOT)   Date Value Ref Range Status   11/22/2019 15 0 - 32 U/L Final     Alkaline Phosphatase   Date Value Ref Range Status   11/22/2019 61 38 - 116 U/L Final     Total Bilirubin   Date Value Ref Range Status   11/22/2019 0.4 0.2 - 1.2 mg/dL Final     eGFR Non  Amer   Date Value Ref Range Status   11/22/2019 109 >60 mL/min/1.73 Final     A/G Ratio   Date Value Ref Range Status   06/21/2019 1.8 g/dL Final     BUN/Creatinine Ratio   Date Value Ref Range Status   11/22/2019 13.0 7.3 - 30.0 Final     Anion Gap   Date Value Ref Range Status   11/22/2019 14.0 5.0 - 15.0 mmol/L Final        IMAGE STUDY:  F-18 FDG PET FROM SKULL BASE TO MID THIGH WITH PET/CT FUSION 11/22/2019     HISTORY: 79-year-old female with pancreatic cancer. Restaging.     TECHNIQUE: Radiation dose reduction techniques were utilized, including  automated exposure control and  exposure modulation based on body size.   Blood glucose level at time of injection was 142 mg/dL.  5.7 mCi of F-18  FDG were injected and PET was performed from skull base to mid thigh. CT  was obtained for localization and attenuation correction. Time at  injection 8:52 AM. PET start time 10:11 AM. Compared with CT of the  abdomen and pelvis from 09/19/2019 and PET/CT from 09/03/2019.     FINDINGS: The hypermetabolic mass at the pancreatic head appears  slightly smaller on the PET sequence measuring approximately 1.3 cm,  previously 1.7 cm. The mass is too ill-defined on the noncontrasted CT  sequences for comparison. There has been slight decrease in the  intensity of the metabolic activity with a maximal SUV of 6.3,  previously 7.9. There is no hypermetabolic lymphadenopathy in the  retroperitoneum. There is no suspicious activity within the liver. There  is no suspicious hypermetabolic activity within the pelvis. There is no  interval change in the size of the shotty mediastinal nodes. A 1.3 x 0.9  cm reference precarinal node is stable and the maximal SUV is 3.4 which  is also stable. There is no new or more intensely hypermetabolic  lymphadenopathy within the chest. There is no change in the tiny  pleural-based nodule at the left upper lobe. Advanced emphysematous  changes are noted. There is no suspicious hypermetabolic activity within  the neck.     IMPRESSION:  1. There has been slight interval decrease in size of the pancreatic  head mass and the intensity of the metabolic activity has decreased as  well. There is no evidence for metastatic disease within the abdomen.  2. Stable shotty mediastinal nodes with low-level activity. The nodes  may be benign. The tiny pleural-based nodule at the left upper lobe  appears stable since the previous chest CT from 08/22/2019. Conservative  surveillance is recommended with a chest CT in 3-4 months.      Assessment/Plan   1. Pancreatic carcinoma diagnosed in early  September 2019: The patient previously had a chest X-ray performed on 08/19/2019 for evaluation of a severe cough which revealed a subtle nodule projecting over the medial aspect of the left lung base not previously seen on study performed on 12/17/2018. The patient then underwent a CT Chest on 08/22/2019 which showed a small pleural-based nodule of the upper left lobe with nonspecific mediastinal adenopathy. There was also mild prominence of the pancreatic duct, thinning of the pancreas, without a specific cause identified on the exam. A PET levy was then performed on 09/03/2019 which demonstrated intensely FDG avid mass-like enlargement of the pancreatic head with dilation of the main pancreatic duct, as well as mildly FDG avid mediastinal adenopathy. CT Abdomen & Pelvis performed on 09/10/2019 reported approximately 2.7 cm pancreatic head mass with significant pancreatic ductal dilation. The mass is situated medial to the 1st portion of the duodenum. There were no pathologically enlarged nodes or evidence for liver metastases. The patient underwent an endoscopic ultrasound (upper) fine needle biopsy on 09/17/2019 and the pathology report showed atypical glandular cells consistent with adenocarcinoma.   · Laboratory study performed on 9/16/2019 showed normal CA 19-9 of 18.7 U/mL.  · I explained to the patient that the PET showed mediastinal adenopathy, however biopsy would be difficult due to the size and location of the lymph nodes.  There is lymph nodes also his only slightly elevated activity, could even be benign lymph nodes.  · I discussed with the patient that chemotherapy and/or concurrent chemoradiation therapy to the pancreas may be an option. I further explained that the only way to cure pancreatic cancer is completed surgical resection and that chemotherapy or radiation treatment would be to control the disease. I discussed with the patient and her  at length that the best systematic treatment to  treat pancreatic cancer is with a mixture of 3 chemotherapy medications FOLFIRINOX regimen, however I do not believe that she would tolerate this treatment well.  I also has suspicion that she would tolerate Abraxane plus Gemzar doublets chemotherapy, also because of the side effects especially bone marrow suppression.  · I further discussed that she could consider chemotherapy with single agent Gemzar in combination with radiation and a sandwich type schedule.  I further discussed the side effects at length, including nausea, loss of appetite, fatigue, and weight loss, bone marrow suppression, flu type symptoms etc. I explained that I feel this is the best way for her to proceed as this would result in the least amount of side effects. We would administer 2 weeks on and one week off for 3 cycles (6 doses),   Then followed by concurrent chemoradiotherapy using single agent daily Gemzar, and then further treatment with weekly Gemzar.  We would also have to repeat PET imaging in order to evaluate efficacy of chemotherapy treatment.  · I also explained that there are presently some clinical trials to treat pancreatic cancer however they are not currently available at our clinic. In addition, the patient is not currently eligible for a clinical trial for second line chemotherapy as she has not undergone first line chemotherapy treatment at this time.   · I explained to the patient that without any treatment, she would be expected to live anywhere between 6 months to 1 year and that although she has not been experiencing many side effects now, more side effects are likely to occur, such as obstructive jaundice with jaundiced skin and eyes, whitish stools, and very yellow urine, and the liver failure.  She will also suffer from pain as her pain are likely to worsen. She will at some point require a stent placement to relieve the obstructive jaundice for which she should be referred to Dr. Cortes. I explained to the  patient that she will most likely not be able to have the stent placed preventatively.   · I discussed that she is likely not a good candidate for complete Whipple surgery due to comorbidities, which result in her development of diabetes and would require insulin and medication to improve digestion.   · The patient's  and the patient state that they have conjointly decided to focus on quality of life rather than longevity at this time.   · PET scan examination on 11/24/2019 showed improved hypermetabolic lesion in the pancreas.  Patient is asymptomatic.  Discussed with the patient and her , I recommended a CT scan examination for chest abdomen pelvis in 3 months for reassessment.     PLAN:   1. Continue Creon 3 times a day.   2. Obtain CT scan examination in 3 months.  We will get the laboratory studies also CBC CMP and CA 19-9.  3. Follow-up with me 1 week of the above studies.     Patient is accompanied by her  today who helped with the history.    I spent more than 40 minutes in patient care today, more than 50% of which was spent in counseling the patient on treatment options.        Morris Cassidy MD PhD  11/26/2019.      Agata Marie MD

## 2019-11-27 RX ORDER — AMLODIPINE BESYLATE 5 MG/1
TABLET ORAL
Qty: 90 TABLET | Refills: 1 | Status: SHIPPED | OUTPATIENT
Start: 2019-11-27 | End: 2019-12-19 | Stop reason: DRUGHIGH

## 2019-12-19 DIAGNOSIS — I10 ESSENTIAL HYPERTENSION: ICD-10-CM

## 2019-12-19 DIAGNOSIS — E78.5 HYPERLIPIDEMIA, UNSPECIFIED HYPERLIPIDEMIA TYPE: Primary | ICD-10-CM

## 2019-12-19 DIAGNOSIS — R73.01 IMPAIRED FASTING GLUCOSE: ICD-10-CM

## 2019-12-19 RX ORDER — AMLODIPINE BESYLATE 5 MG/1
7.5 TABLET ORAL DAILY
Qty: 135 TABLET | Refills: 3 | Status: SHIPPED | OUTPATIENT
Start: 2019-12-19 | End: 2020-05-20 | Stop reason: SDUPTHER

## 2019-12-21 ENCOUNTER — OFFICE VISIT (OUTPATIENT)
Dept: RETAIL CLINIC | Facility: CLINIC | Age: 79
End: 2019-12-21

## 2019-12-21 VITALS
BODY MASS INDEX: 20.55 KG/M2 | RESPIRATION RATE: 18 BRPM | DIASTOLIC BLOOD PRESSURE: 68 MMHG | WEIGHT: 116 LBS | HEART RATE: 74 BPM | TEMPERATURE: 98 F | OXYGEN SATURATION: 95 % | HEIGHT: 63 IN | SYSTOLIC BLOOD PRESSURE: 122 MMHG

## 2019-12-21 DIAGNOSIS — J44.1 COPD EXACERBATION (HCC): Primary | ICD-10-CM

## 2019-12-21 DIAGNOSIS — R06.2 WHEEZING: ICD-10-CM

## 2019-12-21 DIAGNOSIS — R05.9 COUGHING: ICD-10-CM

## 2019-12-21 PROCEDURE — 99213 OFFICE O/P EST LOW 20 MIN: CPT | Performed by: NURSE PRACTITIONER

## 2019-12-21 RX ORDER — AZITHROMYCIN 250 MG/1
TABLET, FILM COATED ORAL
Qty: 6 TABLET | Refills: 0 | Status: SHIPPED | OUTPATIENT
Start: 2019-12-21 | End: 2019-12-30

## 2019-12-21 RX ORDER — PREDNISONE 20 MG/1
20 TABLET ORAL 2 TIMES DAILY
Qty: 14 TABLET | Refills: 0 | Status: SHIPPED | OUTPATIENT
Start: 2019-12-21 | End: 2019-12-28

## 2019-12-21 RX ORDER — BENZONATATE 100 MG/1
100 CAPSULE ORAL 3 TIMES DAILY PRN
Qty: 30 CAPSULE | Refills: 0 | Status: SHIPPED | OUTPATIENT
Start: 2019-12-21 | End: 2019-12-30

## 2019-12-21 RX ORDER — GUAIFENESIN 600 MG/1
600 TABLET, EXTENDED RELEASE ORAL 2 TIMES DAILY
Qty: 20 TABLET | Refills: 0 | Status: SHIPPED | OUTPATIENT
Start: 2019-12-21 | End: 2019-12-31

## 2019-12-21 NOTE — PROGRESS NOTES
"Subjective   Janet Martinez is a 79 y.o. female.       /68 (BP Location: Left arm, Patient Position: Sitting, Cuff Size: Adult)   Pulse 74   Temp 98 °F (36.7 °C) (Oral)   Resp 18   Ht 160 cm (63\")   Wt 52.6 kg (116 lb)   SpO2 95%   BMI 20.55 kg/m²     Cough   This is a new problem. The current episode started in the past 7 days. The problem has been gradually worsening. The problem occurs constantly. The cough is productive of sputum. Associated symptoms include chills, nasal congestion, postnasal drip, shortness of breath and wheezing. The symptoms are aggravated by cold air, exercise and lying down. She has tried OTC cough suppressant, ipratropium inhaler and a beta-agonist inhaler for the symptoms. The treatment provided no relief. Her past medical history is significant for COPD and environmental allergies.        The following portions of the patient's history were reviewed and updated as appropriate: current medications, past family history, past medical history, past social history, past surgical history and problem list.    Review of Systems   Constitutional: Positive for chills and fatigue.   HENT: Positive for congestion and postnasal drip.    Eyes: Negative.    Respiratory: Positive for cough, shortness of breath and wheezing.    Cardiovascular: Negative.    Gastrointestinal: Positive for abdominal pain.   Endocrine: Negative.    Genitourinary: Negative.    Musculoskeletal: Negative.    Skin: Negative.    Allergic/Immunologic: Positive for environmental allergies.   Neurological: Negative.    Hematological: Negative.    Psychiatric/Behavioral: Negative.        Objective   Physical Exam   Constitutional: She is oriented to person, place, and time. She appears well-developed and well-nourished.   HENT:   Head: Normocephalic and atraumatic.   Right Ear: Hearing, tympanic membrane, external ear and ear canal normal.   Left Ear: Hearing, tympanic membrane, external ear and ear canal normal. "   Nose: Congestion present.   Mouth/Throat: Uvula is midline, oropharynx is clear and moist and mucous membranes are normal.   Eyes: Pupils are equal, round, and reactive to light. Conjunctivae and EOM are normal.   Cardiovascular: Normal rate, regular rhythm and normal heart sounds.   Pulmonary/Chest: Effort normal. She has wheezes in the left upper field.   Abdominal: Soft. Bowel sounds are normal. There is tenderness.   Musculoskeletal: Normal range of motion.   Neurological: She is alert and oriented to person, place, and time.   Skin: Skin is warm and dry. Capillary refill takes less than 2 seconds.   Psychiatric: She has a normal mood and affect.   Vitals reviewed.        Assessment/Plan   Janet was seen today for cough.    Diagnoses and all orders for this visit:    COPD exacerbation (CMS/[facility])  -     azithromycin (ZITHROMAX) 250 MG tablet; Take 2 tablets the first day, then 1 tablet daily for 4 days.  -     guaiFENesin (MUCINEX) 600 MG 12 hr tablet; Take 1 tablet by mouth 2 (Two) Times a Day for 10 days.    Coughing  -     benzonatate (TESSALON PERLES) 100 MG capsule; Take 1 capsule by mouth 3 (Three) Times a Day As Needed for Cough for up to 10 days.    Wheezing  -     predniSONE (DELTASONE) 20 MG tablet; Take 1 tablet by mouth 2 (Two) Times a Day for 7 days.

## 2019-12-24 LAB
ALBUMIN SERPL-MCNC: 4.4 G/DL (ref 3.5–5.2)
ALBUMIN/GLOB SERPL: 1.5 G/DL
ALP SERPL-CCNC: 57 U/L (ref 39–117)
ALT SERPL-CCNC: 10 U/L (ref 1–33)
AST SERPL-CCNC: 15 U/L (ref 1–32)
BASOPHILS # BLD AUTO: 0.04 10*3/MM3 (ref 0–0.2)
BASOPHILS NFR BLD AUTO: 0.6 % (ref 0–1.5)
BILIRUB SERPL-MCNC: 0.3 MG/DL (ref 0.2–1.2)
BUN SERPL-MCNC: 6 MG/DL (ref 8–23)
BUN/CREAT SERPL: 11.5 (ref 7–25)
CALCIUM SERPL-MCNC: 9.3 MG/DL (ref 8.6–10.5)
CHLORIDE SERPL-SCNC: 100 MMOL/L (ref 98–107)
CHOLEST SERPL-MCNC: 143 MG/DL (ref 0–200)
CO2 SERPL-SCNC: 29.9 MMOL/L (ref 22–29)
CREAT SERPL-MCNC: 0.52 MG/DL (ref 0.57–1)
EOSINOPHIL # BLD AUTO: 0.05 10*3/MM3 (ref 0–0.4)
EOSINOPHIL NFR BLD AUTO: 0.7 % (ref 0.3–6.2)
ERYTHROCYTE [DISTWIDTH] IN BLOOD BY AUTOMATED COUNT: 13.3 % (ref 12.3–15.4)
GLOBULIN SER CALC-MCNC: 3 GM/DL
GLUCOSE SERPL-MCNC: 109 MG/DL (ref 65–99)
HCT VFR BLD AUTO: 36.7 % (ref 34–46.6)
HDLC SERPL-MCNC: 56 MG/DL (ref 40–60)
HGB BLD-MCNC: 12 G/DL (ref 12–15.9)
IMM GRANULOCYTES # BLD AUTO: 0.03 10*3/MM3 (ref 0–0.05)
IMM GRANULOCYTES NFR BLD AUTO: 0.4 % (ref 0–0.5)
LDLC SERPL CALC-MCNC: 71 MG/DL (ref 0–100)
LYMPHOCYTES # BLD AUTO: 2.48 10*3/MM3 (ref 0.7–3.1)
LYMPHOCYTES NFR BLD AUTO: 34.3 % (ref 19.6–45.3)
MCH RBC QN AUTO: 28.4 PG (ref 26.6–33)
MCHC RBC AUTO-ENTMCNC: 32.7 G/DL (ref 31.5–35.7)
MCV RBC AUTO: 87 FL (ref 79–97)
MONOCYTES # BLD AUTO: 1.07 10*3/MM3 (ref 0.1–0.9)
MONOCYTES NFR BLD AUTO: 14.8 % (ref 5–12)
NEUTROPHILS # BLD AUTO: 3.55 10*3/MM3 (ref 1.7–7)
NEUTROPHILS NFR BLD AUTO: 49.2 % (ref 42.7–76)
NRBC BLD AUTO-RTO: 0 /100 WBC (ref 0–0.2)
PLATELET # BLD AUTO: 273 10*3/MM3 (ref 140–450)
POTASSIUM SERPL-SCNC: 3.2 MMOL/L (ref 3.5–5.2)
PROT SERPL-MCNC: 7.4 G/DL (ref 6–8.5)
RBC # BLD AUTO: 4.22 10*6/MM3 (ref 3.77–5.28)
SODIUM SERPL-SCNC: 142 MMOL/L (ref 136–145)
TRIGL SERPL-MCNC: 82 MG/DL (ref 0–150)
TSH SERPL DL<=0.005 MIU/L-ACNC: 3.25 UIU/ML (ref 0.27–4.2)
VLDLC SERPL CALC-MCNC: 16.4 MG/DL
WBC # BLD AUTO: 7.22 10*3/MM3 (ref 3.4–10.8)

## 2019-12-24 RX ORDER — POTASSIUM CHLORIDE 20 MEQ/1
20 TABLET, EXTENDED RELEASE ORAL DAILY
Qty: 30 TABLET | Refills: 2 | Status: SHIPPED | OUTPATIENT
Start: 2019-12-24 | End: 2020-03-18

## 2019-12-30 ENCOUNTER — OFFICE VISIT (OUTPATIENT)
Dept: INTERNAL MEDICINE | Facility: CLINIC | Age: 79
End: 2019-12-30

## 2019-12-30 VITALS
WEIGHT: 115 LBS | DIASTOLIC BLOOD PRESSURE: 70 MMHG | SYSTOLIC BLOOD PRESSURE: 156 MMHG | HEIGHT: 63 IN | HEART RATE: 65 BPM | BODY MASS INDEX: 20.38 KG/M2

## 2019-12-30 DIAGNOSIS — C25.0 MALIGNANT NEOPLASM OF HEAD OF PANCREAS (HCC): Primary | ICD-10-CM

## 2019-12-30 DIAGNOSIS — E87.6 HYPOKALEMIA: ICD-10-CM

## 2019-12-30 DIAGNOSIS — J44.9 CHRONIC OBSTRUCTIVE PULMONARY DISEASE, UNSPECIFIED COPD TYPE (HCC): ICD-10-CM

## 2019-12-30 DIAGNOSIS — I10 ESSENTIAL HYPERTENSION: ICD-10-CM

## 2019-12-30 PROCEDURE — 99214 OFFICE O/P EST MOD 30 MIN: CPT | Performed by: INTERNAL MEDICINE

## 2019-12-30 NOTE — PROGRESS NOTES
Chief Complaint   Patient presents with   • Pancreatic Cancer   • Hypertension       History of Present Illness   Janet Martinez is a 79 y.o. female presents for follow up evaluation. Patient has pancreatic cancer. She reports that she does not have abdominal pain at this time. She has oxycodone available if needed. She does have loose stooling and this is treated well with creon. If she misses creon, she can have uncontrolled bm. She has hypertension but is normotensive on current medications. She has copd and breathing is well managed w/ current meds. She is under [facility] care and this is going well.       The following portions of the patient's history were reviewed and updated as appropriate: allergies, current medications, past family history, past medical history, past social history, past surgical history and problem list.  Current Outpatient Medications on File Prior to Visit   Medication Sig Dispense Refill   • albuterol (PROVENTIL) (2.5 MG/3ML) 0.083% nebulizer solution Take 2.5 mg by nebulization Every 4 (Four) Hours As Needed for Wheezing.     • amLODIPine (NORVASC) 5 MG tablet Take 1.5 tablets by mouth Daily. 135 tablet 3   • atorvastatin (LIPITOR) 20 MG tablet Take 20 mg by mouth Daily.  2   • budesonide (PULMICORT) 0.5 MG/2ML nebulizer solution USE 1 VIAL VIA NEBULIZER TWICE DAILY RINSE MOUTH AFTER USE  12   • Calcium Carbonate-Vitamin D (CALCIUM 600+D PO) Take 1 tablet by mouth Daily.     • colestipol (COLESTID) 1 g tablet Take 1 tablet by mouth 2 (Two) Times a Day. 60 tablet 4   • CREON 74907 units capsule delayed-release particles capsule Take 12,000 units of lipase by mouth Daily. before a meal  3   • DULoxetine (CYMBALTA) 60 MG capsule Take 1 capsule by mouth 2 (Two) Times a Day. 60 capsule 3   • etanercept (ENBREL) 50 MG/ML solution prefilled syringe injection Inject 50 mg under the skin 1 (One) Time Per Week. THURSDAYS. TO HOLD 4 WEEKS     • fluticasone (FLONASE) 50 MCG/ACT nasal spray 2  sprays into the nostril(s) as directed by provider Daily. 1 bottle 5   • folic acid (FOLVITE) 1 MG tablet TAKE 1 TABLET BY MOUTH DAILY. 90 tablet 0   • guaiFENesin (MUCINEX) 600 MG 12 hr tablet Take 1 tablet by mouth 2 (Two) Times a Day for 10 days. 20 tablet 0   • ipratropium (ATROVENT) 0.06 % nasal spray 2 sprays into each nostril 4 (Four) Times a Day As Needed for Rhinitis.     • ipratropium-albuterol (DUO-NEB) 0.5-2.5 mg/3 ml nebulizer Take 3 mL by nebulization Every 4 (Four) Hours As Needed for Wheezing.     • magnesium oxide (MAG-OX) 400 MG tablet Take 1 tablet by mouth Daily. 30 tablet 0   • montelukast (SINGULAIR) 10 MG tablet Take 1 tablet by mouth Daily. 90 tablet 3   • omeprazole (priLOSEC) 40 MG capsule Take 1 capsule by mouth Every Evening. 90 capsule 1   • oxyCODONE-acetaminophen (PERCOCET)  MG per tablet Take 1 tablet by mouth Every 6 (Six) Hours As Needed for Moderate Pain .     • PERFOROMIST 20 MCG/2ML nebulizer solution USE 1 VIAL VIA NEBULIZER TWICE DAILY  12   • potassium chloride (K-DUR,KLOR-CON) 20 MEQ CR tablet Take 1 tablet by mouth Daily. 30 tablet 2   • saccharomyces boulardii (FLORASTOR) 250 MG capsule Take 1 capsule by mouth 2 (Two) Times a Day. 20 capsule 5   • umeclidinium-vilanterol (ANORO ELLIPTA) 62.5-25 MCG/INH aerosol powder  inhaler Inhale 1 puff Daily. 3 each 2   • valsartan (DIOVAN) 320 MG tablet Take 1 tablet by mouth Daily. 90 tablet 2   • [DISCONTINUED] azithromycin (ZITHROMAX) 250 MG tablet Take 2 tablets the first day, then 1 tablet daily for 4 days. 6 tablet 0   • [DISCONTINUED] benzonatate (TESSALON PERLES) 100 MG capsule Take 1 capsule by mouth 3 (Three) Times a Day As Needed for Cough for up to 10 days. 30 capsule 0   • [DISCONTINUED] guaifenesin-codeine (GUAIFENESIN AC) 100-10 MG/5ML liquid Take 5 mL by mouth 3 (Three) Times a Day As Needed for Cough. 150 mL 3     Current Facility-Administered Medications on File Prior to Visit   Medication Dose Route Frequency  "Provider Last Rate Last Dose   • levalbuterol (XOPENEX) nebulizer solution 0.63 mg  0.63 mg Nebulization Q6H PRN Agata Burgess MD   0.63 mg at 12/17/18 1437     Review of Systems   Constitutional: Negative.    HENT: Negative.    Eyes: Negative.    Respiratory: Negative.    Cardiovascular: Negative.    Gastrointestinal: Positive for diarrhea.   Endocrine: Negative.    Genitourinary: Negative.    Musculoskeletal: Positive for arthralgias.   Skin: Negative.    Allergic/Immunologic: Negative.    Neurological: Negative.    Hematological: Negative.    Psychiatric/Behavioral: Negative.        Objective   Physical Exam   Constitutional: She is oriented to person, place, and time. She appears well-developed and well-nourished.   HENT:   Head: Normocephalic and atraumatic.   Right Ear: External ear normal.   Left Ear: External ear normal.   Nose: Nose normal.   Mouth/Throat: Oropharynx is clear and moist.   Eyes: Pupils are equal, round, and reactive to light. Conjunctivae and EOM are normal.   Neck: Normal range of motion. Neck supple.   Cardiovascular: Normal rate, regular rhythm, normal heart sounds and intact distal pulses.   Pulmonary/Chest: Effort normal and breath sounds normal.   Abdominal: Soft. Bowel sounds are normal.   Musculoskeletal: Normal range of motion.   Neurological: She is alert and oriented to person, place, and time.   Skin: Skin is warm and dry.   Psychiatric: She has a normal mood and affect. Her behavior is normal. Judgment and thought content normal.   Nursing note and vitals reviewed.       /70   Pulse 65   Ht 160 cm (63\")   Wt 52.2 kg (115 lb)   BMI 20.37 kg/m²     Assessment/Plan   Diagnoses and all orders for this visit:    Malignant neoplasm of head of pancreas (CMS/HCC)    Chronic obstructive pulmonary disease, unspecified COPD type (CMS/HCC)    Essential hypertension    Hypokalemia  -     Basic Metabolic Panel      Patient w/ pancreatic cancer. She is doing well and will continue " creon w/ prn oxycodone. She has a f/u scheduled w/ oncology in feb. Will continue current amlodipine dosing as long as she is eating well and active. If bp falls then will reduce. She will continue meds for copd and has clear respirations today. She will f/u in 4 months or prn. She will have bmp testing today to ensure potassium has improved.

## 2019-12-31 DIAGNOSIS — E87.6 HYPOKALEMIA: ICD-10-CM

## 2019-12-31 DIAGNOSIS — E83.42 HYPOMAGNESEMIA: Primary | ICD-10-CM

## 2019-12-31 LAB
BUN SERPL-MCNC: 8 MG/DL (ref 8–23)
BUN/CREAT SERPL: 14.3 (ref 7–25)
CALCIUM SERPL-MCNC: 9 MG/DL (ref 8.6–10.5)
CHLORIDE SERPL-SCNC: 93 MMOL/L (ref 98–107)
CO2 SERPL-SCNC: 26.6 MMOL/L (ref 22–29)
CREAT SERPL-MCNC: 0.56 MG/DL (ref 0.57–1)
GLUCOSE SERPL-MCNC: 174 MG/DL (ref 65–99)
Lab: NORMAL
MAGNESIUM SERPL-MCNC: 1.5 MG/DL (ref 1.6–2.4)
POTASSIUM SERPL-SCNC: 3.1 MMOL/L (ref 3.5–5.2)
SODIUM SERPL-SCNC: 134 MMOL/L (ref 136–145)
WRITTEN AUTHORIZATION: NORMAL

## 2020-01-01 ENCOUNTER — READMISSION MANAGEMENT (OUTPATIENT)
Dept: CALL CENTER | Facility: HOSPITAL | Age: 80
End: 2020-01-01

## 2020-01-01 ENCOUNTER — HOSPITAL ENCOUNTER (OUTPATIENT)
Dept: CT IMAGING | Facility: HOSPITAL | Age: 80
Discharge: HOME | End: 2020-12-03
Admitting: INTERNAL MEDICINE

## 2020-01-01 ENCOUNTER — OFFICE VISIT (OUTPATIENT)
Dept: INTERNAL MEDICINE | Facility: CLINIC | Age: 80
End: 2020-01-01

## 2020-01-01 ENCOUNTER — APPOINTMENT (OUTPATIENT)
Dept: OTHER | Facility: HOSPITAL | Age: 80
End: 2020-01-01

## 2020-01-01 ENCOUNTER — TELEPHONE (OUTPATIENT)
Dept: ONCOLOGY | Facility: CLINIC | Age: 80
End: 2020-01-01

## 2020-01-01 ENCOUNTER — OFFICE VISIT (OUTPATIENT)
Dept: ONCOLOGY | Facility: CLINIC | Age: 80
End: 2020-01-01

## 2020-01-01 ENCOUNTER — TELEPHONE (OUTPATIENT)
Dept: INTERNAL MEDICINE | Facility: CLINIC | Age: 80
End: 2020-01-01

## 2020-01-01 ENCOUNTER — ANESTHESIA (OUTPATIENT)
Dept: GASTROENTEROLOGY | Facility: HOSPITAL | Age: 80
End: 2020-01-01

## 2020-01-01 ENCOUNTER — TELEMEDICINE (OUTPATIENT)
Dept: INTERNAL MEDICINE | Facility: CLINIC | Age: 80
End: 2020-01-01

## 2020-01-01 ENCOUNTER — ANESTHESIA EVENT (OUTPATIENT)
Dept: GASTROENTEROLOGY | Facility: HOSPITAL | Age: 80
End: 2020-01-01

## 2020-01-01 ENCOUNTER — APPOINTMENT (OUTPATIENT)
Dept: GENERAL RADIOLOGY | Facility: HOSPITAL | Age: 80
End: 2020-01-01

## 2020-01-01 ENCOUNTER — LAB (OUTPATIENT)
Dept: LAB | Facility: HOSPITAL | Age: 80
End: 2020-01-01

## 2020-01-01 ENCOUNTER — APPOINTMENT (OUTPATIENT)
Dept: CT IMAGING | Facility: HOSPITAL | Age: 80
End: 2020-01-01

## 2020-01-01 ENCOUNTER — HOSPITAL ENCOUNTER (INPATIENT)
Facility: HOSPITAL | Age: 80
LOS: 3 days | Discharge: HOSPICE | End: 2020-08-14
Attending: INTERNAL MEDICINE | Admitting: HOSPITALIST

## 2020-01-01 ENCOUNTER — LAB (OUTPATIENT)
Dept: OTHER | Facility: HOSPITAL | Age: 80
End: 2020-01-01

## 2020-01-01 VITALS
BODY MASS INDEX: 20.11 KG/M2 | DIASTOLIC BLOOD PRESSURE: 75 MMHG | HEART RATE: 72 BPM | TEMPERATURE: 98.1 F | RESPIRATION RATE: 16 BRPM | SYSTOLIC BLOOD PRESSURE: 142 MMHG | OXYGEN SATURATION: 96 % | HEIGHT: 62 IN

## 2020-01-01 VITALS
HEIGHT: 62 IN | HEART RATE: 60 BPM | TEMPERATURE: 98.2 F | DIASTOLIC BLOOD PRESSURE: 83 MMHG | SYSTOLIC BLOOD PRESSURE: 152 MMHG | OXYGEN SATURATION: 98 % | RESPIRATION RATE: 16 BRPM | BODY MASS INDEX: 20.13 KG/M2 | WEIGHT: 109.4 LBS

## 2020-01-01 VITALS
WEIGHT: 111.7 LBS | HEIGHT: 62 IN | TEMPERATURE: 98.4 F | OXYGEN SATURATION: 98 % | HEART RATE: 68 BPM | DIASTOLIC BLOOD PRESSURE: 72 MMHG | SYSTOLIC BLOOD PRESSURE: 169 MMHG | BODY MASS INDEX: 20.56 KG/M2 | RESPIRATION RATE: 18 BRPM

## 2020-01-01 VITALS
HEIGHT: 62 IN | WEIGHT: 110 LBS | BODY MASS INDEX: 20.24 KG/M2 | TEMPERATURE: 98.2 F | HEART RATE: 67 BPM | DIASTOLIC BLOOD PRESSURE: 76 MMHG | SYSTOLIC BLOOD PRESSURE: 174 MMHG

## 2020-01-01 VITALS
RESPIRATION RATE: 16 BRPM | SYSTOLIC BLOOD PRESSURE: 154 MMHG | DIASTOLIC BLOOD PRESSURE: 63 MMHG | TEMPERATURE: 97.7 F | WEIGHT: 112.1 LBS | OXYGEN SATURATION: 99 % | BODY MASS INDEX: 20.63 KG/M2 | HEIGHT: 62 IN | HEART RATE: 62 BPM

## 2020-01-01 VITALS
DIASTOLIC BLOOD PRESSURE: 72 MMHG | HEART RATE: 61 BPM | SYSTOLIC BLOOD PRESSURE: 158 MMHG | BODY MASS INDEX: 20.48 KG/M2 | TEMPERATURE: 96.6 F | WEIGHT: 112 LBS

## 2020-01-01 VITALS
HEART RATE: 69 BPM | TEMPERATURE: 97.4 F | BODY MASS INDEX: 19.49 KG/M2 | OXYGEN SATURATION: 100 % | DIASTOLIC BLOOD PRESSURE: 78 MMHG | HEIGHT: 63 IN | SYSTOLIC BLOOD PRESSURE: 143 MMHG | WEIGHT: 110 LBS

## 2020-01-01 DIAGNOSIS — C25.0 MALIGNANT NEOPLASM OF HEAD OF PANCREAS (HCC): Primary | ICD-10-CM

## 2020-01-01 DIAGNOSIS — C25.0 MALIGNANT NEOPLASM OF HEAD OF PANCREAS (HCC): ICD-10-CM

## 2020-01-01 DIAGNOSIS — D64.9 ANEMIA, UNSPECIFIED TYPE: ICD-10-CM

## 2020-01-01 DIAGNOSIS — E11.65 TYPE 2 DIABETES MELLITUS WITH HYPERGLYCEMIA, WITHOUT LONG-TERM CURRENT USE OF INSULIN (HCC): ICD-10-CM

## 2020-01-01 DIAGNOSIS — E08.44 DIABETES MELLITUS DUE TO UNDERLYING CONDITION WITH DIABETIC AMYOTROPHY, UNSPECIFIED WHETHER LONG TERM INSULIN USE (HCC): ICD-10-CM

## 2020-01-01 DIAGNOSIS — R73.9 HYPERGLYCEMIA: Primary | ICD-10-CM

## 2020-01-01 DIAGNOSIS — C25.9 MALIGNANT NEOPLASM OF PANCREAS, UNSPECIFIED LOCATION OF MALIGNANCY (HCC): Primary | ICD-10-CM

## 2020-01-01 DIAGNOSIS — R10.9 ACUTE ABDOMINAL PAIN: Primary | ICD-10-CM

## 2020-01-01 DIAGNOSIS — I10 ESSENTIAL HYPERTENSION: ICD-10-CM

## 2020-01-01 DIAGNOSIS — G89.3 CANCER ASSOCIATED PAIN: ICD-10-CM

## 2020-01-01 DIAGNOSIS — M06.9 RHEUMATOID ARTHRITIS OF HAND, UNSPECIFIED LATERALITY, UNSPECIFIED WHETHER RHEUMATOID FACTOR PRESENT (HCC): ICD-10-CM

## 2020-01-01 DIAGNOSIS — C25.9 MALIGNANT NEOPLASM OF PANCREAS, UNSPECIFIED LOCATION OF MALIGNANCY (HCC): ICD-10-CM

## 2020-01-01 DIAGNOSIS — R79.89 ABNORMAL CBC: Primary | ICD-10-CM

## 2020-01-01 DIAGNOSIS — C78.7 SECONDARY LIVER CANCER (HCC): ICD-10-CM

## 2020-01-01 LAB
ALBUMIN SERPL-MCNC: 3.2 G/DL (ref 3.5–5.2)
ALBUMIN SERPL-MCNC: 3.4 G/DL (ref 3.5–5.2)
ALBUMIN SERPL-MCNC: 3.5 G/DL (ref 3.5–5.2)
ALBUMIN SERPL-MCNC: 4 G/DL (ref 3.5–5.2)
ALBUMIN SERPL-MCNC: 4.6 G/DL (ref 3.5–5.2)
ALBUMIN/GLOB SERPL: 0.9 G/DL
ALBUMIN/GLOB SERPL: 0.9 G/DL (ref 1.1–2.4)
ALBUMIN/GLOB SERPL: 1 G/DL
ALBUMIN/GLOB SERPL: 1 G/DL
ALBUMIN/GLOB SERPL: 1.2 G/DL
ALP SERPL-CCNC: 128 U/L (ref 39–117)
ALP SERPL-CCNC: 161 U/L (ref 38–116)
ALP SERPL-CCNC: 242 U/L (ref 39–117)
ALP SERPL-CCNC: 288 U/L (ref 39–117)
ALP SERPL-CCNC: 81 U/L (ref 39–117)
ALT SERPL W P-5'-P-CCNC: 12 U/L (ref 1–33)
ALT SERPL W P-5'-P-CCNC: 122 U/L (ref 1–33)
ALT SERPL W P-5'-P-CCNC: 126 U/L (ref 1–33)
ALT SERPL W P-5'-P-CCNC: 17 U/L (ref 1–33)
ALT SERPL W P-5'-P-CCNC: 23 U/L (ref 0–33)
ANION GAP SERPL CALCULATED.3IONS-SCNC: 10.7 MMOL/L (ref 5–15)
ANION GAP SERPL CALCULATED.3IONS-SCNC: 10.7 MMOL/L (ref 5–15)
ANION GAP SERPL CALCULATED.3IONS-SCNC: 12 MMOL/L (ref 5–15)
ANION GAP SERPL CALCULATED.3IONS-SCNC: 13.4 MMOL/L (ref 5–15)
ANION GAP SERPL CALCULATED.3IONS-SCNC: 8.5 MMOL/L (ref 5–15)
ANION GAP SERPL CALCULATED.3IONS-SCNC: 9.6 MMOL/L (ref 5–15)
AST SERPL-CCNC: 110 U/L (ref 1–32)
AST SERPL-CCNC: 14 U/L (ref 1–32)
AST SERPL-CCNC: 20 U/L (ref 1–32)
AST SERPL-CCNC: 30 U/L (ref 0–32)
AST SERPL-CCNC: 65 U/L (ref 1–32)
BASOPHILS # BLD AUTO: 0.02 10*3/MM3 (ref 0–0.2)
BASOPHILS # BLD AUTO: 0.02 10*3/MM3 (ref 0–0.2)
BASOPHILS # BLD AUTO: 0.03 10*3/MM3 (ref 0–0.2)
BASOPHILS # BLD AUTO: 0.03 10*3/MM3 (ref 0–0.2)
BASOPHILS # BLD AUTO: 0.04 10*3/MM3 (ref 0–0.2)
BASOPHILS # BLD AUTO: 0.05 10*3/MM3 (ref 0–0.2)
BASOPHILS NFR BLD AUTO: 0.2 % (ref 0–1.5)
BASOPHILS NFR BLD AUTO: 0.3 % (ref 0–1.5)
BASOPHILS NFR BLD AUTO: 0.4 % (ref 0–1.5)
BASOPHILS NFR BLD AUTO: 0.5 % (ref 0–1.5)
BASOPHILS NFR BLD AUTO: 0.6 % (ref 0–1.5)
BASOPHILS NFR BLD AUTO: 0.6 % (ref 0–1.5)
BILIRUB SERPL-MCNC: 0.2 MG/DL (ref 0.2–1.2)
BILIRUB SERPL-MCNC: 0.2 MG/DL (ref 0–1.2)
BILIRUB SERPL-MCNC: 0.4 MG/DL (ref 0–1.2)
BILIRUB SERPL-MCNC: 1.1 MG/DL (ref 0–1.2)
BILIRUB SERPL-MCNC: 1.1 MG/DL (ref 0–1.2)
BUN SERPL-MCNC: 3 MG/DL (ref 8–23)
BUN SERPL-MCNC: 5 MG/DL (ref 6–20)
BUN SERPL-MCNC: 5 MG/DL (ref 8–23)
BUN SERPL-MCNC: 5 MG/DL (ref 8–23)
BUN SERPL-MCNC: 6 MG/DL (ref 8–23)
BUN SERPL-MCNC: 8 MG/DL (ref 8–23)
BUN/CREAT SERPL: 11.1 (ref 7–25)
BUN/CREAT SERPL: 11.9 (ref 7.3–30)
BUN/CREAT SERPL: 14.3 (ref 7–25)
BUN/CREAT SERPL: 14.6 (ref 7–25)
BUN/CREAT SERPL: 16 (ref 7–25)
BUN/CREAT SERPL: 16.7 (ref 7–25)
CALCIUM SPEC-SCNC: 10 MG/DL (ref 8.6–10.5)
CALCIUM SPEC-SCNC: 8.2 MG/DL (ref 8.6–10.5)
CALCIUM SPEC-SCNC: 8.3 MG/DL (ref 8.6–10.5)
CALCIUM SPEC-SCNC: 8.8 MG/DL (ref 8.5–10.2)
CALCIUM SPEC-SCNC: 9.2 MG/DL (ref 8.6–10.5)
CALCIUM SPEC-SCNC: 9.4 MG/DL (ref 8.6–10.5)
CANCER AG19-9 SERPL-ACNC: 129.7 U/ML
CANCER AG19-9 SERPL-ACNC: 369.9 U/ML
CANCER AG19-9 SERPL-ACNC: 70.6 U/ML
CHLORIDE SERPL-SCNC: 100 MMOL/L (ref 98–107)
CHLORIDE SERPL-SCNC: 101 MMOL/L (ref 98–107)
CHLORIDE SERPL-SCNC: 101 MMOL/L (ref 98–107)
CHLORIDE SERPL-SCNC: 95 MMOL/L (ref 98–107)
CHLORIDE SERPL-SCNC: 96 MMOL/L (ref 98–107)
CHLORIDE SERPL-SCNC: 96 MMOL/L (ref 98–107)
CO2 SERPL-SCNC: 24.4 MMOL/L (ref 22–29)
CO2 SERPL-SCNC: 26.3 MMOL/L (ref 22–29)
CO2 SERPL-SCNC: 26.6 MMOL/L (ref 22–29)
CO2 SERPL-SCNC: 27 MMOL/L (ref 22–29)
CO2 SERPL-SCNC: 27.3 MMOL/L (ref 22–29)
CO2 SERPL-SCNC: 32.5 MMOL/L (ref 22–29)
CREAT BLDA-MCNC: 0.5 MG/DL (ref 0.6–1.3)
CREAT SERPL-MCNC: 0.27 MG/DL (ref 0.57–1)
CREAT SERPL-MCNC: 0.3 MG/DL (ref 0.57–1)
CREAT SERPL-MCNC: 0.35 MG/DL (ref 0.57–1)
CREAT SERPL-MCNC: 0.41 MG/DL (ref 0.57–1)
CREAT SERPL-MCNC: 0.42 MG/DL (ref 0.6–1.1)
CREAT SERPL-MCNC: 0.5 MG/DL (ref 0.57–1)
DEPRECATED RDW RBC AUTO: 38.4 FL (ref 37–54)
DEPRECATED RDW RBC AUTO: 40.8 FL (ref 37–54)
DEPRECATED RDW RBC AUTO: 42.4 FL (ref 37–54)
DEPRECATED RDW RBC AUTO: 42.5 FL (ref 37–54)
DEPRECATED RDW RBC AUTO: 43.5 FL (ref 37–54)
DEPRECATED RDW RBC AUTO: 45.1 FL (ref 37–54)
EOSINOPHIL # BLD AUTO: 0.02 10*3/MM3 (ref 0–0.4)
EOSINOPHIL # BLD AUTO: 0.03 10*3/MM3 (ref 0–0.4)
EOSINOPHIL # BLD AUTO: 0.03 10*3/MM3 (ref 0–0.4)
EOSINOPHIL # BLD AUTO: 0.19 10*3/MM3 (ref 0–0.4)
EOSINOPHIL # BLD AUTO: 0.3 10*3/MM3 (ref 0–0.4)
EOSINOPHIL # BLD AUTO: 0.32 10*3/MM3 (ref 0–0.4)
EOSINOPHIL NFR BLD AUTO: 0.2 % (ref 0.3–6.2)
EOSINOPHIL NFR BLD AUTO: 0.4 % (ref 0.3–6.2)
EOSINOPHIL NFR BLD AUTO: 0.4 % (ref 0.3–6.2)
EOSINOPHIL NFR BLD AUTO: 2.8 % (ref 0.3–6.2)
EOSINOPHIL NFR BLD AUTO: 4.3 % (ref 0.3–6.2)
EOSINOPHIL NFR BLD AUTO: 5.6 % (ref 0.3–6.2)
ERYTHROCYTE [DISTWIDTH] IN BLOOD BY AUTOMATED COUNT: 12.6 % (ref 12.3–15.4)
ERYTHROCYTE [DISTWIDTH] IN BLOOD BY AUTOMATED COUNT: 12.9 % (ref 12.3–15.4)
ERYTHROCYTE [DISTWIDTH] IN BLOOD BY AUTOMATED COUNT: 13 % (ref 12.3–15.4)
ERYTHROCYTE [DISTWIDTH] IN BLOOD BY AUTOMATED COUNT: 13.2 % (ref 12.3–15.4)
ERYTHROCYTE [DISTWIDTH] IN BLOOD BY AUTOMATED COUNT: 13.7 % (ref 12.3–15.4)
ERYTHROCYTE [DISTWIDTH] IN BLOOD BY AUTOMATED COUNT: 14.6 % (ref 12.3–15.4)
FERRITIN SERPL-MCNC: 55.8 NG/ML (ref 13–150)
GFR SERPL CREATININE-BSD FRML MDRD: 119 ML/MIN/1.73
GFR SERPL CREATININE-BSD FRML MDRD: 146 ML/MIN/1.73
GFR SERPL CREATININE-BSD FRML MDRD: 150 ML/MIN/1.73
GFR SERPL CREATININE-BSD FRML MDRD: >150 ML/MIN/1.73
GLOBULIN UR ELPH-MCNC: 3.4 GM/DL
GLOBULIN UR ELPH-MCNC: 3.5 GM/DL
GLOBULIN UR ELPH-MCNC: 3.9 GM/DL
GLOBULIN UR ELPH-MCNC: 4 GM/DL (ref 1.8–3.5)
GLOBULIN UR ELPH-MCNC: 4.1 GM/DL
GLUCOSE BLDC GLUCOMTR-MCNC: 214 MG/DL (ref 70–130)
GLUCOSE BLDC-MCNC: 202 MG/DL
GLUCOSE SERPL-MCNC: 110 MG/DL (ref 65–99)
GLUCOSE SERPL-MCNC: 125 MG/DL (ref 65–99)
GLUCOSE SERPL-MCNC: 125 MG/DL (ref 65–99)
GLUCOSE SERPL-MCNC: 140 MG/DL (ref 65–99)
GLUCOSE SERPL-MCNC: 252 MG/DL (ref 74–124)
GLUCOSE SERPL-MCNC: 94 MG/DL (ref 65–99)
HCT VFR BLD AUTO: 30.9 % (ref 34–46.6)
HCT VFR BLD AUTO: 31.6 % (ref 34–46.6)
HCT VFR BLD AUTO: 36.2 % (ref 34–46.6)
HCT VFR BLD AUTO: 36.5 % (ref 34–46.6)
HCT VFR BLD AUTO: 36.7 % (ref 34–46.6)
HCT VFR BLD AUTO: 38.5 % (ref 34–46.6)
HGB BLD-MCNC: 10.2 G/DL (ref 12–15.9)
HGB BLD-MCNC: 10.7 G/DL (ref 12–15.9)
HGB BLD-MCNC: 11.5 G/DL (ref 12–15.9)
HGB BLD-MCNC: 11.6 G/DL (ref 12–15.9)
HGB BLD-MCNC: 11.8 G/DL (ref 12–15.9)
HGB BLD-MCNC: 12.7 G/DL (ref 12–15.9)
IMM GRANULOCYTES # BLD AUTO: 0.01 10*3/MM3 (ref 0–0.05)
IMM GRANULOCYTES # BLD AUTO: 0.02 10*3/MM3 (ref 0–0.05)
IMM GRANULOCYTES # BLD AUTO: 0.02 10*3/MM3 (ref 0–0.05)
IMM GRANULOCYTES # BLD AUTO: 0.03 10*3/MM3 (ref 0–0.05)
IMM GRANULOCYTES # BLD AUTO: 0.03 10*3/MM3 (ref 0–0.05)
IMM GRANULOCYTES # BLD AUTO: 0.04 10*3/MM3 (ref 0–0.05)
IMM GRANULOCYTES NFR BLD AUTO: 0.2 % (ref 0–0.5)
IMM GRANULOCYTES NFR BLD AUTO: 0.3 % (ref 0–0.5)
IMM GRANULOCYTES NFR BLD AUTO: 0.4 % (ref 0–0.5)
IMM GRANULOCYTES NFR BLD AUTO: 0.6 % (ref 0–0.5)
IRON 24H UR-MRATE: 85 MCG/DL (ref 37–145)
IRON SATN MFR SERPL: 21 % (ref 20–50)
LIPASE SERPL-CCNC: 5 U/L (ref 13–60)
LYMPHOCYTES # BLD AUTO: 0.91 10*3/MM3 (ref 0.7–3.1)
LYMPHOCYTES # BLD AUTO: 0.98 10*3/MM3 (ref 0.7–3.1)
LYMPHOCYTES # BLD AUTO: 1.18 10*3/MM3 (ref 0.7–3.1)
LYMPHOCYTES # BLD AUTO: 1.29 10*3/MM3 (ref 0.7–3.1)
LYMPHOCYTES # BLD AUTO: 1.29 10*3/MM3 (ref 0.7–3.1)
LYMPHOCYTES # BLD AUTO: 1.56 10*3/MM3 (ref 0.7–3.1)
LYMPHOCYTES NFR BLD AUTO: 10.6 % (ref 19.6–45.3)
LYMPHOCYTES NFR BLD AUTO: 12.9 % (ref 19.6–45.3)
LYMPHOCYTES NFR BLD AUTO: 15.4 % (ref 19.6–45.3)
LYMPHOCYTES NFR BLD AUTO: 17.6 % (ref 19.6–45.3)
LYMPHOCYTES NFR BLD AUTO: 18.6 % (ref 19.6–45.3)
LYMPHOCYTES NFR BLD AUTO: 27.1 % (ref 19.6–45.3)
Lab: NORMAL
MAGNESIUM SERPL-MCNC: 1.6 MG/DL (ref 1.6–2.4)
MAGNESIUM SERPL-MCNC: 1.7 MG/DL (ref 1.6–2.4)
MCH RBC QN AUTO: 26.7 PG (ref 26.6–33)
MCH RBC QN AUTO: 27.9 PG (ref 26.6–33)
MCH RBC QN AUTO: 28.2 PG (ref 26.6–33)
MCH RBC QN AUTO: 28.3 PG (ref 26.6–33)
MCH RBC QN AUTO: 28.4 PG (ref 26.6–33)
MCH RBC QN AUTO: 28.5 PG (ref 26.6–33)
MCHC RBC AUTO-ENTMCNC: 31.8 G/DL (ref 31.5–35.7)
MCHC RBC AUTO-ENTMCNC: 31.8 G/DL (ref 31.5–35.7)
MCHC RBC AUTO-ENTMCNC: 32.2 G/DL (ref 31.5–35.7)
MCHC RBC AUTO-ENTMCNC: 33 G/DL (ref 31.5–35.7)
MCHC RBC AUTO-ENTMCNC: 33 G/DL (ref 31.5–35.7)
MCHC RBC AUTO-ENTMCNC: 33.9 G/DL (ref 31.5–35.7)
MCV RBC AUTO: 83.2 FL (ref 79–97)
MCV RBC AUTO: 84 FL (ref 79–97)
MCV RBC AUTO: 84.6 FL (ref 79–97)
MCV RBC AUTO: 85.8 FL (ref 79–97)
MCV RBC AUTO: 88.6 FL (ref 79–97)
MCV RBC AUTO: 89.5 FL (ref 79–97)
MONOCYTES # BLD AUTO: 0.49 10*3/MM3 (ref 0.1–0.9)
MONOCYTES # BLD AUTO: 0.69 10*3/MM3 (ref 0.1–0.9)
MONOCYTES # BLD AUTO: 0.73 10*3/MM3 (ref 0.1–0.9)
MONOCYTES # BLD AUTO: 1.03 10*3/MM3 (ref 0.1–0.9)
MONOCYTES # BLD AUTO: 1.15 10*3/MM3 (ref 0.1–0.9)
MONOCYTES # BLD AUTO: 1.17 10*3/MM3 (ref 0.1–0.9)
MONOCYTES NFR BLD AUTO: 10.5 % (ref 5–12)
MONOCYTES NFR BLD AUTO: 11.1 % (ref 5–12)
MONOCYTES NFR BLD AUTO: 12 % (ref 5–12)
MONOCYTES NFR BLD AUTO: 16.6 % (ref 5–12)
MONOCYTES NFR BLD AUTO: 17.2 % (ref 5–12)
MONOCYTES NFR BLD AUTO: 5.8 % (ref 5–12)
NEUTROPHILS NFR BLD AUTO: 3.15 10*3/MM3 (ref 1.7–7)
NEUTROPHILS NFR BLD AUTO: 4.1 10*3/MM3 (ref 1.7–7)
NEUTROPHILS NFR BLD AUTO: 4.57 10*3/MM3 (ref 1.7–7)
NEUTROPHILS NFR BLD AUTO: 4.91 10*3/MM3 (ref 1.7–7)
NEUTROPHILS NFR BLD AUTO: 54.6 % (ref 42.7–76)
NEUTROPHILS NFR BLD AUTO: 6.5 10*3/MM3 (ref 1.7–7)
NEUTROPHILS NFR BLD AUTO: 61.4 % (ref 42.7–76)
NEUTROPHILS NFR BLD AUTO: 65.7 % (ref 42.7–76)
NEUTROPHILS NFR BLD AUTO: 69.5 % (ref 42.7–76)
NEUTROPHILS NFR BLD AUTO: 7.16 10*3/MM3 (ref 1.7–7)
NEUTROPHILS NFR BLD AUTO: 77.4 % (ref 42.7–76)
NEUTROPHILS NFR BLD AUTO: 77.6 % (ref 42.7–76)
NRBC BLD AUTO-RTO: 0 /100 WBC (ref 0–0.2)
PLATELET # BLD AUTO: 230 10*3/MM3 (ref 140–450)
PLATELET # BLD AUTO: 230 10*3/MM3 (ref 140–450)
PLATELET # BLD AUTO: 246 10*3/MM3 (ref 140–450)
PLATELET # BLD AUTO: 366 10*3/MM3 (ref 140–450)
PLATELET # BLD AUTO: 406 10*3/MM3 (ref 140–450)
PLATELET # BLD AUTO: 527 10*3/MM3 (ref 140–450)
PMV BLD AUTO: 10.3 FL (ref 6–12)
PMV BLD AUTO: 10.5 FL (ref 6–12)
PMV BLD AUTO: 10.7 FL (ref 6–12)
PMV BLD AUTO: 11.3 FL (ref 6–12)
PMV BLD AUTO: 11.3 FL (ref 6–12)
PMV BLD AUTO: 11.9 FL (ref 6–12)
POTASSIUM SERPL-SCNC: 2.7 MMOL/L (ref 3.5–5.2)
POTASSIUM SERPL-SCNC: 2.9 MMOL/L (ref 3.5–5.2)
POTASSIUM SERPL-SCNC: 3.3 MMOL/L (ref 3.5–5.2)
POTASSIUM SERPL-SCNC: 3.4 MMOL/L (ref 3.5–5.2)
POTASSIUM SERPL-SCNC: 3.9 MMOL/L (ref 3.5–5.2)
POTASSIUM SERPL-SCNC: 4.2 MMOL/L (ref 3.5–4.7)
PROT SERPL-MCNC: 6.6 G/DL (ref 6–8.5)
PROT SERPL-MCNC: 6.9 G/DL (ref 6–8.5)
PROT SERPL-MCNC: 7.5 G/DL (ref 6.3–8)
PROT SERPL-MCNC: 8.1 G/DL (ref 6–8.5)
PROT SERPL-MCNC: 8.5 G/DL (ref 6–8.5)
RBC # BLD AUTO: 3.6 10*6/MM3 (ref 3.77–5.28)
RBC # BLD AUTO: 3.8 10*6/MM3 (ref 3.77–5.28)
RBC # BLD AUTO: 4.08 10*6/MM3 (ref 3.77–5.28)
RBC # BLD AUTO: 4.14 10*6/MM3 (ref 3.77–5.28)
RBC # BLD AUTO: 4.31 10*6/MM3 (ref 3.77–5.28)
RBC # BLD AUTO: 4.55 10*6/MM3 (ref 3.77–5.28)
REF LAB TEST METHOD: NORMAL
SARS-COV-2 RNA RESP QL NAA+PROBE: NOT DETECTED
SODIUM SERPL-SCNC: 134 MMOL/L (ref 134–145)
SODIUM SERPL-SCNC: 135 MMOL/L (ref 136–145)
SODIUM SERPL-SCNC: 136 MMOL/L (ref 136–145)
SODIUM SERPL-SCNC: 137 MMOL/L (ref 136–145)
SODIUM SERPL-SCNC: 138 MMOL/L (ref 136–145)
SODIUM SERPL-SCNC: 138 MMOL/L (ref 136–145)
TIBC SERPL-MCNC: 411 MCG/DL (ref 298–536)
TRANSFERRIN SERPL-MCNC: 276 MG/DL (ref 200–360)
WBC # BLD AUTO: 5.76 10*3/MM3 (ref 3.4–10.8)
WBC # BLD AUTO: 6.69 10*3/MM3 (ref 3.4–10.8)
WBC # BLD AUTO: 6.95 10*3/MM3 (ref 3.4–10.8)
WBC # BLD AUTO: 7.06 10*3/MM3 (ref 3.4–10.8)
WBC # BLD AUTO: 8.39 10*3/MM3 (ref 3.4–10.8)
WBC # BLD AUTO: 9.24 10*3/MM3 (ref 3.4–10.8)

## 2020-01-01 PROCEDURE — 84466 ASSAY OF TRANSFERRIN: CPT | Performed by: INTERNAL MEDICINE

## 2020-01-01 PROCEDURE — 25010000003 POTASSIUM CHLORIDE 10 MEQ/100ML SOLUTION: Performed by: INTERNAL MEDICINE

## 2020-01-01 PROCEDURE — 74328 X-RAY BILE DUCT ENDOSCOPY: CPT

## 2020-01-01 PROCEDURE — 43274 ERCP DUCT STENT PLACEMENT: CPT | Performed by: INTERNAL MEDICINE

## 2020-01-01 PROCEDURE — 74177 CT ABD & PELVIS W/CONTRAST: CPT

## 2020-01-01 PROCEDURE — 86301 IMMUNOASSAY TUMOR CA 19-9: CPT | Performed by: INTERNAL MEDICINE

## 2020-01-01 PROCEDURE — 80053 COMPREHEN METABOLIC PANEL: CPT | Performed by: INTERNAL MEDICINE

## 2020-01-01 PROCEDURE — 99213 OFFICE O/P EST LOW 20 MIN: CPT | Performed by: NURSE PRACTITIONER

## 2020-01-01 PROCEDURE — 25010000002 ONDANSETRON PER 1 MG: Performed by: INTERNAL MEDICINE

## 2020-01-01 PROCEDURE — 94799 UNLISTED PULMONARY SVC/PX: CPT

## 2020-01-01 PROCEDURE — 99232 SBSQ HOSP IP/OBS MODERATE 35: CPT | Performed by: INTERNAL MEDICINE

## 2020-01-01 PROCEDURE — 25810000003 SODIUM CHLORIDE 0.9 % WITH KCL 20 MEQ 20-0.9 MEQ/L-% SOLUTION: Performed by: INTERNAL MEDICINE

## 2020-01-01 PROCEDURE — 99215 OFFICE O/P EST HI 40 MIN: CPT | Performed by: INTERNAL MEDICINE

## 2020-01-01 PROCEDURE — C1874 STENT, COATED/COV W/DEL SYS: HCPCS | Performed by: INTERNAL MEDICINE

## 2020-01-01 PROCEDURE — C1769 GUIDE WIRE: HCPCS | Performed by: INTERNAL MEDICINE

## 2020-01-01 PROCEDURE — 25010000002 IOPAMIDOL 61 % SOLUTION: Performed by: INTERNAL MEDICINE

## 2020-01-01 PROCEDURE — 83735 ASSAY OF MAGNESIUM: CPT | Performed by: INTERNAL MEDICINE

## 2020-01-01 PROCEDURE — 80048 BASIC METABOLIC PNL TOTAL CA: CPT | Performed by: HOSPITALIST

## 2020-01-01 PROCEDURE — 94640 AIRWAY INHALATION TREATMENT: CPT

## 2020-01-01 PROCEDURE — 63710000001 ONDANSETRON PER 8 MG: Performed by: INTERNAL MEDICINE

## 2020-01-01 PROCEDURE — U0004 COV-19 TEST NON-CDC HGH THRU: HCPCS | Performed by: INTERNAL MEDICINE

## 2020-01-01 PROCEDURE — 85025 COMPLETE CBC W/AUTO DIFF WBC: CPT | Performed by: HOSPITALIST

## 2020-01-01 PROCEDURE — 82565 ASSAY OF CREATININE: CPT

## 2020-01-01 PROCEDURE — 25010000002 PROPOFOL 10 MG/ML EMULSION: Performed by: ANESTHESIOLOGY

## 2020-01-01 PROCEDURE — 82962 GLUCOSE BLOOD TEST: CPT | Performed by: INTERNAL MEDICINE

## 2020-01-01 PROCEDURE — 25010000002 HYDRALAZINE PER 20 MG: Performed by: INTERNAL MEDICINE

## 2020-01-01 PROCEDURE — 99222 1ST HOSP IP/OBS MODERATE 55: CPT | Performed by: INTERNAL MEDICINE

## 2020-01-01 PROCEDURE — 85025 COMPLETE CBC W/AUTO DIFF WBC: CPT

## 2020-01-01 PROCEDURE — 99233 SBSQ HOSP IP/OBS HIGH 50: CPT | Performed by: INTERNAL MEDICINE

## 2020-01-01 PROCEDURE — 83690 ASSAY OF LIPASE: CPT | Performed by: INTERNAL MEDICINE

## 2020-01-01 PROCEDURE — 99495 TRANSJ CARE MGMT MOD F2F 14D: CPT | Performed by: INTERNAL MEDICINE

## 2020-01-01 PROCEDURE — 83540 ASSAY OF IRON: CPT | Performed by: INTERNAL MEDICINE

## 2020-01-01 PROCEDURE — 99214 OFFICE O/P EST MOD 30 MIN: CPT | Performed by: INTERNAL MEDICINE

## 2020-01-01 PROCEDURE — 0 DIATRIZOATE MEGLUMINE & SODIUM PER 1 ML: Performed by: INTERNAL MEDICINE

## 2020-01-01 PROCEDURE — 82728 ASSAY OF FERRITIN: CPT | Performed by: INTERNAL MEDICINE

## 2020-01-01 PROCEDURE — 85025 COMPLETE CBC W/AUTO DIFF WBC: CPT | Performed by: INTERNAL MEDICINE

## 2020-01-01 PROCEDURE — G0378 HOSPITAL OBSERVATION PER HR: HCPCS

## 2020-01-01 PROCEDURE — 25010000002 IOPAMIDOL 61 % SOLUTION: Performed by: HOSPITALIST

## 2020-01-01 PROCEDURE — 25010000002 HYDROMORPHONE PER 4 MG: Performed by: INTERNAL MEDICINE

## 2020-01-01 PROCEDURE — 80053 COMPREHEN METABOLIC PANEL: CPT

## 2020-01-01 PROCEDURE — 0F798DZ DILATION OF COMMON BILE DUCT WITH INTRALUMINAL DEVICE, VIA NATURAL OR ARTIFICIAL OPENING ENDOSCOPIC: ICD-10-PCS | Performed by: INTERNAL MEDICINE

## 2020-01-01 PROCEDURE — 36415 COLL VENOUS BLD VENIPUNCTURE: CPT

## 2020-01-01 PROCEDURE — 0 DIATRIZOATE MEGLUMINE & SODIUM PER 1 ML: Performed by: HOSPITALIST

## 2020-01-01 PROCEDURE — 82962 GLUCOSE BLOOD TEST: CPT

## 2020-01-01 DEVICE — STENT SYSTEM RMV
Type: IMPLANTABLE DEVICE | Site: BILE DUCT | Status: FUNCTIONAL
Brand: WALLFLEX BILIARY

## 2020-01-01 RX ORDER — SODIUM CHLORIDE 0.9 % (FLUSH) 0.9 %
10 SYRINGE (ML) INJECTION AS NEEDED
Status: DISCONTINUED | OUTPATIENT
Start: 2020-01-01 | End: 2020-01-01 | Stop reason: HOSPADM

## 2020-01-01 RX ORDER — OMEPRAZOLE 40 MG/1
CAPSULE, DELAYED RELEASE ORAL
Qty: 90 CAPSULE | Refills: 1 | Status: SHIPPED | OUTPATIENT
Start: 2020-01-01 | End: 2021-01-01

## 2020-01-01 RX ORDER — PANTOPRAZOLE SODIUM 40 MG/1
40 TABLET, DELAYED RELEASE ORAL EVERY MORNING
Status: DISCONTINUED | OUTPATIENT
Start: 2020-01-01 | End: 2020-01-01 | Stop reason: HOSPADM

## 2020-01-01 RX ORDER — MONTELUKAST SODIUM 10 MG/1
TABLET ORAL
Qty: 90 TABLET | Refills: 3 | Status: SHIPPED | OUTPATIENT
Start: 2020-01-01

## 2020-01-01 RX ORDER — ALBUTEROL SULFATE 2.5 MG/3ML
2.5 SOLUTION RESPIRATORY (INHALATION) EVERY 6 HOURS PRN
Status: DISCONTINUED | OUTPATIENT
Start: 2020-01-01 | End: 2020-01-01 | Stop reason: HOSPADM

## 2020-01-01 RX ORDER — SODIUM CHLORIDE 0.9 % (FLUSH) 0.9 %
10 SYRINGE (ML) INJECTION EVERY 12 HOURS SCHEDULED
Status: DISCONTINUED | OUTPATIENT
Start: 2020-01-01 | End: 2020-01-01 | Stop reason: HOSPADM

## 2020-01-01 RX ORDER — FOLIC ACID 1 MG/1
1000 TABLET ORAL DAILY
Status: DISCONTINUED | OUTPATIENT
Start: 2020-01-01 | End: 2020-01-01 | Stop reason: HOSPADM

## 2020-01-01 RX ORDER — POTASSIUM CHLORIDE 1.5 G/1.77G
40 POWDER, FOR SOLUTION ORAL AS NEEDED
Status: DISCONTINUED | OUTPATIENT
Start: 2020-01-01 | End: 2020-01-01 | Stop reason: HOSPADM

## 2020-01-01 RX ORDER — BLOOD-GLUCOSE METER
1 KIT MISCELLANEOUS AS NEEDED
Qty: 1 EACH | Refills: 3 | Status: SHIPPED | OUTPATIENT
Start: 2020-01-01 | End: 2021-01-01

## 2020-01-01 RX ORDER — SACCHAROMYCES BOULARDII 250 MG
250 CAPSULE ORAL 2 TIMES DAILY
Status: DISCONTINUED | OUTPATIENT
Start: 2020-01-01 | End: 2020-01-01 | Stop reason: HOSPADM

## 2020-01-01 RX ORDER — ACETAMINOPHEN 160 MG/5ML
650 SOLUTION ORAL EVERY 4 HOURS PRN
Status: DISCONTINUED | OUTPATIENT
Start: 2020-01-01 | End: 2020-01-01 | Stop reason: HOSPADM

## 2020-01-01 RX ORDER — PREDNISONE 10 MG/1
10 TABLET ORAL DAILY
Qty: 90 TABLET | Refills: 1 | Status: SHIPPED | OUTPATIENT
Start: 2020-01-01 | End: 2020-01-01 | Stop reason: DRUGHIGH

## 2020-01-01 RX ORDER — SODIUM CHLORIDE AND POTASSIUM CHLORIDE 150; 900 MG/100ML; MG/100ML
100 INJECTION, SOLUTION INTRAVENOUS CONTINUOUS
Status: DISCONTINUED | OUTPATIENT
Start: 2020-01-01 | End: 2020-01-01 | Stop reason: HOSPADM

## 2020-01-01 RX ORDER — FLUTICASONE PROPIONATE 50 MCG
2 SPRAY, SUSPENSION (ML) NASAL DAILY
Status: DISCONTINUED | OUTPATIENT
Start: 2020-01-01 | End: 2020-01-01 | Stop reason: HOSPADM

## 2020-01-01 RX ORDER — MAGNESIUM SULFATE HEPTAHYDRATE 40 MG/ML
2 INJECTION, SOLUTION INTRAVENOUS AS NEEDED
Status: DISCONTINUED | OUTPATIENT
Start: 2020-01-01 | End: 2020-01-01 | Stop reason: HOSPADM

## 2020-01-01 RX ORDER — DULOXETIN HYDROCHLORIDE 60 MG/1
120 CAPSULE, DELAYED RELEASE ORAL DAILY
Status: DISCONTINUED | OUTPATIENT
Start: 2020-01-01 | End: 2020-01-01 | Stop reason: HOSPADM

## 2020-01-01 RX ORDER — ACETAMINOPHEN 650 MG/1
650 SUPPOSITORY RECTAL EVERY 4 HOURS PRN
Status: DISCONTINUED | OUTPATIENT
Start: 2020-01-01 | End: 2020-01-01 | Stop reason: HOSPADM

## 2020-01-01 RX ORDER — DULOXETIN HYDROCHLORIDE 60 MG/1
120 CAPSULE, DELAYED RELEASE ORAL 2 TIMES DAILY
Status: DISCONTINUED | OUTPATIENT
Start: 2020-01-01 | End: 2020-01-01

## 2020-01-01 RX ORDER — SENNA PLUS 8.6 MG/1
1 TABLET ORAL DAILY PRN
COMMUNITY

## 2020-01-01 RX ORDER — ACETAMINOPHEN 325 MG/1
650 TABLET ORAL EVERY 4 HOURS PRN
Status: DISCONTINUED | OUTPATIENT
Start: 2020-01-01 | End: 2020-01-01 | Stop reason: HOSPADM

## 2020-01-01 RX ORDER — SODIUM CHLORIDE 9 MG/ML
100 INJECTION, SOLUTION INTRAVENOUS CONTINUOUS
Status: DISCONTINUED | OUTPATIENT
Start: 2020-01-01 | End: 2020-01-01

## 2020-01-01 RX ORDER — SODIUM CHLORIDE 9 MG/ML
30 INJECTION, SOLUTION INTRAVENOUS CONTINUOUS PRN
Status: DISCONTINUED | OUTPATIENT
Start: 2020-01-01 | End: 2020-01-01 | Stop reason: HOSPADM

## 2020-01-01 RX ORDER — POTASSIUM CHLORIDE 7.45 MG/ML
10 INJECTION INTRAVENOUS
Status: DISCONTINUED | OUTPATIENT
Start: 2020-01-01 | End: 2020-01-01 | Stop reason: HOSPADM

## 2020-01-01 RX ORDER — FOLIC ACID 1 MG/1
TABLET ORAL
Qty: 90 TABLET | Refills: 0 | Status: SHIPPED | OUTPATIENT
Start: 2020-01-01 | End: 2021-01-01

## 2020-01-01 RX ORDER — POTASSIUM CHLORIDE 1500 MG/1
TABLET, EXTENDED RELEASE ORAL
Qty: 90 TABLET | Refills: 0 | Status: SHIPPED | OUTPATIENT
Start: 2020-01-01 | End: 2021-01-01 | Stop reason: SDUPTHER

## 2020-01-01 RX ORDER — ONDANSETRON 4 MG/1
4 TABLET, ORALLY DISINTEGRATING ORAL EVERY 8 HOURS PRN
Status: ON HOLD | COMMUNITY
End: 2020-01-01 | Stop reason: SDUPTHER

## 2020-01-01 RX ORDER — ERGOCALCIFEROL 1.25 MG/1
50000 CAPSULE ORAL
COMMUNITY
Start: 2020-01-01 | End: 2021-01-01

## 2020-01-01 RX ORDER — ONDANSETRON 2 MG/ML
4 INJECTION INTRAMUSCULAR; INTRAVENOUS EVERY 6 HOURS PRN
Status: DISCONTINUED | OUTPATIENT
Start: 2020-01-01 | End: 2020-01-01 | Stop reason: HOSPADM

## 2020-01-01 RX ORDER — INSULIN GLARGINE 100 [IU]/ML
10 INJECTION, SOLUTION SUBCUTANEOUS NIGHTLY
Qty: 2 PEN | Refills: 3 | Status: SHIPPED | OUTPATIENT
Start: 2020-01-01 | End: 2021-01-01

## 2020-01-01 RX ORDER — NALOXONE HCL 0.4 MG/ML
0.4 VIAL (ML) INJECTION
Status: DISCONTINUED | OUTPATIENT
Start: 2020-01-01 | End: 2020-01-01 | Stop reason: HOSPADM

## 2020-01-01 RX ORDER — GLYCOPYRROLATE 0.2 MG/ML
INJECTION INTRAMUSCULAR; INTRAVENOUS AS NEEDED
Status: DISCONTINUED | OUTPATIENT
Start: 2020-01-01 | End: 2020-01-01 | Stop reason: SURG

## 2020-01-01 RX ORDER — FERROUS SULFATE 325(65) MG
325 TABLET ORAL
Qty: 90 TABLET | Refills: 1 | Status: SHIPPED | OUTPATIENT
Start: 2020-01-01 | End: 2021-01-01

## 2020-01-01 RX ORDER — OXYCODONE AND ACETAMINOPHEN 10; 325 MG/1; MG/1
1 TABLET ORAL EVERY 4 HOURS PRN
Status: DISCONTINUED | OUTPATIENT
Start: 2020-01-01 | End: 2020-01-01 | Stop reason: HOSPADM

## 2020-01-01 RX ORDER — AMLODIPINE BESYLATE 5 MG/1
5 TABLET ORAL DAILY
Status: DISCONTINUED | OUTPATIENT
Start: 2020-01-01 | End: 2020-01-01 | Stop reason: HOSPADM

## 2020-01-01 RX ORDER — BUDESONIDE 0.5 MG/2ML
0.5 INHALANT ORAL
Status: DISCONTINUED | OUTPATIENT
Start: 2020-01-01 | End: 2020-01-01 | Stop reason: HOSPADM

## 2020-01-01 RX ORDER — DULOXETIN HYDROCHLORIDE 60 MG/1
60 CAPSULE, DELAYED RELEASE ORAL 2 TIMES DAILY
Status: DISCONTINUED | OUTPATIENT
Start: 2020-01-01 | End: 2020-01-01

## 2020-01-01 RX ORDER — FAMOTIDINE 40 MG/1
40 TABLET, FILM COATED ORAL DAILY
Qty: 90 TABLET | Refills: 3 | Status: SHIPPED | OUTPATIENT
Start: 2020-01-01

## 2020-01-01 RX ORDER — ONDANSETRON 4 MG/1
4 TABLET, FILM COATED ORAL EVERY 8 HOURS PRN
Qty: 10 TABLET | Refills: 0 | Status: SHIPPED | OUTPATIENT
Start: 2020-01-01 | End: 2020-01-01

## 2020-01-01 RX ORDER — AMLODIPINE BESYLATE 10 MG/1
10 TABLET ORAL DAILY
Qty: 90 TABLET | Refills: 3 | Status: SHIPPED | OUTPATIENT
Start: 2020-01-01 | End: 2021-01-01

## 2020-01-01 RX ORDER — POTASSIUM CHLORIDE 750 MG/1
40 CAPSULE, EXTENDED RELEASE ORAL AS NEEDED
Status: DISCONTINUED | OUTPATIENT
Start: 2020-01-01 | End: 2020-01-01 | Stop reason: HOSPADM

## 2020-01-01 RX ORDER — ONDANSETRON 4 MG/1
4 TABLET, FILM COATED ORAL EVERY 6 HOURS PRN
Status: DISCONTINUED | OUTPATIENT
Start: 2020-01-01 | End: 2020-01-01 | Stop reason: HOSPADM

## 2020-01-01 RX ORDER — HYDROMORPHONE HYDROCHLORIDE 1 MG/ML
0.5 INJECTION, SOLUTION INTRAMUSCULAR; INTRAVENOUS; SUBCUTANEOUS
Status: DISCONTINUED | OUTPATIENT
Start: 2020-01-01 | End: 2020-01-01 | Stop reason: HOSPADM

## 2020-01-01 RX ORDER — HYDRALAZINE HYDROCHLORIDE 20 MG/ML
10 INJECTION INTRAMUSCULAR; INTRAVENOUS EVERY 6 HOURS PRN
Status: DISCONTINUED | OUTPATIENT
Start: 2020-01-01 | End: 2020-01-01 | Stop reason: HOSPADM

## 2020-01-01 RX ORDER — METOPROLOL TARTRATE 5 MG/5ML
INJECTION INTRAVENOUS AS NEEDED
Status: DISCONTINUED | OUTPATIENT
Start: 2020-01-01 | End: 2020-01-01 | Stop reason: SURG

## 2020-01-01 RX ORDER — PROPOFOL 10 MG/ML
VIAL (ML) INTRAVENOUS AS NEEDED
Status: DISCONTINUED | OUTPATIENT
Start: 2020-01-01 | End: 2020-01-01 | Stop reason: SURG

## 2020-01-01 RX ORDER — IPRATROPIUM BROMIDE AND ALBUTEROL SULFATE 2.5; .5 MG/3ML; MG/3ML
3 SOLUTION RESPIRATORY (INHALATION)
Status: DISCONTINUED | OUTPATIENT
Start: 2020-01-01 | End: 2020-01-01 | Stop reason: HOSPADM

## 2020-01-01 RX ORDER — AMLODIPINE BESYLATE 5 MG/1
TABLET ORAL
Qty: 90 TABLET | Refills: 1 | Status: SHIPPED | OUTPATIENT
Start: 2020-01-01 | End: 2020-01-01

## 2020-01-01 RX ORDER — LIDOCAINE HYDROCHLORIDE 20 MG/ML
INJECTION, SOLUTION INFILTRATION; PERINEURAL AS NEEDED
Status: DISCONTINUED | OUTPATIENT
Start: 2020-01-01 | End: 2020-01-01 | Stop reason: SURG

## 2020-01-01 RX ORDER — SYRING-NEEDL,DISP,INSUL,0.3 ML 30 GX5/16"
1 SYRINGE, EMPTY DISPOSABLE MISCELLANEOUS DAILY
Qty: 100 EACH | Refills: 6 | Status: SHIPPED | OUTPATIENT
Start: 2020-01-01 | End: 2021-01-01

## 2020-01-01 RX ORDER — MAGNESIUM OXIDE 400 MG/1
400 TABLET ORAL 2 TIMES DAILY
Qty: 60 TABLET | Refills: 2 | Status: SHIPPED | OUTPATIENT
Start: 2020-01-01 | End: 2021-01-01

## 2020-01-01 RX ORDER — PREDNISONE 1 MG/1
5 TABLET ORAL DAILY
Qty: 30 TABLET | Refills: 4 | Status: SHIPPED | OUTPATIENT
Start: 2020-01-01 | End: 2021-01-01 | Stop reason: SDUPTHER

## 2020-01-01 RX ORDER — ONDANSETRON 4 MG/1
4 TABLET, ORALLY DISINTEGRATING ORAL EVERY 8 HOURS PRN
Qty: 10 TABLET | Refills: 0 | Status: SHIPPED | OUTPATIENT
Start: 2020-01-01

## 2020-01-01 RX ORDER — MAGNESIUM SULFATE HEPTAHYDRATE 40 MG/ML
4 INJECTION, SOLUTION INTRAVENOUS AS NEEDED
Status: DISCONTINUED | OUTPATIENT
Start: 2020-01-01 | End: 2020-01-01 | Stop reason: HOSPADM

## 2020-01-01 RX ORDER — MONTELUKAST SODIUM 10 MG/1
10 TABLET ORAL DAILY
Status: DISCONTINUED | OUTPATIENT
Start: 2020-01-01 | End: 2020-01-01 | Stop reason: HOSPADM

## 2020-01-01 RX ORDER — PROPOFOL 10 MG/ML
VIAL (ML) INTRAVENOUS CONTINUOUS PRN
Status: DISCONTINUED | OUTPATIENT
Start: 2020-01-01 | End: 2020-01-01 | Stop reason: SURG

## 2020-01-01 RX ORDER — CHOLECALCIFEROL (VITAMIN D3) 125 MCG
1000 CAPSULE ORAL DAILY
Status: DISCONTINUED | OUTPATIENT
Start: 2020-01-01 | End: 2020-01-01 | Stop reason: HOSPADM

## 2020-01-01 RX ORDER — VALSARTAN 320 MG/1
320 TABLET ORAL DAILY
Status: DISCONTINUED | OUTPATIENT
Start: 2020-01-01 | End: 2020-01-01 | Stop reason: HOSPADM

## 2020-01-01 RX ADMIN — FOLIC ACID 1000 MCG: 1 TABLET ORAL at 09:05

## 2020-01-01 RX ADMIN — Medication 250 MG: at 09:05

## 2020-01-01 RX ADMIN — BUDESONIDE 0.5 MG: 0.5 INHALANT RESPIRATORY (INHALATION) at 07:33

## 2020-01-01 RX ADMIN — POTASSIUM CHLORIDE 40 MEQ: 10 CAPSULE, COATED, EXTENDED RELEASE ORAL at 21:29

## 2020-01-01 RX ADMIN — MONTELUKAST SODIUM 10 MG: 10 TABLET, FILM COATED ORAL at 09:05

## 2020-01-01 RX ADMIN — SODIUM CHLORIDE 30 ML/HR: 9 INJECTION, SOLUTION INTRAVENOUS at 17:01

## 2020-01-01 RX ADMIN — VALSARTAN 320 MG: 320 TABLET, FILM COATED ORAL at 09:05

## 2020-01-01 RX ADMIN — PROPOFOL 200 MCG/KG/MIN: 10 INJECTION, EMULSION INTRAVENOUS at 17:48

## 2020-01-01 RX ADMIN — SODIUM CHLORIDE 100 ML/HR: 9 INJECTION, SOLUTION INTRAVENOUS at 12:18

## 2020-01-01 RX ADMIN — Medication 1000 MCG: at 09:05

## 2020-01-01 RX ADMIN — DULOXETINE HYDROCHLORIDE 120 MG: 60 CAPSULE, DELAYED RELEASE ORAL at 09:04

## 2020-01-01 RX ADMIN — DULOXETINE HYDROCHLORIDE 120 MG: 60 CAPSULE, DELAYED RELEASE ORAL at 08:20

## 2020-01-01 RX ADMIN — IOPAMIDOL 85 ML: 612 INJECTION, SOLUTION INTRAVENOUS at 14:29

## 2020-01-01 RX ADMIN — IPRATROPIUM BROMIDE AND ALBUTEROL SULFATE 3 ML: 2.5; .5 SOLUTION RESPIRATORY (INHALATION) at 14:50

## 2020-01-01 RX ADMIN — AMLODIPINE BESYLATE 5 MG: 5 TABLET ORAL at 08:21

## 2020-01-01 RX ADMIN — Medication 1000 MCG: at 18:16

## 2020-01-01 RX ADMIN — FOLIC ACID 1000 MCG: 1 TABLET ORAL at 18:17

## 2020-01-01 RX ADMIN — SODIUM CHLORIDE, PRESERVATIVE FREE 10 ML: 5 INJECTION INTRAVENOUS at 21:29

## 2020-01-01 RX ADMIN — Medication 250 MG: at 21:29

## 2020-01-01 RX ADMIN — POTASSIUM CHLORIDE AND SODIUM CHLORIDE 100 ML/HR: 900; 150 INJECTION, SOLUTION INTRAVENOUS at 15:21

## 2020-01-01 RX ADMIN — BUDESONIDE 0.5 MG: 0.5 INHALANT RESPIRATORY (INHALATION) at 22:24

## 2020-01-01 RX ADMIN — IPRATROPIUM BROMIDE AND ALBUTEROL SULFATE 3 ML: 2.5; .5 SOLUTION RESPIRATORY (INHALATION) at 07:33

## 2020-01-01 RX ADMIN — SODIUM CHLORIDE, PRESERVATIVE FREE 10 ML: 5 INJECTION INTRAVENOUS at 20:57

## 2020-01-01 RX ADMIN — AMLODIPINE BESYLATE 5 MG: 5 TABLET ORAL at 18:17

## 2020-01-01 RX ADMIN — IOPAMIDOL 85 ML: 612 INJECTION, SOLUTION INTRAVENOUS at 11:49

## 2020-01-01 RX ADMIN — VALSARTAN 320 MG: 320 TABLET, FILM COATED ORAL at 18:17

## 2020-01-01 RX ADMIN — ONDANSETRON HYDROCHLORIDE 4 MG: 4 TABLET, FILM COATED ORAL at 16:32

## 2020-01-01 RX ADMIN — SODIUM CHLORIDE, PRESERVATIVE FREE 10 ML: 5 INJECTION INTRAVENOUS at 08:46

## 2020-01-01 RX ADMIN — OXYCODONE HYDROCHLORIDE AND ACETAMINOPHEN 1 TABLET: 10; 325 TABLET ORAL at 20:02

## 2020-01-01 RX ADMIN — BUDESONIDE 0.5 MG: 0.5 INHALANT RESPIRATORY (INHALATION) at 08:13

## 2020-01-01 RX ADMIN — Medication 250 MG: at 20:57

## 2020-01-01 RX ADMIN — AMLODIPINE BESYLATE 5 MG: 5 TABLET ORAL at 08:45

## 2020-01-01 RX ADMIN — VALSARTAN 320 MG: 320 TABLET, FILM COATED ORAL at 08:45

## 2020-01-01 RX ADMIN — POTASSIUM CHLORIDE 10 MEQ: 7.46 INJECTION, SOLUTION INTRAVENOUS at 16:33

## 2020-01-01 RX ADMIN — IPRATROPIUM BROMIDE AND ALBUTEROL SULFATE 3 ML: 2.5; .5 SOLUTION RESPIRATORY (INHALATION) at 06:59

## 2020-01-01 RX ADMIN — Medication 250 MG: at 08:45

## 2020-01-01 RX ADMIN — DULOXETINE HYDROCHLORIDE 120 MG: 60 CAPSULE, DELAYED RELEASE ORAL at 08:45

## 2020-01-01 RX ADMIN — IPRATROPIUM BROMIDE AND ALBUTEROL SULFATE 3 ML: 2.5; .5 SOLUTION RESPIRATORY (INHALATION) at 16:11

## 2020-01-01 RX ADMIN — LIDOCAINE HYDROCHLORIDE 60 MG: 20 INJECTION, SOLUTION INFILTRATION; PERINEURAL at 17:48

## 2020-01-01 RX ADMIN — POTASSIUM CHLORIDE AND SODIUM CHLORIDE 100 ML/HR: 900; 150 INJECTION, SOLUTION INTRAVENOUS at 00:57

## 2020-01-01 RX ADMIN — PROPOFOL 60 MG: 10 INJECTION, EMULSION INTRAVENOUS at 17:48

## 2020-01-01 RX ADMIN — IPRATROPIUM BROMIDE AND ALBUTEROL SULFATE 3 ML: 2.5; .5 SOLUTION RESPIRATORY (INHALATION) at 08:13

## 2020-01-01 RX ADMIN — Medication 250 MG: at 08:21

## 2020-01-01 RX ADMIN — METOPROLOL TARTRATE 2.5 MG: 1 INJECTION, SOLUTION INTRAVENOUS at 18:08

## 2020-01-01 RX ADMIN — OXYCODONE HYDROCHLORIDE AND ACETAMINOPHEN 1 TABLET: 10; 325 TABLET ORAL at 12:24

## 2020-01-01 RX ADMIN — IPRATROPIUM BROMIDE AND ALBUTEROL SULFATE 3 ML: 2.5; .5 SOLUTION RESPIRATORY (INHALATION) at 23:34

## 2020-01-01 RX ADMIN — OXYCODONE HYDROCHLORIDE AND ACETAMINOPHEN 1 TABLET: 10; 325 TABLET ORAL at 06:24

## 2020-01-01 RX ADMIN — SODIUM CHLORIDE, PRESERVATIVE FREE 10 ML: 5 INJECTION INTRAVENOUS at 12:18

## 2020-01-01 RX ADMIN — IPRATROPIUM BROMIDE AND ALBUTEROL SULFATE 3 ML: 2.5; .5 SOLUTION RESPIRATORY (INHALATION) at 23:30

## 2020-01-01 RX ADMIN — POTASSIUM CHLORIDE AND SODIUM CHLORIDE 100 ML/HR: 900; 150 INJECTION, SOLUTION INTRAVENOUS at 14:54

## 2020-01-01 RX ADMIN — FOLIC ACID 1000 MCG: 1 TABLET ORAL at 08:22

## 2020-01-01 RX ADMIN — POTASSIUM CHLORIDE AND SODIUM CHLORIDE 100 ML/HR: 900; 150 INJECTION, SOLUTION INTRAVENOUS at 14:37

## 2020-01-01 RX ADMIN — BUDESONIDE 0.5 MG: 0.5 INHALANT RESPIRATORY (INHALATION) at 23:28

## 2020-01-01 RX ADMIN — DIATRIZOATE MEGLUMINE AND DIATRIZOATE SODIUM 30 ML: 600; 100 SOLUTION ORAL; RECTAL at 09:00

## 2020-01-01 RX ADMIN — ONDANSETRON 4 MG: 2 INJECTION INTRAMUSCULAR; INTRAVENOUS at 22:30

## 2020-01-01 RX ADMIN — POTASSIUM CHLORIDE AND SODIUM CHLORIDE 100 ML/HR: 900; 150 INJECTION, SOLUTION INTRAVENOUS at 01:44

## 2020-01-01 RX ADMIN — PANCRELIPASE 12000 UNITS OF LIPASE: 60000; 12000; 38000 CAPSULE, DELAYED RELEASE PELLETS ORAL at 08:45

## 2020-01-01 RX ADMIN — GLYCOPYRROLATE 0.2 MG: 0.2 INJECTION INTRAMUSCULAR; INTRAVENOUS at 17:45

## 2020-01-01 RX ADMIN — IPRATROPIUM BROMIDE AND ALBUTEROL SULFATE 3 ML: 2.5; .5 SOLUTION RESPIRATORY (INHALATION) at 15:12

## 2020-01-01 RX ADMIN — PANTOPRAZOLE SODIUM 40 MG: 40 TABLET, DELAYED RELEASE ORAL at 08:45

## 2020-01-01 RX ADMIN — MONTELUKAST SODIUM 10 MG: 10 TABLET, FILM COATED ORAL at 08:21

## 2020-01-01 RX ADMIN — ACETAMINOPHEN 650 MG: 325 TABLET, FILM COATED ORAL at 00:57

## 2020-01-01 RX ADMIN — FOLIC ACID 1000 MCG: 1 TABLET ORAL at 08:45

## 2020-01-01 RX ADMIN — POTASSIUM CHLORIDE 40 MEQ: 10 CAPSULE, COATED, EXTENDED RELEASE ORAL at 14:54

## 2020-01-01 RX ADMIN — Medication 1000 MCG: at 08:45

## 2020-01-01 RX ADMIN — BUDESONIDE 0.5 MG: 0.5 INHALANT RESPIRATORY (INHALATION) at 23:30

## 2020-01-01 RX ADMIN — DULOXETINE HYDROCHLORIDE 60 MG: 60 CAPSULE, DELAYED RELEASE ORAL at 18:18

## 2020-01-01 RX ADMIN — PANTOPRAZOLE SODIUM 40 MG: 40 TABLET, DELAYED RELEASE ORAL at 06:21

## 2020-01-01 RX ADMIN — POTASSIUM CHLORIDE AND SODIUM CHLORIDE 100 ML/HR: 900; 150 INJECTION, SOLUTION INTRAVENOUS at 03:00

## 2020-01-01 RX ADMIN — Medication 250 MG: at 20:02

## 2020-01-01 RX ADMIN — MONTELUKAST SODIUM 10 MG: 10 TABLET, FILM COATED ORAL at 08:45

## 2020-01-01 RX ADMIN — SODIUM CHLORIDE, PRESERVATIVE FREE 10 ML: 5 INJECTION INTRAVENOUS at 22:17

## 2020-01-01 RX ADMIN — POTASSIUM CHLORIDE 40 MEQ: 10 CAPSULE, COATED, EXTENDED RELEASE ORAL at 08:21

## 2020-01-01 RX ADMIN — VALSARTAN 320 MG: 320 TABLET, FILM COATED ORAL at 08:21

## 2020-01-01 RX ADMIN — Medication 1000 MCG: at 08:20

## 2020-01-01 RX ADMIN — FLUTICASONE PROPIONATE 2 SPRAY: 50 SPRAY, METERED NASAL at 08:46

## 2020-01-01 RX ADMIN — SODIUM CHLORIDE, PRESERVATIVE FREE 10 ML: 5 INJECTION INTRAVENOUS at 09:06

## 2020-01-01 RX ADMIN — PANCRELIPASE 12000 UNITS OF LIPASE: 60000; 12000; 38000 CAPSULE, DELAYED RELEASE PELLETS ORAL at 09:05

## 2020-01-01 RX ADMIN — IPRATROPIUM BROMIDE AND ALBUTEROL SULFATE 3 ML: 2.5; .5 SOLUTION RESPIRATORY (INHALATION) at 22:24

## 2020-01-01 RX ADMIN — HYDROMORPHONE HYDROCHLORIDE 0.5 MG: 1 INJECTION, SOLUTION INTRAMUSCULAR; INTRAVENOUS; SUBCUTANEOUS at 22:30

## 2020-01-01 RX ADMIN — HYDRALAZINE HYDROCHLORIDE 10 MG: 20 INJECTION INTRAMUSCULAR; INTRAVENOUS at 20:02

## 2020-01-01 RX ADMIN — AMLODIPINE BESYLATE 5 MG: 5 TABLET ORAL at 09:05

## 2020-01-01 RX ADMIN — MONTELUKAST SODIUM 10 MG: 10 TABLET, FILM COATED ORAL at 18:17

## 2020-01-01 RX ADMIN — ONDANSETRON HYDROCHLORIDE 4 MG: 4 TABLET, FILM COATED ORAL at 06:24

## 2020-01-01 RX ADMIN — DIATRIZOATE MEGLUMINE AND DIATRIZOATE SODIUM 30 ML: 660; 100 LIQUID ORAL; RECTAL at 14:00

## 2020-01-01 RX ADMIN — PANTOPRAZOLE SODIUM 40 MG: 40 TABLET, DELAYED RELEASE ORAL at 08:20

## 2020-01-02 ENCOUNTER — TELEPHONE (OUTPATIENT)
Dept: INTERNAL MEDICINE | Facility: CLINIC | Age: 80
End: 2020-01-02

## 2020-01-02 NOTE — TELEPHONE ENCOUNTER
Pt informed1/2/20  ----- Message from Agata Burgess MD sent at 12/31/2019  8:24 AM EST -----  Please add magnesium to blood in lab.   adivise patient that potassium remains low. She should take 2 tablets daily for 4 days and daily after that. Repeat levels in 1 week. Nonfasting.

## 2020-01-06 RX ORDER — ATORVASTATIN CALCIUM 20 MG/1
TABLET, FILM COATED ORAL
Qty: 90 TABLET | Refills: 2 | Status: SHIPPED | OUTPATIENT
Start: 2020-01-06 | End: 2020-01-30

## 2020-01-07 ENCOUNTER — RESULTS ENCOUNTER (OUTPATIENT)
Dept: INTERNAL MEDICINE | Facility: CLINIC | Age: 80
End: 2020-01-07

## 2020-01-07 DIAGNOSIS — E87.6 HYPOKALEMIA: ICD-10-CM

## 2020-01-10 LAB
BUN SERPL-MCNC: 4 MG/DL (ref 8–23)
BUN/CREAT SERPL: 7.3 (ref 7–25)
CALCIUM SERPL-MCNC: 9.1 MG/DL (ref 8.6–10.5)
CHLORIDE SERPL-SCNC: 101 MMOL/L (ref 98–107)
CO2 SERPL-SCNC: 28 MMOL/L (ref 22–29)
CREAT SERPL-MCNC: 0.55 MG/DL (ref 0.57–1)
GLUCOSE SERPL-MCNC: 212 MG/DL (ref 65–99)
POTASSIUM SERPL-SCNC: 4 MMOL/L (ref 3.5–5.2)
SODIUM SERPL-SCNC: 141 MMOL/L (ref 136–145)

## 2020-01-15 RX ORDER — FOLIC ACID 1 MG/1
TABLET ORAL
Qty: 90 TABLET | Refills: 0 | Status: SHIPPED | OUTPATIENT
Start: 2020-01-15 | End: 2020-07-20

## 2020-01-27 RX ORDER — MONTELUKAST SODIUM 4 MG/1
TABLET, CHEWABLE ORAL
Qty: 180 TABLET | Refills: 1 | Status: SHIPPED | OUTPATIENT
Start: 2020-01-27 | End: 2021-01-01

## 2020-02-03 ENCOUNTER — TELEPHONE (OUTPATIENT)
Dept: INTERNAL MEDICINE | Facility: CLINIC | Age: 80
End: 2020-02-03

## 2020-02-03 NOTE — TELEPHONE ENCOUNTER
This med is only if needed for loose stooling. If she is not having loose stooling then she does not need to take the med. She may take it in a prn fashion .

## 2020-02-04 ENCOUNTER — TELEPHONE (OUTPATIENT)
Dept: ONCOLOGY | Facility: CLINIC | Age: 80
End: 2020-02-04

## 2020-02-04 NOTE — TELEPHONE ENCOUNTER
Kalie with [facility] wanted us to know that CT scan on 2/11 is not covered by them. Patient would be financial responsible if she has it as a hospice patient. Can callback at [phone] if any questions.

## 2020-02-11 ENCOUNTER — HOSPITAL ENCOUNTER (OUTPATIENT)
Dept: PET IMAGING | Facility: HOSPITAL | Age: 80
Discharge: HOME | End: 2020-02-11
Admitting: INTERNAL MEDICINE

## 2020-02-11 DIAGNOSIS — C25.0 MALIGNANT NEOPLASM OF HEAD OF PANCREAS (HCC): ICD-10-CM

## 2020-02-11 DIAGNOSIS — R59.9 ENLARGEMENT OF LYMPH NODE: ICD-10-CM

## 2020-02-11 LAB — CREAT BLDA-MCNC: 0.4 MG/DL (ref 0.6–1.3)

## 2020-02-11 PROCEDURE — 0 DIATRIZOATE MEGLUMINE & SODIUM PER 1 ML: Performed by: INTERNAL MEDICINE

## 2020-02-11 PROCEDURE — 25010000002 IOPAMIDOL 61 % SOLUTION: Performed by: INTERNAL MEDICINE

## 2020-02-11 PROCEDURE — 71260 CT THORAX DX C+: CPT

## 2020-02-11 PROCEDURE — 74177 CT ABD & PELVIS W/CONTRAST: CPT

## 2020-02-11 PROCEDURE — 82565 ASSAY OF CREATININE: CPT

## 2020-02-11 RX ADMIN — IOPAMIDOL 85 ML: 612 INJECTION, SOLUTION INTRAVENOUS at 13:45

## 2020-02-11 RX ADMIN — DIATRIZOATE MEGLUMINE AND DIATRIZOATE SODIUM 30 ML: 660; 100 LIQUID ORAL; RECTAL at 12:54

## 2020-02-17 NOTE — PROGRESS NOTES
Subjective     REASON FOR FOLLOWUP:    1. Pancreatic carcinoma diagnosed in early September 2019.  · Patient declined systematic chemotherapy in early October 2019.  · PET scan examination on 11/22/2019 showed improved hypermetabolic lesion in the pancreas.  · CT scan of the chest, abdomen, and pelvis with contrast performed on 2/11/2020 showed stable mediastinal lymph nodes, stable pancreatic head lesion, and persistent pancreatic ductal dilatation.   · Patient continues on Creon 3 times a day.    HISTORY OF PRESENT ILLNESS:  The patient is a 79 y.o. female who presents today for 3-month re-evaluation to discuss the results of CT scan examination obtained on 2/11/2020 and recent laboratory studies.  Patient is accompanied by her , who helped with history and discussion.    Today, patient reports she has good performance status ECOG 0.  She has no nausea or vomiting.  She reports a poor appetite, and she has lost weight recently.  Per our records, she has lost about 13 pounds in the past 6 months.  She does continue taking Percocet as needed usually once a day.  She currently has no pain.  Patient continues to have intermittent diarrhea, but she notes this continues to improve.  She denies any dyspnea.    CT scan of the chest, abdomen, and pelvis with contrast performed on 2/11/2020 showed stable mediastinal lymph nodes, stable pancreatic head lesion, and persistent pancreatic ductal dilatation.     Laboratory studies today, 2/18/2020, show normal CBC including hemoglobin 12.3, MCV 88.7, platelets 220,000 and a WBC 4840 including ANC 2050 lymphocytes 1420..  She has a unremarkable CMP.  Tumor marker, CA 19-9, is currently pending.    Past Medical History:   Diagnosis Date   • Abdominal hernia    • Cancer (CMS/HCC)    • COPD (chronic obstructive pulmonary disease) (CMS/HCC)    • Depression    • Food allergy     Scallops: causes hives   • GERD (gastroesophageal reflux disease)    • Hiatal hernia    • History  of bruising easily    • History of colon polyps 08/13/2013    PATH: Distal Transverse Colon, Fragments of Tubular Adenoma   • History of colon polyps 01/30/2013    PATH: Ascending Colon - Tubular Adenoma, Splenic Flexure Polyp @ 65 cm - Tubulovillous Adenoma, Descending Colon - Tubular Adenoma   • History of colon polyps 10/30/2017    PATH: Transverse Colon - Tubular Adenoma, Rectal Polyps - Fragments of Hyperplastic Polyp   • Hypercholesteremia    • Hypertension    • Joint pain     swelling   • On home oxygen therapy     O2 NC 2. LITERS/ NIGHT   • Osteoarthritis    • Osteoporosis    • Pancreatic cancer (CMS/HCC)    • PONV (postoperative nausea and vomiting)    • Rheumatoid arthritis (CMS/HCC)     DIAGNOSIS AGE 23 - FOLLOWED BY RHEUMATOLOGY DR INFANTE, NO PROBLEMS WITH FLEX/ EXTENSION         Past Surgical History:   Procedure Laterality Date   • BREAST BIOPSY Left     Benign pathology   • BRONCHOSCOPY N/A 1/3/2018    Procedure: BRONCHOSCOPY with lavage in right middle lobe and lingula.;  Surgeon: Trenton Garcia MD;  Location: [facility] ENDOSCOPY;  Service:    • CAROTID ENDARTERECTOMY Left 2006   • CATARACT EXTRACTION WITH INTRAOCULAR LENS IMPLANT Bilateral    • COLONOSCOPY N/A 10/30/2017    Procedure: COLONOSCOPY TO CECUM, TO TERMINAL ILEUM WITH COLD BIOPSY POLYPECTOMY;  Surgeon: Jossie Stanley MD;  Location: [facility] ENDOSCOPY;  Service:    • COLONOSCOPY N/A 08/13/2013    One 3 mm polyp in the transverse colon, [facility]   • COLONOSCOPY N/A 01/30/2013    One 3 mm polyp ascending colon; One 7 mm polyp descending colon; One 25 mm polyp splenic flexure, Diverticulosis Sigmoid Colon, [facility], Dr. Jossie Stanley   • FOOT SURGERY Bilateral     Bilateral (MULITPLE SURGERIES)   • HAND SURGERY Bilateral     Bilateral (MULTIPLE SURGERIES)   • KNEE ARTHROSCOPY W/ PARTIAL MEDIAL MENISCECTOMY Left 01/22/2016    Left Knee Arthroscopy w/ partial medial & lateral meniscectomies,  Three compartment synovectomy w/ loose body removal, Chondroplasty of patella, Dr. Florencio Frazier   • LEFT OOPHORECTOMY Left 1950    bening tumor   • NECK SURGERY     • SINUS SURGERY N/A 2011   • TOE FUSION Right 2017    with screws and pins-Dr. Atkins   • TOTAL KNEE ARTHROPLASTY Left 5/18/2016    Procedure: LT TOTAL KNEE ARTHROPLASTY WITH FELECIA NAVIGATION;  Surgeon: Florencio Frazier MD;  Location: [facility] OR;  Service:    • VENTRAL HERNIA REPAIR N/A 5/22/2018    Procedure: VENTRAL HERNIA REPAIR LAPAROSCOPIC WITH DAVINCI ROBOT;  Surgeon: Josr Jennings MD;  Location: [facility] MAIN OR;  Service: DaVinci      OBGYN HISTORY:  G:3 P:3    ONCOLOGY HISTORY:   The patient is a 79 y.o. year old female who presents on 9/25/2019 as a new patient to our clinic for evaluation of newly diagnosed pancreatic cancer. Patient is accompanied by her  today who helped with the history.    The patient reports she is feeling well overall. She does experience epigastric and lower abdominal pain since having her endoscopy and fine-needle biopsy of the pancreatic mass. The patient has been taking her prescribed pain medication as needed, a few times a day, and before bedtime to help her sleep. She rates her pain regularly at a 5/10 scale. The patient states she has been having urgent bowel movements every morning which are orange for the last 3 mornings which she states is new for her. She notes beforehand she previously had a bowel movement every 3 days. She denies any nausea, vomiting or weight loss.     The patient has COPD which is well managed with her inhaler. She does state she becomes short of breath easily, however she notes she is able to complete house chores and other tasks.     The patient has been on Enbrel for rheumatoid arthritis for 20 years and was informed she would have to hold her medication for chemotherapy treatment.     The patient reported on 9/25/2019 that she has decided she did not want to undergo  chemotherapy, radiation, or surgery as she does not believe that her quality of life will be improved or even be maintained with the options available to her.     The patient previously had a chest X-ray performed on 08/19/2019 for evaluation of a severe cough which revealed a subtle nodule projecting over the medial aspect of the left lung base not previously seen on study performed on 12/17/2018.  The patient then underwent a CT Chest on 08/22/2019 which showed a small pleural-based nodule of the upper left lobe with nonspecific mediastinal adenopathy. There was also mild prominence of the pancreatic duct, thinning of the pancreas, without a specific cause identified on the exam. A PET levy was then performed on 09/03/2019 which demonstrated intensely FDG avid mass-like enlargement of the pancreatic head with dilation of the main pancreatic duct, as well as mildly FDG avid mediastinal adenopathy.     CT Abdomen & Pelvis performed on 09/10/2019 reported approximately 2.7 cm pancreatic head mass with significant pancreatic ductal dilation. The mass is situated medial to the 1st portion of the duodenum. There were no pathologically enlarged nodes or evidence for liver metastases.     The patient underwent an endoscopic ultrasound (upper) EUS and fine needle biopsy on 09/17/2019 and the pathology evaluation revealed atypical glandular cells consistent with adenocarcinoma.  She has no elevation of tumor marker CA 19-9 at 18.7 units/mL on 9/16/2019.     The patient was admitted to the hospital on 09/19/2019 to 09/20/2019 with new onset epigastric abdominal pain and nausea. CT Abdomen & Pelvis performed on 09/19/2019 reported stable pancreatic head mass and proximal ductal dilation, stable left renal scarring and low-density cortical lesions, likely cysts, colonic diverticulosis, and stable adrenal hyperplasia. The patient had previously stated in the hospital that she did not want further surgical or oncological  consultation however, she was instructed that if she experienced worsening pain she could follow up with surgery and oncology to see if palliative treatment could be beneficial. The patient was discharged to home hospice with a written prescription for oxycodone and oral magnesium supplementation. She received potassium replacement in the hospital prior to discharge.    Patient and her  reports they already had a contact with home hospice care.     Laboratory study performed, 09/25/2019, reported completely normal CBC.      On 11/4/2019, the  reported that the patient actually was seen by Dr. Turner at the [facility].  After discussion with Dr. Turner about Whipple surgery, patient declined surgical intervention for her pancreas cancer.  Her  reports recently they talked to another patient who had a stage IV pancreatic cancer for the past 2 years who is actively receiving systematic chemotherapy, her  brought patient here for rediscussion of possible systematic therapy.  They also have a lot of questions in terms of how quickly the tumor was growing, whether that she would respond to the treatment, and the patient certainly voiced that she wants to live at least until October 2020, when her granddaughter will get .      I discussed with them on 11/4/2019 about systematic therapy with single agent Gemzar followed by concurrent chemoradiotherapy with Gemzar and then further chemotherapy with Gemzar.  We also discussed about monitoring of response to systematic treatment.  After discussion, patient is willing to consider systematic chemotherapy followed by chemoradiation, to be started after Thanksgiving holiday.  With that in mind, I recommended to obtain PET scan examination and laboratory study to document a new baseline.      PET scan examination on 11/22/2019 reported improved hypermetabolic activity in the pancreas.    Because of that chemoradiation therapy was  canceled instead we continue follow-up with CT scan.      Current Outpatient Medications on File Prior to Visit   Medication Sig Dispense Refill   • albuterol (PROVENTIL) (2.5 MG/3ML) 0.083% nebulizer solution Take 2.5 mg by nebulization Every 4 (Four) Hours As Needed for Wheezing.     • amLODIPine (NORVASC) 5 MG tablet Take 1.5 tablets by mouth Daily. 135 tablet 3   • budesonide (PULMICORT) 0.5 MG/2ML nebulizer solution USE 1 VIAL VIA NEBULIZER TWICE DAILY RINSE MOUTH AFTER USE  12   • Calcium Carbonate-Vitamin D (CALCIUM 600+D PO) Take 1 tablet by mouth Daily.     • colestipol (COLESTID) 1 g tablet TAKE 1 TABLET BY MOUTH TWICE A  tablet 1   • CREON 30241 units capsule delayed-release particles capsule Take 12,000 units of lipase by mouth Daily. before a meal  3   • DULoxetine (CYMBALTA) 60 MG capsule Take 1 capsule by mouth 2 (Two) Times a Day. 60 capsule 3   • etanercept (ENBREL) 50 MG/ML solution prefilled syringe injection Inject 50 mg under the skin 1 (One) Time Per Week. THURSDAYS. TO HOLD 4 WEEKS     • fluticasone (FLONASE) 50 MCG/ACT nasal spray 2 sprays into the nostril(s) as directed by provider Daily. 1 bottle 5   • folic acid (FOLVITE) 1 MG tablet TAKE 1 TABLET BY MOUTH DAILY. 90 tablet 0   • ipratropium (ATROVENT) 0.06 % nasal spray 2 sprays into each nostril 4 (Four) Times a Day As Needed for Rhinitis.     • ipratropium-albuterol (DUO-NEB) 0.5-2.5 mg/3 ml nebulizer Take 3 mL by nebulization Every 4 (Four) Hours As Needed for Wheezing.     • magnesium oxide (MAG-OX) 400 MG tablet Take 1 tablet by mouth 2 (Two) Times a Day. 60 tablet 2   • montelukast (SINGULAIR) 10 MG tablet Take 1 tablet by mouth Daily. 90 tablet 3   • omeprazole (priLOSEC) 40 MG capsule Take 1 capsule by mouth Every Evening. 90 capsule 1   • oxyCODONE-acetaminophen (PERCOCET)  MG per tablet Take 1 tablet by mouth Every 6 (Six) Hours As Needed for Moderate Pain .     • PERFOROMIST 20 MCG/2ML nebulizer solution USE 1 VIAL  VIA NEBULIZER TWICE DAILY  12   • potassium chloride (K-DUR,KLOR-CON) 20 MEQ CR tablet Take 1 tablet by mouth Daily. 30 tablet 2   • saccharomyces boulardii (FLORASTOR) 250 MG capsule Take 1 capsule by mouth 2 (Two) Times a Day. 20 capsule 5   • umeclidinium-vilanterol (ANORO ELLIPTA) 62.5-25 MCG/INH aerosol powder  inhaler Inhale 1 puff Daily. 3 each 2   • valsartan (DIOVAN) 320 MG tablet Take 1 tablet by mouth Daily. 90 tablet 2     Current Facility-Administered Medications on File Prior to Visit   Medication Dose Route Frequency Provider Last Rate Last Dose   • levalbuterol (XOPENEX) nebulizer solution 0.63 mg  0.63 mg Nebulization Q6H PRN Agata Burgess MD   0.63 mg at 18 1608        ALLERGIES:    Allergies   Allergen Reactions   • Gold-Containing Drug Products Unknown - High Severity     Patient cannot recall reaction   • Hydrocodone Irritability     HYPERACTIVITY        Social History     Socioeconomic History   • Marital status:      Spouse name: Óscar   • Number of children: 3   • Years of education: High school   • Highest education level: Not on file   Occupational History     Employer: RETIRED   Tobacco Use   • Smoking status: Former Smoker     Packs/day: 1.50     Years: 30.00     Pack years: 45.00     Types: Cigarettes     Last attempt to quit:      Years since quittin.1   • Smokeless tobacco: Never Used   Substance and Sexual Activity   • Alcohol use: No     Comment: No alcohol for 30 years   • Drug use: No   • Sexual activity: Defer    , enjoys golfing.  Patient has two  children.     Family History   Problem Relation Age of Onset   • Heart disease Mother    • Hypertension Mother    • Lung disease Mother    • Diabetes Sister    • Hypertension Brother    • Multiple sclerosis Brother    • Diabetes Sister    • Malig Hyperthermia Neg Hx         Review of Systems   Constitutional: Positive for appetite change (decreased appetite) and unexpected weight change (patient  "has lost about 13 pounds in past 6 months). Negative for chills, diaphoresis, fatigue and fever.   HENT: Negative for hearing loss, sore throat and trouble swallowing.    Eyes: Negative for visual disturbance.   Respiratory: Negative for cough, chest tightness, shortness of breath and wheezing.    Cardiovascular: Negative for chest pain, palpitations and leg swelling.   Gastrointestinal: Positive for diarrhea. Negative for abdominal distention, abdominal pain, blood in stool and nausea.   Endocrine: Negative for cold intolerance and polyuria.   Genitourinary: Negative for dysuria, frequency, hematuria and urgency.   Musculoskeletal: Negative for arthralgias and joint swelling.   Skin: Negative for color change, rash and wound.   Allergic/Immunologic: Negative for immunocompromised state.   Neurological: Negative for dizziness, seizures, numbness and headaches.   Hematological: Negative for adenopathy. Does not bruise/bleed easily.   Psychiatric/Behavioral: Negative for behavioral problems and confusion.      Objective     Vitals:    02/18/20 1342   BP: 169/64   Pulse: 68   Resp: 16   Temp: 97.8 °F (36.6 °C)   SpO2: 98%   Weight: 52.1 kg (114 lb 14.4 oz)   Height: 160 cm (62.99\")   PainSc: 0-No pain     Current Status 2/18/2020   ECOG score 0       PHYSICAL EXAM:  GENERAL:  Well-developed, thin elderly female in no acute distress.   SKIN:  Warm, dry without rashes, purpura or petechiae.  HEAD:  Normocephalic.     EYES:  Pupils equal, round and reactive to light.  EOMs intact.  Conjunctivae normal.  EARS:  Hearing intact.  NOSE:  No nasal discharge.  MOUTH:  Tongue is well-papillated; no stomatitis or ulcers.  Lips normal.  THROAT:  Oropharynx without lesions or exudates.  NECK:  Supple with good range of motion; no thyromegaly or masses, no JVD.  LYMPHATICS:  No cervical, supraclavicular, axillary or inguinal adenopathy.  CHEST:  Lungs clear to auscultation. Decreased breath sounds in the bilateral lower lung fields, " but no crackles or wheezing.  CARDIAC:  Regular rate and rhythm without murmurs, rubs or gallops. Normal S1,S2.  ABDOMEN:  Soft, nontender with no organomegaly or masses.  EXTREMITIES:  Significant deformities on the fingers, mostly at the MCP joints.  No clubbing, cyanosis or edema.  NEUROLOGICAL:  Cranial Nerves II-XII grossly intact.  No focal neurological deficits.  PSYCHIATRIC:  Normal affect and mood.      RECENT LABS:  Lab Results   Component Value Date    WBC 4.84 02/18/2020    HGB 12.3 02/18/2020    HCT 37.5 02/18/2020    MCV 88.7 02/18/2020     02/18/2020     Lab Results   Component Value Date    NEUTROABS 2.05 02/18/2020     Glucose   Date Value Ref Range Status   11/22/2019 110 74 - 124 mg/dL Final     BUN   Date Value Ref Range Status   01/09/2020 4 (L) 8 - 23 mg/dL Final   11/22/2019 7 6 - 20 mg/dL Final     Creatinine   Date Value Ref Range Status   02/11/2020 0.40 (L) 0.60 - 1.30 mg/dL Final     Comment:     Serial Number: [id number]:  [id number]     Sodium   Date Value Ref Range Status   01/09/2020 141 136 - 145 mmol/L Final   11/22/2019 141 134 - 145 mmol/L Final     Potassium   Date Value Ref Range Status   01/09/2020 4.0 3.5 - 5.2 mmol/L Final   11/22/2019 3.0 (L) 3.5 - 4.7 mmol/L Final     Chloride   Date Value Ref Range Status   01/09/2020 101 98 - 107 mmol/L Final   11/22/2019 100 98 - 107 mmol/L Final     CO2   Date Value Ref Range Status   11/22/2019 27.0 22.0 - 29.0 mmol/L Final     Total CO2   Date Value Ref Range Status   01/09/2020 28.0 22.0 - 29.0 mmol/L Final     Calcium   Date Value Ref Range Status   01/09/2020 9.1 8.6 - 10.5 mg/dL Final   11/22/2019 8.8 8.5 - 10.2 mg/dL Final     Total Protein   Date Value Ref Range Status   11/22/2019 7.0 6.3 - 8.0 g/dL Final     Albumin   Date Value Ref Range Status   12/23/2019 4.40 3.50 - 5.20 g/dL Final   11/22/2019 3.90 3.50 - 5.20 g/dL Final     ALT (SGPT)   Date Value Ref Range Status   12/23/2019 10 1 - 33 U/L Final   11/22/2019 10 0  - 33 U/L Final     AST (SGOT)   Date Value Ref Range Status   12/23/2019 15 1 - 32 U/L Final   11/22/2019 15 0 - 32 U/L Final     Alkaline Phosphatase   Date Value Ref Range Status   12/23/2019 57 39 - 117 U/L Final   11/22/2019 61 38 - 116 U/L Final     Total Bilirubin   Date Value Ref Range Status   12/23/2019 0.3 0.2 - 1.2 mg/dL Final   11/22/2019 0.4 0.2 - 1.2 mg/dL Final     eGFR Non  Am   Date Value Ref Range Status   01/09/2020 107 >60 mL/min/1.73 Final     Comment:     The MDRD GFR formula is only valid for adults with stable  renal function between ages 18 and 70.       eGFR Non  Amer   Date Value Ref Range Status   11/22/2019 109 >60 mL/min/1.73 Final     A/G Ratio   Date Value Ref Range Status   12/23/2019 1.5 g/dL Final     BUN/Creatinine Ratio   Date Value Ref Range Status   01/09/2020 7.3 7.0 - 25.0 Final   11/22/2019 13.0 7.3 - 30.0 Final     Anion Gap   Date Value Ref Range Status   11/22/2019 14.0 5.0 - 15.0 mmol/L Final       IMAGE STUDY:  CT CHEST, ABDOMEN, AND PELVIS WITH CONTRAST - 2/11/2020     Radiation dose reduction techniques were utilized, including automated  exposure control and exposure modulation based on body size.     CLINICAL: 79 year old female with pancreatic carcinoma.     COMPARISON: CT abdomen 09/19/2019 and PET/CT 11/22/2019.     CT CHEST FINDINGS: No acute airspace disease, pleural effusion or  suspicious pulmonary lesion has developed. Bullae formation compatible  with emphysema.     The mediastinal lymph nodes remains stable. Reference precarinal node is  again 13 mm. Reference infracarinal lymph node again 2.5 cm. Heart size  within normal limits. No pericardial or esophageal abnormality. No lytic  or sclerotic bone lesion has developed.     CONCLUSION: No indication of intrathoracic metastatic disease. Stable  mediastinal lymph nodes.     CT ABDOMEN AND PELVIS FINDINGS: The pancreatic head tumor is near  isodense with the surrounding parenchyma and measures  approximately 2.4  cm in maximum transverse diameter. There remains pancreatic ductal  dilatation. It is certainly smaller than the previous 09/19/2019 CT  examination, size appears similar to the previous PET/CT. The liver is  normal in appearance. The gallbladder is normal, no biliary duct  dilatation. The spleen and adrenal glands are normal. There are small  renal cysts, the kidneys are otherwise unremarkable. The diameter of the  aorta is within normal limits. Moderate distention of the urinary  bladder which is otherwise unremarkable. No adenopathy. No focal bowel  abnormality seen. No free intraperitoneal fluid nor indication of  carcinomatosis. No lytic or sclerotic bone lesion.     CONCLUSION: The pancreatic head lesion is near isodense to the  surrounding parenchyma and is quite comparable in size compared to the  previous PET/CT. There is persistent pancreatic ductal dilatation. No  indication of visceral metastasis, adenopathy or carcinomatosis.       Assessment/Plan   1. Pancreatic carcinoma diagnosed in early September 2019: The patient previously had a chest X-ray performed on 08/19/2019 for evaluation of a severe cough which revealed a subtle nodule projecting over the medial aspect of the left lung base not previously seen on study performed on 12/17/2018. The patient then underwent a CT Chest on 08/22/2019 which showed a small pleural-based nodule of the upper left lobe with nonspecific mediastinal adenopathy. There was also mild prominence of the pancreatic duct, thinning of the pancreas, without a specific cause identified on the exam. A PET levy was then performed on 09/03/2019 which demonstrated intensely FDG avid mass-like enlargement of the pancreatic head with dilation of the main pancreatic duct, as well as mildly FDG avid mediastinal adenopathy. CT Abdomen & Pelvis performed on 09/10/2019 reported approximately 2.7 cm pancreatic head mass with significant pancreatic ductal dilation. The  mass is situated medial to the 1st portion of the duodenum. There were no pathologically enlarged nodes or evidence for liver metastases.   · The patient underwent an endoscopic ultrasound (upper) fine needle biopsy on 09/17/2019 and the pathology report showed atypical glandular cells consistent with adenocarcinoma.   · Laboratory study performed on 9/16/2019 showed normal CA 19-9 of 18.7 U/mL.  · I explained to the patient that the PET showed mediastinal adenopathy, however biopsy would be difficult due to the size and location of the lymph nodes.  There is lymph nodes also his only slightly elevated activity, could even be benign lymph nodes.  · I discussed with the patient that chemotherapy and/or concurrent chemoradiation therapy to the pancreas may be an option. I further explained that the only way to cure pancreatic cancer is completed surgical resection and that chemotherapy or radiation treatment would be to control the disease. I discussed with the patient and her  at length that the best systematic treatment to treat pancreatic cancer is with a mixture of 3 chemotherapy medications FOLFIRINOX regimen, however I do not believe that she would tolerate this treatment well.  I also has suspicion that she would tolerate Abraxane plus Gemzar doublets chemotherapy, also because of the side effects especially bone marrow suppression.  · I further discussed that she could consider chemotherapy with single agent Gemzar in combination with radiation and a sandwich type schedule.  I further discussed the side effects at length, including nausea, loss of appetite, fatigue, and weight loss, bone marrow suppression, flu type symptoms etc. I explained that I feel this is the best way for her to proceed as this would result in the least amount of side effects. We would administer 2 weeks on and one week off for 3 cycles (6 doses),   Then followed by concurrent chemoradiotherapy using single agent daily Gemzar, and  then further treatment with weekly Gemzar.  We would also have to repeat PET imaging in order to evaluate efficacy of chemotherapy treatment.  · I also explained that there are presently some clinical trials to treat pancreatic cancer however they are not currently available at our clinic. In addition, the patient is not currently eligible for a clinical trial for second line chemotherapy as she has not undergone first line chemotherapy treatment at this time.   · I explained to the patient that without any treatment, she would be expected to live anywhere between 6 months to 1 year and that although she has not been experiencing many side effects now, more side effects are likely to occur, such as obstructive jaundice with jaundiced skin and eyes, whitish stools, and very yellow urine, and the liver failure.  She will also suffer from pain as her pain are likely to worsen. She will at some point require a stent placement to relieve the obstructive jaundice for which she should be referred to Dr. Cortes. I explained to the patient that she will most likely not be able to have the stent placed preventatively.   · I discussed that she is likely not a good candidate for complete Whipple surgery due to comorbidities, which result in her development of diabetes and would require insulin and medication to improve digestion.   · The patient's  and the patient state that they have conjointly decided to focus on quality of life rather than longevity at this time.  · PET scan examination on 11/22/2019 showed improved hypermetabolic lesion in the pancreas.  Patient is asymptomatic.   · CT scan of the chest, abdomen, and pelvis with contrast performed on 2/11/2020 showed stable mediastinal lymph nodes, stable pancreatic head lesion, and persistent pancreatic ductal dilatation.    · Laboratory studies today, 2/18/2020, show normal CBC and CMP.  Tumor marker, CA 19-9, is currently pending.     · We discussed with the  patient and her  about management options.  Since CT scan shows stable disease, and we are waiting for tumor marker CA 19-9.  Patient is asymptomatic, she feels well physically.  I recommended to obtain repeat CT scan with contrast of the abdomen and pelvis in 3 months.  We will get the laboratory studies also CBC CMP and CA 19-9.  She will follow up with me 1 week after the above studies.    2.  Recent weight loss and decreased appetite, reported on 2/18/2020.  Per our records, the patient has lost about 13 pounds in the past 6 months.  · I encouraged her to drink Boost shakes and/or protein powder mixed with water for nutritional support.    PLAN:   1. Tumor marker, CA 19-9, is currently pending.  I will call the patient with the result if abnormal.   2. I encouraged her to drink Boost shakes and/or protein powder mixed with water for nutritional support.  3. Continue Creon 3 times a day.   4. Obtain CT scan of abdomen and pelvis with contrast in 3 months.  We will get the laboratory studies also CBC CMP and CA 19-9 on the same day as the scans.  5. Follow-up with me 1 week after the above studies.     Patient is accompanied by her  today who helped with the history.    I have reviewed the CT imaging from 2/11/2020, and I agree with the assessment. I have reviewed the imaging and findings with the patient today.     I spent more than 40 minutes in patient care today, more than 50% of which was spent in counseling the patient on treatment options.    By signing my name here, I Davin Kent, attest that all documentation on 02/18/20 at  has been prepared under the direction and in the presence of Dr. Morris Cassidy MD.    I reviewed the note scribed by Davin Kent and made appropriate corrections.       Morris Cassidy MD PhD    Addendum:  CA 19-9 at 27.8 units/mL, still within normal range, however higher than previous level 17.7 on 11/22/2019.      We will continue to monitor as originally  planned.        ADAM NAVA M.D., Ph.D.          PAVAN - Agata Burgess MD

## 2020-02-18 ENCOUNTER — OFFICE VISIT (OUTPATIENT)
Dept: ONCOLOGY | Facility: CLINIC | Age: 80
End: 2020-02-18

## 2020-02-18 ENCOUNTER — LAB (OUTPATIENT)
Dept: LAB | Facility: HOSPITAL | Age: 80
End: 2020-02-18

## 2020-02-18 VITALS
WEIGHT: 114.9 LBS | TEMPERATURE: 97.8 F | HEART RATE: 68 BPM | HEIGHT: 63 IN | DIASTOLIC BLOOD PRESSURE: 64 MMHG | RESPIRATION RATE: 16 BRPM | OXYGEN SATURATION: 98 % | SYSTOLIC BLOOD PRESSURE: 169 MMHG | BODY MASS INDEX: 20.36 KG/M2

## 2020-02-18 DIAGNOSIS — C25.0 MALIGNANT NEOPLASM OF HEAD OF PANCREAS (HCC): ICD-10-CM

## 2020-02-18 DIAGNOSIS — C25.0 MALIGNANT NEOPLASM OF HEAD OF PANCREAS (HCC): Primary | ICD-10-CM

## 2020-02-18 LAB
ALBUMIN SERPL-MCNC: 4.1 G/DL (ref 3.5–5.2)
ALBUMIN/GLOB SERPL: 1.3 G/DL (ref 1.1–2.4)
ALP SERPL-CCNC: 62 U/L (ref 38–116)
ALT SERPL W P-5'-P-CCNC: 11 U/L (ref 0–33)
ANION GAP SERPL CALCULATED.3IONS-SCNC: 13.1 MMOL/L (ref 5–15)
AST SERPL-CCNC: 15 U/L (ref 0–32)
BASOPHILS # BLD AUTO: 0.04 10*3/MM3 (ref 0–0.2)
BASOPHILS NFR BLD AUTO: 0.8 % (ref 0–1.5)
BILIRUB SERPL-MCNC: 0.3 MG/DL (ref 0.2–1.2)
BUN BLD-MCNC: 6 MG/DL (ref 6–20)
BUN/CREAT SERPL: 13 (ref 7.3–30)
CALCIUM SPEC-SCNC: 9 MG/DL (ref 8.5–10.2)
CANCER AG19-9 SERPL-ACNC: 27.8 U/ML
CHLORIDE SERPL-SCNC: 99 MMOL/L (ref 98–107)
CO2 SERPL-SCNC: 25.9 MMOL/L (ref 22–29)
CREAT BLD-MCNC: 0.46 MG/DL (ref 0.6–1.1)
DEPRECATED RDW RBC AUTO: 43.5 FL (ref 37–54)
EOSINOPHIL # BLD AUTO: 0.49 10*3/MM3 (ref 0–0.4)
EOSINOPHIL NFR BLD AUTO: 10.1 % (ref 0.3–6.2)
ERYTHROCYTE [DISTWIDTH] IN BLOOD BY AUTOMATED COUNT: 13.4 % (ref 12.3–15.4)
GFR SERPL CREATININE-BSD FRML MDRD: 132 ML/MIN/1.73
GLOBULIN UR ELPH-MCNC: 3.1 GM/DL (ref 1.8–3.5)
GLUCOSE BLD-MCNC: 203 MG/DL (ref 74–124)
HCT VFR BLD AUTO: 37.5 % (ref 34–46.6)
HGB BLD-MCNC: 12.3 G/DL (ref 12–15.9)
IMM GRANULOCYTES # BLD AUTO: 0.01 10*3/MM3 (ref 0–0.05)
IMM GRANULOCYTES NFR BLD AUTO: 0.2 % (ref 0–0.5)
LYMPHOCYTES # BLD AUTO: 1.42 10*3/MM3 (ref 0.7–3.1)
LYMPHOCYTES NFR BLD AUTO: 29.3 % (ref 19.6–45.3)
MCH RBC QN AUTO: 29.1 PG (ref 26.6–33)
MCHC RBC AUTO-ENTMCNC: 32.8 G/DL (ref 31.5–35.7)
MCV RBC AUTO: 88.7 FL (ref 79–97)
MONOCYTES # BLD AUTO: 0.83 10*3/MM3 (ref 0.1–0.9)
MONOCYTES NFR BLD AUTO: 17.1 % (ref 5–12)
NEUTROPHILS # BLD AUTO: 2.05 10*3/MM3 (ref 1.7–7)
NEUTROPHILS NFR BLD AUTO: 42.5 % (ref 42.7–76)
NRBC BLD AUTO-RTO: 0 /100 WBC (ref 0–0.2)
PLATELET # BLD AUTO: 220 10*3/MM3 (ref 140–450)
PMV BLD AUTO: 10.8 FL (ref 6–12)
POTASSIUM BLD-SCNC: 3.5 MMOL/L (ref 3.5–4.7)
PROT SERPL-MCNC: 7.2 G/DL (ref 6.3–8)
RBC # BLD AUTO: 4.23 10*6/MM3 (ref 3.77–5.28)
SODIUM BLD-SCNC: 138 MMOL/L (ref 134–145)
WBC NRBC COR # BLD: 4.84 10*3/MM3 (ref 3.4–10.8)

## 2020-02-18 PROCEDURE — 85025 COMPLETE CBC W/AUTO DIFF WBC: CPT

## 2020-02-18 PROCEDURE — 80053 COMPREHEN METABOLIC PANEL: CPT

## 2020-02-18 PROCEDURE — 36415 COLL VENOUS BLD VENIPUNCTURE: CPT | Performed by: INTERNAL MEDICINE

## 2020-02-18 PROCEDURE — 99215 OFFICE O/P EST HI 40 MIN: CPT | Performed by: INTERNAL MEDICINE

## 2020-02-18 PROCEDURE — 86301 IMMUNOASSAY TUMOR CA 19-9: CPT | Performed by: INTERNAL MEDICINE

## 2020-03-12 RX ORDER — OMEPRAZOLE 40 MG/1
CAPSULE, DELAYED RELEASE ORAL
Qty: 90 CAPSULE | Refills: 1 | Status: SHIPPED | OUTPATIENT
Start: 2020-03-12 | End: 2020-01-01 | Stop reason: SDUPTHER

## 2020-03-18 RX ORDER — POTASSIUM CHLORIDE 1500 MG/1
TABLET, EXTENDED RELEASE ORAL
Qty: 90 TABLET | Refills: 0 | Status: SHIPPED | OUTPATIENT
Start: 2020-03-18 | End: 2020-06-10

## 2020-03-24 ENCOUNTER — DOCUMENTATION (OUTPATIENT)
Dept: INTERNAL MEDICINE | Facility: CLINIC | Age: 80
End: 2020-03-24

## 2020-03-24 ENCOUNTER — TELEPHONE (OUTPATIENT)
Dept: INTERNAL MEDICINE | Facility: CLINIC | Age: 80
End: 2020-03-24

## 2020-03-24 NOTE — PROGRESS NOTES
Contacted by hospitals. Questioning if patient could be switched from an inhaled steroid to an oral steroid. I strongly advised against this given multiple side effects of oral steroids. Importantly, do not want further compromise of her immune system w/ current COVID-19 pandemic. I advised continuation of current inhaled medications. Patient may stop oral calcium, but should continue vitamin D supplementation as this can be beneficial for muscular and bone strength and avoidance of a fracture which could effect QOL. Anna w/ hospitals voiced understanding of these reccomendations.   JW

## 2020-04-28 ENCOUNTER — OFFICE VISIT (OUTPATIENT)
Dept: INTERNAL MEDICINE | Facility: CLINIC | Age: 80
End: 2020-04-28

## 2020-04-28 DIAGNOSIS — J44.9 CHRONIC OBSTRUCTIVE PULMONARY DISEASE, UNSPECIFIED COPD TYPE (HCC): ICD-10-CM

## 2020-04-28 DIAGNOSIS — I10 ESSENTIAL HYPERTENSION: Primary | ICD-10-CM

## 2020-04-28 DIAGNOSIS — C25.0 MALIGNANT NEOPLASM OF HEAD OF PANCREAS (HCC): ICD-10-CM

## 2020-04-28 PROCEDURE — 99214 OFFICE O/P EST MOD 30 MIN: CPT | Performed by: INTERNAL MEDICINE

## 2020-04-28 RX ORDER — TRIAMCINOLONE ACETONIDE 55 UG/1
2 SPRAY, METERED NASAL DAILY
Qty: 16.5 G | Refills: 11 | Status: SHIPPED | OUTPATIENT
Start: 2020-04-28 | End: 2021-01-01

## 2020-04-28 NOTE — PROGRESS NOTES
"Chief Complaint   Patient presents with   • Pancreatic Cancer   • Hypertension   • COPD       History of Present Illness   Janet Martinez is a 79 y.o. female presents for a follow up visit by video visit in place of an office visit due to covid-19 pandemic. She is doing well today. She has pancreatic cancer. She does have some dumping after eating but this is improved w/ creon. \"it goes right through me\". As such she does have some weight loss. She is still having a fairly good appetite. She is not requiring any pain medications at this time. She is scheduled for a repeat CT scan on 5/5/20. She has not routinely test her bp. She is still taking diovan and amlodipine.   She is having no issues with breathing w/ exception of sinus congestion and drainage. She is not treating her sinus allergies at all at this time.   Patient reports that she is \"really pleased with how she feels- I think that you guys made a mistake\".     The following portions of the patient's history were reviewed and updated as appropriate: allergies, current medications, past family history, past medical history, past social history, past surgical history and problem list.  Current Outpatient Medications on File Prior to Visit   Medication Sig Dispense Refill   • albuterol (PROVENTIL) (2.5 MG/3ML) 0.083% nebulizer solution Take 2.5 mg by nebulization Every 4 (Four) Hours As Needed for Wheezing.     • amLODIPine (NORVASC) 5 MG tablet Take 1.5 tablets by mouth Daily. 135 tablet 3   • budesonide (PULMICORT) 0.5 MG/2ML nebulizer solution USE 1 VIAL VIA NEBULIZER TWICE DAILY RINSE MOUTH AFTER USE  12   • Calcium Carbonate-Vitamin D (CALCIUM 600+D PO) Take 1 tablet by mouth Daily.     • colestipol (COLESTID) 1 g tablet TAKE 1 TABLET BY MOUTH TWICE A  tablet 1   • CREON 05755 units capsule delayed-release particles capsule Take 12,000 units of lipase by mouth Daily. before a meal  3   • DULoxetine (CYMBALTA) 60 MG capsule Take 1 capsule by mouth " 2 (Two) Times a Day. 60 capsule 3   • etanercept (ENBREL) 50 MG/ML solution prefilled syringe injection Inject 50 mg under the skin 1 (One) Time Per Week. THURSDAYS. TO HOLD 4 WEEKS     • fluticasone (FLONASE) 50 MCG/ACT nasal spray 2 sprays into the nostril(s) as directed by provider Daily. 1 bottle 5   • folic acid (FOLVITE) 1 MG tablet TAKE 1 TABLET BY MOUTH DAILY. 90 tablet 0   • ipratropium (ATROVENT) 0.06 % nasal spray 2 sprays into each nostril 4 (Four) Times a Day As Needed for Rhinitis.     • ipratropium-albuterol (DUO-NEB) 0.5-2.5 mg/3 ml nebulizer Take 3 mL by nebulization Every 4 (Four) Hours As Needed for Wheezing.     • KLOR-CON 20 MEQ CR tablet TAKE 1 TABLET BY MOUTH EVERY DAY 90 tablet 0   • magnesium oxide (MAG-OX) 400 MG tablet Take 1 tablet by mouth 2 (Two) Times a Day. 60 tablet 2   • montelukast (SINGULAIR) 10 MG tablet Take 1 tablet by mouth Daily. 90 tablet 3   • omeprazole (priLOSEC) 40 MG capsule TAKE 1 CAPSULE BY MOUTH EVERY DAY IN THE EVENING 90 capsule 1   • oxyCODONE-acetaminophen (PERCOCET)  MG per tablet Take 1 tablet by mouth Every 6 (Six) Hours As Needed for Moderate Pain .     • PERFOROMIST 20 MCG/2ML nebulizer solution USE 1 VIAL VIA NEBULIZER TWICE DAILY  12   • saccharomyces boulardii (FLORASTOR) 250 MG capsule Take 1 capsule by mouth 2 (Two) Times a Day. 20 capsule 5   • umeclidinium-vilanterol (ANORO ELLIPTA) 62.5-25 MCG/INH aerosol powder  inhaler Inhale 1 puff Daily. 3 each 2   • valsartan (DIOVAN) 320 MG tablet Take 1 tablet by mouth Daily. 90 tablet 2     Current Facility-Administered Medications on File Prior to Visit   Medication Dose Route Frequency Provider Last Rate Last Dose   • levalbuterol (XOPENEX) nebulizer solution 0.63 mg  0.63 mg Nebulization Q6H PRN Agata Burgess MD   0.63 mg at 12/17/18 8287     Review of Systems   Constitutional: Negative.    HENT: Negative.    Eyes: Negative.    Respiratory: Negative.  Negative for cough, shortness of breath and  wheezing.    Cardiovascular: Negative.    Gastrointestinal: Positive for diarrhea.   Endocrine: Negative.    Genitourinary: Negative.    Musculoskeletal: Negative.    Skin: Negative.    Allergic/Immunologic: Negative.    Neurological: Negative.    Hematological: Negative.    Psychiatric/Behavioral: Negative.        Objective   Physical Exam   Constitutional: She is oriented to person, place, and time. She appears well-developed and well-nourished.   HENT:   Head: Normocephalic and atraumatic.   Eyes: Pupils are equal, round, and reactive to light. EOM are normal.   Pulmonary/Chest: Effort normal and breath sounds normal.   Neurological: She is alert and oriented to person, place, and time.   Psychiatric: She has a normal mood and affect. Her behavior is normal. Judgment and thought content normal.   Nursing note and vitals reviewed.       There were no vitals taken for this visit.    Assessment/Plan   Diagnoses and all orders for this visit:    Essential hypertension    Malignant neoplasm of head of pancreas (CMS/HCC)    Chronic obstructive pulmonary disease, unspecified COPD type (CMS/HCC)    Other orders  -     Triamcinolone Acetonide (NASACORT) 55 MCG/ACT nasal inhaler; 2 sprays into the nostril(s) as directed by provider Daily.      Patient w/ pancreatic cancer. She is in [facility] care given decision not to treat. She is doing very well considering. She is to f/u w/ oncology and have repeat scans and will see if there has been progression of the disease. She is having dumping and advised to use creon prior to snacks as well as meals especially if there is a fatty content. She has sinus drainage/ allergy symtpoms and will start nasacort daily. Continue current inhalers for copd. Advised patient that as she is losing weight may need to reduce bp meds. She is not experiencing symtpoms of hypotension but will test bp and advise if she is running low and will adjust meds if needed. She will f/u here in 4 months or  prn.

## 2020-04-29 ENCOUNTER — TELEPHONE (OUTPATIENT)
Dept: INTERNAL MEDICINE | Facility: CLINIC | Age: 80
End: 2020-04-29

## 2020-04-29 NOTE — TELEPHONE ENCOUNTER
PATIENT CALLING TO GIVE BP READIN- 171/89  2- 169/87  3- 169/88    ALL THREE WERE TAKEN TODAY.    PATIENT CALL BACK -[phone].

## 2020-05-01 ENCOUNTER — DOCUMENTATION (OUTPATIENT)
Dept: INTERNAL MEDICINE | Facility: CLINIC | Age: 80
End: 2020-05-01

## 2020-05-01 NOTE — PROGRESS NOTES
Patient w/ hypertension. He has pancreatic cancer she has chosen not to treat. If she has symptomatic hypertension she may take additional amlodipine up to total dose 10 mg. Otherwise will just continue diovan 320 and amlodipine 7.5 mg. Discussed all w/ patient and she voiced understandingl. Will allow permissive hypertension given reducing weight and hospice status.   GUADALUPE

## 2020-05-05 ENCOUNTER — LAB (OUTPATIENT)
Dept: LAB | Facility: HOSPITAL | Age: 80
End: 2020-05-05

## 2020-05-05 ENCOUNTER — HOSPITAL ENCOUNTER (OUTPATIENT)
Dept: PET IMAGING | Facility: HOSPITAL | Age: 80
Discharge: HOME | End: 2020-05-05
Admitting: INTERNAL MEDICINE

## 2020-05-05 DIAGNOSIS — C25.0 MALIGNANT NEOPLASM OF HEAD OF PANCREAS (HCC): ICD-10-CM

## 2020-05-05 LAB
ALBUMIN SERPL-MCNC: 4.3 G/DL (ref 3.5–5.2)
ALBUMIN/GLOB SERPL: 1.2 G/DL (ref 1.1–2.4)
ALP SERPL-CCNC: 56 U/L (ref 38–116)
ALT SERPL W P-5'-P-CCNC: 11 U/L (ref 0–33)
ANION GAP SERPL CALCULATED.3IONS-SCNC: 13.5 MMOL/L (ref 5–15)
AST SERPL-CCNC: 16 U/L (ref 0–32)
BASOPHILS # BLD AUTO: 0.03 10*3/MM3 (ref 0–0.2)
BASOPHILS NFR BLD AUTO: 0.6 % (ref 0–1.5)
BILIRUB SERPL-MCNC: 0.3 MG/DL (ref 0.2–1.2)
BUN BLD-MCNC: 6 MG/DL (ref 6–20)
BUN/CREAT SERPL: 13 (ref 7.3–30)
CALCIUM SPEC-SCNC: 9.3 MG/DL (ref 8.5–10.2)
CANCER AG19-9 SERPL-ACNC: 79.7 U/ML
CHLORIDE SERPL-SCNC: 99 MMOL/L (ref 98–107)
CO2 SERPL-SCNC: 26.5 MMOL/L (ref 22–29)
CREAT BLD-MCNC: 0.46 MG/DL (ref 0.6–1.1)
CREAT BLDA-MCNC: 0.4 MG/DL (ref 0.6–1.3)
DEPRECATED RDW RBC AUTO: 45 FL (ref 37–54)
EOSINOPHIL # BLD AUTO: 0.52 10*3/MM3 (ref 0–0.4)
EOSINOPHIL NFR BLD AUTO: 10.7 % (ref 0.3–6.2)
ERYTHROCYTE [DISTWIDTH] IN BLOOD BY AUTOMATED COUNT: 13.3 % (ref 12.3–15.4)
GFR SERPL CREATININE-BSD FRML MDRD: 132 ML/MIN/1.73
GLOBULIN UR ELPH-MCNC: 3.6 GM/DL (ref 1.8–3.5)
GLUCOSE BLD-MCNC: 159 MG/DL (ref 74–124)
HCT VFR BLD AUTO: 40.1 % (ref 34–46.6)
HGB BLD-MCNC: 12.6 G/DL (ref 12–15.9)
IMM GRANULOCYTES # BLD AUTO: 0.01 10*3/MM3 (ref 0–0.05)
IMM GRANULOCYTES NFR BLD AUTO: 0.2 % (ref 0–0.5)
LYMPHOCYTES # BLD AUTO: 1.36 10*3/MM3 (ref 0.7–3.1)
LYMPHOCYTES NFR BLD AUTO: 28 % (ref 19.6–45.3)
MCH RBC QN AUTO: 28.4 PG (ref 26.6–33)
MCHC RBC AUTO-ENTMCNC: 31.4 G/DL (ref 31.5–35.7)
MCV RBC AUTO: 90.3 FL (ref 79–97)
MONOCYTES # BLD AUTO: 0.67 10*3/MM3 (ref 0.1–0.9)
MONOCYTES NFR BLD AUTO: 13.8 % (ref 5–12)
NEUTROPHILS # BLD AUTO: 2.27 10*3/MM3 (ref 1.7–7)
NEUTROPHILS NFR BLD AUTO: 46.7 % (ref 42.7–76)
NRBC BLD AUTO-RTO: 0 /100 WBC (ref 0–0.2)
PLATELET # BLD AUTO: 290 10*3/MM3 (ref 140–450)
PMV BLD AUTO: 11.8 FL (ref 6–12)
POTASSIUM BLD-SCNC: 3.5 MMOL/L (ref 3.5–4.7)
PROT SERPL-MCNC: 7.9 G/DL (ref 6.3–8)
RBC # BLD AUTO: 4.44 10*6/MM3 (ref 3.77–5.28)
SODIUM BLD-SCNC: 139 MMOL/L (ref 134–145)
WBC NRBC COR # BLD: 4.86 10*3/MM3 (ref 3.4–10.8)

## 2020-05-05 PROCEDURE — 36415 COLL VENOUS BLD VENIPUNCTURE: CPT

## 2020-05-05 PROCEDURE — 85025 COMPLETE CBC W/AUTO DIFF WBC: CPT

## 2020-05-05 PROCEDURE — 86301 IMMUNOASSAY TUMOR CA 19-9: CPT | Performed by: INTERNAL MEDICINE

## 2020-05-05 PROCEDURE — 25010000002 IOPAMIDOL 61 % SOLUTION: Performed by: INTERNAL MEDICINE

## 2020-05-05 PROCEDURE — 74177 CT ABD & PELVIS W/CONTRAST: CPT

## 2020-05-05 PROCEDURE — 80053 COMPREHEN METABOLIC PANEL: CPT

## 2020-05-05 PROCEDURE — 0 DIATRIZOATE MEGLUMINE & SODIUM PER 1 ML: Performed by: INTERNAL MEDICINE

## 2020-05-05 PROCEDURE — 82565 ASSAY OF CREATININE: CPT

## 2020-05-05 RX ADMIN — DIATRIZOATE MEGLUMINE AND DIATRIZOATE SODIUM 30 ML: 660; 100 LIQUID ORAL; RECTAL at 10:20

## 2020-05-05 RX ADMIN — IOPAMIDOL 85 ML: 612 INJECTION, SOLUTION INTRAVENOUS at 10:51

## 2020-05-07 ENCOUNTER — TELEPHONE (OUTPATIENT)
Dept: ONCOLOGY | Facility: HOSPITAL | Age: 80
End: 2020-05-07

## 2020-05-07 DIAGNOSIS — C25.0 MALIGNANT NEOPLASM OF HEAD OF PANCREAS (HCC): Primary | ICD-10-CM

## 2020-05-07 NOTE — TELEPHONE ENCOUNTER
Called and notified pt. Will route to scheduling.     ----- Message from Morris Cassidy MD PhD sent at 5/7/2020 12:37 AM EDT -----  Regarding: Add a PET scan exam  Please arrange a PET scan examination.  She has worsening tumor marker CA 19-9.    Please call patient and inform her my recommendation.     Thank you very much 1 dissolved in 1    Thank you very much

## 2020-05-08 ENCOUNTER — TELEPHONE (OUTPATIENT)
Dept: ONCOLOGY | Facility: CLINIC | Age: 80
End: 2020-05-08

## 2020-05-08 NOTE — TELEPHONE ENCOUNTER
----- Message from Osiris Jenny sent at 5/8/2020  2:38 PM EDT -----  Regarding: FW: Add a PET scan exam   I wanted to share this with you.  Dr. Cassidy sent this earlier today.        ----- Message -----  From: Morris Cassidy MD PhD  Sent: 5/7/2020  10:54 PM EDT  To: Osiris Jenny  Subject: RE: Add a PET scan exam                          Lets wait to see Monday.  If she can't get done, then Thursday having a lot opening if she is willing to go to [facility], otherwise needs to add to Friday, double book it somewhere (I prefer Thursday).    Thanks Osiris!    Khanh    ----- Message -----  From: Osiris Alvarado  Sent: 5/7/2020  10:05 AM EDT  To: Morris Cassidy MD PhD, #  Subject: RE: Add a PET scan exam                          Dr. Cassidy,         I called the PET center, and the first I could schedule her is on Wed, 5/13.  Amita said that not all insurance has approved scans for Monday.  If someone falls off, then [facility] said they will get her in Monday.  My concern is her appt with you on Tues, 5/12.  The next opening you have is Friday, 5/15.  Should we keep her appt on Tuesday in hopes she gets the scan on Monday?  What do you think?    Osiris        ----- Message -----  From: Tonya Cardoza RN  Sent: 5/7/2020   8:53 AM EDT  To: [facility]  Subject: FW: Add a PET scan exam                          I notified pt that you would be calling to set this up.   ----- Message -----  From: Morris Cassidy MD PhD  Sent: 5/7/2020  12:37 AM EDT  To: [facility]  Subject: Add a PET scan exam                              Please arrange a PET scan examination.  She has worsening tumor marker CA 19-9.    Please call patient and inform her my recommendation.     Thank you very much 1 dissolved in 1    Thank you very much

## 2020-05-12 ENCOUNTER — TELEPHONE (OUTPATIENT)
Dept: ONCOLOGY | Facility: CLINIC | Age: 80
End: 2020-05-12

## 2020-05-12 ENCOUNTER — TELEPHONE (OUTPATIENT)
Dept: GENERAL RADIOLOGY | Facility: HOSPITAL | Age: 80
End: 2020-05-12

## 2020-05-12 ENCOUNTER — APPOINTMENT (OUTPATIENT)
Dept: LAB | Facility: HOSPITAL | Age: 80
End: 2020-05-12

## 2020-05-12 NOTE — TELEPHONE ENCOUNTER
----- Message from Delicia Garcia sent at 5/12/2020  2:21 PM EDT -----      ----- Message -----  From: Ivette Rivas RN  Sent: 5/12/2020   2:04 PM EDT  To: [facility]    Pt was supposed to have a PET scan tomorrow. Its not covered so Dr. Cassidy wants to change to a Cest abd, pelvis with contrast. Placing orders now. Thanks!

## 2020-05-12 NOTE — TELEPHONE ENCOUNTER
Pt was rescheduled and called for her appts - she is having her ct scans tomorrow at 8:30 in the morning -

## 2020-05-12 NOTE — TELEPHONE ENCOUNTER
----- Message from Ivette Rivas RN sent at 5/12/2020  2:04 PM EDT -----  Pt was supposed to have a PET scan tomorrow. Its not covered so Dr. Cassidy wants to change to a Cest abd, pelvis with contrast. Placing orders now. Thanks!

## 2020-05-12 NOTE — TELEPHONE ENCOUNTER
----- Message from Morris Cassidy MD PhD sent at 5/12/2020  4:04 PM EDT -----  Regarding: RE: CT A/P  Anup,     I ordered PET scan and was denied by someone (from your office ?). Did not recheck the note before I tell them to order CT scan.  But she has worsening tumor makers, while CT says it's smaller pancreatic mass, that's why I requested PET scan.     I will see her first and then request PET scan again.  It's wasting time for everybody!    Judi, and Tiana, let's cancel the CT scan and I will see her this week as scheduled!    Thanks!    Khanh          will see her and then make decision,   ----- Message -----  From: Anup Negron  Sent: 5/12/2020   2:24 PM EDT  To: Morris Cassidy MD PhD  Subject: CT A/P                                           Hi Dr Cassidy,    I saw you changed the PET to CT scans, did you realize she just had a CT A/P on 5/5/2020?    Thanks, anup

## 2020-05-13 ENCOUNTER — HOSPITAL ENCOUNTER (OUTPATIENT)
Dept: PET IMAGING | Facility: HOSPITAL | Age: 80
End: 2020-05-13

## 2020-05-13 ENCOUNTER — TELEPHONE (OUTPATIENT)
Dept: ONCOLOGY | Facility: CLINIC | Age: 80
End: 2020-05-13

## 2020-05-13 NOTE — TELEPHONE ENCOUNTER
----- Message from Morris Cassidy MD PhD sent at 5/13/2020  1:56 PM EDT -----  Regarding: RE: PET SCAN  Just spoke with him briefly.  Thank you Abida!    Judi could you make appointment for tomorrow they want to do teleconference 2 units in the late morning or early afternoon.  So we can cancel appointment for the Friday.    Her  number is [phone]    Thank you!      ----- Message -----  From: Abida Barahona  Sent: 5/13/2020  12:52 PM EDT  To: Morris Cassidy MD PhD  Subject: RE: PET SCAN                                     Mr. Martinez told me that he is trying to talk her into taking treatment and that's one of the things they want to talk about.  He was going to call scheduling and see if you had an opening for the televisit today.      ----- Message -----  From: Morris Cassidy MD PhD  Sent: 5/13/2020  12:47 PM EDT  To: Abida Barahona  Subject: RE: PET SCAN                                     Abida thanks for checking into this!    I have to double check with them about the hospice care things, she may well be talking to the hospice previously as we were not seeking active treatment at that time.    I will keep you updated.     Thanks!    Khanh    ----- Message -----  From: Abida Barahona  Sent: 5/13/2020  11:02 AM EDT  To: Morris Cassidy MD PhD  Subject: PET SCAN                                         In saw your note that you will see her on Friday and then possibly request another PET scan.  I have talked with Renae Dent who reviews all of the PET scans for Medicare and she says that Medicare will not pay for a PET scan for a patient if they are not actively receiving treatment after the initial PET for staging.    They also will not pay for a PET for elevated tumor markers.   Since Mrs. Martinez has refused treatment she would have to sign an ABN stating that she knows Medicare may not pay for the PET and she would be responsible for the bill.    Her  called me this morning and he is  very upset with us saying that we told Medicare something was wrong and that' why they won't pay for the PET.  We don't notify Medicare because they don't require a precert.  I told him I would look into why it was cancelled.  I will call him back and let him know it's Medicare guidelines for a PET that the patient is to be actively receiving treatment.    I saw Hospice on the patient's account.  Has she been referred to Hospice?    Thanks,  Abida

## 2020-05-14 ENCOUNTER — TELEMEDICINE (OUTPATIENT)
Dept: ONCOLOGY | Facility: CLINIC | Age: 80
End: 2020-05-14

## 2020-05-14 DIAGNOSIS — C25.0 MALIGNANT NEOPLASM OF HEAD OF PANCREAS (HCC): Primary | ICD-10-CM

## 2020-05-14 PROCEDURE — 99215 OFFICE O/P EST HI 40 MIN: CPT | Performed by: INTERNAL MEDICINE

## 2020-05-14 NOTE — PROGRESS NOTES
Subjective     REASON FOR FOLLOWUP:    1. Pancreatic carcinoma diagnosed in early September 2019.  · Patient declined systematic chemotherapy in early October 2019.  · PET scan examination on 11/22/2019 showed improved hypermetabolic lesion in the pancreas.  · CT scan of the chest, abdomen, and pelvis with contrast performed on 2/11/2020 showed stable mediastinal lymph nodes, stable pancreatic head lesion, and persistent pancreatic ductal dilatation.   · Patient continues on Creon 3 times a day.  · CT scan of the abdomen and pelvis on 5/5/2020 showed stable pancreatic head mass and pancreatic ductal dilatation.  There is no lymphadenopathy or new abnormality.  However her tumor marker CA-19-9 level almost tripled    HISTORY OF PRESENT ILLNESS:  The patient is a 79 y.o. female who is scheduled for telemedicine evaluation today for 3-month re-evaluation to discuss the results of CT scan examination and laboratory studies obtained on 5/5/2020.     She was scheduled to have a video encounter with me today due to the pandemic coronavirus infection, but due to connection issues, I could not see patient, however patient and her  could see.  Her  helped with history and discussion.    Today, patient reports she has good performance status ECOG 0.  She has no nausea or vomiting.  She states that she has recently lost her sense of smell and taste.  She does continue taking Percocet as needed usually once a day.  She currently has no pain.  She denies any dyspnea.    CT scan of the abdomen and pelvis on 5/5/2020 showed pancreatic head mass is much less defined, measures smaller about 2.2 cm, instead of 3 cm from 2/11/2020.  There is no lymphadenopathy or new abnormality.    Laboratory studies on 5/5/2020 showed worsening tumor marker at 79.7, almost tripled compared to the level from 2/18/2020 at 27.8 units/mL.      Due to her worsening tumor marker, we suspect the patient having disease progression, despite  the CT scan report, and we tried to obtain a PET scan for further evaluation, but this was denied based on insurance policy.      Past Medical History:   Diagnosis Date   • Abdominal hernia    • Cancer (CMS/HCC)    • COPD (chronic obstructive pulmonary disease) (CMS/HCC)    • Depression    • Food allergy     Scallops: causes hives   • GERD (gastroesophageal reflux disease)    • Hiatal hernia    • History of bruising easily    • History of colon polyps 08/13/2013    PATH: Distal Transverse Colon, Fragments of Tubular Adenoma   • History of colon polyps 01/30/2013    PATH: Ascending Colon - Tubular Adenoma, Splenic Flexure Polyp @ 65 cm - Tubulovillous Adenoma, Descending Colon - Tubular Adenoma   • History of colon polyps 10/30/2017    PATH: Transverse Colon - Tubular Adenoma, Rectal Polyps - Fragments of Hyperplastic Polyp   • Hypercholesteremia    • Hypertension    • Joint pain     swelling   • On home oxygen therapy     O2 NC 2. LITERS/ NIGHT   • Osteoarthritis    • Osteoporosis    • Pancreatic cancer (CMS/HCC)    • PONV (postoperative nausea and vomiting)    • Rheumatoid arthritis (CMS/HCC)     DIAGNOSIS AGE 23 - FOLLOWED BY RHEUMATOLOGY DR INFANTE, NO PROBLEMS WITH FLEX/ EXTENSION         Past Surgical History:   Procedure Laterality Date   • BREAST BIOPSY Left     Benign pathology   • BRONCHOSCOPY N/A 1/3/2018    Procedure: BRONCHOSCOPY with lavage in right middle lobe and lingula.;  Surgeon: Trenton Garcia MD;  Location: [facility] ENDOSCOPY;  Service:    • CAROTID ENDARTERECTOMY Left 2006   • CATARACT EXTRACTION WITH INTRAOCULAR LENS IMPLANT Bilateral    • COLONOSCOPY N/A 10/30/2017    Procedure: COLONOSCOPY TO CECUM, TO TERMINAL ILEUM WITH COLD BIOPSY POLYPECTOMY;  Surgeon: Jossie Stanley MD;  Location: [facility] ENDOSCOPY;  Service:    • COLONOSCOPY N/A 08/13/2013    One 3 mm polyp in the transverse colon, Diverticulosis in the sigmoid colon, [facility], Dr. Jossie Stanley   • COLONOSCOPY N/A 01/30/2013    One 3 mm  polyp ascending colon; One 7 mm polyp descending colon; One 25 mm polyp splenic flexure, Diverticulosis Sigmoid Colon, [facility], Dr. Jossie Stanley   • FOOT SURGERY Bilateral     Bilateral (MULITPLE SURGERIES)   • HAND SURGERY Bilateral     Bilateral (MULTIPLE SURGERIES)   • KNEE ARTHROSCOPY W/ PARTIAL MEDIAL MENISCECTOMY Left 01/22/2016    Left Knee Arthroscopy w/ partial medial & lateral meniscectomies, Three compartment synovectomy w/ loose body removal, Chondroplasty of patella, Dr. Florencio Frazier   • LEFT OOPHORECTOMY Left 1950    bening tumor   • NECK SURGERY     • SINUS SURGERY N/A 2011   • TOE FUSION Right 2017    with screws and pins-Dr. Atkins   • TOTAL KNEE ARTHROPLASTY Left 5/18/2016    Procedure: LT TOTAL KNEE ARTHROPLASTY WITH FELECIA NAVIGATION;  Surgeon: Florencio Frazier MD;  Location: [facility] OR;  Service:    • VENTRAL HERNIA REPAIR N/A 5/22/2018    Procedure: VENTRAL HERNIA REPAIR LAPAROSCOPIC WITH DAVINCI ROBOT;  Surgeon: Josr Jennings MD;  Location: [facility] OR;  Service: DaVinci      OBGYN HISTORY:  G:3 P:3    ONCOLOGY HISTORY:   The patient is a 79 y.o. year old female who presents on 9/25/2019 as a new patient to our clinic for evaluation of newly diagnosed pancreatic cancer. Patient is accompanied by her  today who helped with the history.    The patient reports she is feeling well overall. She does experience epigastric and lower abdominal pain since having her endoscopy and fine-needle biopsy of the pancreatic mass. The patient has been taking her prescribed pain medication as needed, a few times a day, and before bedtime to help her sleep. She rates her pain regularly at a 5/10 scale. The patient states she has been having urgent bowel movements every morning which are orange for the last 3 mornings which she states is new for her. She notes beforehand she previously had a bowel movement every 3 days. She denies any nausea, vomiting or weight loss.     The patient has COPD  which is well managed with her inhaler. She does state she becomes short of breath easily, however she notes she is able to complete house chores and other tasks.     The patient has been on Enbrel for rheumatoid arthritis for 20 years and was informed she would have to hold her medication for chemotherapy treatment.     The patient reported on 9/25/2019 that she has decided she did not want to undergo chemotherapy, radiation, or surgery as she does not believe that her quality of life will be improved or even be maintained with the options available to her.     The patient previously had a chest X-ray performed on 08/19/2019 for evaluation of a severe cough which revealed a subtle nodule projecting over the medial aspect of the left lung base not previously seen on study performed on 12/17/2018.  The patient then underwent CT Chest on 08/22/2019 which showed a small pleural-based nodule of the upper left lobe with nonspecific mediastinal adenopathy. There was also mild prominence of the pancreatic duct, thinning of the pancreas, without a specific cause identified on the exam. A PET levy was then performed on 09/03/2019 which demonstrated intensely FDG avid mass-like enlargement of the pancreatic head with dilation of the main pancreatic duct, as well as mildly FDG avid mediastinal adenopathy.     CT Abdomen & Pelvis performed on 09/10/2019 reported approximately 2.7 cm pancreatic head mass with significant pancreatic ductal dilation. The mass is situated medial to the 1st portion of the duodenum. There were no pathologically enlarged nodes or evidence for liver metastases.     The patient underwent an endoscopic ultrasound (upper) EUS and fine needle biopsy on 09/17/2019 and the pathology evaluation revealed atypical glandular cells consistent with adenocarcinoma.  She has no elevation of tumor marker CA 19-9 at 18.7 units/mL on 9/16/2019.     The patient was admitted to the hospital on 09/19/2019 to 09/20/2019  with new onset epigastric abdominal pain and nausea. CT Abdomen & Pelvis performed on 09/19/2019 reported stable pancreatic head mass and proximal ductal dilation, stable left renal scarring and low-density cortical lesions, likely cysts, colonic diverticulosis, and stable adrenal hyperplasia. The patient had previously stated in the hospital that she did not want further surgical or oncological consultation however, she was instructed that if she experienced worsening pain she could follow up with surgery and oncology to see if palliative treatment could be beneficial. The patient was discharged to home hospice with a written prescription for oxycodone and oral magnesium supplementation. She received potassium replacement in the hospital prior to discharge.    Patient and her  reports they already had a contact with home hospice care.     Laboratory study performed, 09/25/2019, reported completely normal CBC.      On 11/4/2019, the  reported that the patient actually was seen by Dr. Turner at the [facility].  After discussion with Dr. Turner about Whipple surgery, patient declined surgical intervention for her pancreas cancer.  Her  reports recently they talked to another patient who had a stage IV pancreatic cancer for the past 2 years who is actively receiving systematic chemotherapy, her  brought patient here for rediscussion of possible systematic therapy.  They also have a lot of questions in terms of how quickly the tumor was growing, whether that she would respond to the treatment, and the patient certainly voiced that she wants to live at least until October 2020, when her granddaughter will get .      I discussed with them on 11/4/2019 about systematic therapy with single agent Gemzar followed by concurrent chemoradiotherapy with Gemzar and then further chemotherapy with Gemzar.  We also discussed about monitoring of response to systematic treatment.   After discussion, patient is willing to consider systematic chemotherapy followed by chemoradiation, to be started after Thanksgiving holiday.  With that in mind, I recommended to obtain PET scan examination and laboratory study to document a new baseline.      PET scan examination on 11/22/2019 reported improved hypermetabolic activity in the pancreas.    Because of that chemoradiation therapy was canceled instead we continue follow-up with CT scan.    CT scan of the chest, abdomen, and pelvis with contrast performed on 2/11/2020 showed stable mediastinal lymph nodes, stable pancreatic head lesion, and persistent pancreatic ductal dilatation.     Laboratory studies on 2/18/2020 showed normal CBC including hemoglobin 12.3, MCV 88.7, platelets 220,000 and a WBC 4840 including ANC 2050 lymphocytes 1420..  She has a unremarkable CMP.  CA 19-9 at 27.8 units/mL, still within normal range, however higher than previous level 17.7 on 11/22/2019.     Current Outpatient Medications on File Prior to Visit   Medication Sig Dispense Refill   • albuterol (PROVENTIL) (2.5 MG/3ML) 0.083% nebulizer solution Take 2.5 mg by nebulization Every 4 (Four) Hours As Needed for Wheezing.     • amLODIPine (NORVASC) 5 MG tablet Take 1.5 tablets by mouth Daily. 135 tablet 3   • budesonide (PULMICORT) 0.5 MG/2ML nebulizer solution USE 1 VIAL VIA NEBULIZER TWICE DAILY RINSE MOUTH AFTER USE  12   • Calcium Carbonate-Vitamin D (CALCIUM 600+D PO) Take 1 tablet by mouth Daily.     • colestipol (COLESTID) 1 g tablet TAKE 1 TABLET BY MOUTH TWICE A  tablet 1   • CREON 14763 units capsule delayed-release particles capsule Take 12,000 units of lipase by mouth Daily. before a meal  3   • DULoxetine (CYMBALTA) 60 MG capsule Take 1 capsule by mouth 2 (Two) Times a Day. 60 capsule 3   • etanercept (ENBREL) 50 MG/ML solution prefilled syringe injection Inject 50 mg under the skin 1 (One) Time Per Week. THURSDAYS. TO HOLD 4 WEEKS     • fluticasone  (FLONASE) 50 MCG/ACT nasal spray 2 sprays into the nostril(s) as directed by provider Daily. 1 bottle 5   • folic acid (FOLVITE) 1 MG tablet TAKE 1 TABLET BY MOUTH DAILY. 90 tablet 0   • ipratropium (ATROVENT) 0.06 % nasal spray 2 sprays into each nostril 4 (Four) Times a Day As Needed for Rhinitis.     • ipratropium-albuterol (DUO-NEB) 0.5-2.5 mg/3 ml nebulizer Take 3 mL by nebulization Every 4 (Four) Hours As Needed for Wheezing.     • KLOR-CON 20 MEQ CR tablet TAKE 1 TABLET BY MOUTH EVERY DAY 90 tablet 0   • magnesium oxide (MAG-OX) 400 MG tablet Take 1 tablet by mouth 2 (Two) Times a Day. 60 tablet 2   • montelukast (SINGULAIR) 10 MG tablet Take 1 tablet by mouth Daily. 90 tablet 3   • omeprazole (priLOSEC) 40 MG capsule TAKE 1 CAPSULE BY MOUTH EVERY DAY IN THE EVENING 90 capsule 1   • oxyCODONE-acetaminophen (PERCOCET)  MG per tablet Take 1 tablet by mouth Every 6 (Six) Hours As Needed for Moderate Pain .     • PERFOROMIST 20 MCG/2ML nebulizer solution USE 1 VIAL VIA NEBULIZER TWICE DAILY  12   • saccharomyces boulardii (FLORASTOR) 250 MG capsule Take 1 capsule by mouth 2 (Two) Times a Day. 20 capsule 5   • Triamcinolone Acetonide (NASACORT) 55 MCG/ACT nasal inhaler 2 sprays into the nostril(s) as directed by provider Daily. 16.5 g 11   • umeclidinium-vilanterol (ANORO ELLIPTA) 62.5-25 MCG/INH aerosol powder  inhaler Inhale 1 puff Daily. 3 each 2   • valsartan (DIOVAN) 320 MG tablet Take 1 tablet by mouth Daily. 90 tablet 2     Current Facility-Administered Medications on File Prior to Visit   Medication Dose Route Frequency Provider Last Rate Last Dose   • levalbuterol (XOPENEX) nebulizer solution 0.63 mg  0.63 mg Nebulization Q6H PRN Agata Burgses MD   0.63 mg at 12/17/18 1107        ALLERGIES:    Allergies   Allergen Reactions   • Gold-Containing Drug Products Unknown - High Severity     Patient cannot recall reaction   • Hydrocodone Irritability     HYPERACTIVITY        Social History      Socioeconomic History   • Marital status:      Spouse name: Óscar   • Number of children: 3   • Years of education: High school   • Highest education level: Not on file   Occupational History     Employer: RETIRED   Tobacco Use   • Smoking status: Former Smoker     Packs/day: 1.50     Years: 30.00     Pack years: 45.00     Types: Cigarettes     Last attempt to quit:      Years since quittin.3   • Smokeless tobacco: Never Used   Substance and Sexual Activity   • Alcohol use: No     Comment: No alcohol for 30 years   • Drug use: No   • Sexual activity: Defer    , enjoys golfing.  Patient has two  children.     Family History   Problem Relation Age of Onset   • Heart disease Mother    • Hypertension Mother    • Lung disease Mother    • Diabetes Sister    • Hypertension Brother    • Multiple sclerosis Brother    • Diabetes Sister    • Malig Hyperthermia Neg Hx         Review of Systems   Constitutional: Positive for appetite change (decreased appetite). Negative for chills, diaphoresis, fatigue and fever.   HENT: Negative for hearing loss, sore throat and trouble swallowing.         She states that she has recently lost her sense of smell and taste   Eyes: Negative for visual disturbance.   Respiratory: Negative for cough, chest tightness, shortness of breath and wheezing.    Cardiovascular: Negative for chest pain, palpitations and leg swelling.   Gastrointestinal: Negative for abdominal distention, abdominal pain, blood in stool, diarrhea and nausea.   Endocrine: Negative for cold intolerance and polyuria.   Genitourinary: Negative for dysuria, frequency, hematuria and urgency.   Musculoskeletal: Negative for arthralgias and joint swelling.   Skin: Negative for color change, rash and wound.   Allergic/Immunologic: Negative for immunocompromised state.   Neurological: Negative for dizziness, seizures, numbness and headaches.   Hematological: Negative for adenopathy. Does not  bruise/bleed easily.   Psychiatric/Behavioral: Negative for behavioral problems and confusion.      Objective     There were no vitals filed for this visit.     PHYSICAL EXAM:  Not performed today due to telemedicine encounter.    RECENT LABS:  Lab Results   Component Value Date    WBC 4.86 05/05/2020    HGB 12.6 05/05/2020    HCT 40.1 05/05/2020    MCV 90.3 05/05/2020     05/05/2020     Lab Results   Component Value Date    NEUTROABS 2.27 05/05/2020     Component      Latest Ref Rng & Units 11/22/2019 2/18/2020 5/5/2020   CA 19-9      <=35.0 U/mL 17.7 27.8 79.7 (H)       Glucose   Date Value Ref Range Status   05/05/2020 159 (H) 74 - 124 mg/dL Final     BUN   Date Value Ref Range Status   05/05/2020 6 6 - 20 mg/dL Final     Creatinine   Date Value Ref Range Status   05/05/2020 0.40 (L) 0.60 - 1.30 mg/dL Final     Comment:     Serial Number: [id number]:  [id number]     Sodium   Date Value Ref Range Status   05/05/2020 139 134 - 145 mmol/L Final     Potassium   Date Value Ref Range Status   05/05/2020 3.5 3.5 - 4.7 mmol/L Final     Chloride   Date Value Ref Range Status   05/05/2020 99 98 - 107 mmol/L Final     CO2   Date Value Ref Range Status   05/05/2020 26.5 22.0 - 29.0 mmol/L Final     Calcium   Date Value Ref Range Status   05/05/2020 9.3 8.5 - 10.2 mg/dL Final     Total Protein   Date Value Ref Range Status   05/05/2020 7.9 6.3 - 8.0 g/dL Final     Albumin   Date Value Ref Range Status   05/05/2020 4.30 3.50 - 5.20 g/dL Final     ALT (SGPT)   Date Value Ref Range Status   05/05/2020 11 0 - 33 U/L Final     AST (SGOT)   Date Value Ref Range Status   05/05/2020 16 0 - 32 U/L Final     Alkaline Phosphatase   Date Value Ref Range Status   05/05/2020 56 38 - 116 U/L Final     Total Bilirubin   Date Value Ref Range Status   05/05/2020 0.3 0.2 - 1.2 mg/dL Final     eGFR Non  Amer   Date Value Ref Range Status   05/05/2020 132 >60 mL/min/1.73 Final     A/G Ratio   Date Value Ref Range Status   12/23/2019  1.5 g/dL Final     BUN/Creatinine Ratio   Date Value Ref Range Status   05/05/2020 13.0 7.3 - 30.0 Final     Anion Gap   Date Value Ref Range Status   05/05/2020 13.5 5.0 - 15.0 mmol/L Final         IMAGE STUDY:  CT ABDOMEN AND PELVIS WITH IV CONTRAST - 5/5/2020     HISTORY: 79-year-old female with pancreatic cancer. Follow-up. Evaluate  for metastatic disease.     TECHNIQUE: Radiation dose reduction techniques were utilized, including  automated exposure control and exposure modulation based on body size.   3 mm images were obtained through the abdomen and pelvis after the  administration of IV contrast. Compared with previous CTs from  02/11/2020 and 09/19/2019.     FINDINGS: The pancreatic head mass is much less defined than on the CT  from 09/19/2019. The density is slightly less than that of the  surrounding parenchyma and the mass measures approximately 2.2 cm, image  52, previously measuring approximately 3 cm. There is no interval change  in the degree of pancreatic ductal dilatation proximal to the mass at  the pancreatic duct measuring 1 cm. There is no peripancreatic  lymphadenopathy and there is no suspicious liver lesion or biliary  dilatation. The gallbladder, spleen, and right adrenal gland appear  unremarkable. Left adrenal gland hyperplasia appears stable and the few  tiny renal cysts appear stable as well. No acute bowel abnormality is  seen. There is formed stool throughout the colon. There is moderately  extensive sigmoid diverticulosis without evidence for diverticulitis.  Uterus and adnexa appear unremarkable. There are no airspace  consolidations or effusions at the visualized lower lung fields.     IMPRESSION:  1. The pancreatic head mass has become very ill-defined, but measures  approximately 2 cm. There is no change in the degree of pancreatic  ductal dilatation and there is no lymphadenopathy or new abnormality.  2. Constipation is suspected.         Assessment/Plan   1. Pancreatic  carcinoma diagnosed in early September 2019: The patient previously had a chest X-ray performed on 08/19/2019 for evaluation of a severe cough which revealed a subtle nodule projecting over the medial aspect of the left lung base not previously seen on study performed on 12/17/2018. The patient then underwent a CT Chest on 08/22/2019 which showed a small pleural-based nodule of the upper left lobe with nonspecific mediastinal adenopathy. There was also mild prominence of the pancreatic duct, thinning of the pancreas, without a specific cause identified on the exam. A PET levy was then performed on 09/03/2019 which demonstrated intensely FDG avid mass-like enlargement of the pancreatic head with dilation of the main pancreatic duct, as well as mildly FDG avid mediastinal adenopathy. CT Abdomen & Pelvis performed on 09/10/2019 reported approximately 2.7 cm pancreatic head mass with significant pancreatic ductal dilation. The mass is situated medial to the 1st portion of the duodenum. There were no pathologically enlarged nodes or evidence for liver metastases.   · The patient underwent an endoscopic ultrasound (upper) fine needle biopsy on 09/17/2019 and the pathology report showed atypical glandular cells consistent with adenocarcinoma.   · Laboratory study performed on 9/16/2019 showed normal CA 19-9 of 18.7 U/mL.  · I explained to the patient that the PET showed mediastinal adenopathy, however biopsy would be difficult due to the size and location of the lymph nodes.  There is lymph nodes also his only slightly elevated activity, could even be benign lymph nodes.  · I discussed with the patient that chemotherapy and/or concurrent chemoradiation therapy to the pancreas may be an option. I further explained that the only way to cure pancreatic cancer is completed surgical resection and that chemotherapy or radiation treatment would be to control the disease. I discussed with the patient and her  at length that  the best systematic treatment to treat pancreatic cancer is with a mixture of 3 chemotherapy medications FOLFIRINOX regimen, however I do not believe that she would tolerate this treatment well.  I also has suspicion that she would tolerate Abraxane plus Gemzar doublets chemotherapy, also because of the side effects especially bone marrow suppression.  · I further discussed that she could consider chemotherapy with single agent Gemzar in combination with radiation and a sandwich type schedule.  I further discussed the side effects at length, including nausea, loss of appetite, fatigue, and weight loss, bone marrow suppression, flu type symptoms etc. I explained that I feel this is the best way for her to proceed as this would result in the least amount of side effects. We would administer 2 weeks on and one week off for 3 cycles (6 doses),   Then followed by concurrent chemoradiotherapy using single agent daily Gemzar, and then further treatment with weekly Gemzar.  We would also have to repeat PET imaging in order to evaluate efficacy of chemotherapy treatment.  · I also explained that there are presently some clinical trials to treat pancreatic cancer however they are not currently available at our clinic. In addition, the patient is not currently eligible for a clinical trial for second line chemotherapy as she has not undergone first line chemotherapy treatment at this time.   · I explained to the patient that without any treatment, she would be expected to live anywhere between 6 months to 1 year and that although she has not been experiencing many side effects now, more side effects are likely to occur, such as obstructive jaundice with jaundiced skin and eyes, whitish stools, and very yellow urine, and the liver failure.  She will also suffer from pain as her pain are likely to worsen. She will at some point require a stent placement to relieve the obstructive jaundice for which she should be referred to   [facility]. I explained to the patient that she will most likely not be able to have the stent placed preventatively.   · I discussed that she is likely not a good candidate for complete Whipple surgery due to comorbidities, which result in her development of diabetes and would require insulin and medication to improve digestion.   · The patient's  and the patient state that they have conjointly decided to focus on quality of life rather than longevity at this time.  · PET scan examination on 11/22/2019 showed improved hypermetabolic lesion in the pancreas.  Patient is asymptomatic.   · CT scan of the chest, abdomen, and pelvis with contrast performed on 2/11/2020 showed stable mediastinal lymph nodes, stable pancreatic head lesion, and persistent pancreatic ductal dilatation.    · Laboratory studies on 2/18/2020 showed normal CBC and CMP.  CA 19-9 at 27.8 units/mL, still within normal range, however higher than previous level 17.7 on 11/22/2019.   · CT scan of the abdomen and pelvis on 5/5/2020 showed pancreatic head mass is much less defined, measures smaller about 2.2 cm, instead of 3 cm from 9/192020.  There is no lymphadenopathy or new abnormality.    · Laboratory studies on 5/5/2020 showed worsening tumor marker at 79.7 U/mL, almost tripled compared to the level in February 2020.  Otherwise patient had normal CBC and unremarkable CMP except glucose 159.  · Due to her worsening tumor marker on 5/5/2020, we suspect the patient having disease progression, despite the CT scan report, and we tried to obtain a PET scan for further evaluation, but this was denied based on insurance policy.    · On 5/14/2020, patient and her  report that patient remains asymptomatic, she feels well physically.   · I discussed with the patient that despite the stable CT findings on 5/5/2020, her tumor marker has almost tripled.  I discussed with the patient and her  today the treatment options of monitoring her with  a repeat CT scan and tumor marker in 2 months vs. proceeding with chemotherapy on Gemzar at this time.  I discussed with the patient the potential side effects of Gemzar, including fatigue, change in taste, and low white blood cell count and platelets.  The patient and her  had all questions answered and verbalized understanding, and she does not wish to receive chemotherapy at this time due to the potential side effects with treatment.  Therefore, we will proceed with continued monitoring with repeat imaging and laboratory studies in 2 months.    2.  Recent weight loss and decreased appetite, reported on 2/18/2020.  Per our records, the patient has lost about 13 pounds in the past 6 months.  · I encouraged her to drink Boost shakes and/or protein powder mixed with water for nutritional support.      PLAN:   1. Obtain repeat CT scan in 2 months.  We will get the laboratory studies also CBC CMP and CA 19-9 on the same day as the scan.     2. I encouraged her to drink Boost shakes and/or protein powder mixed with water for nutritional support.  3. Continue Creon 3 times a day.   4. Follow-up with me 1 week after the above studies for re-evaluation and to discuss further treatment plan.    Patient's  today helped with history in the discussion.     This is a video visit to discuss her recent laboratory study results and CT scan of the abdomen and pelvis.  Due to the pandemic coronavirus infection, to decrease the risk for exposure, we arranged this video conference and the patient is agreeable.     I have reviewed the CT imaging from 5/5/2020, and compared to the CT scan images from 2/11/2020 and 9/192019.  I agree with the assessment.  I have reviewed the findings with the patient and her  today.     I spent more than 40 minutes in patient care today, more than 50% of which was spent in counseling the patient on treatment options, and in coordinating her care.    By signing my name here, I Davin  Donte, attest that all documentation on 05/14/20 at 11:17 has been prepared under the direction and in the presence of Dr. Adam Cassidy MD.    I reviewed the note scribed by Davin Kent and made appropriate corrections.       ADAM CASSIDY M.D., Ph.D.          PAVAN - Agata uBrgess MD

## 2020-05-20 NOTE — TELEPHONE ENCOUNTER
PATIENT REQUESTED A REFILL ON:amLODIPine (NORVASC) 5 MG tablet    PATIENT CAN BE REACHED ON:[phone]    PHARMACY PREFERRED:[facility]/pharmacy #[id number] - [place], [place] - [address] AT [facility] - [phone]  - [phone] FX

## 2020-05-21 RX ORDER — AMLODIPINE BESYLATE 5 MG/1
7.5 TABLET ORAL DAILY
Qty: 135 TABLET | Refills: 3 | Status: SHIPPED | OUTPATIENT
Start: 2020-05-21 | End: 2020-06-12 | Stop reason: SDUPTHER

## 2020-05-21 RX ORDER — AMLODIPINE BESYLATE 5 MG/1
TABLET ORAL
Qty: 90 TABLET | Refills: 1 | Status: SHIPPED | OUTPATIENT
Start: 2020-05-21 | End: 2020-06-12

## 2020-06-10 RX ORDER — POTASSIUM CHLORIDE 1500 MG/1
TABLET, EXTENDED RELEASE ORAL
Qty: 90 TABLET | Refills: 0 | Status: SHIPPED | OUTPATIENT
Start: 2020-06-10 | End: 2020-01-01

## 2020-06-11 ENCOUNTER — TELEPHONE (OUTPATIENT)
Dept: INTERNAL MEDICINE | Facility: CLINIC | Age: 80
End: 2020-06-11

## 2020-06-11 NOTE — TELEPHONE ENCOUNTER
Patient is very fatigue, no energy can sleep all day. Wants to know if she can have some blood work done or does she need to make an appointment to see you. (video visit or in office)

## 2020-06-12 ENCOUNTER — OFFICE VISIT (OUTPATIENT)
Dept: INTERNAL MEDICINE | Facility: CLINIC | Age: 80
End: 2020-06-12

## 2020-06-12 VITALS
HEIGHT: 62 IN | TEMPERATURE: 98.5 F | HEART RATE: 67 BPM | OXYGEN SATURATION: 96 % | WEIGHT: 111 LBS | DIASTOLIC BLOOD PRESSURE: 65 MMHG | BODY MASS INDEX: 20.43 KG/M2 | SYSTOLIC BLOOD PRESSURE: 135 MMHG

## 2020-06-12 DIAGNOSIS — R64 CACHEXIA (HCC): Primary | ICD-10-CM

## 2020-06-12 DIAGNOSIS — E44.1 MILD PROTEIN-CALORIE MALNUTRITION (HCC): ICD-10-CM

## 2020-06-12 DIAGNOSIS — R73.01 IMPAIRED FASTING GLUCOSE: ICD-10-CM

## 2020-06-12 DIAGNOSIS — E78.5 HYPERLIPIDEMIA, UNSPECIFIED HYPERLIPIDEMIA TYPE: ICD-10-CM

## 2020-06-12 DIAGNOSIS — R53.83 FATIGUE, UNSPECIFIED TYPE: ICD-10-CM

## 2020-06-12 PROCEDURE — 99214 OFFICE O/P EST MOD 30 MIN: CPT | Performed by: INTERNAL MEDICINE

## 2020-06-12 RX ORDER — OFLOXACIN 3 MG/ML
1 SOLUTION/ DROPS OPHTHALMIC 4 TIMES DAILY
Qty: 5 ML | Refills: 1 | Status: ON HOLD | OUTPATIENT
Start: 2020-06-12 | End: 2020-01-01

## 2020-06-12 NOTE — TELEPHONE ENCOUNTER
I will be happy to order labs but would like a video visit. Have sev openings for tomorrow 6/12 but put her down for 11:30 video visit. Let her know I may be a little late to that time just to wait.

## 2020-06-12 NOTE — PROGRESS NOTES
"Chief Complaint   Patient presents with   • Fatigue       History of Present Illness   Janet Martinez is a 80 y.o. female presents for acute care. She is feeling fatigued. \"no energy and I'm tired all the time. Resting from 11:30 pm to 10:30 am. She has pancreatic cancer but no symptoms of abdominal pain at this time. She has a history of hypertension. She is taking diovan and amlodipine late afternoon. Does not track bp at home.     The following portions of the patient's history were reviewed and updated as appropriate: allergies, current medications, past family history, past medical history, past social history, past surgical history and problem list.  Current Outpatient Medications on File Prior to Visit   Medication Sig Dispense Refill   • albuterol (PROVENTIL) (2.5 MG/3ML) 0.083% nebulizer solution Take 2.5 mg by nebulization Every 4 (Four) Hours As Needed for Wheezing.     • amLODIPine (NORVASC) 5 MG tablet TAKE 1 TABLET BY MOUTH EVERY DAY 90 tablet 1   • budesonide (PULMICORT) 0.5 MG/2ML nebulizer solution USE 1 VIAL VIA NEBULIZER TWICE DAILY RINSE MOUTH AFTER USE  12   • Calcium Carbonate-Vitamin D (CALCIUM 600+D PO) Take 1 tablet by mouth Daily.     • colestipol (COLESTID) 1 g tablet TAKE 1 TABLET BY MOUTH TWICE A  tablet 1   • CREON 91503 units capsule delayed-release particles capsule Take 12,000 units of lipase by mouth Daily. before a meal  3   • DULoxetine (CYMBALTA) 60 MG capsule Take 1 capsule by mouth 2 (Two) Times a Day. 60 capsule 3   • etanercept (ENBREL) 50 MG/ML solution prefilled syringe injection Inject 50 mg under the skin 1 (One) Time Per Week. THURSDAYS. TO HOLD 4 WEEKS     • fluticasone (FLONASE) 50 MCG/ACT nasal spray 2 sprays into the nostril(s) as directed by provider Daily. 1 bottle 5   • folic acid (FOLVITE) 1 MG tablet TAKE 1 TABLET BY MOUTH DAILY. 90 tablet 0   • ipratropium (ATROVENT) 0.06 % nasal spray 2 sprays into each nostril 4 (Four) Times a Day As Needed for " Rhinitis.     • ipratropium-albuterol (DUO-NEB) 0.5-2.5 mg/3 ml nebulizer Take 3 mL by nebulization Every 4 (Four) Hours As Needed for Wheezing.     • KLOR-CON 20 MEQ CR tablet TAKE 1 TABLET BY MOUTH EVERY DAY 90 tablet 0   • magnesium oxide (MAG-OX) 400 MG tablet Take 1 tablet by mouth 2 (Two) Times a Day. 60 tablet 2   • montelukast (SINGULAIR) 10 MG tablet Take 1 tablet by mouth Daily. 90 tablet 3   • omeprazole (priLOSEC) 40 MG capsule TAKE 1 CAPSULE BY MOUTH EVERY DAY IN THE EVENING 90 capsule 1   • oxyCODONE-acetaminophen (PERCOCET)  MG per tablet Take 1 tablet by mouth Every 6 (Six) Hours As Needed for Moderate Pain .     • PERFOROMIST 20 MCG/2ML nebulizer solution USE 1 VIAL VIA NEBULIZER TWICE DAILY  12   • saccharomyces boulardii (FLORASTOR) 250 MG capsule Take 1 capsule by mouth 2 (Two) Times a Day. 20 capsule 5   • Triamcinolone Acetonide (NASACORT) 55 MCG/ACT nasal inhaler 2 sprays into the nostril(s) as directed by provider Daily. 16.5 g 11   • umeclidinium-vilanterol (ANORO ELLIPTA) 62.5-25 MCG/INH aerosol powder  inhaler Inhale 1 puff Daily. 3 each 2   • valsartan (DIOVAN) 320 MG tablet Take 1 tablet by mouth Daily. 90 tablet 2   • [DISCONTINUED] amLODIPine (NORVASC) 5 MG tablet Take 1.5 tablets by mouth Daily. 135 tablet 3     Current Facility-Administered Medications on File Prior to Visit   Medication Dose Route Frequency Provider Last Rate Last Dose   • levalbuterol (XOPENEX) nebulizer solution 0.63 mg  0.63 mg Nebulization Q6H PRN Agata Burgess MD   0.63 mg at 12/17/18 5137     Review of Systems   Constitutional: Positive for fatigue and unexpected weight change.   HENT: Negative.    Eyes: Positive for photophobia, pain and redness.   Respiratory: Negative.    Cardiovascular: Negative.    Gastrointestinal: Negative.    Endocrine: Negative.    Genitourinary: Negative.    Musculoskeletal: Negative.    Skin: Negative.    Allergic/Immunologic: Negative.    Neurological: Negative.   "  Hematological: Negative.    Psychiatric/Behavioral: Negative.        Objective   Physical Exam   Constitutional: She is oriented to person, place, and time.   Thin wf no acute distress   HENT:   Head: Normocephalic and atraumatic.   Eyes: Pupils are equal, round, and reactive to light. EOM are normal.   Left eye injected conjuntiva   Neck: Normal range of motion. Neck supple.   Pulmonary/Chest: Effort normal and breath sounds normal.   b rhonchi partial clear w/ cough   Abdominal: Soft. Bowel sounds are normal.   Musculoskeletal: Normal range of motion.   Neurological: She is alert and oriented to person, place, and time.   Skin: Skin is warm and dry.   Psychiatric: She has a normal mood and affect. Her behavior is normal. Judgment and thought content normal.        /65   Pulse 67   Temp 98.5 °F (36.9 °C)   Ht 157.5 cm (62\")   Wt 50.3 kg (111 lb)   SpO2 96%   BMI 20.30 kg/m²     Assessment/Plan   Diagnoses and all orders for this visit:    Cachexia (CMS/HCC)   -     Iron Profile    Hyperlipidemia, unspecified hyperlipidemia type  -     CBC & Differential  -     Comprehensive Metabolic Panel  -     TSH  -     Vitamin B12  -     Ferritin  -     Iron Profile    Impaired fasting glucose  -     CBC & Differential  -     Comprehensive Metabolic Panel  -     TSH  -     Vitamin B12  -     Ferritin  -     Iron Profile    Fatigue, unspecified type  -     CBC & Differential  -     Comprehensive Metabolic Panel  -     TSH  -     Vitamin B12  -     Ferritin  -     Iron Profile    Mild protein-calorie malnutrition (CMS/HCC)   -     Ferritin    Other orders  -     ofloxacin (Ocuflox) 0.3 % ophthalmic solution; Administer 1 drop into the left eye 4 (Four) Times a Day.    patient w/ fatigue. She has pancreatic cancer. She will start an energy shake (ensure or boost or other) at least one daily. Will test listed labs. Some hypotension possible. Will d/c amlodipine. She will monitor bp. She will f/u routinely. "     ADDENDUM  Will start b12 1000 mcg po qd for low levels as well. Close follow up.

## 2020-06-13 LAB
ALBUMIN SERPL-MCNC: 4.2 G/DL (ref 3.5–5.2)
ALBUMIN/GLOB SERPL: 1.3 G/DL
ALP SERPL-CCNC: 58 U/L (ref 39–117)
ALT SERPL-CCNC: 12 U/L (ref 1–33)
AST SERPL-CCNC: 13 U/L (ref 1–32)
BASOPHILS # BLD AUTO: 0.04 10*3/MM3 (ref 0–0.2)
BASOPHILS NFR BLD AUTO: 0.8 % (ref 0–1.5)
BILIRUB SERPL-MCNC: 0.4 MG/DL (ref 0.2–1.2)
BUN SERPL-MCNC: 6 MG/DL (ref 8–23)
BUN/CREAT SERPL: 10.7 (ref 7–25)
CALCIUM SERPL-MCNC: 9.6 MG/DL (ref 8.6–10.5)
CHLORIDE SERPL-SCNC: 102 MMOL/L (ref 98–107)
CO2 SERPL-SCNC: 27.2 MMOL/L (ref 22–29)
CREAT SERPL-MCNC: 0.56 MG/DL (ref 0.57–1)
EOSINOPHIL # BLD AUTO: 0.48 10*3/MM3 (ref 0–0.4)
EOSINOPHIL NFR BLD AUTO: 10.1 % (ref 0.3–6.2)
ERYTHROCYTE [DISTWIDTH] IN BLOOD BY AUTOMATED COUNT: 13.2 % (ref 12.3–15.4)
FERRITIN SERPL-MCNC: 27.8 NG/ML (ref 13–150)
GLOBULIN SER CALC-MCNC: 3.2 GM/DL
GLUCOSE SERPL-MCNC: 126 MG/DL (ref 65–99)
HCT VFR BLD AUTO: 36.8 % (ref 34–46.6)
HGB BLD-MCNC: 12.3 G/DL (ref 12–15.9)
IMM GRANULOCYTES # BLD AUTO: 0.01 10*3/MM3 (ref 0–0.05)
IMM GRANULOCYTES NFR BLD AUTO: 0.2 % (ref 0–0.5)
IRON SATN MFR SERPL: 21 % (ref 20–50)
IRON SERPL-MCNC: 89 MCG/DL (ref 37–145)
LYMPHOCYTES # BLD AUTO: 1.64 10*3/MM3 (ref 0.7–3.1)
LYMPHOCYTES NFR BLD AUTO: 34.4 % (ref 19.6–45.3)
MCH RBC QN AUTO: 28.9 PG (ref 26.6–33)
MCHC RBC AUTO-ENTMCNC: 33.4 G/DL (ref 31.5–35.7)
MCV RBC AUTO: 86.6 FL (ref 79–97)
MONOCYTES # BLD AUTO: 0.64 10*3/MM3 (ref 0.1–0.9)
MONOCYTES NFR BLD AUTO: 13.4 % (ref 5–12)
NEUTROPHILS # BLD AUTO: 1.96 10*3/MM3 (ref 1.7–7)
NEUTROPHILS NFR BLD AUTO: 41.1 % (ref 42.7–76)
NRBC BLD AUTO-RTO: 0 /100 WBC (ref 0–0.2)
PLATELET # BLD AUTO: 246 10*3/MM3 (ref 140–450)
POTASSIUM SERPL-SCNC: 3.7 MMOL/L (ref 3.5–5.2)
PROT SERPL-MCNC: 7.4 G/DL (ref 6–8.5)
RBC # BLD AUTO: 4.25 10*6/MM3 (ref 3.77–5.28)
SODIUM SERPL-SCNC: 138 MMOL/L (ref 136–145)
TIBC SERPL-MCNC: 430 MCG/DL
TSH SERPL DL<=0.005 MIU/L-ACNC: 4.02 UIU/ML (ref 0.27–4.2)
UIBC SERPL-MCNC: 341 MCG/DL (ref 112–346)
VIT B12 SERPL-MCNC: 356 PG/ML (ref 211–946)
WBC # BLD AUTO: 4.77 10*3/MM3 (ref 3.4–10.8)

## 2020-06-14 RX ORDER — LANOLIN ALCOHOL/MO/W.PET/CERES
1000 CREAM (GRAM) TOPICAL DAILY
Qty: 90 TABLET | Refills: 2 | Status: SHIPPED | OUTPATIENT
Start: 2020-06-14 | End: 2021-01-01

## 2020-06-18 ENCOUNTER — TELEPHONE (OUTPATIENT)
Dept: ONCOLOGY | Facility: CLINIC | Age: 80
End: 2020-06-18

## 2020-06-18 NOTE — TELEPHONE ENCOUNTER
Patient has been on the phone with insurance to see if the CT scheduled for 7/2 has been approved.  She was on the phone for an hour and never had the opportunity to speak to anyone.    Last time the test that was ordered was refused.  She wants the test done.    Please call her at  [phone] can leave message if approved or not.  If not approved, she wants to appeal it.

## 2020-06-22 ENCOUNTER — TELEPHONE (OUTPATIENT)
Dept: ONCOLOGY | Facility: CLINIC | Age: 80
End: 2020-06-22

## 2020-06-22 NOTE — TELEPHONE ENCOUNTER
SARAH FROM HOSPICE IS REQUESTING TO SPEAK WITH SOMEONE FROM THE OFFICE PT IS SCHEDULED FOR A PET SCAN ON 7/2/20 THE PT'S INSURANCE COMPANY IS DENYING COVERAGE BECAUSE SHE IS UNDER HOSPICE CARE HOSPICE WILL COVER THE COST OF A CT CHEST ABDOMEN & PELVIS IF DR NAVA WILL ORDER IT     SARAH CONTACT # [phone]

## 2020-06-22 NOTE — TELEPHONE ENCOUNTER
Hospice calling to say they dont cover pet scan, but we can change to a ct scan of c/a/p if MD wants and that is covered. msg sent to .

## 2020-06-23 ENCOUNTER — TELEPHONE (OUTPATIENT)
Dept: ONCOLOGY | Facility: CLINIC | Age: 80
End: 2020-06-23

## 2020-06-23 NOTE — TELEPHONE ENCOUNTER
----- Message from Morris Nava MD PhD sent at 6/23/2020  9:52 AM EDT -----  Regarding: CT scan  Tonya    This patient is scheduled for CT scan only, it just happened to be in the PET scanning facility.    Thank you very much!    Khanh      ----- Message -----  From: Tonya Cardoza RN  Sent: 6/22/2020   3:08 PM EDT  To: Morris Nava MD PhD    SARAH FROM HOSPICE IS REQUESTING TO SPEAK WITH SOMEONE FROM THE OFFICE PT IS SCHEDULED FOR A PET SCAN ON 7/2/20 THE PT'S INSURANCE COMPANY IS DENYING COVERAGE BECAUSE SHE IS UNDER HOSPICE CARE HOSPICE WILL COVER THE COST OF A CT CHEST ABDOMEN & PELVIS IF DR NAVA WILL ORDER IT        Do you want to switch the scan orders?

## 2020-06-24 ENCOUNTER — TELEPHONE (OUTPATIENT)
Dept: ONCOLOGY | Facility: CLINIC | Age: 80
End: 2020-06-24

## 2020-06-24 DIAGNOSIS — C25.0 MALIGNANT NEOPLASM OF HEAD OF PANCREAS (HCC): Primary | ICD-10-CM

## 2020-06-24 NOTE — TELEPHONE ENCOUNTER
There was some confusion about her imaging studies.  Patient thought hospice what a cover PET scan examination, and if she called us to clarify that.  I contacted hospice care and spoke with Dr. Frank medical director of hospice service.  Dr. Frank told me that the only cover CT scan examination, not PET scan.      I called the patient back, told her the results of the communication with the hospice care.  Patient voiced understanding.      Will request chest abdomen and pelvic CT scan examination.     She will keep appointment on 7/2/2020 for the CT scan examination.    ADAM NAVA M.D., Ph.D.    6/24/2020

## 2020-06-30 ENCOUNTER — HOSPITAL ENCOUNTER (OUTPATIENT)
Dept: CT IMAGING | Facility: HOSPITAL | Age: 80
Discharge: HOME | End: 2020-06-30
Admitting: INTERNAL MEDICINE

## 2020-06-30 ENCOUNTER — LAB (OUTPATIENT)
Dept: LAB | Facility: HOSPITAL | Age: 80
End: 2020-06-30

## 2020-06-30 DIAGNOSIS — C25.0 MALIGNANT NEOPLASM OF HEAD OF PANCREAS (HCC): ICD-10-CM

## 2020-06-30 LAB
ALBUMIN SERPL-MCNC: 4 G/DL (ref 3.5–5.2)
ALBUMIN/GLOB SERPL: 1.2 G/DL
ALP SERPL-CCNC: 52 U/L (ref 39–117)
ALT SERPL W P-5'-P-CCNC: 9 U/L (ref 1–33)
ANION GAP SERPL CALCULATED.3IONS-SCNC: 8.6 MMOL/L (ref 5–15)
AST SERPL-CCNC: 12 U/L (ref 1–32)
BASOPHILS # BLD AUTO: 0.02 10*3/MM3 (ref 0–0.2)
BASOPHILS NFR BLD AUTO: 0.5 % (ref 0–1.5)
BILIRUB SERPL-MCNC: 0.2 MG/DL (ref 0.2–1.2)
BUN SERPL-MCNC: 6 MG/DL (ref 8–23)
BUN/CREAT SERPL: 11.3 (ref 7–25)
CALCIUM SPEC-SCNC: 9 MG/DL (ref 8.6–10.5)
CANCER AG19-9 SERPL-ACNC: 45.7 U/ML
CHLORIDE SERPL-SCNC: 105 MMOL/L (ref 98–107)
CO2 SERPL-SCNC: 26.4 MMOL/L (ref 22–29)
CREAT BLDA-MCNC: 0.5 MG/DL (ref 0.6–1.3)
CREAT SERPL-MCNC: 0.53 MG/DL (ref 0.57–1)
DEPRECATED RDW RBC AUTO: 40.1 FL (ref 37–54)
EOSINOPHIL # BLD AUTO: 0.52 10*3/MM3 (ref 0–0.4)
EOSINOPHIL NFR BLD AUTO: 11.9 % (ref 0.3–6.2)
ERYTHROCYTE [DISTWIDTH] IN BLOOD BY AUTOMATED COUNT: 12.7 % (ref 12.3–15.4)
GFR SERPL CREATININE-BSD FRML MDRD: 112 ML/MIN/1.73
GLOBULIN UR ELPH-MCNC: 3.3 GM/DL
GLUCOSE SERPL-MCNC: 114 MG/DL (ref 65–99)
HCT VFR BLD AUTO: 34.5 % (ref 34–46.6)
HGB BLD-MCNC: 11.5 G/DL (ref 12–15.9)
IMM GRANULOCYTES # BLD AUTO: 0.01 10*3/MM3 (ref 0–0.05)
IMM GRANULOCYTES NFR BLD AUTO: 0.2 % (ref 0–0.5)
LYMPHOCYTES # BLD AUTO: 1.59 10*3/MM3 (ref 0.7–3.1)
LYMPHOCYTES NFR BLD AUTO: 36.3 % (ref 19.6–45.3)
MCH RBC QN AUTO: 28.9 PG (ref 26.6–33)
MCHC RBC AUTO-ENTMCNC: 33.3 G/DL (ref 31.5–35.7)
MCV RBC AUTO: 86.7 FL (ref 79–97)
MONOCYTES # BLD AUTO: 0.79 10*3/MM3 (ref 0.1–0.9)
MONOCYTES NFR BLD AUTO: 18 % (ref 5–12)
NEUTROPHILS NFR BLD AUTO: 1.45 10*3/MM3 (ref 1.7–7)
NEUTROPHILS NFR BLD AUTO: 33.1 % (ref 42.7–76)
NRBC BLD AUTO-RTO: 0 /100 WBC (ref 0–0.2)
PLATELET # BLD AUTO: 229 10*3/MM3 (ref 140–450)
PMV BLD AUTO: 11.9 FL (ref 6–12)
POTASSIUM SERPL-SCNC: 4.7 MMOL/L (ref 3.5–5.2)
PROT SERPL-MCNC: 7.3 G/DL (ref 6–8.5)
RBC # BLD AUTO: 3.98 10*6/MM3 (ref 3.77–5.28)
SODIUM SERPL-SCNC: 140 MMOL/L (ref 136–145)
WBC # BLD AUTO: 4.38 10*3/MM3 (ref 3.4–10.8)

## 2020-06-30 PROCEDURE — 25010000002 IOPAMIDOL 61 % SOLUTION: Performed by: INTERNAL MEDICINE

## 2020-06-30 PROCEDURE — 0 DIATRIZOATE MEGLUMINE & SODIUM PER 1 ML: Performed by: INTERNAL MEDICINE

## 2020-06-30 PROCEDURE — 86301 IMMUNOASSAY TUMOR CA 19-9: CPT | Performed by: INTERNAL MEDICINE

## 2020-06-30 PROCEDURE — 80053 COMPREHEN METABOLIC PANEL: CPT | Performed by: INTERNAL MEDICINE

## 2020-06-30 PROCEDURE — 71260 CT THORAX DX C+: CPT

## 2020-06-30 PROCEDURE — 85025 COMPLETE CBC W/AUTO DIFF WBC: CPT | Performed by: INTERNAL MEDICINE

## 2020-06-30 PROCEDURE — 82565 ASSAY OF CREATININE: CPT

## 2020-06-30 PROCEDURE — 36415 COLL VENOUS BLD VENIPUNCTURE: CPT

## 2020-06-30 PROCEDURE — 74177 CT ABD & PELVIS W/CONTRAST: CPT

## 2020-06-30 RX ADMIN — IOPAMIDOL 85 ML: 612 INJECTION, SOLUTION INTRAVENOUS at 11:41

## 2020-06-30 RX ADMIN — DIATRIZOATE MEGLUMINE AND DIATRIZOATE SODIUM 30 ML: 660; 100 LIQUID ORAL; RECTAL at 11:41

## 2020-07-02 ENCOUNTER — APPOINTMENT (OUTPATIENT)
Dept: PET IMAGING | Facility: HOSPITAL | Age: 80
End: 2020-07-02

## 2020-07-09 ENCOUNTER — APPOINTMENT (OUTPATIENT)
Dept: OTHER | Facility: HOSPITAL | Age: 80
End: 2020-07-09

## 2020-07-09 ENCOUNTER — OFFICE VISIT (OUTPATIENT)
Dept: ONCOLOGY | Facility: CLINIC | Age: 80
End: 2020-07-09

## 2020-07-09 VITALS
TEMPERATURE: 97.1 F | RESPIRATION RATE: 14 BRPM | HEIGHT: 62 IN | WEIGHT: 111.2 LBS | HEART RATE: 66 BPM | OXYGEN SATURATION: 99 % | SYSTOLIC BLOOD PRESSURE: 176 MMHG | BODY MASS INDEX: 20.46 KG/M2 | DIASTOLIC BLOOD PRESSURE: 82 MMHG

## 2020-07-09 DIAGNOSIS — R59.9 ENLARGEMENT OF LYMPH NODE: ICD-10-CM

## 2020-07-09 DIAGNOSIS — C25.0 MALIGNANT NEOPLASM OF HEAD OF PANCREAS (HCC): Primary | ICD-10-CM

## 2020-07-09 PROCEDURE — 99213 OFFICE O/P EST LOW 20 MIN: CPT | Performed by: INTERNAL MEDICINE

## 2020-07-20 RX ORDER — FOLIC ACID 1 MG/1
TABLET ORAL
Qty: 90 TABLET | Refills: 0 | Status: SHIPPED | OUTPATIENT
Start: 2020-07-20 | End: 2020-07-31

## 2020-07-24 ENCOUNTER — TELEPHONE (OUTPATIENT)
Dept: INTERNAL MEDICINE | Facility: CLINIC | Age: 80
End: 2020-07-24

## 2020-07-24 RX ORDER — AMLODIPINE BESYLATE 5 MG/1
5 TABLET ORAL DAILY
Qty: 30 TABLET | Refills: 3 | Status: SHIPPED | OUTPATIENT
Start: 2020-07-24 | End: 2020-01-01

## 2020-07-24 NOTE — TELEPHONE ENCOUNTER
Patient called and stated that he BP for the past 2 days have been high. She said yesterday her BP was 185/90 and today it is 192/94  Please advise

## 2020-07-24 NOTE — TELEPHONE ENCOUNTER
Attempted to reach patient. No answer. Lm. rx amlodipine 5mg one daily. She should call w/ resulting bp.

## 2020-07-31 RX ORDER — FOLIC ACID 1 MG/1
TABLET ORAL
Qty: 90 TABLET | Refills: 0 | Status: SHIPPED | OUTPATIENT
Start: 2020-07-31 | End: 2020-01-01

## 2020-08-10 NOTE — TELEPHONE ENCOUNTER
Óscar called stated the hospice nurse was at their home. I spoke with lila from hospice. She is having stomach pain and nausea since Saturday. Her stomach is firm in her upper and lower right quadrant. Her right leg is also swelling. Her blood pressure is running high 182/70.  Óscar would like a phone call back. [phone].

## 2020-08-11 PROBLEM — R10.9 ABDOMINAL PAIN: Status: ACTIVE | Noted: 2020-01-01

## 2020-08-11 NOTE — PROGRESS NOTES
"Chief Complaint   Patient presents with   • Nausea   • Abdominal Pain   • Hypertension       History of Present Illness   Janet Martinez is a 80 y.o. female presents for acute needs. Patient has pancreatic cancer. She is treating this with palliation only. She reports that approx 4 days ago she had increasing abdominal pain. She has had emesis x 1. She has not been able to eat much at all and has not had a bm. She has also had elevated bp. She has not taken any meds for bp related to emesis. She has been taking oxycodone w/ some benefit for the pain. Also taking zofran for nausea, senna, and creon. Today she has continued pain at \"about 5/10\". Notes stomach feeling \"firm\".   Weight loss 5 # from dec. Patient has been able to eat fairly well until Friday.        The following portions of the patient's history were reviewed and updated as appropriate: allergies, current medications, past family history, past medical history, past social history, past surgical history and problem list.  Current Outpatient Medications on File Prior to Visit   Medication Sig Dispense Refill   • CREON 81016 units capsule delayed-release particles capsule Take 12,000 units of lipase by mouth Daily. before a meal  3   • oxyCODONE-acetaminophen (PERCOCET)  MG per tablet Take 1 tablet by mouth Every 6 (Six) Hours As Needed for Moderate Pain .     • albuterol (PROVENTIL) (2.5 MG/3ML) 0.083% nebulizer solution Take 2.5 mg by nebulization Every 4 (Four) Hours As Needed for Wheezing.     • amLODIPine (NORVASC) 5 MG tablet Take 1 tablet by mouth Daily. 30 tablet 3   • budesonide (PULMICORT) 0.5 MG/2ML nebulizer solution USE 1 VIAL VIA NEBULIZER TWICE DAILY RINSE MOUTH AFTER USE  12   • Calcium Carbonate-Vitamin D (CALCIUM 600+D PO) Take 1 tablet by mouth Daily.     • colestipol (COLESTID) 1 g tablet TAKE 1 TABLET BY MOUTH TWICE A  tablet 1   • DULoxetine (CYMBALTA) 60 MG capsule Take 1 capsule by mouth 2 (Two) Times a Day. 60 " capsule 3   • etanercept (ENBREL) 50 MG/ML solution prefilled syringe injection Inject 50 mg under the skin 1 (One) Time Per Week. THURSDAYS. TO HOLD 4 WEEKS     • fluticasone (FLONASE) 50 MCG/ACT nasal spray 2 sprays into the nostril(s) as directed by provider Daily. 1 bottle 5   • folic acid (FOLVITE) 1 MG tablet TAKE 1 TABLET BY MOUTH EVERY DAY 90 tablet 0   • ipratropium (ATROVENT) 0.06 % nasal spray 2 sprays into each nostril 4 (Four) Times a Day As Needed for Rhinitis.     • ipratropium-albuterol (DUO-NEB) 0.5-2.5 mg/3 ml nebulizer Take 3 mL by nebulization Every 4 (Four) Hours As Needed for Wheezing.     • KLOR-CON 20 MEQ CR tablet TAKE 1 TABLET BY MOUTH EVERY DAY 90 tablet 0   • magnesium oxide (MAG-OX) 400 MG tablet Take 1 tablet by mouth 2 (Two) Times a Day. 60 tablet 2   • montelukast (SINGULAIR) 10 MG tablet Take 1 tablet by mouth Daily. 90 tablet 3   • ofloxacin (Ocuflox) 0.3 % ophthalmic solution Administer 1 drop into the left eye 4 (Four) Times a Day. 5 mL 1   • omeprazole (priLOSEC) 40 MG capsule TAKE 1 CAPSULE BY MOUTH EVERY DAY IN THE EVENING 90 capsule 1   • PERFOROMIST 20 MCG/2ML nebulizer solution USE 1 VIAL VIA NEBULIZER TWICE DAILY  12   • saccharomyces boulardii (FLORASTOR) 250 MG capsule Take 1 capsule by mouth 2 (Two) Times a Day. 20 capsule 5   • Triamcinolone Acetonide (NASACORT) 55 MCG/ACT nasal inhaler 2 sprays into the nostril(s) as directed by provider Daily. 16.5 g 11   • umeclidinium-vilanterol (ANORO ELLIPTA) 62.5-25 MCG/INH aerosol powder  inhaler Inhale 1 puff Daily. 3 each 2   • valsartan (DIOVAN) 320 MG tablet Take 1 tablet by mouth Daily. 90 tablet 2   • vitamin B-12 (CYANOCOBALAMIN) 1000 MCG tablet Take 1 tablet by mouth Daily. 90 tablet 2     Current Facility-Administered Medications on File Prior to Visit   Medication Dose Route Frequency Provider Last Rate Last Dose   • levalbuterol (XOPENEX) nebulizer solution 0.63 mg  0.63 mg Nebulization Q6H PRN Agata Burgess MD    "0.63 mg at 12/17/18 1437     Review of Systems   Constitutional: Positive for fatigue.   HENT: Negative.    Eyes: Negative.    Respiratory: Negative.    Cardiovascular: Negative.    Gastrointestinal: Positive for abdominal distention, abdominal pain and constipation. Negative for diarrhea.   Endocrine: Negative.    Genitourinary: Negative.    Musculoskeletal: Negative.    Skin: Negative.    Allergic/Immunologic: Negative.    Neurological: Negative.    Hematological: Negative.    Psychiatric/Behavioral: Negative.        Objective   Physical Exam   Constitutional: She is oriented to person, place, and time.   Thin wf. Chronically ill appearing. Discomfort but no acute distress   HENT:   Head: Normocephalic and atraumatic.   Right Ear: External ear normal.   Left Ear: External ear normal.   Mouth/Throat: Oropharynx is clear and moist.   Eyes: Pupils are equal, round, and reactive to light. EOM are normal.   Neck: Normal range of motion. Neck supple.   Cardiovascular: Normal rate and regular rhythm.   Pulmonary/Chest: Effort normal and breath sounds normal.   Abdominal: She exhibits distension. There is tenderness. There is guarding.   Mid epigastric pain    Musculoskeletal: Normal range of motion.   Neurological: She is alert and oriented to person, place, and time.   Skin: Skin is warm and dry.   Psychiatric: She has a normal mood and affect. Her behavior is normal. Judgment and thought content normal.   Nursing note and vitals reviewed.       /76   Pulse 67   Temp 98.2 °F (36.8 °C)   Ht 157.5 cm (62\")   Wt 49.9 kg (110 lb)   BMI 20.12 kg/m²     Assessment/Plan   Diagnoses and all orders for this visit:    Acute abdominal pain    Malignant neoplasm of pancreas, unspecified location of malignancy (CMS/HCC)    Essential hypertension      Patient with pancreatic cancer. Acute abdominal pain w/ constipation. Concern for sbo. Will admit to hospital for further evaluation and treatment. She will have bp monitored " and meds restarted as needed. She will f/u here after discharge.

## 2020-08-11 NOTE — H&P
Name: Janet Martinez ADMIT: 2020   : 1940  PCP: Agata Burgess MD    MRN: [id number] LOS: 0 days   AGE/SEX: 80 y.o. female  ROOM: [facility]     No chief complaint on file.  Abdominal pain    Subjective   HPI  Ms. Martinez is a 80 y.o. female with a history of metastatic prostate cancer, hypertension, GERD, COPD who presents to [facility] with worsening abdominal pain for about 3 to 4 days.  She had an episode of nonbloody and nonbilious vomiting about 3 days ago after she took her oral medications.  She has not had any additional vomiting but still has intermittent nausea.  She has not taken many of her oral medication since then.  She also has not had a bowel movement in 3 to 4 days.  She reports passing a very small amount of flatus.  The abdominal pain is generalized to central location and nonradiating.  She reports it is intermittent and worse with movement.  Improved with rest and with medication.  She reports no fevers or chills.  No diarrhea.  No chest pain or shortness of breath.  She has been following with the [facility] but also is current with hospice.  She had done well and then had some increasing tumor markers in May of this year.  She did have stable CT findings at that time.  She has a wedding in about 2 weeks which she would very much like to attend.  She reports that she does not want CPR or intubation in the event of acute arrest.    Past Medical History:   Diagnosis Date   • Abdominal hernia    • Cancer (CMS/HCC)    • COPD (chronic obstructive pulmonary disease) (CMS/HCC)    • Depression    • Food allergy     Scallops: causes hives   • GERD (gastroesophageal reflux disease)    • Hiatal hernia    • History of bruising easily    • History of colon polyps 2013    PATH: Distal Transverse Colon, Fragments of Tubular Adenoma   • History of colon polyps 2013    PATH: Ascending Colon - Tubular Adenoma, Splenic Flexure Polyp @ 65 cm - Tubulovillous Adenoma,  Descending Colon - Tubular Adenoma   • History of colon polyps 10/30/2017    PATH: Transverse Colon - Tubular Adenoma, Rectal Polyps - Fragments of Hyperplastic Polyp   • Hypercholesteremia    • Hypertension    • Joint pain     swelling   • On home oxygen therapy     O2 NC 2. LITERS/ NIGHT   • Osteoarthritis    • Osteoporosis    • Pancreatic cancer (CMS/HCC)    • PONV (postoperative nausea and vomiting)    • Rheumatoid arthritis (CMS/HCC)     DIAGNOSIS AGE 23 - FOLLOWED BY RHEUMATOLOGY DR INFANTE, NO PROBLEMS WITH FLEX/ EXTENSION      Past Surgical History:   Procedure Laterality Date   • BREAST BIOPSY Left     Benign pathology   • BRONCHOSCOPY N/A 1/3/2018    Procedure: BRONCHOSCOPY with lavage in right middle lobe and lingula.;  Surgeon: Trenton Garcia MD;  Location: [facility] ENDOSCOPY;  Service:    • CAROTID ENDARTERECTOMY Left 2006   • CATARACT EXTRACTION WITH INTRAOCULAR LENS IMPLANT Bilateral    • COLONOSCOPY N/A 10/30/2017    Procedure: COLONOSCOPY TO CECUM, TO TERMINAL ILEUM WITH COLD BIOPSY POLYPECTOMY;  Surgeon: Jossie Stanley MD;  Location: [facility] ENDOSCOPY;  Service:    • COLONOSCOPY N/A 08/13/2013    One 3 mm polyp in the transverse colon, Diverticulosis in the sigmoid colon, [facility], Dr. Jossie Stanley   • COLONOSCOPY N/A 01/30/2013    One 3 mm polyp ascending colon; One 7 mm polyp descending colon; One 25 mm polyp splenic flexure, Diverticulosis Sigmoid Colon, [facility], Dr. Jossie Stanley   • FOOT SURGERY Bilateral     Bilateral (MULITPLE SURGERIES)   • HAND SURGERY Bilateral     Bilateral (MULTIPLE SURGERIES)   • KNEE ARTHROSCOPY W/ PARTIAL MEDIAL MENISCECTOMY Left 01/22/2016    Left Knee Arthroscopy w/ partial medial & lateral meniscectomies, Three compartment synovectomy w/ loose body removal, Chondroplasty of patella, Dr. Florencio Frazier   • LEFT OOPHORECTOMY Left 1950    bening tumor   • NECK SURGERY     • SINUS SURGERY N/A 2011   • TOE FUSION Right 2017    with screws and pins-Dr. Atkins   • TOTAL  KNEE ARTHROPLASTY Left 2016    Procedure: LT TOTAL KNEE ARTHROPLASTY WITH FELECIA NAVIGATION;  Surgeon: Florencio Frazier MD;  Location: [facility] MAIN OR;  Service:    • VENTRAL HERNIA REPAIR N/A 2018    Procedure: VENTRAL HERNIA REPAIR LAPAROSCOPIC WITH DAVINCI ROBOT;  Surgeon: Josr Jennings MD;  Location:  [facility] MAIN OR;  Service: DaVinci     Family History   Problem Relation Age of Onset   • Heart disease Mother    • Hypertension Mother    • Lung disease Mother    • Diabetes Sister    • Hypertension Brother    • Multiple sclerosis Brother    • Diabetes Sister    • Malig Hyperthermia Neg Hx      Social History     Tobacco Use   • Smoking status: Former Smoker     Packs/day: 1.50     Years: 30.00     Pack years: 45.00     Types: Cigarettes     Last attempt to quit:      Years since quittin.6   • Smokeless tobacco: Never Used   Substance Use Topics   • Alcohol use: No     Comment: No alcohol for 30 years   • Drug use: No     Facility-Administered Medications Prior to Admission   Medication Dose Route Frequency Provider Last Rate Last Dose   • levalbuterol (XOPENEX) nebulizer solution 0.63 mg  0.63 mg Nebulization Q6H PRN Agata Burgess MD   0.63 mg at 18 1437     Medications Prior to Admission   Medication Sig Dispense Refill Last Dose   • albuterol (PROVENTIL) (2.5 MG/3ML) 0.083% nebulizer solution Take 2.5 mg by nebulization Every 4 (Four) Hours As Needed for Wheezing.   8/10/2020 at Unknown time   • amLODIPine (NORVASC) 5 MG tablet Take 1 tablet by mouth Daily. 30 tablet 3 Past Week at Unknown time   • budesonide (PULMICORT) 0.5 MG/2ML nebulizer solution USE 1 VIAL VIA NEBULIZER TWICE DAILY RINSE MOUTH AFTER USE  12 8/10/2020 at Unknown time   • Calcium Carbonate-Vitamin D (CALCIUM 600+D PO) Take 1 tablet by mouth Daily.   Past Week at Unknown time   • colestipol (COLESTID) 1 g tablet TAKE 1 TABLET BY MOUTH TWICE A  tablet 1 Past Week at Unknown time   • CREON 63011 units capsule  delayed-release particles capsule Take 12,000 units of lipase by mouth Daily. before a meal  3 8/10/2020 at Unknown time   • DULoxetine (CYMBALTA) 60 MG capsule Take 1 capsule by mouth 2 (Two) Times a Day. 60 capsule 3 Past Week at Unknown time   • etanercept (ENBREL) 50 MG/ML solution prefilled syringe injection Inject 50 mg under the skin 1 (One) Time Per Week. THURSDAYS. TO HOLD 4 WEEKS   Past Week at Unknown time   • fluticasone (FLONASE) 50 MCG/ACT nasal spray 2 sprays into the nostril(s) as directed by provider Daily. 1 bottle 5 Past Month at Unknown time   • folic acid (FOLVITE) 1 MG tablet TAKE 1 TABLET BY MOUTH EVERY DAY 90 tablet 0 Past Week at Unknown time   • ipratropium (ATROVENT) 0.06 % nasal spray 2 sprays into each nostril 4 (Four) Times a Day As Needed for Rhinitis.   Past Month at Unknown time   • ipratropium-albuterol (DUO-NEB) 0.5-2.5 mg/3 ml nebulizer Take 3 mL by nebulization Every 4 (Four) Hours As Needed for Wheezing.   8/10/2020 at Unknown time   • KLOR-CON 20 MEQ CR tablet TAKE 1 TABLET BY MOUTH EVERY DAY 90 tablet 0 Past Week at Unknown time   • magnesium oxide (MAG-OX) 400 MG tablet Take 1 tablet by mouth 2 (Two) Times a Day. 60 tablet 2 Past Week at Unknown time   • montelukast (SINGULAIR) 10 MG tablet Take 1 tablet by mouth Daily. 90 tablet 3 Past Week at Unknown time   • omeprazole (priLOSEC) 40 MG capsule TAKE 1 CAPSULE BY MOUTH EVERY DAY IN THE EVENING 90 capsule 1 Past Week at Unknown time   • ondansetron ODT (ZOFRAN-ODT) 4 MG disintegrating tablet Place 4 mg on the tongue Every 8 (Eight) Hours As Needed for Nausea or Vomiting.   8/10/2020 at Unknown time   • oxyCODONE-acetaminophen (PERCOCET)  MG per tablet Take 1 tablet by mouth Every 6 (Six) Hours As Needed for Moderate Pain .   8/10/2020 at Unknown time   • saccharomyces boulardii (FLORASTOR) 250 MG capsule Take 1 capsule by mouth 2 (Two) Times a Day. 20 capsule 5 Past Week at Unknown time   • senna (senna) 8.6 MG tablet  Take 1 tablet by mouth Daily As Needed for Constipation.   8/10/2020 at Unknown time   • Triamcinolone Acetonide (NASACORT) 55 MCG/ACT nasal inhaler 2 sprays into the nostril(s) as directed by provider Daily. 16.5 g 11 Past Week at Unknown time   • umeclidinium-vilanterol (ANORO ELLIPTA) 62.5-25 MCG/INH aerosol powder  inhaler Inhale 1 puff Daily. 3 each 2 Past Week at Unknown time   • valsartan (DIOVAN) 320 MG tablet Take 1 tablet by mouth Daily. 90 tablet 2 Past Week at Unknown time   • vitamin B-12 (CYANOCOBALAMIN) 1000 MCG tablet Take 1 tablet by mouth Daily. 90 tablet 2 Past Week at Unknown time   • ofloxacin (Ocuflox) 0.3 % ophthalmic solution Administer 1 drop into the left eye 4 (Four) Times a Day. 5 mL 1 Not Taking   • PERFOROMIST 20 MCG/2ML nebulizer solution USE 1 VIAL VIA NEBULIZER TWICE DAILY  12 Not Taking     Allergies:    Allergies   Allergen Reactions   • Gold-Containing Drug Products Unknown - High Severity     Patient cannot recall reaction   • Hydrocodone Irritability     HYPERACTIVITY       Review of Systems   Constitutional: Negative for chills and fever.   HENT: Negative for sore throat and trouble swallowing.    Eyes: Negative for pain and visual disturbance.   Respiratory: Negative for cough, shortness of breath and wheezing.    Cardiovascular: Negative for chest pain, palpitations and leg swelling.   Gastrointestinal: Positive for abdominal pain, constipation, nausea and vomiting. Negative for diarrhea.   Endocrine: Negative for cold intolerance and heat intolerance.   Genitourinary: Negative for difficulty urinating and dysuria.   Musculoskeletal: Negative for neck pain and neck stiffness.   Skin: Negative for pallor and rash.   Allergic/Immunologic: Positive for food allergies. Negative for environmental allergies.   Neurological: Negative for seizures and syncope.   Hematological: Negative for adenopathy. Does not bruise/bleed easily.   Psychiatric/Behavioral: Negative for agitation and  confusion.        Objective    Vital Signs  Temp:  [97.1 °F (36.2 °C)-98.2 °F (36.8 °C)] 97.1 °F (36.2 °C)  Heart Rate:  [66-67] 66  Resp:  [16] 16  BP: (174-187)/(76-88) 187/81  SpO2:  [98 %] 98 %  on   ;   Device (Oxygen Therapy): room air  There is no height or weight on file to calculate BMI.    Physical Exam   Constitutional: She is oriented to person, place, and time. She appears well-developed. No distress.   HENT:   Head: Atraumatic.   Nose: Nose normal.   Eyes: Conjunctivae and EOM are normal.   Neck: Neck supple. No tracheal deviation present.   Cardiovascular: Normal rate, regular rhythm and intact distal pulses.   Murmur heard.  Pulmonary/Chest: Effort normal. She has no wheezes. She has no rales.   Abdominal: Soft. She exhibits no distension. There is tenderness. There is guarding. There is no rebound.   Musculoskeletal: She exhibits no edema.   RA swan neck deformity and deviation bilat hands   Neurological: She is alert and oriented to person, place, and time. No cranial nerve deficit.   Skin: Skin is warm and dry. She is not diaphoretic.   Psychiatric: She has a normal mood and affect. Her behavior is normal.   Nursing note and vitals reviewed.      Results Review:  I reviewed the patient's new clinical results.  I reviewed imaging, agree with interpretation.  I reviewed EKG/telemetry, sinus.  I reviewed prior records.    Lab Results (last 24 hours)     Procedure Component Value Units Date/Time    COVID PRE-OP / PRE-PROCEDURE SCREENING ORDER (NO ISOLATION) - Swab, Nasopharynx [[id number]] Collected:  08/11/20 1041    Specimen:  Swab from Nasopharynx Updated:  08/11/20 1041    Narrative:       The following orders were created for panel order COVID PRE-OP / PRE-PROCEDURE SCREENING ORDER (NO ISOLATION) - Swab, Nasopharynx.  Procedure                               Abnormality         Status                     ---------                               -----------         ------                        COVID-19,BIOTAP, NP/OP S...[[id number]]                      In process                   Please view results for these tests on the individual orders.    COVID-19,BIOTAP, NP/OP SWAB IN TRANSPORT MEDIA OR SALINE 24-36 HR TAT - Swab, Nasopharynx [[id number]] Collected:  08/11/20 1041    Specimen:  Swab from Nasopharynx Updated:  08/11/20 1041          No orders to display     Assessment/Plan      Active Hospital Problems    Diagnosis  POA   • **Abdominal pain [R10.9]  Yes   • Cancer associated pain [G89.3]  Yes   • Pancreatic cancer (CMS/HCC) [C25.9]  Yes   • Hypertension [I10]  Yes   • Chronic obstructive pulmonary disease (CMS/HCC) [J44.9]  Yes      Resolved Hospital Problems   No resolved problems to display.     · Abdominal pain: Concern would be for obstruction versus constipation related to progression of cancer, chronic opioid use, or other etiology.  Am obtaining stat labs to check liver function renal function and electrolytes.  Also will check a lipase and CBC.  After those labs result plan to proceed with CT of the abdomen and pelvis hopefully with contrast unless she has some renal impairment.  Give fluids and control pain and nausea.  Depending on imaging results may need surgical consult if bowel obstruction is present.  If it is not present then we will proceed with a bowel regimen and see how she does with clears.  N.p.o. for now.  · Pancreatic cancer/cancer associated pain: Repeat CA-19-9.  Follow-up CT imaging.  Consult oncology.  · Hypertension: Has not been tolerating her meds.  Will order as needed hydralazine.  · COPD: No acute exacerbation.  · Prophylaxis: SCD  · Code: DNR/DNI, discussed with patient    Renal function ok. Low potassium. CT ordered and will replace electrolytes.      I discussed the patients findings and my recommendations with patient, family, nursing staff and primary care team.    Benjamin Guerrero MD  [facility]  08/11/20  12:21    Dictated portions  using Dragon dictation software.  During the entire encounter, I was wearing recommended PPE including face mask and eye protection. Hand sanitization was performed prior to entering room and upon exit.

## 2020-08-11 NOTE — PROGRESS NOTES
Discharge Planning Assessment  [facility]     Patient Name: Janet Martinez  MRN: [id number]  Today's Date: 8/11/2020    Admit Date: 8/11/2020    Discharge Needs Assessment     Row Name 08/11/20 1832       Living Environment    Lives With  spouse    Name(s) of Who Lives With Patient  spouse Óscar Martinez [phone]    Current Living Arrangements  home/apartment/condo    Primary Care Provided by  self;spouse/significant other    Provides Primary Care For  no one, unable/limited ability to care for self    Family Caregiver if Needed  spouse    Family Caregiver Names  her spouse Óscar Martinez [phone]    Quality of Family Relationships  helpful;involved;supportive    Able to Return to Prior Arrangements  yes       Resource/Environmental Concerns    Resource/Environmental Concerns  none       Transition Planning    Patient/Family Anticipates Transition to  home with family    Patient/Family Anticipated Services at Transition  none    Transportation Anticipated  family or friend will provide       Discharge Needs Assessment    Concerns to be Addressed  denies needs/concerns at this time    Equipment Currently Used at Home  oxygen    Anticipated Changes Related to Illness  none    Equipment Needed After Discharge  none    Current Discharge Risk  physical impairment        Discharge Plan     Row Name 08/11/20 1831       Plan    Plan  Plans home; denies needs.     Provided Post Acute Provider List?  Yes    Post Acute Provider List  Home Health;Nursing Home    Provided Post Acute Provider Quality & Resource List?  N/A    N/A Quality & Resource List Comment  The patient was provided with a HH/SNF list and not a print out of the HH/nursing home compare list from Medicare.gov as she currently denies any d/c needs.      Delivered To  Patient    Method of Delivery  In person    Patient/Family in Agreement with Plan  yes    Plan Comments  Met with the patient at bedside; explained role of CCP,  "verified facesheet and discussed discharge planning needs.  The patient plans to return home upon d/c with assistance from her spouse Óscar Martinez [phone].  The patient uses home O2 but is unsure of which provider.  The patient has no steps to enter her single story home.  The patient's PCP is Agata Burgess, pharmacy [facility] [address] and she denies any trouble remembering to take her medication or with affording her medication.  The patient denies any HH/SNF history, has her sisters Lashaun Duran [phone] and Miladys \"Traci\" Adelaida [phone] listed as her Healthcare Surrogates on her living will in [employer]  The patient states that her spouse will transport her home upon d/c and he also takes her to her appointments.  The patient was provided with a HH/SNF list and not a print out of the HH/nursing home compare list from Medicare.gov as she currently denies any d/c needs.  CCP will follow to assist with any d/c needs that may arise.  MONICA Zacarias        Destination      Coordination has not been started for this encounter.      Durable Medical Equipment      Coordination has not been started for this encounter.      Dialysis/Infusion      Coordination has not been started for this encounter.      Home Medical Care      Coordination has not been started for this encounter.      Therapy      Coordination has not been started for this encounter.      Community Resources      Coordination has not been started for this encounter.          Demographic Summary     Row Name 08/11/20 1831       General Information    Admission Type  observation    Arrived From  home    Referral Source  admission list    Reason for Consult  discharge planning    Preferred Language  English     Used During This Interaction  no        Functional Status     Row Name 08/11/20 1832       Functional Status    Usual Activity Tolerance  moderate    Current Activity Tolerance  fair       Functional " Status, IADL    Medications  independent    Meal Preparation  independent    Housekeeping  independent    Laundry  independent    Shopping  independent       Mental Status    General Appearance WDL  WDL       Mental Status Summary    Recent Changes in Mental Status/Cognitive Functioning  no changes        Psychosocial    No documentation.       Abuse/Neglect    No documentation.       Legal    No documentation.       Substance Abuse    No documentation.       Patient Forms     Row Name 08/11/20 1835       Patient Forms    Provider Choice List  Delivered    Delivered to  Patient    Method of delivery  In person            MONICA Tran

## 2020-08-11 NOTE — CONSULTS
Subjective     REASON FOR CONSULTATION: Pancreatic cancer, abdominal pain   Provide an opinion on any further workup or treatment                             REQUESTING PHYSICIAN: Dr. Guerrero    RECORDS OBTAINED:  Records of the patients history including those obtained from the referring provider were reviewed and summarized in detail.    HISTORY OF PRESENT ILLNESS:  The patient is a 80 y.o. year old female who is here for an opinion about the above issue.    History of Present Illness   This is a vivi 80-year-old lady followed by  in our practice for pancreatic cancer.  She was initially diagnosed in September 2019 and has not to date received any active treatment preferring palliative measures.  Her baseline PET scan 9/3/2019 showed an intensely FDG avid mass in the pancreatic head with dilation of the main pancreatic duct, mild activity in mediastinal lymph nodes of unclear significance.  And endoscopic ultrasound and FNA on 9/17/2019 showed atypical glandular cells consistent with adenocarcinoma.  Treatment options were discussed with the patient and her  but as stated she preferred palliative measures showing slight decrease.  She has been followed with imaging studies.  She had a repeat PET scan 11/22/2019 sides of the pancreatic head mass and decreased activity stable shotty mediastinal lymph nodes.  Most recent CT chest abdomen pelvis 6/30/2020 showed stable mediastinal lymph nodes, 2 cm vague pancreatic head mass, stable pancreatic duct dilatation, no biliary dilatation, no obvious of metastatic disease to the liver.    The patient is admitted after experiencing 4 to 5 days of abdominal pain anteriorly with some radiation to the back unable to keep comfortable at home.  Pain is been associated with one episode of emesis and some nausea.  She has had decreased appetite.  She also complains of no bowel movement for 4 days or as she typically has a bowel movement twice daily.    Past  Medical History:   Diagnosis Date   • Abdominal hernia    • Cancer (CMS/HCC)    • COPD (chronic obstructive pulmonary disease) (CMS/HCC)    • Depression    • Food allergy     Scallops: causes hives   • GERD (gastroesophageal reflux disease)    • Hiatal hernia    • History of bruising easily    • History of colon polyps 08/13/2013    PATH: Distal Transverse Colon, Fragments of Tubular Adenoma   • History of colon polyps 01/30/2013    PATH: Ascending Colon - Tubular Adenoma, Splenic Flexure Polyp @ 65 cm - Tubulovillous Adenoma, Descending Colon - Tubular Adenoma   • History of colon polyps 10/30/2017    PATH: Transverse Colon - Tubular Adenoma, Rectal Polyps - Fragments of Hyperplastic Polyp   • Hypercholesteremia    • Hypertension    • Joint pain     swelling   • On home oxygen therapy     O2 NC 2. LITERS/ NIGHT   • Osteoarthritis    • Osteoporosis    • Pancreatic cancer (CMS/HCC)    • PONV (postoperative nausea and vomiting)    • Rheumatoid arthritis (CMS/HCC)     DIAGNOSIS AGE 23 - FOLLOWED BY RHEUMATOLOGY DR INFANTE, NO PROBLEMS WITH FLEX/ EXTENSION         Past Surgical History:   Procedure Laterality Date   • BREAST BIOPSY Left     Benign pathology   • BRONCHOSCOPY N/A 1/3/2018    Procedure: BRONCHOSCOPY with lavage in right middle lobe and lingula.;  Surgeon: Trenton Garcia MD;  Location: [facility] ENDOSCOPY;  Service:    • CAROTID ENDARTERECTOMY Left 2006   • CATARACT EXTRACTION WITH INTRAOCULAR LENS IMPLANT Bilateral    • COLONOSCOPY N/A 10/30/2017    Procedure: COLONOSCOPY TO CECUM, TO TERMINAL ILEUM WITH COLD BIOPSY POLYPECTOMY;  Surgeon: Jossie Stanley MD;  Location: [facility] ENDOSCOPY;  Service:    • COLONOSCOPY N/A 08/13/2013    One 3 mm polyp in the transverse colon, Diverticulosis in the sigmoid colon, GUICHO, Dr. Jossie Stanley   • COLONOSCOPY N/A 01/30/2013    One 3 mm polyp ascending colon; One 7 mm polyp descending colon; One 25 mm polyp splenic flexure, Diverticulosis Sigmoid Colon, Dr. GUICHO  Jossie Stanley   • FOOT SURGERY Bilateral     Bilateral (MULITPLE SURGERIES)   • HAND SURGERY Bilateral     Bilateral (MULTIPLE SURGERIES)   • KNEE ARTHROSCOPY W/ PARTIAL MEDIAL MENISCECTOMY Left 01/22/2016    Left Knee Arthroscopy w/ partial medial & lateral meniscectomies, Three compartment synovectomy w/ loose body removal, Chondroplasty of patella, Dr. Florencio Frazier   • LEFT OOPHORECTOMY Left 1950    bening tumor   • NECK SURGERY     • SINUS SURGERY N/A 2011   • TOE FUSION Right 2017    with screws and pins-Dr. Atkins   • TOTAL KNEE ARTHROPLASTY Left 5/18/2016    Procedure: LT TOTAL KNEE ARTHROPLASTY WITH FELECIA NAVIGATION;  Surgeon: Florencio Frazier MD;  Location: [facility] OR;  Service:    • VENTRAL HERNIA REPAIR N/A 5/22/2018    Procedure: VENTRAL HERNIA REPAIR LAPAROSCOPIC WITH [employer];  Surgeon: Josr Jennings MD;  Location: [facility] OR;  Service: DaVinci        No current facility-administered medications on file prior to encounter.      Current Outpatient Medications on File Prior to Encounter   Medication Sig Dispense Refill   • albuterol (PROVENTIL) (2.5 MG/3ML) 0.083% nebulizer solution Take 2.5 mg by nebulization Every 4 (Four) Hours As Needed for Wheezing.     • amLODIPine (NORVASC) 5 MG tablet Take 1 tablet by mouth Daily. 30 tablet 3   • budesonide (PULMICORT) 0.5 MG/2ML nebulizer solution USE 1 VIAL VIA NEBULIZER TWICE DAILY RINSE MOUTH AFTER USE  12   • Calcium Carbonate-Vitamin D (CALCIUM 600+D PO) Take 1 tablet by mouth Daily.     • colestipol (COLESTID) 1 g tablet TAKE 1 TABLET BY MOUTH TWICE A  tablet 1   • CREON 97739 units capsule delayed-release particles capsule Take 12,000 units of lipase by mouth Daily. before a meal  3   • DULoxetine (CYMBALTA) 60 MG capsule Take 1 capsule by mouth 2 (Two) Times a Day. 60 capsule 3   • etanercept (ENBREL) 50 MG/ML solution prefilled syringe injection Inject 50 mg under the skin 1 (One) Time Per Week. THURSDAYS. TO HOLD 4 WEEKS      • fluticasone (FLONASE) 50 MCG/ACT nasal spray 2 sprays into the nostril(s) as directed by provider Daily. 1 bottle 5   • folic acid (FOLVITE) 1 MG tablet TAKE 1 TABLET BY MOUTH EVERY DAY 90 tablet 0   • ipratropium (ATROVENT) 0.06 % nasal spray 2 sprays into each nostril 4 (Four) Times a Day As Needed for Rhinitis.     • ipratropium-albuterol (DUO-NEB) 0.5-2.5 mg/3 ml nebulizer Take 3 mL by nebulization Every 4 (Four) Hours As Needed for Wheezing.     • KLOR-CON 20 MEQ CR tablet TAKE 1 TABLET BY MOUTH EVERY DAY 90 tablet 0   • magnesium oxide (MAG-OX) 400 MG tablet Take 1 tablet by mouth 2 (Two) Times a Day. 60 tablet 2   • montelukast (SINGULAIR) 10 MG tablet Take 1 tablet by mouth Daily. 90 tablet 3   • omeprazole (priLOSEC) 40 MG capsule TAKE 1 CAPSULE BY MOUTH EVERY DAY IN THE EVENING 90 capsule 1   • ondansetron ODT (ZOFRAN-ODT) 4 MG disintegrating tablet Place 4 mg on the tongue Every 8 (Eight) Hours As Needed for Nausea or Vomiting.     • oxyCODONE-acetaminophen (PERCOCET)  MG per tablet Take 1 tablet by mouth Every 6 (Six) Hours As Needed for Moderate Pain .     • saccharomyces boulardii (FLORASTOR) 250 MG capsule Take 1 capsule by mouth 2 (Two) Times a Day. 20 capsule 5   • senna (senna) 8.6 MG tablet Take 1 tablet by mouth Daily As Needed for Constipation.     • Triamcinolone Acetonide (NASACORT) 55 MCG/ACT nasal inhaler 2 sprays into the nostril(s) as directed by provider Daily. 16.5 g 11   • umeclidinium-vilanterol (ANORO ELLIPTA) 62.5-25 MCG/INH aerosol powder  inhaler Inhale 1 puff Daily. 3 each 2   • valsartan (DIOVAN) 320 MG tablet Take 1 tablet by mouth Daily. 90 tablet 2   • vitamin B-12 (CYANOCOBALAMIN) 1000 MCG tablet Take 1 tablet by mouth Daily. 90 tablet 2   • ofloxacin (Ocuflox) 0.3 % ophthalmic solution Administer 1 drop into the left eye 4 (Four) Times a Day. 5 mL 1   • PERFOROMIST 20 MCG/2ML nebulizer solution USE 1 VIAL VIA NEBULIZER TWICE DAILY  12        ALLERGIES:    Allergies    Allergen Reactions   • Gold-Containing Drug Products Unknown - High Severity     Patient cannot recall reaction   • Hydrocodone Irritability     HYPERACTIVITY        Social History     Socioeconomic History   • Marital status:      Spouse name: Óscar   • Number of children: 3   • Years of education: High school   • Highest education level: Not on file   Occupational History     Employer: RETIRED   Tobacco Use   • Smoking status: Former Smoker     Packs/day: 1.50     Years: 30.00     Pack years: 45.00     Types: Cigarettes     Last attempt to quit:      Years since quittin.6   • Smokeless tobacco: Never Used   Substance and Sexual Activity   • Alcohol use: No     Comment: No alcohol for 30 years   • Drug use: No   • Sexual activity: Defer        Family History   Problem Relation Age of Onset   • Heart disease Mother    • Hypertension Mother    • Lung disease Mother    • Diabetes Sister    • Hypertension Brother    • Multiple sclerosis Brother    • Diabetes Sister    • Malig Hyperthermia Neg Hx         Review of Systems   Constitutional: Positive for activity change, appetite change, fatigue and fever. Negative for unexpected weight change.   HENT: Negative.    Respiratory: Negative.    Cardiovascular: Negative.    Gastrointestinal: Positive for abdominal distention, abdominal pain, constipation, nausea and vomiting (x1). Negative for diarrhea.   Genitourinary: Negative.    Musculoskeletal: Negative.    Skin: Negative.    Allergic/Immunologic: Negative.    Neurological: Negative.    Hematological: Negative.    Psychiatric/Behavioral: Negative.           Objective     Vitals:    20 1110   BP: (!) 187/81  Comment: nurse notified   BP Location: Left arm   Patient Position: Lying   Pulse: 66   Resp: 16   Temp: 97.1 °F (36.2 °C)   TempSrc: Oral   SpO2: 98%     Current Status 2020   ECOG score 0       Physical Exam    CON: pleasant well-developed woman  HEENT: no icterus, no thrush, moist  membranes  NECK: no jvd  LYMPH: no cervical or supraclavicular lad  CV: RRR, S1S2, no murmur  RESP: cta bilat, no wheezing, no rales  GI: mildly distended and tender without rebound  MUSC: no edema, normal joints  NEURO: alert and oriented x3, a little somnolent after pain medications  PSYCH: normal mood and affect  Skin: No noted jaundice or bruising or induration    RECENT LABS:  Hematology WBC   Date Value Ref Range Status   08/11/2020 9.24 3.40 - 10.80 10*3/mm3 Final     RBC   Date Value Ref Range Status   08/11/2020 4.14 3.77 - 5.28 10*6/mm3 Final     Hemoglobin   Date Value Ref Range Status   08/11/2020 11.8 (L) 12.0 - 15.9 g/dL Final     Hematocrit   Date Value Ref Range Status   08/11/2020 36.7 34.0 - 46.6 % Final     Platelets   Date Value Ref Range Status   08/11/2020 230 140 - 450 10*3/mm3 Final        Lab Results   Component Value Date    GLUCOSE 110 (H) 08/11/2020    BUN 5 (L) 08/11/2020    CREATININE 0.35 (L) 08/11/2020    EGFRIFNONA >150 08/11/2020    EGFRIFAFRI 127 06/12/2020    BCR 14.3 08/11/2020    K 2.7 (L) 08/11/2020    CO2 26.6 08/11/2020    CALCIUM 9.4 08/11/2020    PROTENTOTREF 7.4 06/12/2020    ALBUMIN 3.40 (L) 08/11/2020    LABIL2 1.3 06/12/2020    AST 65 (H) 08/11/2020     (H) 08/11/2020         Assessment/Plan     1.  Pancreatic carcinoma diagnosed September 2019 for which she has been observed radiographically and at her choice received no active treatment in the way of chemotherapy to date.    2.  Admitted with progression of abdominal pain, nausea/constipation    3.  New elevation of the ALT/AST/alk phos concerning for impending biliary obstruction versus metastatic disease to liver    4.  New mild anemia, hemoglobin 11.8 likely secondary to malignancy    5.  Hypertension per hospital MD    Plan:  I agree with the plan of CT abdomen/pelvis with contrast and CA 19-9 for assessment of progression of malignancy, possible biliary obstruction versus bowel obstruction versus  constipation etc.  I will follow with you.

## 2020-08-12 NOTE — PROGRESS NOTES
[facility] Visit Report    Janet Martinez  [id number]  8/12/2020    Admission R/T [facility] Dx: Yes    Reason for [facility] Admission: Pancreatic Cancer    Symptom  Management: Pain/Nausea    Nursing/Medication Recommendations: Start IV pain meds for increased comfort    Psychosocial Issues and Recommendations:    Spiritual Concerns and Recommendations:    [facility] Discharge Plans:  None.  [facility] to follow daily./    Review of Visit :  Pt is a pps of 40% and is pleasantly alert and oriented x3.  Goal is comfort with [facility] to follow daily.  RN recommends pt to start IV pain meds for maximum comfort.  Discharge plan are pending as a CT is currently being done of Abdomen.  RN collaborated with Rica who verbalizes understanding of pt plan of care.        Felton Zapata RN

## 2020-08-12 NOTE — PROGRESS NOTES
Continued Stay Note  [facility]     Patient Name: Janet Martinez  MRN: [id number]  Today's Date: 8/12/2020    Admit Date: 8/11/2020    Discharge Plan     Row Name 08/12/20 1629       Plan    Plan  From home with [facility] services.     Plan Comments  Spoke to Felton with [facility] who confirmed that [facility] and it is a related stay.  Met with the patient's spouse Óscar Martinez [phone] as the patient was off the floor and he confirmed that the patient will return home upon d/c and will resume services at home with [facility].  CCP will follow to assist with the patient's d/c home with [facility].  MONICA Zacarias        Discharge Codes    No documentation.             MONICA Tran

## 2020-08-12 NOTE — TELEPHONE ENCOUNTER
Spoke with  who wanted to know if CT scan performed approx 2 hrs ago was reviewed by Dr. Cassidy yet, if pt could be taken off NPO, or if pt will stay another night.  Encouraged  to talk with nurse and hospitalist during rounding to see they can answer questions, but as of this time it does not look as if CT scan has been reviewed by Dr. Cassidy.

## 2020-08-12 NOTE — PLAN OF CARE
cymbalta changed to correct dossage. CT scan with oral contrast, GI added to team for possible ERCP with stent placement. regular diet/anything she can tolerate. clear at midnight.  still very anxious about care. pt is calm, will continue to monitor.

## 2020-08-12 NOTE — TELEPHONE ENCOUNTER
PT IS IN THE HOSPITAL. SHE HAD THE CT SCAN THIS MORNING.     HAS DR NAVA REVIEWED THE CT SCAN. POSSIBLE BILIARY OBSTRUCTION VERSUS BOWEL OBSTRUCTION VERSUS CONSTIPATION PER DR FLORES.     IS ASKING IF IT IS OK FOR PT TO EAT. SHE HAS NOT HAD ANYTHING FOR 3 DAYS.    PLEASE GIVE  A CALL [phone]

## 2020-08-12 NOTE — PROGRESS NOTES
[facility]     LOS: 0 days     Subjective:    CC:No chief complaint on file.    DIET:  Diet Order   Procedures   • NPO Diet NPO Except: Ice Chips, Sips With Meds     No bm in 5 days  No cp, no soa  Ice chips  No n/v/d    Objective:    Vital Signs:  Temp:  [97.1 °F (36.2 °C)-98.3 °F (36.8 °C)] 98.3 °F (36.8 °C)  Heart Rate:  [62-83] 80  Resp:  [16] 16  BP: (136-187)/(61-81) 143/61    SpO2:  [92 %-98 %] 92 %  on   ;   Device (Oxygen Therapy): room air  There is no height or weight on file to calculate BMI.    Physical Exam   Constitutional: She appears well-developed and well-nourished.   HENT:   Head: Normocephalic and atraumatic.   Cardiovascular: Exam reveals no friction rub.   No murmur heard.  Pulmonary/Chest: Effort normal and breath sounds normal.   Abdominal: Soft. Bowel sounds are normal. She exhibits no distension. There is no tenderness.   Neurological: She is alert.   Skin: Skin is warm and dry.       Results Review:    Glucose   Date Value Ref Range Status   08/12/2020 94 65 - 99 mg/dL Final   08/11/2020 110 (H) 65 - 99 mg/dL Final     Results from last 7 days   Lab Units 08/12/20  0509   WBC 10*3/mm3 7.06   HEMOGLOBIN g/dL 10.7*   HEMATOCRIT % 31.6*   PLATELETS 10*3/mm3 230     Results from last 7 days   Lab Units 08/12/20  0509   SODIUM mmol/L 138   POTASSIUM mmol/L 2.9*   CHLORIDE mmol/L 101   CO2 mmol/L 26.3   BUN mg/dL 5*   CREATININE mg/dL 0.30*   CALCIUM mg/dL 8.3*   BILIRUBIN mg/dL 1.1   ALK PHOS U/L 288*   ALT (SGPT) U/L 126*   AST (SGOT) U/L 110*   GLUCOSE mg/dL 94         Results from last 7 days   Lab Units 08/12/20  0509   MAGNESIUM mg/dL 1.7         Cultures:  No results found for: BLOODCX, URINECX, WOUNDCX, MRSACX, RESPCX, STOOLCX    I have reviewed daily medications and changes in CPOE    Scheduled meds    amLODIPine 5 mg Oral Daily   budesonide 0.5 mg Nebulization BID - RT   DULoxetine 120 mg Oral Daily   fluticasone 2 spray Nasal Daily   folic acid 1,000 mcg  Oral Daily   ipratropium-albuterol 3 mL Nebulization Q8H - RT   montelukast 10 mg Oral Daily   pancrelipase (Lip-Prot-Amyl) 12,000 units of lipase Oral Daily   pantoprazole 40 mg Oral QAM   saccharomyces boulardii 250 mg Oral BID   sodium chloride 10 mL Intravenous Q12H   valsartan 320 mg Oral Daily   vitamin B-12 1,000 mcg Oral Daily         sodium chloride 0.9 % with KCl 20 mEq 100 mL/hr Last Rate: 100 mL/hr (08/12/20 0939)     PRN meds  •  acetaminophen **OR** acetaminophen **OR** acetaminophen  •  albuterol  •  hydrALAZINE  •  HYDROmorphone **AND** naloxone  •  magnesium sulfate **OR** magnesium sulfate **OR** magnesium sulfate  •  ondansetron **OR** ondansetron  •  oxyCODONE-acetaminophen  •  potassium chloride **OR** potassium chloride **OR** potassium chloride  •  sodium chloride        Abdominal pain    Chronic obstructive pulmonary disease (CMS/HCC)    Hypertension    Pancreatic cancer (CMS/HCC)    Cancer associated pain        Assessment/Plan:  · Ct of abdomen and pelvis now  · bp is slightly elevated  · Continue copd inhalers  · Iv fluids  · percocets  · Npo x ice chips  · Replace potassium    DVT PPX: scd      Florencio Garcia MD  08/12/20  09:51

## 2020-08-12 NOTE — PLAN OF CARE
Problem: Patient Care Overview  Goal: Plan of Care Review  Outcome: Ongoing (interventions implemented as appropriate)  Flowsheets (Taken 8/12/2020 3028)  Outcome Summary: VSS, Dilaudid & Zofran x1, 1 emesis episode with sm amt liquid, pt showed picture of her Cymbalta med bottle showing she takes 120 mg daily - not 60,CT abd this am, rested well  Goal: Individualization and Mutuality  Outcome: Ongoing (interventions implemented as appropriate)  Goal: Discharge Needs Assessment  Outcome: Ongoing (interventions implemented as appropriate)  Goal: Interprofessional Rounds/Family Conf  Outcome: Ongoing (interventions implemented as appropriate)     Problem: Pain, Acute (Adult)  Goal: Identify Related Risk Factors and Signs and Symptoms  Outcome: Ongoing (interventions implemented as appropriate)  Goal: Acceptable Pain Control/Comfort Level  Outcome: Ongoing (interventions implemented as appropriate)     Problem: Nausea/Vomiting (Adult)  Goal: Identify Related Risk Factors and Signs and Symptoms  Outcome: Ongoing (interventions implemented as appropriate)  Goal: Symptom Relief  Outcome: Ongoing (interventions implemented as appropriate)  Goal: Adequate Hydration  Outcome: Ongoing (interventions implemented as appropriate)

## 2020-08-12 NOTE — PROGRESS NOTES
CHIEF COMPLAINT/REASON FOR FOLLOW-UP:  Pancreatic cancer, abdominal pain     INTERVAL HISTORY:  The patient  in the abdomen but has not required much in the way of pain medication overnight.  She had one episode of emesis yesterday.  She reports being hungry.  She continues to have no bowel movement and minimal gas.  Still awaiting CT to be done.    HISTORY OF PRESENT ILLNESS:   This is a vivi 80-year-old lady followed by  in our practice for pancreatic cancer.  She was initially diagnosed in September 2019 and has not to date received any active treatment preferring palliative measures.  Her baseline PET scan 9/3/2019 showed an intensely FDG avid mass in the pancreatic head with dilation of the main pancreatic duct, mild activity in mediastinal lymph nodes of unclear significance.  And endoscopic ultrasound and FNA on 9/17/2019 showed atypical glandular cells consistent with adenocarcinoma.  Treatment options were discussed with the patient and her  but as stated she preferred palliative measures showing slight decrease.  She has been followed with imaging studies.  She had a repeat PET scan 11/22/2019 sides of the pancreatic head mass and decreased activity stable shotty mediastinal lymph nodes.  Most recent CT chest abdomen pelvis 6/30/2020 showed stable mediastinal lymph nodes, 2 cm vague pancreatic head mass, stable pancreatic duct dilatation, no biliary dilatation, no obvious of metastatic disease to the liver.     The patient is admitted after experiencing 4 to 5 days of abdominal pain anteriorly with some radiation to the back unable to keep comfortable at home.  Pain is been associated with one episode of emesis and some nausea.  She has had decreased appetite.  She also complains of no bowel movement for 4 days or as she typically has a bowel movement twice daily      Past Medical History, Past Surgical History, Social History, Family History have been reviewed and are without  significant changes except as mentioned.    Review of Systems   Constitutional: Negative for appetite change and fever.   HENT: Negative.    Eyes: Negative.    Respiratory: Negative.    Cardiovascular: Negative.    Gastrointestinal: Positive for abdominal pain, constipation, nausea and vomiting.   Musculoskeletal: Negative.    Skin: Negative.    Allergic/Immunologic: Negative.    Neurological: Negative.    Hematological: Negative.    Psychiatric/Behavioral: Negative.           Medications:  The current medication list was reviewed in the EMR    ALLERGIES:    Allergies   Allergen Reactions   • Gold-Containing Drug Products Unknown - High Severity     Patient cannot recall reaction   • Hydrocodone Irritability     HYPERACTIVITY       Objective      Vitals:    08/12/20 0736   BP: 143/61   Pulse: 80   Resp: 16   Temp: 98.3 °F (36.8 °C)   SpO2: 92%          Physical Exam      CON: pleasant well-developed woman  HEENT: no icterus, no thrush, moist membranes  NECK: no jvd  LYMPH: no cervical or supraclavicular lad  CV: RRR, S1S2, no murmur  RESP: cta bilat, no wheezing, no rales  GI: mildly distended and tender without rebound  MUSC: no edema, normal joints  NEURO: alert and oriented x3, normal strength  PSYCH: normal mood and affect  Skin: No noted jaundice or bruising or induration    RECENT LABS:  Hematology Results from last 7 days   Lab Units 08/12/20  0509 08/11/20  1201   WBC 10*3/mm3 7.06 9.24   HEMOGLOBIN g/dL 10.7* 11.8*   HEMATOCRIT % 31.6* 36.7   PLATELETS 10*3/mm3 230 230     2  Lab Results   Component Value Date    GLUCOSE 94 08/12/2020    BUN 5 (L) 08/12/2020    CREATININE 0.30 (L) 08/12/2020    EGFRIFNONA >150 08/12/2020    EGFRIFAFRI 127 06/12/2020    BCR 16.7 08/12/2020    CO2 26.3 08/12/2020    CALCIUM 8.3 (L) 08/12/2020    PROTENTOTREF 7.4 06/12/2020    ALBUMIN 3.20 (L) 08/12/2020    LABIL2 1.3 06/12/2020     (H) 08/12/2020     (H) 08/12/2020       Lab Results   Component Value Date     TIBC 430 06/12/2020    FERRITIN 27.80 06/12/2020       Lab Results   Component Value Date    ECGMUHZW44 356 06/12/2020          CA 19-9 70.6 (previously 45.7 6/30/20, 79.7 5/5/20)    Assessment/Plan   1.  Pancreatic carcinoma diagnosed September 2019 for which she has been observed radiographically and at her choice received no active treatment in the way of chemotherapy to date.     2.  Admitted with progression of abdominal pain, nausea/constipation     3.  New elevation of the ALT/AST/alk phos concerning for impending biliary obstruction versus metastatic disease to liver     4.  New mild anemia, hemoglobin 11.8 likely secondary to malignancy.  Hemoglobin 10.7 after hydration.     5.  Hypertension per hospital MD     Plan:  I agree with the plan of CT abdomen/pelvis with contrast and CA 19-9 for assessment of progression of malignancy, possible biliary obstruction versus bowel obstruction versus constipation etc. discussed with nurse- CAT scan has called for the patient to be given her oral contrast so hopefully imaging will be performed soon.                  8/12/2020      CC:

## 2020-08-12 NOTE — NURSING NOTE
" is very agitated with care wanting results right away. CT scan was done before lunch and  is wanting results now and a new diet order stating \"she has not ate in couple days\". Explained to pt and  they are looking for SBO and with that NPO is normal diet until they come up with a plan.  keeps coming out at the desk stating he contacted [facility] himself who then stated the RN contacted  about results waiting to hear back.   "

## 2020-08-12 NOTE — PROGRESS NOTES
"Adult Nutrition  Assessment/PES    Patient Name:  Janet Martinez  YOB: 1940  MRN: [id number]  Admit Date:  8/11/2020    Assessment Date:  8/12/2020  Nutrition assessment triggered by MST score-2, reported decreased appetite and weight loss.   History from patient's chart. Admitted for possible SBO, no bm x 5 days-plan CT today. NPO at this time.  Will continue to follow for plan of care/nutritional needs.  Reason for Assessment     Row Name 08/12/20 1028          Reason for Assessment    Reason For Assessment  identified at risk by screening criteria     Diagnosis   pancreatic cancer, abd pain, COPD, HTN     Identified At Risk by Screening Criteria  MST SCORE 2+         Nutrition/Diet History     Row Name 08/12/20 1029          Nutrition/Diet History    Typical Food/Fluid Intake  per MD,  plan of CT abdomen/pelvis with contrast and CA 19-9 for assessment of progression of malignancy, possible biliary obstruction versus bowel obstruction versus constipation. NPO at this time         Anthropometrics     Row Name 08/12/20 1029          Anthropometrics    Height  157.5 cm (62.01\")        Admit Weight    Admit Weight  49.9 kg (110 lb 0.2 oz)        Ideal Body Weight (IBW)    Ideal Body Weight (IBW) (kg)  50.45     % of Ideal Body Weight Assessment  -- 99%        Body Mass Index (BMI)    BMI Assessment  BMI 18.5-24.9: normal BMI-20.1         Labs/Tests/Procedures/Meds     Row Name 08/12/20 1029          Labs/Procedures/Meds    Lab Results Reviewed  reviewed, pertinent     Lab Results Comments  K, ALT, AST        Diagnostic Tests/Procedures    Diagnostic Test/Procedure Reviewed  reviewed, pertinent        Medications    Pertinent Medications Reviewed  reviewed, pertinent     Pertinent Medications Comments  folic acid, creon, PPI, probiotic, NaCl, vitamin B12         Physical Findings     Row Name 08/12/20 1030          Physical Findings    Overall Physical Appearance  -- B=20         Estimated/Assessed " "Needs     Row Name 08/12/20 1030 08/12/20 1029       Calculation Measurements    Weight Used For Calculations  49.9 kg (110 lb 0.2 oz)      Height    157.5 cm (62.01\")       Estimated/Assessed Needs    Additional Documentation  KCAL/KG (Group);Protein Requirements (Group);Fluid Requirements (Group)         KCAL/KG    KCAL/KG  25 Kcal/Kg (kcal);30 Kcal/Kg (kcal)      25 Kcal/Kg (kcal)  1247.5      30 Kcal/Kg (kcal)  1497         Protein Requirements    Weight Used For Protein Calculations  49.9 kg (110 lb 0.2 oz)      Est Protein Requirement Amount (gms/kg)  1.2 gm protein      Estimated Protein Requirements (gms/day)  59.88         Fluid Requirements    Estimated Fluid Requirements (mL/day)  1250      RDA Method (mL)  1250          Nutrition Prescription Ordered     Row Name 08/12/20 1030          Nutrition Prescription PO    Current PO Diet  NPO                 Problem/Interventions:  Problem 1     Row Name 08/12/20 1030          Nutrition Diagnoses Problem 1    Problem 1  Inadequate Nutrient Intake     Etiology (related to)  MNT for Treatment/Condition     Signs/Symptoms (evidenced by)  NPO               Intervention Goal     Row Name 08/12/20 1031          Intervention Goal    General  Maintain nutrition;Meet nutritional needs for age/condition;Reduce/improve symptoms     PO  Tolerate PO;Initiate feeding     Weight  Maintain weight         Nutrition Intervention     Row Name 08/12/20 1031          Nutrition Intervention    RD/Tech Action  Follow Tx progress;Care plan reviewd           Education/Evaluation     Row Name 08/12/20 1031          Education    Education  Will Instruct as appropriate        Monitor/Evaluation    Monitor  Per protocol           Electronically signed by:  Silva Quintana RD  08/12/20 10:31  "

## 2020-08-13 PROBLEM — C25.0 MALIGNANT NEOPLASM OF HEAD OF PANCREAS (HCC): Status: ACTIVE | Noted: 2020-01-01

## 2020-08-13 NOTE — NURSING NOTE
Spoke with Dr. Millan re pt's BP, pt denies any symptoms. Dr. Millan states ok for pt to return to room.

## 2020-08-13 NOTE — BRIEF OP NOTE
ENDOSCOPIC RETROGRADE CHOLANGIOPANCREATOGRAPHY  Progress Note    Janet Martinez  8/13/2020    Pre-op Diagnosis:   Malignant neoplasm of head of pancreas (CMS/HCC) [C25.0]       Post-Op Diagnosis Codes:     * Malignant neoplasm of head of pancreas (CMS/HCC) [C25.0]    Procedure/CPT® Codes:        Procedure(s):  ENDOSCOPIC RETROGRADE CHOLANGIOPANCREATOGRAPHY with sphincterotomy, wall stent placement    Surgeon(s):  Guillermo Bardales MD    Anesthesia: Monitored Anesthesia Care    Staff:   Endo Technician: Aysha Buck RN  Endo Nurse: Ashley Malone RN         Estimated Blood Loss: minimal    Urine Voided: * No values recorded between 8/13/2020  5:30 PM and 8/13/2020  6:04 PM *    Specimens:                None          Drains: * No LDAs found *    Findings: ERCP with sphincterotomy and stent placement performed for distal bile duct obstruction.  High-grade obstruction distally noted with dilation of the cystic common bile duct and common hepatic duct as well as the right and left hepatic ducts.  Sphincterotomy and placement of a 10 mm x 6 cm covered Wallstent was performed.  Patient tolerated well and sent to recovery.    Complications: None          Guillermo Bardales MD     Date: 8/13/2020  Time: 18:07

## 2020-08-13 NOTE — PROGRESS NOTES
[facility] Visit Report    Janet Martinez  [id number]  8/13/2020    Admission R/T [facility] Dx: Yes    Reason for [facility] Admission: Pancreatic Cancer    Symptom  Management: Pain    Nursing/Medication Recommendations: Monitor for condition changes    Psychosocial Issues and Recommendations:    Spiritual Concerns and Recommendations:    [facility] Discharge Plans:  Complete.  Pt is to go home with [facility] at home following pt discharge and to go home via car with Spouse.    Review of Visit :  Pt is a pps of 40% and is pleasantly alert and oriented x3 and denies pain and dyspnea during visit.  Pt is aware of current condition and that she is going today for a ERCP and possible stent placement.  RN collaborated with BILLY Strauss and KENZIE Middleton regarding pt current condition.  [facility] to follow daily.        Felton Zapata RN

## 2020-08-13 NOTE — PROGRESS NOTES
CHIEF COMPLAINT/REASON FOR FOLLOW-UP:  Pancreatic cancer, abdominal pain     INTERVAL HISTORY:  The patient is feeling okay this morning, minimal abdominal pain, no significant nausea or vomiting today.  She has not had a bowel movement but feels like she will go this morning.  ERCP scheduled for this evening for biliary ductal dilatation and potential stent placement.  See CT report below    HISTORY OF PRESENT ILLNESS:   This is a vivi 80-year-old lady followed by  in our practice for pancreatic cancer.  She was initially diagnosed in September 2019 and has not to date received any active treatment preferring palliative measures.  Her baseline PET scan 9/3/2019 showed an intensely FDG avid mass in the pancreatic head with dilation of the main pancreatic duct, mild activity in mediastinal lymph nodes of unclear significance.  And endoscopic ultrasound and FNA on 9/17/2019 showed atypical glandular cells consistent with adenocarcinoma.  Treatment options were discussed with the patient and her  but as stated she preferred palliative measures showing slight decrease.  She has been followed with imaging studies.  She had a repeat PET scan 11/22/2019 sides of the pancreatic head mass and decreased activity stable shotty mediastinal lymph nodes.  Most recent CT chest abdomen pelvis 6/30/2020 showed stable mediastinal lymph nodes, 2 cm vague pancreatic head mass, stable pancreatic duct dilatation, no biliary dilatation, no obvious of metastatic disease to the liver.     The patient is admitted after experiencing 4 to 5 days of abdominal pain anteriorly with some radiation to the back unable to keep comfortable at home.  Pain is been associated with one episode of emesis and some nausea.  She has had decreased appetite.  She also complains of no bowel movement for 4 days or as she typically has a bowel movement twice daily      Past Medical History, Past Surgical History, Social History, Family History  have been reviewed and are without significant changes except as mentioned.    Review of Systems   Constitutional: Negative for appetite change and fever.   HENT: Negative.    Eyes: Negative.    Respiratory: Negative.    Cardiovascular: Negative.    Gastrointestinal: Positive for abdominal pain and constipation. Negative for nausea and vomiting.   Musculoskeletal: Negative.    Skin: Negative.    Allergic/Immunologic: Negative.    Neurological: Negative.    Hematological: Negative.    Psychiatric/Behavioral: Negative.  Hallucinations:          Medications:  The current medication list was reviewed in the EMR    ALLERGIES:    Allergies   Allergen Reactions   • Gold-Containing Drug Products Unknown - High Severity     Patient cannot recall reaction   • Hydrocodone Irritability     HYPERACTIVITY       Objective      Vitals:    08/13/20 0757   BP: 152/67   Pulse: 68   Resp: 18   Temp: 98.4 °F (36.9 °C)   SpO2: 98%          Physical Exam      CON: pleasant well-developed woman  HEENT: no icterus, no thrush, moist membranes  NECK: no jvd  LYMPH: no cervical or supraclavicular lad  CV: RRR, S1S2, no murmur  RESP: cta bilat, no wheezing, no rales  GI: mildly distended and tender without rebound  MUSC: no edema, normal joints  NEURO: alert and oriented x3, normal strength  PSYCH: normal mood and affect  Skin: No noted jaundice or bruising or induration  Exam is unchanged-8/13/2020  RECENT LABS:  Hematology Results from last 7 days   Lab Units 08/13/20  0124 08/12/20  0509 08/11/20  1201   WBC 10*3/mm3 6.69 7.06 9.24   HEMOGLOBIN g/dL 10.2* 10.7* 11.8*   HEMATOCRIT % 30.9* 31.6* 36.7   PLATELETS 10*3/mm3 246 230 230     2  Lab Results   Component Value Date    GLUCOSE 140 (H) 08/13/2020    BUN 3 (L) 08/13/2020    CREATININE 0.27 (L) 08/13/2020    EGFRIFNONA >150 08/13/2020    EGFRIFAFRI 127 06/12/2020    BCR 11.1 08/13/2020    CO2 24.4 08/13/2020    CALCIUM 8.2 (L) 08/13/2020    PROTENTOTREF 7.4 06/12/2020    ALBUMIN 3.20 (L)  08/12/2020    LABIL2 1.3 06/12/2020     (H) 08/12/2020     (H) 08/12/2020       Lab Results   Component Value Date    TIBC 430 06/12/2020    FERRITIN 27.80 06/12/2020       Lab Results   Component Value Date    DKJJGZVC79 356 06/12/2020          CA 19-9 70.6 (previously 45.7 6/30/20, 79.7 5/5/20)    CT abdomen-pelvis 8/12/2020.  I personally reviewed the images.  There is new biliary ductal dilatation intra-and extrahepatic.  There is distention of the gallbladder.  Per radiology pancreatic mass has enlarged slightly from 2.3 to 3.3 cm.    Assessment/Plan   1.  Pancreatic carcinoma diagnosed September 2019 for which she has been observed radiographically and at her choice received no active treatment in the way of chemotherapy to date.  Current CT shows mild progression of disease with pancreatic head mass increased from 2.3 to 3.3 cm causing new biliary obstruction.    2.  Biliary obstruction secondary to progression of pancreatic head malignancy: GI consulted and the patient is scheduled for ERCP and stent placement this evening     3.  New mild anemia, hemoglobin 11.8 likely secondary to malignancy.  Hemoglobin 10.2 after hydration.     4.  Hypertension per hospital MD     Will follow ERCP results.                  8/13/2020      CC:

## 2020-08-13 NOTE — PROGRESS NOTES
[facility]     LOS: 1 day     Subjective:    CC:No chief complaint on file.    DIET:  Diet Order   Procedures   • Diet Clear Liquid   abd pain is better  BM last night  No cp, no soa  No n/v/d  Headaches overnight, gone    Objective:    Vital Signs:  Temp:  [97.3 °F (36.3 °C)-98.3 °F (36.8 °C)] 98 °F (36.7 °C)  Heart Rate:  [56-80] 56  Resp:  [16] 16  BP: (122-157)/(61-74) 122/61    SpO2:  [92 %-98 %] 98 %  on   ;   Device (Oxygen Therapy): room air  Body mass index is 20.11 kg/m².    Physical Exam   Constitutional: She appears well-developed and well-nourished.   HENT:   Head: Normocephalic and atraumatic.   Cardiovascular: Exam reveals no friction rub.   No murmur heard.  Pulmonary/Chest: Effort normal and breath sounds normal.   Abdominal: Soft. Bowel sounds are normal. She exhibits no distension. There is tenderness.   Neurological: She is alert.   Skin: Skin is warm and dry.       Results Review:    Glucose   Date Value Ref Range Status   08/13/2020 140 (H) 65 - 99 mg/dL Final   08/12/2020 94 65 - 99 mg/dL Final   08/11/2020 110 (H) 65 - 99 mg/dL Final     Results from last 7 days   Lab Units 08/13/20  0124   WBC 10*3/mm3 6.69   HEMOGLOBIN g/dL 10.2*   HEMATOCRIT % 30.9*   PLATELETS 10*3/mm3 246     Results from last 7 days   Lab Units 08/13/20  0124 08/12/20  0509   SODIUM mmol/L 135* 138   POTASSIUM mmol/L 3.9 2.9*   CHLORIDE mmol/L 101 101   CO2 mmol/L 24.4 26.3   BUN mg/dL 3* 5*   CREATININE mg/dL 0.27* 0.30*   CALCIUM mg/dL 8.2* 8.3*   BILIRUBIN mg/dL  --  1.1   ALK PHOS U/L  --  288*   ALT (SGPT) U/L  --  126*   AST (SGOT) U/L  --  110*   GLUCOSE mg/dL 140* 94         Results from last 7 days   Lab Units 08/12/20  0509   MAGNESIUM mg/dL 1.7         Cultures:  No results found for: BLOODCX, URINECX, WOUNDCX, MRSACX, RESPCX, STOOLCX    I have reviewed daily medications and changes in CPOE    Scheduled meds    amLODIPine 5 mg Oral Daily   budesonide 0.5 mg Nebulization BID - RT     DULoxetine 120 mg Oral Daily   fluticasone 2 spray Nasal Daily   folic acid 1,000 mcg Oral Daily   ipratropium-albuterol 3 mL Nebulization Q8H - RT   montelukast 10 mg Oral Daily   pancrelipase (Lip-Prot-Amyl) 12,000 units of lipase Oral Daily   pantoprazole 40 mg Oral QAM   saccharomyces boulardii 250 mg Oral BID   sodium chloride 10 mL Intravenous Q12H   valsartan 320 mg Oral Daily   vitamin B-12 1,000 mcg Oral Daily         sodium chloride 0.9 % with KCl 20 mEq 100 mL/hr Last Rate: 100 mL/hr (08/13/20 0057)     PRN meds  •  acetaminophen **OR** acetaminophen **OR** acetaminophen  •  albuterol  •  hydrALAZINE  •  HYDROmorphone **AND** naloxone  •  magnesium sulfate **OR** magnesium sulfate **OR** magnesium sulfate  •  ondansetron **OR** ondansetron  •  oxyCODONE-acetaminophen  •  potassium chloride **OR** potassium chloride **OR** potassium chloride  •  sodium chloride        Abdominal pain    Chronic obstructive pulmonary disease (CMS/HCC)    Hypertension    Pancreatic cancer (CMS/HCC)    Cancer associated pain        Assessment/Plan:  · abd pain is stable, Ct of abdomen and pelvis reviewed with biliary obstruction with moderate dilatation biliary ducts  · Continue copd inhalers, stable on RA  · bp is slightly elevated, norvasc and valsartan  · Iv fluids  · percocets prn  · Currently on Clears liquid diet  · GI consultation  · Potassium is better    DVT PPX: scd    D/w night nurse and dayshift nurse    Florencio Garcia MD  08/13/20  07:19

## 2020-08-13 NOTE — CONSULTS
[facility], [place], operated by [facility]  Initial Inpatient Consult Note    Referring Provider: Dr. Florencio Garcia    Reason for Consultation: Bile duct obstruction    Subjective     History of present illness:    80 y.o. female with history of hiatal hernia, GERD and colon polyps diagnosed with metastatic pancreatic carcinoma.  Patient diagnosed September 2019 and decided to defer palliative measures.  Patient after diagnosed by fine-needle aspiration had done relatively well.  At this time patient is started experiencing 4 to 5 days of abdominal pain anteriorly with an episode of emesis and nausea with decreased appetite and obstipation.  Patient noted with elevated LFTs and dilated biliary tree with pancreatic mass causing obstruction.  Patient here for palliative stenting.  Patient currently now n.p.o. feels improved.    Past Medical History:  Past Medical History:   Diagnosis Date   • Abdominal hernia    • Cancer (CMS/HCC)    • COPD (chronic obstructive pulmonary disease) (CMS/HCC)    • Depression    • Food allergy     Scallops: causes hives   • GERD (gastroesophageal reflux disease)    • Hiatal hernia    • History of bruising easily    • History of colon polyps 08/13/2013    PATH: Distal Transverse Colon, Fragments of Tubular Adenoma   • History of colon polyps 01/30/2013    PATH: Ascending Colon - Tubular Adenoma, Splenic Flexure Polyp @ 65 cm - Tubulovillous Adenoma, Descending Colon - Tubular Adenoma   • History of colon polyps 10/30/2017    PATH: Transverse Colon - Tubular Adenoma, Rectal Polyps - Fragments of Hyperplastic Polyp   • Hypercholesteremia    • Hypertension    • Joint pain     swelling   • On home oxygen therapy     O2 NC 2. LITERS/ NIGHT   • Osteoarthritis    • Osteoporosis    • Pancreatic cancer (CMS/HCC)    • PONV (postoperative nausea and vomiting)    • Rheumatoid arthritis (CMS/HCC)     DIAGNOSIS AGE 23 - FOLLOWED BY RHEUMATOLOGY DR INFANTE, NO PROBLEMS WITH FLEX/ EXTENSION      Past Surgical  History:  Past Surgical History:   Procedure Laterality Date   • BREAST BIOPSY Left     Benign pathology   • BRONCHOSCOPY N/A 1/3/2018    Procedure: BRONCHOSCOPY with lavage in right middle lobe and lingula.;  Surgeon: Trenton Garcia MD;  Location: [facility] ENDOSCOPY;  Service:    • CAROTID ENDARTERECTOMY Left 2006   • CATARACT EXTRACTION WITH INTRAOCULAR LENS IMPLANT Bilateral    • COLONOSCOPY N/A 10/30/2017    Procedure: COLONOSCOPY TO CECUM, TO TERMINAL ILEUM WITH COLD BIOPSY POLYPECTOMY;  Surgeon: Jossie Stanley MD;  Location: [facility] ENDOSCOPY;  Service:    • COLONOSCOPY N/A 08/13/2013    One 3 mm polyp in the transverse colon, Diverticulosis in the sigmoid colon, [facility], Dr. Jossie Stanley   • COLONOSCOPY N/A 01/30/2013    One 3 mm polyp ascending colon; One 7 mm polyp descending colon; One 25 mm polyp splenic flexure, Diverticulosis Sigmoid Colon, [facility], Dr. Jossie Stanley   • FOOT SURGERY Bilateral     Bilateral (MULITPLE SURGERIES)   • HAND SURGERY Bilateral     Bilateral (MULTIPLE SURGERIES)   • KNEE ARTHROSCOPY W/ PARTIAL MEDIAL MENISCECTOMY Left 01/22/2016    Left Knee Arthroscopy w/ partial medial & lateral meniscectomies, Three compartment synovectomy w/ loose body removal, Chondroplasty of patella, Dr. Florencio Frazier   • LEFT OOPHORECTOMY Left 1950    bening tumor   • NECK SURGERY     • SINUS SURGERY N/A 2011   • TOE FUSION Right 2017    with screws and pins-Dr. Atkins   • TOTAL KNEE ARTHROPLASTY Left 5/18/2016    Procedure: LT TOTAL KNEE ARTHROPLASTY WITH FELECIA NAVIGATION;  Surgeon: Florencio Frazier MD;  Location: [facility] OR;  Service:    • VENTRAL HERNIA REPAIR N/A 5/22/2018    Procedure: VENTRAL HERNIA REPAIR LAPAROSCOPIC WITH DAVINCI ROBOT;  Surgeon: Josr Jennings MD;  Location: [facility] MAIN OR;  Service: PenBladei      Social History:   Social History     Tobacco Use   • Smoking status: Former Smoker     Packs/day: 1.50     Years: 30.00     Pack years: 45.00     Types: Cigarettes      Last attempt to quit:      Years since quittin.6   • Smokeless tobacco: Never Used   Substance Use Topics   • Alcohol use: No     Comment: No alcohol for 30 years      Family History:  Family History   Problem Relation Age of Onset   • Heart disease Mother    • Hypertension Mother    • Lung disease Mother    • Diabetes Sister    • Hypertension Brother    • Multiple sclerosis Brother    • Diabetes Sister    • Malig Hyperthermia Neg Hx        Home Meds:  Facility-Administered Medications Prior to Admission   Medication Dose Route Frequency Provider Last Rate Last Dose   • levalbuterol (XOPENEX) nebulizer solution 0.63 mg  0.63 mg Nebulization Q6H PRN Agata Burgess MD   0.63 mg at 18 1437     Medications Prior to Admission   Medication Sig Dispense Refill Last Dose   • albuterol (PROVENTIL) (2.5 MG/3ML) 0.083% nebulizer solution Take 2.5 mg by nebulization Every 4 (Four) Hours As Needed for Wheezing.   8/10/2020 at Unknown time   • amLODIPine (NORVASC) 5 MG tablet Take 1 tablet by mouth Daily. 30 tablet 3 Past Week at Unknown time   • budesonide (PULMICORT) 0.5 MG/2ML nebulizer solution USE 1 VIAL VIA NEBULIZER TWICE DAILY RINSE MOUTH AFTER USE  12 8/10/2020 at Unknown time   • Calcium Carbonate-Vitamin D (CALCIUM 600+D PO) Take 1 tablet by mouth Daily.   Past Week at Unknown time   • colestipol (COLESTID) 1 g tablet TAKE 1 TABLET BY MOUTH TWICE A  tablet 1 Past Week at Unknown time   • CREON 44351 units capsule delayed-release particles capsule Take 12,000 units of lipase by mouth Daily. before a meal  3 8/10/2020 at Unknown time   • DULoxetine (CYMBALTA) 60 MG capsule Take 1 capsule by mouth 2 (Two) Times a Day. 60 capsule 3 Past Week at Unknown time   • etanercept (ENBREL) 50 MG/ML solution prefilled syringe injection Inject 50 mg under the skin 1 (One) Time Per Week. . TO HOLD 4 WEEKS   Past Week at Unknown time   • fluticasone (FLONASE) 50 MCG/ACT nasal spray 2 sprays into the  nostril(s) as directed by provider Daily. 1 bottle 5 Past Month at Unknown time   • folic acid (FOLVITE) 1 MG tablet TAKE 1 TABLET BY MOUTH EVERY DAY 90 tablet 0 Past Week at Unknown time   • ipratropium (ATROVENT) 0.06 % nasal spray 2 sprays into each nostril 4 (Four) Times a Day As Needed for Rhinitis.   Past Month at Unknown time   • ipratropium-albuterol (DUO-NEB) 0.5-2.5 mg/3 ml nebulizer Take 3 mL by nebulization Every 4 (Four) Hours As Needed for Wheezing.   8/10/2020 at Unknown time   • KLOR-CON 20 MEQ CR tablet TAKE 1 TABLET BY MOUTH EVERY DAY 90 tablet 0 Past Week at Unknown time   • magnesium oxide (MAG-OX) 400 MG tablet Take 1 tablet by mouth 2 (Two) Times a Day. 60 tablet 2 Past Week at Unknown time   • montelukast (SINGULAIR) 10 MG tablet Take 1 tablet by mouth Daily. 90 tablet 3 Past Week at Unknown time   • omeprazole (priLOSEC) 40 MG capsule TAKE 1 CAPSULE BY MOUTH EVERY DAY IN THE EVENING 90 capsule 1 Past Week at Unknown time   • ondansetron ODT (ZOFRAN-ODT) 4 MG disintegrating tablet Place 4 mg on the tongue Every 8 (Eight) Hours As Needed for Nausea or Vomiting.   8/10/2020 at Unknown time   • oxyCODONE-acetaminophen (PERCOCET)  MG per tablet Take 1 tablet by mouth Every 6 (Six) Hours As Needed for Moderate Pain .   8/10/2020 at Unknown time   • saccharomyces boulardii (FLORASTOR) 250 MG capsule Take 1 capsule by mouth 2 (Two) Times a Day. 20 capsule 5 Past Week at Unknown time   • senna (senna) 8.6 MG tablet Take 1 tablet by mouth Daily As Needed for Constipation.   8/10/2020 at Unknown time   • Triamcinolone Acetonide (NASACORT) 55 MCG/ACT nasal inhaler 2 sprays into the nostril(s) as directed by provider Daily. 16.5 g 11 Past Week at Unknown time   • umeclidinium-vilanterol (ANORO ELLIPTA) 62.5-25 MCG/INH aerosol powder  inhaler Inhale 1 puff Daily. 3 each 2 Past Week at Unknown time   • valsartan (DIOVAN) 320 MG tablet Take 1 tablet by mouth Daily. 90 tablet 2 Past Week at Unknown time    • vitamin B-12 (CYANOCOBALAMIN) 1000 MCG tablet Take 1 tablet by mouth Daily. 90 tablet 2 Past Week at Unknown time   • PERFOROMIST 20 MCG/2ML nebulizer solution USE 1 VIAL VIA NEBULIZER TWICE DAILY  12 Not Taking     Current Meds:     amLODIPine 5 mg Oral Daily   budesonide 0.5 mg Nebulization BID - RT   DULoxetine 120 mg Oral Daily   fluticasone 2 spray Nasal Daily   folic acid 1,000 mcg Oral Daily   ipratropium-albuterol 3 mL Nebulization Q8H - RT   montelukast 10 mg Oral Daily   pancrelipase (Lip-Prot-Amyl) 12,000 units of lipase Oral Daily   pantoprazole 40 mg Oral QAM   saccharomyces boulardii 250 mg Oral BID   sodium chloride 10 mL Intravenous Q12H   valsartan 320 mg Oral Daily   vitamin B-12 1,000 mcg Oral Daily     Allergies:  Allergies   Allergen Reactions   • Gold-Containing Drug Products Unknown - High Severity     Patient cannot recall reaction   • Hydrocodone Irritability     HYPERACTIVITY     Review of Systems  Pertinent items are noted in HPI, all other systems reviewed and negative     Objective     Vital Signs  Temp:  [97.3 °F (36.3 °C)-98.6 °F (37 °C)] 98.5 °F (36.9 °C)  Heart Rate:  [56-76] 67  Resp:  [16-18] 16  BP: (122-184)/(61-97) 184/97  Physical Exam:  General Appearance:    Alert, cooperative, in no acute distress   Head:    Normocephalic, without obvious abnormality, atraumatic   Eyes:          conjunctivae and sclerae normal, no   icterus   Throat:   no thrush, oral mucosa moist   Neck:   Supple, no adenopathy   Lungs:     Clear to auscultation bilaterally    Heart:    Regular rhythm and normal rate    Chest Wall:    No abnormalities observed   Abdomen:     Soft, nondistended, nontender; normal bowel sounds   Extremities:   no edema, no redness   Skin:   No bruising or rash   Psychiatric:  normal mood and insight     Results Review:   I reviewed the patient's new clinical results.  I reviewed the patient's new imaging results and agree with the interpretation.    Results from last 7  days   Lab Units 08/13/20  0124 08/12/20  0509 08/11/20  1201   WBC 10*3/mm3 6.69 7.06 9.24   HEMOGLOBIN g/dL 10.2* 10.7* 11.8*   HEMATOCRIT % 30.9* 31.6* 36.7   PLATELETS 10*3/mm3 246 230 230     Results from last 7 days   Lab Units 08/13/20  0124 08/12/20  0509 08/11/20  1201   SODIUM mmol/L 135* 138 136   POTASSIUM mmol/L 3.9 2.9* 2.7*   CHLORIDE mmol/L 101 101 96*   CO2 mmol/L 24.4 26.3 26.6   BUN mg/dL 3* 5* 5*   CREATININE mg/dL 0.27* 0.30* 0.35*   CALCIUM mg/dL 8.2* 8.3* 9.4   BILIRUBIN mg/dL  --  1.1 1.1   ALK PHOS U/L  --  288* 242*   ALT (SGPT) U/L  --  126* 122*   AST (SGOT) U/L  --  110* 65*   GLUCOSE mg/dL 140* 94 110*         Lab Results   Lab Value Date/Time    LIPASE 5 (L) 08/11/2020 1201    LIPASE 125 (H) 09/19/2019 0123       Radiology:  CT Abdomen Pelvis With Contrast   Final Result   In the interim from prior imaging the patient has developed   biliary obstruction with moderate dilatation of the intrahepatic and   extrahepatic biliary system and significant distention of the   gallbladder. The pancreatic mass although ill-defined also appears to   have enlarged in the interim. Consideration for ERCP and bile duct stent   placement.       Radiation dose reduction techniques were utilized, including automated   exposure control and exposure modulation based on body size.       This report was finalized on 8/13/2020 1:10 PM by Dr. Rahul Altamirano M.D.          [place] ercp biliary duct only    (Results Pending)       Assessment/Plan   Patient Active Problem List   Diagnosis   • Arthritis of knee   • Anxiety   • Chronic obstructive pulmonary disease (CMS/HCC)   • Hyperlipidemia   • Hypertension   • Impaired fasting glucose   • Osteoporosis   • Rheumatoid arthritis (CMS/HCC)   • Status post total left knee replacement   • Hematuria   • Sebaceous cyst   • Hypokalemia   • Epigastric hernia   • Depression   • Pain of upper abdomen   • Pancreatic cancer (CMS/HCC)   • Cancer associated pain   • Enlargement of  lymph node   • Abdominal pain   • Malignant neoplasm of head of pancreas (CMS/HCC)       Assessment:  1. Pancreatic carcinoma  2. Bile duct obstruction  3. Biliary colic  4. Nausea and vomiting    Plan:  · Discussed findings on the CT revealing obstruction by mass in the pancreatic head.  Will attempt ERCP with stent placement in order to palliate obstruction.  Informed patient risk that this is unsuccessful due to the obstructing mass is possible and may need percutaneous drainage if this is unsuccessful.  Patient agreed to proceed all questions were answered we will arrange for ERCP this afternoon and monitor clinically based on findings.      I discussed the patients findings and my recommendations with patient and nursing staff.    Guillermo Bardales MD

## 2020-08-13 NOTE — PLAN OF CARE
Problem: Patient Care Overview  Goal: Plan of Care Review  Outcome: Ongoing (interventions implemented as appropriate)  Flowsheets (Taken 8/13/2020 8782)  Progress: no change  Outcome Summary: VSS, potassium 3.9, Tylenol x1 for headache, no requests for Diaudid or nausea meds, BM, rested well  Goal: Individualization and Mutuality  Outcome: Ongoing (interventions implemented as appropriate)  Goal: Discharge Needs Assessment  Outcome: Ongoing (interventions implemented as appropriate)  Goal: Interprofessional Rounds/Family Conf  Outcome: Ongoing (interventions implemented as appropriate)     Problem: Pain, Acute (Adult)  Goal: Identify Related Risk Factors and Signs and Symptoms  Outcome: Ongoing (interventions implemented as appropriate)  Goal: Acceptable Pain Control/Comfort Level  Outcome: Ongoing (interventions implemented as appropriate)     Problem: Nausea/Vomiting (Adult)  Goal: Identify Related Risk Factors and Signs and Symptoms  Outcome: Ongoing (interventions implemented as appropriate)  Goal: Symptom Relief  Outcome: Ongoing (interventions implemented as appropriate)  Goal: Adequate Hydration  Outcome: Ongoing (interventions implemented as appropriate)

## 2020-08-13 NOTE — ANESTHESIA POSTPROCEDURE EVALUATION
"Patient: Janet Martinez    Procedure Summary     Date:  08/13/20 Room / Location:   [facility]    Anesthesia Start:  1742 Anesthesia Stop:  1818    Procedure:  ENDOSCOPIC RETROGRADE CHOLANGIOPANCREATOGRAPHY with sphincterotomy, wall stent placement (N/A ) Diagnosis:       Malignant neoplasm of head of pancreas (CMS/HCC)      (Malignant neoplasm of head of pancreas (CMS/HCC) [C25.0])    Surgeon:  Guillermo Bardales MD Provider:  Erasmo Valencia MD    Anesthesia Type:  MAC ASA Status:  3          Anesthesia Type: MAC    Vitals  No vitals data found for the desired time range.          Post Anesthesia Care and Evaluation    Patient location during evaluation: PACU  Patient participation: complete - patient participated  Level of consciousness: awake  Pain management: adequate  Airway patency: patent  Anesthetic complications: No anesthetic complications    Cardiovascular status: acceptable  Respiratory status: acceptable  Hydration status: acceptable    Comments: BP (!) 184/97 (BP Location: Left arm, Patient Position: Lying)   Pulse 67   Temp 36.9 °C (98.5 °F) (Oral)   Resp 16   Ht 157.5 cm (62.01\")   SpO2 97%   BMI 20.11 kg/m²         "

## 2020-08-13 NOTE — ANESTHESIA PREPROCEDURE EVALUATION
Anesthesia Evaluation     Patient summary reviewed and Nursing notes reviewed   history of anesthetic complications: PONV  NPO Solid Status: > 8 hours  NPO Liquid Status: > 2 hours           Airway   Mallampati: II  TM distance: >3 FB  Neck ROM: full  Dental - normal exam     Pulmonary - normal exam    breath sounds clear to auscultation  (+) a smoker (45 pack years) Former, COPD (Home O2) severe,   Cardiovascular - normal exam    Rhythm: regular  Rate: normal    (+) hypertension, hyperlipidemia,   (-) angina, orthopnea, PND, CORONEL      Neuro/Psych  (+) psychiatric history Anxiety and Depression,     GI/Hepatic/Renal/Endo    (+)  hiatal hernia, GERD,      Musculoskeletal     Abdominal    Substance History - negative use     OB/GYN negative ob/gyn ROS         Other   arthritis (Rheumatoid arthritis ),    history of cancer (Pancreatic cancer ) active                    Anesthesia Plan    ASA 3     MAC       Anesthetic plan, all risks, benefits, and alternatives have been provided, discussed and informed consent has been obtained with: patient.

## 2020-08-14 NOTE — PROGRESS NOTES
CHIEF COMPLAINT/REASON FOR FOLLOW-UP:  Pancreatic cancer, abdominal pain     INTERVAL HISTORY:  The patient underwent ERCP and stent placement on 8/13/2020.  She feels okay this morning with mild epigastric pain but eating breakfast of cereal.  She denies nausea or vomiting.  She had a bowel movement this morning.  She would like to go home.    HISTORY OF PRESENT ILLNESS:   This is a vivi 80-year-old lady followed by  in our practice for pancreatic cancer.  She was initially diagnosed in September 2019 and has not to date received any active treatment preferring palliative measures.  Her baseline PET scan 9/3/2019 showed an intensely FDG avid mass in the pancreatic head with dilation of the main pancreatic duct, mild activity in mediastinal lymph nodes of unclear significance.  And endoscopic ultrasound and FNA on 9/17/2019 showed atypical glandular cells consistent with adenocarcinoma.  Treatment options were discussed with the patient and her  but as stated she preferred palliative measures showing slight decrease.  She has been followed with imaging studies.  She had a repeat PET scan 11/22/2019 sides of the pancreatic head mass and decreased activity stable shotty mediastinal lymph nodes.  Most recent CT chest abdomen pelvis 6/30/2020 showed stable mediastinal lymph nodes, 2 cm vague pancreatic head mass, stable pancreatic duct dilatation, no biliary dilatation, no obvious of metastatic disease to the liver.     The patient is admitted after experiencing 4 to 5 days of abdominal pain anteriorly with some radiation to the back unable to keep comfortable at home.  Pain is been associated with one episode of emesis and some nausea.  She has had decreased appetite.  She also complains of no bowel movement for 4 days or as she typically has a bowel movement twice daily      Past Medical History, Past Surgical History, Social History, Family History have been reviewed and are without significant  changes except as mentioned.    Review of Systems   Constitutional: Negative for appetite change and fever.   HENT: Negative.    Eyes: Negative.    Respiratory: Negative.    Cardiovascular: Negative.    Gastrointestinal: Positive for abdominal pain. Negative for constipation, nausea and vomiting.   Musculoskeletal: Negative.    Skin: Negative.    Allergic/Immunologic: Negative.    Neurological: Negative.    Hematological: Negative.    Psychiatric/Behavioral: Negative.  Hallucinations:          Medications:  The current medication list was reviewed in the EMR    ALLERGIES:    Allergies   Allergen Reactions   • Gold-Containing Drug Products Unknown - High Severity     Patient cannot recall reaction   • Hydrocodone Irritability     HYPERACTIVITY       Objective      Vitals:    08/14/20 0813   BP:    Pulse: 73   Resp: 16   Temp:    SpO2: 94%          Physical Exam      CON: pleasant well-developed woman up in bed eating breakfast  HEENT: no icterus, no thrush, moist membranes  NECK: no jvd  LYMPH: no cervical or supraclavicular lad  CV: RRR, S1S2, no murmur  RESP: cta bilat, no wheezing, no rales  GI:  Less tender, active bowel sounds  MUSC: no edema, normal joints  NEURO: alert and oriented x3, normal strength  PSYCH: normal mood and affect  Skin: No noted jaundice or bruising or induration    RECENT LABS:  Hematology Results from last 7 days   Lab Units 08/13/20  0124 08/12/20  0509 08/11/20  1201   WBC 10*3/mm3 6.69 7.06 9.24   HEMOGLOBIN g/dL 10.2* 10.7* 11.8*   HEMATOCRIT % 30.9* 31.6* 36.7   PLATELETS 10*3/mm3 246 230 230     2  Lab Results   Component Value Date    GLUCOSE 140 (H) 08/13/2020    BUN 3 (L) 08/13/2020    CREATININE 0.27 (L) 08/13/2020    EGFRIFNONA >150 08/13/2020    EGFRIFAFRI 127 06/12/2020    BCR 11.1 08/13/2020    CO2 24.4 08/13/2020    CALCIUM 8.2 (L) 08/13/2020    PROTENTOTREF 7.4 06/12/2020    ALBUMIN 3.20 (L) 08/12/2020    LABIL2 1.3 06/12/2020     (H) 08/12/2020     (H)  08/12/2020       Lab Results   Component Value Date    TIBC 430 06/12/2020    FERRITIN 27.80 06/12/2020       Lab Results   Component Value Date    WKBGLRTG25 356 06/12/2020          CA 19-9 70.6 (previously 45.7 6/30/20, 79.7 5/5/20)    CT abdomen-pelvis 8/12/2020.  I personally reviewed the images.  There is new biliary ductal dilatation intra-and extrahepatic.  There is distention of the gallbladder.  Per radiology pancreatic mass has enlarged slightly from 2.3 to 3.3 cm.    Assessment/Plan   1.  Pancreatic carcinoma diagnosed September 2019 for which she has been observed radiographically and at her choice received no active treatment in the way of chemotherapy to date.  Current CT shows mild progression of disease with pancreatic head mass increased from 2.3 to 3.3 cm causing new biliary obstruction.    I discussed with the patient progression of disease seen on CT scan.  She continues to not want any chemotherapy or active treatment of her malignancy and focus on palliative care.  She did not want me to move forward her appointment with Dr. Cassidy.    2.  Biliary obstruction secondary to progression of pancreatic head malignancy: Status post ERCP and stent 8/13/2020     3.  New mild anemia, hemoglobin 11.8 likely secondary to malignancy.  Hemoglobin 10.2 after hydration.     4.  Hypertension per hospital MD     Okay for the patient to be discharged home later today if tolerates diet from oncology perspective.  I will arrange her to have a CBC and CMP in 1 week to make sure liver function test and bilirubin are okay after stent placement.  Please call if further needed during the hospital stay.                  8/14/2020      CC:

## 2020-08-14 NOTE — TELEPHONE ENCOUNTER
----- Message from Jann Ernandez MD sent at 8/14/2020  9:55 AM EDT -----  CBC, CMP nurse review 1 week   none

## 2020-08-14 NOTE — PLAN OF CARE
Problem: Patient Care Overview  Goal: Plan of Care Review  Outcome: Ongoing (interventions implemented as appropriate)  Flowsheets  Taken 8/14/2020 0446  Progress: no change  Outcome Summary: Post ercp w/stent placement. Percocet x1. Tolerating reg diet. Ambulating in room. SBP >180 Hydralazine given x1. IVF continued..

## 2020-08-14 NOTE — PROGRESS NOTES
[facility]     LOS: 2 days     Subjective:    CC:No chief complaint on file.    DIET:  Diet Order   Procedures   • Diet Regular; Cardiac   abd pain is better  BM this am  frequent urination  No cp, no soa  No n/v/d  Headaches overnight better    Objective:    Vital Signs:  Temp:  [97 °F (36.1 °C)-98.7 °F (37.1 °C)] 98.7 °F (37.1 °C)  Heart Rate:  [60-83] 73  Resp:  [15-18] 16  BP: (145-197)/() 145/77    SpO2:  [94 %-99 %] 94 %  on   ;   Device (Oxygen Therapy): room air  Body mass index is 20.11 kg/m².    Physical Exam   Constitutional: She appears well-developed and well-nourished.   HENT:   Head: Normocephalic and atraumatic.   Cardiovascular: Exam reveals no friction rub.   No murmur heard.  Pulmonary/Chest: Effort normal and breath sounds normal.   Abdominal: Soft. Bowel sounds are normal. She exhibits no distension. There is tenderness.   Neurological: She is alert.   Skin: Skin is warm and dry.       Results Review:    Glucose   Date Value Ref Range Status   08/13/2020 140 (H) 65 - 99 mg/dL Final   08/12/2020 94 65 - 99 mg/dL Final   08/11/2020 110 (H) 65 - 99 mg/dL Final     Results from last 7 days   Lab Units 08/13/20  0124   WBC 10*3/mm3 6.69   HEMOGLOBIN g/dL 10.2*   HEMATOCRIT % 30.9*   PLATELETS 10*3/mm3 246     Results from last 7 days   Lab Units 08/13/20  0124 08/12/20  0509   SODIUM mmol/L 135* 138   POTASSIUM mmol/L 3.9 2.9*   CHLORIDE mmol/L 101 101   CO2 mmol/L 24.4 26.3   BUN mg/dL 3* 5*   CREATININE mg/dL 0.27* 0.30*   CALCIUM mg/dL 8.2* 8.3*   BILIRUBIN mg/dL  --  1.1   ALK PHOS U/L  --  288*   ALT (SGPT) U/L  --  126*   AST (SGOT) U/L  --  110*   GLUCOSE mg/dL 140* 94         Results from last 7 days   Lab Units 08/12/20  0509   MAGNESIUM mg/dL 1.7         Cultures:  No results found for: BLOODCX, URINECX, WOUNDCX, MRSACX, RESPCX, STOOLCX    I have reviewed daily medications and changes in CPOE    Scheduled meds    amLODIPine 5 mg Oral Daily   budesonide  0.5 mg Nebulization BID - RT   DULoxetine 120 mg Oral Daily   fluticasone 2 spray Nasal Daily   folic acid 1,000 mcg Oral Daily   ipratropium-albuterol 3 mL Nebulization Q8H - RT   montelukast 10 mg Oral Daily   pancrelipase (Lip-Prot-Amyl) 12,000 units of lipase Oral Daily   pantoprazole 40 mg Oral QAM   saccharomyces boulardii 250 mg Oral BID   sodium chloride 10 mL Intravenous Q12H   valsartan 320 mg Oral Daily   vitamin B-12 1,000 mcg Oral Daily         sodium chloride 30 mL/hr Last Rate: 30 mL/hr (08/13/20 1701)   sodium chloride 0.9 % with KCl 20 mEq 100 mL/hr Last Rate: 100 mL/hr (08/14/20 0907)     PRN meds  •  acetaminophen **OR** acetaminophen **OR** acetaminophen  •  albuterol  •  hydrALAZINE  •  HYDROmorphone **AND** naloxone  •  magnesium sulfate **OR** magnesium sulfate **OR** magnesium sulfate  •  ondansetron **OR** ondansetron  •  oxyCODONE-acetaminophen  •  potassium chloride **OR** potassium chloride **OR** potassium chloride  •  sodium chloride  •  sodium chloride        Abdominal pain    Chronic obstructive pulmonary disease (CMS/HCC)    Hypertension    Pancreatic cancer (CMS/HCC)    Cancer associated pain    Malignant neoplasm of head of pancreas (CMS/HCC)        Assessment/Plan:  · abd pain is stable, Ct of abdomen and pelvis reviewed with biliary obstruction with moderate dilatation biliary ducts  · Continue copd inhalers, stable on RA  · bp is slightly elevated, norvasc and valsartan, better today  · Iv fluids, stop if taking good po  · percocets prn  · Currently on regular diet  · S/p ercp with stent  · Potassium is better    DVT PPX: scd    D/w night nurse and dayshift nurse    Florencio Garcia MD  08/14/20  09:24

## 2020-08-14 NOTE — PROGRESS NOTES
Case Management Discharge Note      Final Note: Home and resume services with [facility].  MONICA Zacarias    Provided Post Acute Provider List?: Yes  Post Acute Provider List: Home Health, Nursing Home  Provided Post Acute Provider Quality & Resource List?: N/A  N/A Quality & Resource List Comment: The patient was provided with a HH/SNF list and not a print out of the HH/nursing home compare list from Medicare.gov as she currently denies any d/c needs.    Delivered To: Patient  Method of Delivery: In person    Destination - Selection Complete      Service Provider Request Status Selected Services Address Phone Number Fax Number    [facility] Selected Inpatient Hospice [address], [place] [id number] [phone]      Durable Medical Equipment      No service has been selected for the patient.      Dialysis/Infusion      No service has been selected for the patient.      Home Medical Care      No service has been selected for the patient.      Therapy      No service has been selected for the patient.      Community Resources      No service has been selected for the patient.        Transportation Services  Private: Car    Final Discharge Disposition Code: 50 - home with hospice

## 2020-08-14 NOTE — DISCHARGE SUMMARY
[facility]    ASSOCIATES  [phone]    DISCHARGE SUMMARY  [facility]    Patient Identification:  Name: Janet Martinez  Age: 80 y.o.  Sex: female  :  1940  MRN: [id number]  Primary Care Physician: Agata Burgess MD    Admit date: 2020  Discharge date and time:      Discharge Diagnoses:  Abdominal pain    Chronic obstructive pulmonary disease (CMS/HCC)    Hypertension    Pancreatic cancer (CMS/HCC)    Cancer associated pain    Malignant neoplasm of head of pancreas (CMS/HCC)       History of present illness from H&P:    Ms. Martinez is a 80 y.o. female with a history of metastatic pancreatic cancer, hypertension, GERD, COPD who presents to [facility] with worsening abdominal pain for about 3 to 4 days.  She had an episode of nonbloody and nonbilious vomiting about 3 days ago after she took her oral medications.  She has not had any additional vomiting but still has intermittent nausea.  She has not taken many of her oral medication since then.  She also has not had a bowel movement in 3 to 4 days.  She reports passing a very small amount of flatus.  The abdominal pain is generalized to central location and nonradiating.  She reports it is intermittent and worse with movement.  Improved with rest and with medication.  She reports no fevers or chills.  No diarrhea.  No chest pain or shortness of breath.  She has been following with the [facility] but also is current with hospice.  She had done well and then had some increasing tumor markers in May of this year.  She did have stable CT findings at that time.  She has a wedding in about 2 weeks which she would very much like to attend.  She reports that she does not want CPR or intubation in the event of acute arrest.    Hospital Course:      Patient has CT scan which showed likely biliary obstruction secondary to pancreatic malignancy.  The patient had a stent placed by Dr. Bardales.  The patient was  seen by oncology.  The patient is taking adequate p.o.  She had some epigastric discomfort overnight but is feeling well.  We discussed the possibility of staying 1 more day in the hospital to make sure her pain does not worsen however the patient is extremely anxious to leave at this point.  She has hospice who will follow her outpatient.  Patient was given a prescription for Zofran.  She has Percocet already at home.  Patient is noted to have low potassium levels on admission.  She is on potassium supplementation at home and can continue with these potassium supplementations.  Patient is able to ambulate without much difficulty has her  to help her.      Further hospital course by problem list:      The patient was seen and examined on the day of discharge.  Patient is awake and alert answering questions appropriately.  Heart regular rate rhythm no murmurs rubs gallops, lungs are clear to auscultation.    Consults:   Consults     Date and Time Order Name Status Description    8/12/2020 1538 Inpatient Gastroenterology Consult Completed     8/11/2020 1233 Inpatient Hematology & Oncology Consult            Results from last 7 days   Lab Units 08/13/20  0124   WBC 10*3/mm3 6.69   HEMOGLOBIN g/dL 10.2*   HEMATOCRIT % 30.9*   PLATELETS 10*3/mm3 246       Results from last 7 days   Lab Units 08/13/20  0124   SODIUM mmol/L 135*   POTASSIUM mmol/L 3.9   CHLORIDE mmol/L 101   CO2 mmol/L 24.4   BUN mg/dL 3*   CREATININE mg/dL 0.27*   GLUCOSE mg/dL 140*   CALCIUM mg/dL 8.2*       Significant Diagnostic Studies:   WBC   Date Value Ref Range Status   08/13/2020 6.69 3.40 - 10.80 10*3/mm3 Final     Hemoglobin   Date Value Ref Range Status   08/13/2020 10.2 (L) 12.0 - 15.9 g/dL Final     Hematocrit   Date Value Ref Range Status   08/13/2020 30.9 (L) 34.0 - 46.6 % Final     Platelets   Date Value Ref Range Status   08/13/2020 246 140 - 450 10*3/mm3 Final     Sodium   Date Value Ref Range Status   08/13/2020 135 (L) 136 -  145 mmol/L Final     Potassium   Date Value Ref Range Status   08/13/2020 3.9 3.5 - 5.2 mmol/L Final     Chloride   Date Value Ref Range Status   08/13/2020 101 98 - 107 mmol/L Final     CO2   Date Value Ref Range Status   08/13/2020 24.4 22.0 - 29.0 mmol/L Final     BUN   Date Value Ref Range Status   08/13/2020 3 (L) 8 - 23 mg/dL Final     Creatinine   Date Value Ref Range Status   08/13/2020 0.27 (L) 0.57 - 1.00 mg/dL Final     Glucose   Date Value Ref Range Status   08/13/2020 140 (H) 65 - 99 mg/dL Final     Calcium   Date Value Ref Range Status   08/13/2020 8.2 (L) 8.6 - 10.5 mg/dL Final     Magnesium   Date Value Ref Range Status   08/12/2020 1.7 1.6 - 2.4 mg/dL Final     AST (SGOT)   Date Value Ref Range Status   08/12/2020 110 (H) 1 - 32 U/L Final     ALT (SGPT)   Date Value Ref Range Status   08/12/2020 126 (H) 1 - 33 U/L Final     Alkaline Phosphatase   Date Value Ref Range Status   08/12/2020 288 (H) 39 - 117 U/L Final     No results found for: APTT, INR  No results found for: COLORU, CLARITYU, SPECGRAV, PHUR, PROTEINUR, GLUCOSEU, KETONESU, BLOODU, NITRITE, LEUKOCYTESUR, BILIRUBINUR, UROBILINOGEN, RBCUA, WBCUA, BACTERIA, UACOMMENT  No results found for: TROPONINT, TROPONINI, BNP  No components found for: HGBA1C;2  No components found for: TSH;2    Imaging Results (All)     Procedure Component Value Units Date/Time    FL ercp biliary duct only [[id number]] Collected:  08/13/20 1847     Updated:  08/13/20 1854    Narrative:       FL ERCP BILIARY DUCT ONLY-     INDICATIONS: ERCP, biliary duct stent placement     TECHNIQUE: FLUOROSCOPIC ASSISTANCE IN THE OPERATING ROOM.     FINDINGS:     6 intraoperative fluoroscopic spot views were obtained and recorded the  PACS for review, revealing ERCP, opacification of extrahepatic and  central intrahepatic biliary ducts, stent placement of the common  biliary duct. Please see operative report for full details.     Fluoroscopy time: 3 minutes, 13 seconds        Impression:          As described.     This report was finalized on 8/13/2020 6:50 PM by Dr. Erasmo Green M.D.       CT Abdomen Pelvis With Contrast [[id number]] Collected:  08/12/20 1256     Updated:  08/13/20 1313    Narrative:       CT ABDOMEN AND PELVIS WITH CONTRAST     HISTORY: Abdominal pain. Possible bowel obstruction. Patient has history  of pancreatic malignancy.     TECHNIQUE: Axial CT images of the abdomen and pelvis were obtained  following administration of intravenous contrast. The patient was given  oral contrast Coronal and sagittal reformats were obtained.     COMPARISON: CT dated 06/30/2020.     FINDINGS: Since prior CT there has been interval development of mild to  moderate bilobar intrahepatic biliary dilatation and marked dilatation  of the gallbladder. Interval increase in dilatation of the common bile  duct has also increased that now measures up to 2 cm with an abrupt  cutoff in the region of the pancreatic head. The ill-defined mass in the  region of the pancreatic head appears to have somewhat enlarged in the  interim now measuring up to 3.3 cm in AP diameter where it measured up  to 2.3 cm. Diffuse dilatation of the main pancreatic duct is present.  The spleen is normal. Bilateral adrenal glands and kidneys are unchanged  with areas of cortical scarring with small cortical hypoattenuating  lesions. The urinary bladder is moderately distended. The uterus and  adnexa are normal. Small dependent free fluid is seen within the pelvis.  No evidence of bowel obstruction. There is a tiny ground glass nodule  seen within the left lower lobe without interim change.       Impression:       In the interim from prior imaging the patient has developed  biliary obstruction with moderate dilatation of the intrahepatic and  extrahepatic biliary system and significant distention of the  gallbladder. The pancreatic mass although ill-defined also appears to  have enlarged in the interim. Consideration  for ERCP and bile duct stent  placement.     Radiation dose reduction techniques were utilized, including automated  exposure control and exposure modulation based on body size.     This report was finalized on 8/13/2020 1:10 PM by Dr. Rahul Altamirano M.D.         No results found for: SITE, ALLENTEST, PHART, IYM3LZV, PO2ART, TES5CDU, BASEEXCESS, P2UJFTAD, HGBBG, HCTABG, OXYHEMOGLOBI, METHHGBN, CARBOXYHGB, CO2CT, BAROMETRIC, MODALITY, FIO2       Discharge Medications      Continue These Medications      Instructions Start Date   albuterol (2.5 MG/3ML) 0.083% nebulizer solution  Commonly known as:  PROVENTIL   2.5 mg, Nebulization, Every 4 Hours PRN      amLODIPine 5 MG tablet  Commonly known as:  NORVASC   5 mg, Oral, Daily      budesonide 0.5 MG/2ML nebulizer solution  Commonly known as:  PULMICORT   USE 1 VIAL VIA NEBULIZER TWICE DAILY RINSE MOUTH AFTER USE      CALCIUM 600+D PO   1 tablet, Oral, Daily      colestipol 1 g tablet  Commonly known as:  COLESTID   TAKE 1 TABLET BY MOUTH TWICE A DAY      Creon 47897 units capsule delayed-release particles capsule  Generic drug:  pancrelipase (Lip-Prot-Amyl)   12,000 units of lipase, Oral, Daily, before a meal      DULoxetine 60 MG capsule  Commonly known as:  CYMBALTA   60 mg, Oral, 2 Times Daily      etanercept 50 MG/ML solution prefilled syringe injection  Commonly known as:  ENBREL   50 mg, Subcutaneous, Weekly, THURSDAYS. TO HOLD 4 WEEKS      fluticasone 50 MCG/ACT nasal spray  Commonly known as:  Flonase   2 sprays, Nasal, Daily      folic acid 1 MG tablet  Commonly known as:  FOLVITE   TAKE 1 TABLET BY MOUTH EVERY DAY      ipratropium 0.06 % nasal spray  Commonly known as:  ATROVENT   2 sprays, Nasal, 4 Times Daily PRN      ipratropium-albuterol 0.5-2.5 mg/3 ml nebulizer  Commonly known as:  DUO-NEB   3 mL, Nebulization, Every 4 Hours PRN      KLOR-CON 20 MEQ CR tablet  Generic drug:  potassium chloride   TAKE 1 TABLET BY MOUTH EVERY DAY      magnesium oxide 400  MG tablet  Commonly known as:  MAG-OX   400 mg, Oral, 2 Times Daily      montelukast 10 MG tablet  Commonly known as:  SINGULAIR   10 mg, Oral, Daily      omeprazole 40 MG capsule  Commonly known as:  priLOSEC   TAKE 1 CAPSULE BY MOUTH EVERY DAY IN THE EVENING      ondansetron ODT 4 MG disintegrating tablet  Commonly known as:  ZOFRAN-ODT   4 mg, Translingual, Every 8 Hours PRN      oxyCODONE-acetaminophen  MG per tablet  Commonly known as:  PERCOCET   1 tablet, Oral, Every 6 Hours PRN      Perforomist 20 MCG/2ML nebulizer solution  Generic drug:  formoterol   USE 1 VIAL VIA NEBULIZER TWICE DAILY      saccharomyces boulardii 250 MG capsule  Commonly known as:  Florastor   250 mg, Oral, 2 Times Daily      senna 8.6 MG tablet  Commonly known as:  SENOKOT   1 tablet, Oral, Daily PRN      Triamcinolone Acetonide 55 MCG/ACT nasal inhaler  Commonly known as:  NASACORT   2 sprays, Nasal, Daily      umeclidinium-vilanterol 62.5-25 MCG/INH aerosol powder  inhaler  Commonly known as:  Anoro Ellipta   1 puff, Inhalation, Daily - RT      valsartan 320 MG tablet  Commonly known as:  DIOVAN   320 mg, Oral, Daily      vitamin B-12 1000 MCG tablet  Commonly known as:  CYANOCOBALAMIN   1,000 mcg, Oral, Daily               Patient Instructions:   Activity Instructions     Activity as tolerated               Future Appointments   Date Time Provider Department Center   8/21/2020  1:10 PM LAB CHAIR 4 [facility]   8/21/2020  1:40 PM Ashley Ascencio APRN [facility]   10/1/2020  1:30 PM LAB CHAIR [facility]   10/7/2020 10:10 AM [facility] None   10/8/2020  2:00 PM VITALS ONLY [facility]   10/8/2020  2:20 PM Morris Cassidy MD PhD [facility]   10/12/2020  2:15 PM Agata Burgess MD [facility] None         Contact information for follow-up providers     Agata Burgess MD .    Specialty:  Internal Medicine  Contact  information:  [address]  [facility] [address]  [facility] [address]  [phone]                   Contact information for after-discharge care     Destination     [facility] .    Service:  Inpatient Hospice  Contact information:  [address]  [facility] [id number] [phone]                             Discharge Order (From admission, onward)     Start     Ordered    08/14/20 1546  Discharge patient  Once     Expected Discharge Date:  08/14/20    Discharge Disposition:  Home or Self Care    Physician of Record for Attribution - Please select from Treatment Team:  FLORENCIO GARCIA [[id number]]    Review needed by CMO to determine Physician of Record:  No       Question Answer Comment   Physician of Record for Attribution - Please select from Treatment Team FLORENCIO GARCIA    Review needed by CMO to determine Physician of Record No        08/14/20 1550                Diet Order   Procedures   • Diet Regular; Cardiac     Patient will follow-up with hospice outpatient    Discharge instructions:  Follow up with your primary care provider in 1-2 weeks with a cbc and cmp         Total time spent discharging patient including evaluation, post hospitalization follow up,  medication and post hospitalization instructions and education, total time exceeds 30 minutes.    Signed:  Florencio Garcia MD  8/14/2020  17:28

## 2020-08-14 NOTE — PROGRESS NOTES
[facility] Visit Report    Janet Martinez  [id number]  8/14/2020    Admission R/T [facility] Dx: yes    Reason for [facility] Admission:Pancreatic Cancer    Symptom  Management: pain    Nursing/Medication Recommendations:nothing at this time    Psychosocial Issues and Recommendations:    Spiritual Concerns and Recommendations:    [facility] Discharge Plans:  Patient to return home back with [facility]. Spouse to transport patient by car to home.      Review of Visit (Include All Collaboration- including names of hospital and family involved during admission/visit):RN received report from KENZIE Liu on discharge plans. She reports discharge is still up in the air for today. RN also reviewed Epic notes. RN arrived at bedside. Patient and spouse are asleep. Patient appears comfortable with no signs of discomfort or distress. Patient continues on fluids with PPS remaining at 40%. Team will continue to follow patient daily while hospitalized and [facility].         Waqar Barragan RN

## 2020-08-14 NOTE — OUTREACH NOTE
Prep Survey      Responses   [facility] facility patient discharged from?  [facility]   Is LACE score < 7 ?  No   Eligibility  Not Eligible   What are the reasons patient is not eligible?  Hospice/Pallative Care   COVID-19 Test Status  Negative   Does the patient have one of the following disease processes/diagnoses(primary or secondary)?  Other   Prep survey completed?  Yes          Delicia Clemente RN

## 2020-08-14 NOTE — PROGRESS NOTES
Continued Stay Note  [facility]     Patient Name: Janet Martinez  MRN: [id number]  Today's Date: 8/14/2020    Admit Date: 8/11/2020    Discharge Plan     Row Name 08/14/20 1408       Plan    Plan  Plans to return home upon d/c and resume [facility] services.      Plan Comments  Met with the patient and her spouse at bedside to confirm patient's plan to return home upon d/c and resume home services with [facility].  CCP will follow to assist with any d/c needs that may arise. MONICA Zacarias        Discharge Codes    No documentation.             MONICA Tran

## 2020-08-21 NOTE — PROGRESS NOTES
CHIEF COMPLAINT/REASON FOR FOLLOW-UP:  Pancreatic cancer, abdominal pain     INTERVAL HISTORY:  The patient underwent ERCP and stent placement on 8/13/2020.  She feels okay this morning with mild epigastric pain but eating breakfast of cereal.  She denies nausea or vomiting.  She had a bowel movement this morning.  She would like to go home.    HISTORY OF PRESENT ILLNESS: Patient is a 80-year-old female with the above-mentioned history who is here today for hospital return and lab review.  She was admitted 8/11/2020 through 8/14/2020 with initially abdominal pain and vomiting.  The patient did have a CT scan 8/12/2020, while hospitalized, which showed patient had developed biliary obstruction with moderate dilatation of the intrahepatic and extrahepatic biliary system and significant distention of the gallbladder.  Pancreatic mass, although ill-defined, also appears to have enlarged in the interim.    Patient underwent ERCP with biliary stent placement by Dr. Bardales on 8/13/2020.    Patient returns today, reporting that she is feeling okay.  She has had persistent epigastric abdominal pain.  She is currently with hospice, and has been prescribed pain medication.  She states it is not severe enough she feels that she needs to take pain medication.  She denies issues with nausea, vomiting, diarrhea, or constipation.  She denies fevers or chills.  She does continue on Prilosec.      Past Medical History, Past Surgical History, Social History, Family History have been reviewed and are without significant changes except as mentioned.    Review of Systems   Constitutional: Negative for activity change, appetite change, chills, fatigue and fever.   HENT: Negative for mouth sores, nosebleeds and trouble swallowing.    Respiratory: Negative for cough and shortness of breath.    Cardiovascular: Negative for chest pain and leg swelling.   Gastrointestinal: Positive for abdominal pain. Negative for constipation, diarrhea,  nausea and vomiting.   Genitourinary: Negative for difficulty urinating.   Skin: Negative for rash.   Neurological: Negative for dizziness, weakness and numbness.   Hematological: Negative for adenopathy. Does not bruise/bleed easily.   Psychiatric/Behavioral: Negative for sleep disturbance. Hallucinations:        Medications:  The current medication list was reviewed in the EMR    ALLERGIES:    Allergies   Allergen Reactions   • Gold-Containing Drug Products Unknown - High Severity     Patient cannot recall reaction   • Hydrocodone Irritability     HYPERACTIVITY       Objective      Vitals:    08/21/20 1338   BP: 169/72   Pulse: 68   Resp: 18   Temp: 98.4 °F (36.9 °C)   SpO2: 98%          Physical Exam   Constitutional: She is oriented to person, place, and time. She appears well-developed and well-nourished. No distress.   HENT:   Head: Normocephalic and atraumatic.   Mouth/Throat: Oropharynx is clear and moist and mucous membranes are normal. No oropharyngeal exudate.   Eyes: Pupils are equal, round, and reactive to light.   Neck: Normal range of motion.   Cardiovascular: Normal rate, regular rhythm and normal heart sounds.   No murmur heard.  Pulmonary/Chest: Effort normal and breath sounds normal. No respiratory distress. She has no wheezes. She has no rhonchi. She has no rales.   Abdominal: Soft. Normal appearance and bowel sounds are normal. She exhibits no distension. There is no hepatosplenomegaly. There is tenderness (mild) in the epigastric area.   Musculoskeletal: Normal range of motion. She exhibits no edema.   Neurological: She is alert and oriented to person, place, and time.   Skin: Skin is warm and dry. No rash noted.   Psychiatric: She has a normal mood and affect.   Vitals reviewed.          RECENT LABS:  Hematology Results from last 7 days   Lab Units 08/21/20  1319   WBC 10*3/mm3 6.95   HEMOGLOBIN g/dL 11.6*   HEMATOCRIT % 36.5   PLATELETS 10*3/mm3 527*     2  Lab Results   Component Value Date     GLUCOSE 252 (H) 08/21/2020    BUN 5 (L) 08/21/2020    CREATININE 0.42 (L) 08/21/2020    EGFRIFNONA 146 08/21/2020    EGFRIFAFRI 127 06/12/2020    BCR 11.9 08/21/2020    CO2 27.0 08/21/2020    CALCIUM 8.8 08/21/2020    PROTENTOTREF 7.4 06/12/2020    ALBUMIN 3.50 08/21/2020    LABIL2 1.3 06/12/2020    AST 30 08/21/2020    ALT 23 08/21/2020       Lab Results   Component Value Date    TIBC 430 06/12/2020    FERRITIN 27.80 06/12/2020       Lab Results   Component Value Date    BCSVAOTX61 356 06/12/2020          CA 19-9 70.6 (previously 45.7 6/30/20, 79.7 5/5/20)    CT abdomen-pelvis 8/12/2020.  I personally reviewed the images.  There is new biliary ductal dilatation intra-and extrahepatic.  There is distention of the gallbladder.  Per radiology pancreatic mass has enlarged slightly from 2.3 to 3.3 cm.    Assessment/Plan   1.  Pancreatic carcinoma diagnosed September 2019 for which she has been observed radiographically and at her choice received no active treatment in the way of chemotherapy to date.  Current CT shows mild progression of disease with pancreatic head mass increased from 2.3 to 3.3 cm causing new biliary obstruction.    I discussed with the patient progression of disease seen on CT scan.  She continues to not want any chemotherapy or active treatment of her malignancy and focus on palliative care.  She did not want me to move forward her appointment with Dr. Cassidy.    2.  Biliary obstruction secondary to progression of pancreatic head malignancy: Status post ERCP and stent 8/13/2020  · Today, 8/21/2020 her LFTs have normalized, AST 30, ALT 23 , total bilirubin 0.2.  Alkaline phosphatase is improving down to 161.       3.  New mild anemia, hemoglobin 11.8 8/11/2020, likely secondary to malignancy.  Hemoglobin 10.2 after hydration on 8/13/2020.  · 8/21/2020 hemoglobin is 11.6..     PLAN:  1. Continue current pain management as managed by hospice.  2. Patient currently scheduled return for follow-up visit  with Dr. Cassidy in about 6 weeks, sooner should she develop any new concerns problems, or worsening of symptoms.              8/21/2020      CC:

## 2020-08-24 NOTE — PROGRESS NOTES
"Chief Complaint   Patient presents with   • Pancreatic Cancer       History of Present Illness   Janet Martinez is a 80 y.o. female presents for follow up evaluation. Patient has pancreatic cancer. She recently had increasing abdominal pain. She was admitted into the hospital for this. Was found to biliary obstruction on ct abdomen. She was sent for ercp and had a bile duct stent placed. She reports that she only has \"minute\" pain at this time compared to discomfort prior to hospitalization. Mass has increased 1 cm from last year. No noted to have any mets on ct scan.   Reports she is feeling well but does not have much \"pep and vinegar\". She is napping when needed.     The following portions of the patient's history were reviewed and updated as appropriate: allergies, current medications, past family history, past medical history, past social history, past surgical history and problem list.  Current Outpatient Medications on File Prior to Visit   Medication Sig Dispense Refill   • albuterol (PROVENTIL) (2.5 MG/3ML) 0.083% nebulizer solution Take 2.5 mg by nebulization Every 4 (Four) Hours As Needed for Wheezing.     • amLODIPine (NORVASC) 5 MG tablet Take 1 tablet by mouth Daily. 30 tablet 3   • budesonide (PULMICORT) 0.5 MG/2ML nebulizer solution USE 1 VIAL VIA NEBULIZER TWICE DAILY RINSE MOUTH AFTER USE  12   • Calcium Carbonate-Vitamin D (CALCIUM 600+D PO) Take 1 tablet by mouth Daily.     • colestipol (COLESTID) 1 g tablet TAKE 1 TABLET BY MOUTH TWICE A  tablet 1   • CREON 59261 units capsule delayed-release particles capsule Take 12,000 units of lipase by mouth Daily. before a meal  3   • DULoxetine (CYMBALTA) 60 MG capsule Take 1 capsule by mouth 2 (Two) Times a Day. 60 capsule 3   • etanercept (ENBREL) 50 MG/ML solution prefilled syringe injection Inject 50 mg under the skin 1 (One) Time Per Week. THURSDAYS. TO HOLD 4 WEEKS     • fluticasone (FLONASE) 50 MCG/ACT nasal spray 2 sprays into the " nostril(s) as directed by provider Daily. 1 bottle 5   • folic acid (FOLVITE) 1 MG tablet TAKE 1 TABLET BY MOUTH EVERY DAY 90 tablet 0   • ipratropium (ATROVENT) 0.06 % nasal spray 2 sprays into each nostril 4 (Four) Times a Day As Needed for Rhinitis.     • ipratropium-albuterol (DUO-NEB) 0.5-2.5 mg/3 ml nebulizer Take 3 mL by nebulization Every 4 (Four) Hours As Needed for Wheezing.     • KLOR-CON 20 MEQ CR tablet TAKE 1 TABLET BY MOUTH EVERY DAY 90 tablet 0   • magnesium oxide (MAG-OX) 400 MG tablet Take 1 tablet by mouth 2 (Two) Times a Day. 60 tablet 2   • montelukast (SINGULAIR) 10 MG tablet Take 1 tablet by mouth Daily. 90 tablet 3   • omeprazole (priLOSEC) 40 MG capsule TAKE 1 CAPSULE BY MOUTH EVERY DAY IN THE EVENING 90 capsule 1   • ondansetron ODT (ZOFRAN-ODT) 4 MG disintegrating tablet Place 1 tablet on the tongue Every 8 (Eight) Hours As Needed for Nausea or Vomiting. 10 tablet 0   • oxyCODONE-acetaminophen (PERCOCET)  MG per tablet Take 1 tablet by mouth Every 6 (Six) Hours As Needed for Moderate Pain .     • PERFOROMIST 20 MCG/2ML nebulizer solution USE 1 VIAL VIA NEBULIZER TWICE DAILY  12   • saccharomyces boulardii (FLORASTOR) 250 MG capsule Take 1 capsule by mouth 2 (Two) Times a Day. 20 capsule 5   • senna (senna) 8.6 MG tablet Take 1 tablet by mouth Daily As Needed for Constipation.     • Triamcinolone Acetonide (NASACORT) 55 MCG/ACT nasal inhaler 2 sprays into the nostril(s) as directed by provider Daily. 16.5 g 11   • umeclidinium-vilanterol (ANORO ELLIPTA) 62.5-25 MCG/INH aerosol powder  inhaler Inhale 1 puff Daily. 3 each 2   • valsartan (DIOVAN) 320 MG tablet Take 1 tablet by mouth Daily. 90 tablet 2   • vitamin B-12 (CYANOCOBALAMIN) 1000 MCG tablet Take 1 tablet by mouth Daily. 90 tablet 2     Current Facility-Administered Medications on File Prior to Visit   Medication Dose Route Frequency Provider Last Rate Last Dose   • levalbuterol (XOPENEX) nebulizer solution 0.63 mg  0.63 mg  Nebulization Q6H PRN Agata Burgess MD   0.63 mg at 12/17/18 7387     Review of Systems   Constitutional: Negative.    HENT: Negative.    Eyes: Negative.    Respiratory: Negative.    Cardiovascular: Negative.    Gastrointestinal: Negative.         Pain is absent or very low.    Genitourinary: Negative.    Musculoskeletal: Negative.    Skin: Negative.    Allergic/Immunologic: Negative.    Neurological: Negative.    Hematological: Negative.    Psychiatric/Behavioral: Negative.        Objective   Physical Exam   Constitutional: She appears well-developed and well-nourished.   No acute distress. Good energy and good mood.    HENT:   Head: Normocephalic and atraumatic.   Eyes: EOM are normal.   Neck: Normal range of motion.   Pulmonary/Chest: Effort normal.   Abdominal:   nontender to self palpation   Musculoskeletal: Normal range of motion.   Stable arthritic changes   Skin:   No skin changes   Psychiatric: She has a normal mood and affect. Her behavior is normal. Judgment and thought content normal.   Nursing note and vitals reviewed.       LMP  (LMP Unknown)     Assessment/Plan   Diagnoses and all orders for this visit:    Malignant neoplasm of pancreas, unspecified location of malignancy (CMS/HCC)      Patient seen for transitional care after hospitalization related to complications of pancreatic cancer.  Abdominal pain is much improved at this time. She will contact if having any increasing pain and will continue to take pain meds in a prn fashion.. She is encouraged healthy movement and nutritious diet. She will continue w/ [facility] as well. She will f/u w/ gi routinely and f/u in our office as schedule.

## 2020-10-08 PROBLEM — D64.9 ANEMIA: Status: ACTIVE | Noted: 2020-01-01

## 2020-10-12 NOTE — PROGRESS NOTES
Chief Complaint   Patient presents with   • Pancreatic Cancer   • Rheumatoid Arthritis       History of Present Illness   Janet Martinez is a 80 y.o. female presents for follow up evaluation. Patient has pancreatic cancer. Fatigue with this. She is now on low dose steroids and this is beneficial for her energy. She was noted to have hyperglycemia in the past. Her tumor has increased in size. Ca 19-9 has increased some as well. She required a stint to be placed and this did relieve pain and pressure.  She has had some benefit w/ cbd.   bp is elevated.     The following portions of the patient's history were reviewed and updated as appropriate: allergies, current medications, past family history, past medical history, past social history, past surgical history and problem list.  Current Outpatient Medications on File Prior to Visit   Medication Sig Dispense Refill   • albuterol (PROVENTIL) (2.5 MG/3ML) 0.083% nebulizer solution Take 2.5 mg by nebulization Every 4 (Four) Hours As Needed for Wheezing.     • amLODIPine (NORVASC) 5 MG tablet Take 1 tablet by mouth Daily. 30 tablet 3   • budesonide (PULMICORT) 0.5 MG/2ML nebulizer solution USE 1 VIAL VIA NEBULIZER TWICE DAILY RINSE MOUTH AFTER USE  12   • Calcium Carbonate-Vitamin D (CALCIUM 600+D PO) Take 1 tablet by mouth Daily.     • colestipol (COLESTID) 1 g tablet TAKE 1 TABLET BY MOUTH TWICE A  tablet 1   • CREON 25972 units capsule delayed-release particles capsule Take 12,000 units of lipase by mouth Daily. before a meal  3   • DULoxetine (CYMBALTA) 60 MG capsule Take 1 capsule by mouth 2 (Two) Times a Day. 60 capsule 3   • etanercept (ENBREL) 50 MG/ML solution prefilled syringe injection Inject 50 mg under the skin 1 (One) Time Per Week. THURSDAYS. TO HOLD 4 WEEKS     • ferrous sulfate 325 (65 FE) MG tablet Take 1 tablet by mouth Daily With Breakfast. 90 tablet 1   • fluticasone (FLONASE) 50 MCG/ACT nasal spray 2 sprays into the nostril(s) as directed by  provider Daily. 1 bottle 5   • folic acid (FOLVITE) 1 MG tablet TAKE 1 TABLET BY MOUTH EVERY DAY 90 tablet 0   • ipratropium (ATROVENT) 0.06 % nasal spray 2 sprays into each nostril 4 (Four) Times a Day As Needed for Rhinitis.     • ipratropium-albuterol (DUO-NEB) 0.5-2.5 mg/3 ml nebulizer Take 3 mL by nebulization Every 4 (Four) Hours As Needed for Wheezing.     • KLOR-CON 20 MEQ CR tablet TAKE 1 TABLET BY MOUTH EVERY DAY 90 tablet 0   • magnesium oxide (MAG-OX) 400 MG tablet Take 1 tablet by mouth 2 (Two) Times a Day. 60 tablet 2   • montelukast (SINGULAIR) 10 MG tablet TAKE 1 TABLET BY MOUTH EVERY DAY 90 tablet 3   • omeprazole (priLOSEC) 40 MG capsule TAKE 1 CAPSULE BY MOUTH EVERY EVENING 90 capsule 1   • ondansetron ODT (ZOFRAN-ODT) 4 MG disintegrating tablet Place 1 tablet on the tongue Every 8 (Eight) Hours As Needed for Nausea or Vomiting. 10 tablet 0   • oxyCODONE-acetaminophen (PERCOCET)  MG per tablet Take 1 tablet by mouth Every 6 (Six) Hours As Needed for Moderate Pain .     • PERFOROMIST 20 MCG/2ML nebulizer solution USE 1 VIAL VIA NEBULIZER TWICE DAILY  12   • predniSONE (DELTASONE) 10 MG tablet Take 1 tablet by mouth Daily. 90 tablet 1   • saccharomyces boulardii (FLORASTOR) 250 MG capsule Take 1 capsule by mouth 2 (Two) Times a Day. 20 capsule 5   • senna (senna) 8.6 MG tablet Take 1 tablet by mouth Daily As Needed for Constipation.     • Triamcinolone Acetonide (NASACORT) 55 MCG/ACT nasal inhaler 2 sprays into the nostril(s) as directed by provider Daily. 16.5 g 11   • umeclidinium-vilanterol (ANORO ELLIPTA) 62.5-25 MCG/INH aerosol powder  inhaler Inhale 1 puff Daily. 3 each 2   • valsartan (DIOVAN) 320 MG tablet Take 1 tablet by mouth Daily. 90 tablet 2   • vitamin B-12 (CYANOCOBALAMIN) 1000 MCG tablet Take 1 tablet by mouth Daily. 90 tablet 2   • VITAMIN D PO Take  by mouth. Once a week       Current Facility-Administered Medications on File Prior to Visit   Medication Dose Route Frequency  Provider Last Rate Last Dose   • levalbuterol (XOPENEX) nebulizer solution 0.63 mg  0.63 mg Nebulization Q6H PRN Agata Burgess MD   0.63 mg at 12/17/18 1437     Review of Systems   Constitutional: Negative.    HENT: Negative.    Eyes: Negative.    Respiratory: Negative.    Cardiovascular: Negative.    Gastrointestinal: Negative.    Endocrine: Negative.    Genitourinary: Negative.    Musculoskeletal: Negative.    Skin: Negative.    Allergic/Immunologic: Negative.    Neurological: Negative.    Hematological: Negative.    Psychiatric/Behavioral: Negative.        Objective   Physical Exam  Vitals signs and nursing note reviewed.   Constitutional:       Appearance: Normal appearance.      Comments: Thin wf   No acute distress     HENT:      Head: Normocephalic and atraumatic.      Nose: Nose normal.      Mouth/Throat:      Mouth: Mucous membranes are moist.   Eyes:      Extraocular Movements: Extraocular movements intact.      Pupils: Pupils are equal, round, and reactive to light.   Neck:      Musculoskeletal: Normal range of motion.   Cardiovascular:      Rate and Rhythm: Normal rate and regular rhythm.      Pulses: Normal pulses.      Heart sounds: Normal heart sounds.   Pulmonary:      Effort: Pulmonary effort is normal.      Breath sounds: Normal breath sounds.   Abdominal:      General: Abdomen is flat.      Palpations: Abdomen is soft.      Comments: No abdominal tenderness     Musculoskeletal: Normal range of motion.   Skin:     General: Skin is warm and dry.   Neurological:      General: No focal deficit present.      Mental Status: She is alert and oriented to person, place, and time.   Psychiatric:         Mood and Affect: Mood normal.         Behavior: Behavior normal.         Thought Content: Thought content normal.         Judgment: Judgment normal.          /72   Pulse 61   Temp 96.6 °F (35.9 °C)   Wt 50.8 kg (112 lb)   LMP  (LMP Unknown)   BMI 20.48 kg/m²     Assessment/Plan   Diagnoses and  all orders for this visit:    Malignant neoplasm of pancreas, unspecified location of malignancy (CMS/HCC)    Essential hypertension    Rheumatoid arthritis of hand, unspecified laterality, unspecified whether rheumatoid factor present (CMS/HCC)    Type 2 diabetes mellitus with hyperglycemia, without long-term current use of insulin (CMS/HCC)  -     glucose blood test strip; Use as instructed    Other orders  -     glucose blood test strip; Use as instructed  -     glucose monitor monitoring kit; 1 each As Needed (glucose testing).    patient w/ pancreatic cancer. She will continue current therapy as rx by oncology. Will monitor bp and continue amlodipine. Will check glucose levels to determine if any therapy needed while on prednisone. She has good pain regulation and will monitor. She will follow up in 3-4 months or prn.

## 2020-10-19 NOTE — TELEPHONE ENCOUNTER
PATIENT WOULD LIKE YOU TO CALL [phone]      THEY NEED CLARIFICATION ON THE LAST PRESCRIPTION THAT WAS SENT OVER WHICH WAS glucose blood test strip,glucose monitor monitoring kit    PLEASE ADVISE     [phone]

## 2020-10-19 NOTE — TELEPHONE ENCOUNTER
Pharmacy needs new rx for test monitor and strips and lancets all to have ICD 10 code and specific directions of how and how many times a day needed

## 2020-11-16 NOTE — TELEPHONE ENCOUNTER
Patient called states she has been on prednisone for 2 months due to low energy. She tested her blood sugar her reading is 229. She would like to know what you what like for her do. Please advise

## 2020-11-17 NOTE — PROGRESS NOTES
Chief Complaint   Patient presents with   • Hyperglycemia   • Pancreatic Cancer       History of Present Illness   Janet Martinez is a 80 y.o. female presents for follow up evaluation. Patient has pancreatic cancer. She was started on prednisone for low energy. Started at  10 mg daily and noted feeling so alert that she was up from 8 am and then unable to sleep at night. She self decreased to 5 mg after about a month. For last one week she has been taking the 5 mg dosing. She reports that she was easily irritated on the higher dose. She also has noted that glucose has been running over 200 when tested after eating. 120 this morning. Repeat testing in office (90 min post prandial) revealed it to be 214. Mild discomfort in the mid epigastric region.       The following portions of the patient's history were reviewed and updated as appropriate: allergies, current medications, past family history, past medical history, past social history, past surgical history and problem list.  Current Outpatient Medications on File Prior to Visit   Medication Sig Dispense Refill   • albuterol (PROVENTIL) (2.5 MG/3ML) 0.083% nebulizer solution Take 2.5 mg by nebulization Every 4 (Four) Hours As Needed for Wheezing.     • amLODIPine (NORVASC) 5 MG tablet TAKE 1 TABLET BY MOUTH EVERY DAY 90 tablet 1   • Blood Glucose Monitoring Suppl (D-Care Glucometer) w/Device kit 1 each Daily. 1 each 2   • budesonide (PULMICORT) 0.5 MG/2ML nebulizer solution USE 1 VIAL VIA NEBULIZER TWICE DAILY RINSE MOUTH AFTER USE  12   • Calcium Carbonate-Vitamin D (CALCIUM 600+D PO) Take 1 tablet by mouth Daily.     • colestipol (COLESTID) 1 g tablet TAKE 1 TABLET BY MOUTH TWICE A  tablet 1   • CREON 27699 units capsule delayed-release particles capsule Take 12,000 units of lipase by mouth Daily. before a meal  3   • DULoxetine (CYMBALTA) 60 MG capsule Take 1 capsule by mouth 2 (Two) Times a Day. 60 capsule 3   • etanercept (ENBREL) 50 MG/ML solution  prefilled syringe injection Inject 50 mg under the skin 1 (One) Time Per Week. THURSDAYS. TO HOLD 4 WEEKS     • ferrous sulfate 325 (65 FE) MG tablet Take 1 tablet by mouth Daily With Breakfast. 90 tablet 1   • fluticasone (FLONASE) 50 MCG/ACT nasal spray 2 sprays into the nostril(s) as directed by provider Daily. 1 bottle 5   • folic acid (FOLVITE) 1 MG tablet TAKE 1 TABLET BY MOUTH EVERY DAY 90 tablet 0   • glucose blood test strip Use as instructed 100 each 12   • glucose blood test strip Test once daily for type 2 DM.  E11.65 100 each 12   • glucose monitor monitoring kit 1 each As Needed (glucose testing). 1 each 3   • ipratropium (ATROVENT) 0.06 % nasal spray 2 sprays into each nostril 4 (Four) Times a Day As Needed for Rhinitis.     • ipratropium-albuterol (DUO-NEB) 0.5-2.5 mg/3 ml nebulizer Take 3 mL by nebulization Every 4 (Four) Hours As Needed for Wheezing.     • KLOR-CON 20 MEQ CR tablet TAKE 1 TABLET BY MOUTH EVERY DAY 90 tablet 0   • Lancet Device misc 1 each Daily. 100 each 6   • magnesium oxide (MAG-OX) 400 MG tablet Take 1 tablet by mouth 2 (Two) Times a Day. 60 tablet 2   • montelukast (SINGULAIR) 10 MG tablet TAKE 1 TABLET BY MOUTH EVERY DAY 90 tablet 3   • omeprazole (priLOSEC) 40 MG capsule TAKE 1 CAPSULE BY MOUTH EVERY EVENING 90 capsule 1   • ondansetron ODT (ZOFRAN-ODT) 4 MG disintegrating tablet Place 1 tablet on the tongue Every 8 (Eight) Hours As Needed for Nausea or Vomiting. 10 tablet 0   • oxyCODONE-acetaminophen (PERCOCET)  MG per tablet Take 1 tablet by mouth Every 6 (Six) Hours As Needed for Moderate Pain .     • PERFOROMIST 20 MCG/2ML nebulizer solution USE 1 VIAL VIA NEBULIZER TWICE DAILY  12   • saccharomyces boulardii (FLORASTOR) 250 MG capsule Take 1 capsule by mouth 2 (Two) Times a Day. 20 capsule 5   • senna (senna) 8.6 MG tablet Take 1 tablet by mouth Daily As Needed for Constipation.     • Triamcinolone Acetonide (NASACORT) 55 MCG/ACT nasal inhaler 2 sprays into the  nostril(s) as directed by provider Daily. 16.5 g 11   • umeclidinium-vilanterol (ANORO ELLIPTA) 62.5-25 MCG/INH aerosol powder  inhaler Inhale 1 puff Daily. 3 each 2   • valsartan (DIOVAN) 320 MG tablet Take 1 tablet by mouth Daily. 90 tablet 2   • vitamin B-12 (CYANOCOBALAMIN) 1000 MCG tablet Take 1 tablet by mouth Daily. 90 tablet 2   • vitamin D (ERGOCALCIFEROL) 1.25 MG (78972 UT) capsule capsule Take 50,000 Units by mouth Every 7 (Seven) Days.       • VITAMIN D PO Take  by mouth. Once a week     • [DISCONTINUED] folic acid (FOLVITE) 1 MG tablet TAKE 1 TABLET BY MOUTH EVERY DAY 90 tablet 0   • [DISCONTINUED] predniSONE (DELTASONE) 10 MG tablet Take 1 tablet by mouth Daily. 90 tablet 1     Current Facility-Administered Medications on File Prior to Visit   Medication Dose Route Frequency Provider Last Rate Last Dose   • levalbuterol (XOPENEX) nebulizer solution 0.63 mg  0.63 mg Nebulization Q6H PRN Agata Burgess MD   0.63 mg at 12/17/18 1437     Review of Systems   Constitutional: Negative.         Energy improved   HENT: Negative.    Eyes: Negative.    Respiratory: Negative.    Cardiovascular: Negative.    Gastrointestinal:        Mild abdominal pain that radiates up into upper abdomen   Endocrine: Negative.    Genitourinary: Negative.    Musculoskeletal: Negative.    Skin: Negative.    Allergic/Immunologic: Negative.    Neurological: Negative.    Hematological: Negative.    Psychiatric/Behavioral: Negative.        Objective   Physical Exam  Vitals signs and nursing note reviewed.   Constitutional:       Appearance: Normal appearance.   HENT:      Head: Normocephalic and atraumatic.      Right Ear: Tympanic membrane normal.      Left Ear: Tympanic membrane normal.      Nose: Nose normal.      Mouth/Throat:      Mouth: Mucous membranes are moist.   Eyes:      Extraocular Movements: Extraocular movements intact.      Pupils: Pupils are equal, round, and reactive to light.   Neck:      Musculoskeletal: Normal range  "of motion and neck supple.   Cardiovascular:      Rate and Rhythm: Normal rate and regular rhythm.      Pulses: Normal pulses.      Heart sounds: Normal heart sounds.   Pulmonary:      Effort: Pulmonary effort is normal.      Breath sounds: Normal breath sounds.   Abdominal:      General: Abdomen is flat.      Palpations: Abdomen is soft.   Neurological:      Mental Status: She is alert.          /78 (BP Location: Right arm, Patient Position: Sitting, Cuff Size: Adult)   Pulse 69   Temp 97.4 °F (36.3 °C) (Temporal)   Ht 160 cm (63\")   Wt 49.9 kg (110 lb)   LMP  (LMP Unknown)   SpO2 100%   BMI 19.49 kg/m²     Assessment/Plan   Diagnoses and all orders for this visit:    Hyperglycemia  -     POCT Glucose    Malignant neoplasm of head of pancreas (CMS/HCC)    Other orders  -     vitamin D (ERGOCALCIFEROL) 1.25 MG (56073 UT) capsule capsule; Take 50,000 Units by mouth Every 7 (Seven) Days.    -     predniSONE (DELTASONE) 5 MG tablet; Take 1 tablet by mouth Daily.  -     famotidine (Pepcid) 40 MG tablet; Take 1 tablet by mouth Daily.      Patient w/ hyperglycemia. Will reduce prednisone to 5 mg as she was not tolerating 10 mg. She may actually try 1/2 tab of this and will monitor. Will consider starting long acting insulin if post prandial glucose remains >200. She has symptoms of gastritis and this could be from prednisone admin. She will start pepcid 40 mg daily. Follow up routinely or as needed.    ADDENDUM    Patient reports that glucose remained greater than 200. She started basiglar 6 units at night and has dosed two times. She notes that glucose was 180 post prandial last evening. She will continue 6 units daily unless <140. She will follow up as scheduled. If she becomes unable to eat she should hold insulin.   jw       "

## 2020-11-24 NOTE — TELEPHONE ENCOUNTER
PATIENT IS WANTING TO KNOW IF SHE HAS TO TAKE INSULIN IF HER SUGAR IS BELOW 200 OR ONLY IF IT'S ABOVE.    PLEASE CALL BACK: [phone]

## 2020-11-30 NOTE — TELEPHONE ENCOUNTER
Caller: Janet Martinez    Relationship: Self    Best call back number:[phone] (H)    Medication needed:   Needles    When do you need the refill by: 12/1    What details did the patient provide when requesting the medication: Patient states that she recently had one of her medications refilled but she did not receive her needles. She said that if it is possible she wouold be able to pick them up today. She states that she has one needle left for today and none for tomorrow. Please advise.     Does the patient have less than a 3 day supply:  [x] Yes  [] No    What is the patient's preferred pharmacy:  [facility]/pharmacy #[id number] - [place], [place] - [address] AT [facility] - [phone] [facility] [phone]   [phone]

## 2020-12-10 PROBLEM — C78.7 SECONDARY LIVER CANCER (HCC): Status: ACTIVE | Noted: 2020-01-01

## 2020-12-10 NOTE — TELEPHONE ENCOUNTER
Janet's  Óscar is requesting to accompany pt to her appt today. I let him know no visitors allowed but he asked that I request clearance since pt doesn't take good notes or ask questions    Óscar's Ph# [phone]

## 2020-12-10 NOTE — TELEPHONE ENCOUNTER
Called pt and she said she was on her way to see dr izaguirre and she would have them recheck it and ask him if she should take the insulin

## 2020-12-10 NOTE — PROGRESS NOTES
CHIEF COMPLAINT/REASON FOR FOLLOW-UP:  Pancreatic cancer, abdominal pain       HISTORY OF PRESENT ILLNESS: Patient is a 80-year-old female with the above-mentioned history who is here today for 2 months follow-up, after repeated CT scan for abdomen obtained on 12/3/2020 together with laboratory study at the same time.   Patient is accompanied by her  who helped her with history and discussion.    Patient reports that she had a significant elevated glucose level today at home around 280.  We did recheck Accu-Chek today in our clinic which reported glucose 202.    Patient reports that she has abdomen pain, and has been taking oxycodone 5 mg up to twice a day.  She is receiving pain medication from hospice care.  Patient otherwise reports she has reasonable performance status ECOG 1.  Her weight is stable.  She denies nausea vomiting.  She takes Creon, and with regular bowel movement.  She said if she forgot her Creon, then will have diarrhea.  Patient has no other specific complaints.    Her CT scan examination of the abdomen with IV contrast obtained on 12/3/2020 reported newly developed 2 cm hypodense lesion in the left lobe of the liver consistent with metastatic disease.  However the pancreas mass has slightly decreased in size at a 2.7 cm, up from previous 3.3 cm on 8/12/2020.  There was a common bile duct stent visible, resolution of intrahepatic biliary ductal dilatation.    Laboratory studies obtained on the same day 12/3/2020 reported significant worsening tumor marker CA 19-9 at 369.9 units/mL.  She maintains normal CBC including hemoglobin 12.7, platelets 406,000 and WBC 8390 including ANC 6500 lymphocytes 1290 monocytes 480.  Iron study reported ferritin 55.8, iron saturation 21% with free iron 85 TIBC 411.  A chemistry lab reported creatinine 0.5, normal liver function panel, albumin 4.6 and globulin four 8.5 with sodium 3.3 and normal sodium 137, calcium 10.0.  Glucose was 125.        Past Medical  History, Past Surgical History, Social History, Family History have been reviewed and are without significant changes except as mentioned.    Oncology History:  1. Pancreatic carcinoma diagnosed in early September 2019: The patient previously had a chest X-ray performed on 08/19/2019 for evaluation of a severe cough which revealed a subtle nodule projecting over the medial aspect of the left lung base not previously seen on study performed on 12/17/2018. The patient then underwent a CT Chest on 08/22/2019 which showed a small pleural-based nodule of the upper left lobe with nonspecific mediastinal adenopathy. There was also mild prominence of the pancreatic duct, thinning of the pancreas, without a specific cause identified on the exam. A PET levy was then performed on 09/03/2019 which demonstrated intensely FDG avid mass-like enlargement of the pancreatic head with dilation of the main pancreatic duct, as well as mildly FDG avid mediastinal adenopathy. CT Abdomen & Pelvis performed on 09/10/2019 reported approximately 2.7 cm pancreatic head mass with significant pancreatic ductal dilation. The mass is situated medial to the 1st portion of the duodenum. There were no pathologically enlarged nodes or evidence for liver metastases.   · The patient underwent an endoscopic ultrasound (upper) fine needle biopsy on 09/17/2019 and the pathology report showed atypical glandular cells consistent with adenocarcinoma.   · Laboratory study performed on 9/16/2019 showed normal CA 19-9 of 18.7 U/mL.  · I explained to the patient that the PET showed mediastinal adenopathy, however biopsy would be difficult due to the size and location of the lymph nodes.  There is lymph nodes also his only slightly elevated activity, could even be benign lymph nodes.  · I discussed with the patient that chemotherapy and/or concurrent chemoradiation therapy to the pancreas may be an option. I further explained that the only way to cure pancreatic  cancer is completed surgical resection and that chemotherapy or radiation treatment would be to control the disease. I discussed with the patient and her  at length that the best systematic treatment to treat pancreatic cancer is with a mixture of 3 chemotherapy medications FOLFIRINOX regimen, however I do not believe that she would tolerate this treatment well.  I also has suspicion that she would tolerate Abraxane plus Gemzar doublets chemotherapy, also because of the side effects especially bone marrow suppression.  · I further discussed that she could consider chemotherapy with single agent Gemzar in combination with radiation and a sandwich type schedule.  I further discussed the side effects at length, including nausea, loss of appetite, fatigue, and weight loss, bone marrow suppression, flu type symptoms etc. I explained that I feel this is the best way for her to proceed as this would result in the least amount of side effects. We would administer 2 weeks on and one week off for 3 cycles (6 doses),   Then followed by concurrent chemoradiotherapy using single agent daily Gemzar, and then further treatment with weekly Gemzar.  We would also have to repeat PET imaging in order to evaluate efficacy of chemotherapy treatment.  · I also explained that there are presently some clinical trials to treat pancreatic cancer however they are not currently available at our clinic. In addition, the patient is not currently eligible for a clinical trial for second line chemotherapy as she has not undergone first line chemotherapy treatment at this time.   · I explained to the patient that without any treatment, she would be expected to live anywhere between 6 months to 1 year and that although she has not been experiencing many side effects now, more side effects are likely to occur, such as obstructive jaundice with jaundiced skin and eyes, whitish stools, and very yellow urine, and the liver failure.  She will also  suffer from pain as her pain are likely to worsen. She will at some point require a stent placement to relieve the obstructive jaundice for which she should be referred to Dr. Cortes. I explained to the patient that she will most likely not be able to have the stent placed preventatively.   · I discussed that she is likely not a good candidate for complete Whipple surgery due to comorbidities, which result in her development of diabetes and would require insulin and medication to improve digestion.   · The patient's  and the patient state that they have conjointly decided to focus on quality of life rather than longevity at this time.  · PET scan examination on 11/22/2019 showed improved hypermetabolic lesion in the pancreas.  Patient is asymptomatic.   · CT scan of the chest, abdomen, and pelvis with contrast performed on 2/11/2020 showed stable mediastinal lymph nodes, stable pancreatic head lesion, and persistent pancreatic ductal dilatation.    · Laboratory studies on 2/18/2020 showed normal CBC and CMP.  CA 19-9 at 27.8 units/mL, still within normal range, however higher than previous level 17.7 on 11/22/2019.   · CT scan of the abdomen and pelvis on 5/5/2020 showed pancreatic head mass is much less defined, measures smaller about 2.2 cm, instead of 3 cm from 9/192020.  There is no lymphadenopathy or new abnormality.    · Laboratory studies on 5/5/2020 showed worsening tumor marker at 79.7 U/mL, almost tripled compared to the level in February 2020.  Otherwise patient had normal CBC and unremarkable CMP except glucose 159.  · Due to her worsening tumor marker on 5/5/2020, we suspect the patient having disease progression, despite the CT scan report, and we tried to obtain a PET scan for further evaluation, but this was denied based on insurance policy.    · On 5/14/2020, patient and her  report that patient remains asymptomatic, she feels well physically.   · I discussed with the patient that  despite the stable CT findings on 5/5/2020, her tumor marker has almost tripled.  I discussed with the patient and her  today the treatment options of monitoring her with a repeat CT scan and tumor marker in 2 months vs. proceeding with chemotherapy on Gemzar at this time.  I discussed with the patient the potential side effects of Gemzar, including fatigue, change in taste, and low white blood cell count and platelets.  The patient and her  had all questions answered and verbalized understanding, and she does not wish to receive chemotherapy at this time due to the potential side effects with treatment.  Therefore, we will proceed with continued monitoring with repeat imaging and laboratory studies in 2 months.  · On 6/30/2020, tumor marker actually improved at 45.7 units/mL without any systematic treatment.  Patient opted for observation.    · She was admitted on 8/11/2020 through 8/14/2020 with initially abdominal pain and vomiting.  CT scan 8/12/2020 showed biliary obstruction with moderate dilatation of the intrahepatic and extrahepatic biliary system and significant distention of the gallbladder.  Pancreatic mass, although ill-defined, also appears to have enlarged in the interim.  Patient underwent ERCP with biliary stent placement by Dr. Bardales on 8/13/2020.  · Patient has completely normalized AST ALT, and total bilirubin, and a much improved alk phosphatase by 10/1/2020 despite had worsening tumor marker CA 19-9 at 129.7 units/mL.  · Discussed with the patient and her  on 10/8/2020, since patient is feeling much better, largely asymptomatic.  She opted to have monitoring instead of active systematic treatment.     She was admitted 8/11/2020 through 8/14/2020 with initially abdominal pain and vomiting.  The patient did have a CT scan 8/12/2020, while hospitalized, which showed biliary obstruction with moderate dilatation of the intrahepatic and extrahepatic biliary system and  significant distention of the gallbladder.  Pancreatic mass, although ill-defined, also appears to have enlarged in the interim.    Patient underwent ERCP with biliary stent placement by Dr. Bardales on 8/13/2020.    Laboratory study on 10/1/2020 reported worsening tumor marker CA  129.7 units/mL.  She has mild anemia Hb 11.5 but otherwise normal platelets and WBC.  Chemistry lab reported further improved alk phosphatase 128, otherwise normal liver panel.  Maintains normal renal function with creatinine 0.41, and unremarkable CMP otherwise.     Review of Systems   Constitutional: Negative for activity change, appetite change, chills, diaphoresis, fatigue, fever and unexpected weight change.   HENT: Negative for mouth sores, nosebleeds and trouble swallowing.    Eyes: Negative for photophobia and visual disturbance.   Respiratory: Negative for cough and shortness of breath.    Cardiovascular: Negative for chest pain and leg swelling.   Gastrointestinal: Positive for abdominal pain (Controlled with narcotics.). Negative for blood in stool, constipation, diarrhea (No recurrence when takes Creon) and nausea.   Endocrine: Negative for cold intolerance and polydipsia.   Genitourinary: Negative for dysuria and hematuria.   Musculoskeletal: Negative for arthralgias and joint swelling.   Skin: Negative for color change and pallor.   Neurological: Negative for dizziness and numbness.   Hematological: Negative for adenopathy. Does not bruise/bleed easily.   Psychiatric/Behavioral: Negative for confusion and sleep disturbance. Hallucinations:        Medications:  The current medication list was reviewed in the EMR    ALLERGIES:    Allergies   Allergen Reactions   • Gold-Containing Drug Products Unknown - High Severity     Patient cannot recall reaction   • Hydrocodone Irritability     HYPERACTIVITY       Objective      Vitals:    12/10/20 1419   BP: 154/63   Pulse: 62   Resp: 16   Temp: 97.7 °F (36.5 °C)   SpO2: 99%   ECOG  1.      Physical Exam   Constitutional: She is oriented to person, place, and time. She appears well-developed. No distress.   Patient wears mask due to the pandemic coronavirus infection.    HENT:   Head: Normocephalic and atraumatic.   Eyes: Conjunctivae are normal. No scleral icterus.   Neck: Normal range of motion.   Cardiovascular: Normal rate, regular rhythm and normal heart sounds.   Pulmonary/Chest: Effort normal and breath sounds normal. No respiratory distress.   Abdominal: Soft. Normal appearance and bowel sounds are normal. There is no abdominal tenderness.   Musculoskeletal: Deformity (Bilateral hands due to rheumatoid arthritis) present.      Right lower leg: No edema.      Left lower leg: No edema.   Neurological: She is alert and oriented to person, place, and time.   Skin: Skin is warm and dry. No rash noted. No jaundice.   Psychiatric: Mood and thought content normal.   Vitals reviewed.          RECENT LABS:  Lab Results   Component Value Date    WBC 8.39 12/03/2020    HGB 12.7 12/03/2020    HCT 38.5 12/03/2020    MCV 84.6 12/03/2020     12/03/2020     Lab Results   Component Value Date    GLUCOSE 125 (H) 12/03/2020    BUN 8 12/03/2020    CREATININE 0.50 (L) 12/03/2020    EGFRIFNONA 119 12/03/2020    EGFRIFAFRI 127 06/12/2020    BCR 16.0 12/03/2020    K 3.3 (L) 12/03/2020    CO2 32.5 (H) 12/03/2020    CALCIUM 10.0 12/03/2020    PROTENTOTREF 7.4 06/12/2020    ALBUMIN 4.60 12/03/2020    LABIL2 1.3 06/12/2020    AST 14 12/03/2020    ALT 12 12/03/2020     Component      Latest Ref Rng & Units 9/16/2019 11/22/2019 2/18/2020   CA 19-9      <=35.0 U/mL 18.7 17.7 27.8     Component      Latest Ref Rng & Units 5/5/2020 6/30/2020 8/12/2020 10/1/2020   CA 19-9      <=35.0 U/mL 79.7 (H) 45.7 (H) 70.6 (H) 129.7 (H)     CA 19-9 at 369.9 units/mL on 12/3/2020.    IMAGING STUDY:  1. CT ABDOMEN AND PELVIS WITH IV CONTRAST 12/3/2020     HISTORY: Follow-up pancreatic cancer     TECHNIQUE: Radiation dose  reduction techniques were utilized, including  automated exposure control and exposure modulation based on body size.   3 mm images were obtained through the abdomen and pelvis after the  administration of IV contrast.      COMPARISON: 08/12/2020     FINDINGS:      ABDOMEN:  There are emphysematous changes present within the lung bases. Stable  tiny groundglass nodule in the left lower lobe on image 13. There is a  new hypodense lesion in the left lobe of the liver measuring 2 cm.  Interval placement of a stent within the common bile duct. Resolution of  intrahepatic biliary ductal dilatation. Pneumobilia is present.  Gallbladder contains a tiny focus of air. Spleen unremarkable. Bilateral  renal cysts. The adrenal glands are stable in appearance. The mass  within the head of the pancreas smaller measuring about 2.7 cm,  previously measured as 3.3 cm. The degree of pancreatic ductal  dilatation is increased. No evidence for ascites.     PELVIS:  The bladder is unremarkable. No free fluid. Moderate to large amount of  stool within the colon. Bone windows show no acute osseous abnormality.     IMPRESSION:  1. New 2.0 cm hypodense lesion in the left lobe of the liver consistent  with metastatic disease  2. The pancreatic mass has slightly decreased in size measuring about  2.7 cm, previously 3.3 cm  3. Interval placement of stent within the common bile duct. Resolution  of intrahepatic biliary ductal dilatation. Increased degree of  pancreatic ductal dilatation.         Assessment/Plan   1.  Pancreatic carcinoma diagnosed September 2019 for which she has been observed radiographically and at her choice received no active treatment in the way of chemotherapy to date.    · She was admitted on 8/11/2020 through 8/14/2020 with initially abdominal pain and vomiting.  CT scan 8/12/2020 showed biliary obstruction with moderate dilatation of the intrahepatic and extrahepatic biliary system and significant distention of the  gallbladder.  Pancreatic mass, although ill-defined, also appears to have enlarged in the interim.  Patient underwent ERCP with biliary stent placement by Dr. Bardales on 8/13/2020.  · Patient has completely normalized AST ALT, and total bilirubin, and a much improved alk phosphatase by 10/1/2020 despite had worsening tumor marker CA 19-9 at 129.7 units/mL.   · Discussed with the patient and her  on 10/8/2020, since patient is feeling much better, largely asymptomatic, she opted to have monitoring instead of active systematic treatment.   · CT scan of the abdomen on 12/3/2020 reported disease progression, with newly developed liver lesion 2 cm, despite a primary pancreatic tumor is slightly shrinking in size.  In the meantime she had significantly increased tumor marker CA 19-9 and 369.9 units/mL, almost tripled compared to the level on 10/1/2020.   · I had a lengthy discussion with the patient and her  on 12/10/2020 about pros and the cons and the goal of the treatment.  Patient herself is very firm to maintain her quality of life, she has well-controlled pain currently, otherwise asymptomatic from her metastatic disease.  She declined again for considering systematic chemotherapy, despite her  voiced clearly he wants her to be around for longer.  Patient is very comfortable with her decision.      2.  New mild anemia, hemoglobin 11.8 8/11/2020, likely secondary to malignancy.  Hemoglobin 10.2 after hydration on 8/13/2020.  · 8/21/2020 hemoglobin is 11.6.   · Stable Hb 11.5 on 10/1/2020.  · Normalized hemoglobin 12.7 on 12/3/2020.     PLAN:  1. Continue current pain management per hospice.  2. I will discharge patient from our clinic, she will follow up with hospice care.  We do not have active roles in her management.  3. If she was discharged by hospice care in the future, we will be happy to take care of her for pain management.         Discussed with patient and her , both of them  voiced understanding.    I independently reviewed the CT scan images from 12/3/2020 and those from 8/12/2020.  I also shared those images with the patient and her .      More than 40 min were used for patient care, over half of that time were for counseling.    ADAM NAVA M.D., Ph.D.    12/10/2020.      CC:  Agata Burgess MD

## 2020-12-10 NOTE — TELEPHONE ENCOUNTER
PATIENT STATES HER BLOOD SUGAR 2 HOURS AFTER SHE ATE TODAY . SHE BELIEVES DR GUTIERREZ MIGHT WANT HER TO TAKE SOME INSULIN.    PLEASE ADVISE  [phone]

## 2021-01-01 ENCOUNTER — TELEPHONE (OUTPATIENT)
Dept: INTERNAL MEDICINE | Facility: CLINIC | Age: 81
End: 2021-01-01

## 2021-01-01 ENCOUNTER — OFFICE VISIT (OUTPATIENT)
Dept: INTERNAL MEDICINE | Facility: CLINIC | Age: 81
End: 2021-01-01

## 2021-01-01 ENCOUNTER — HOSPITAL ENCOUNTER (OUTPATIENT)
Dept: CT IMAGING | Facility: HOSPITAL | Age: 81
Discharge: HOME | End: 2021-03-29
Admitting: INTERNAL MEDICINE

## 2021-01-01 ENCOUNTER — HOSPITAL ENCOUNTER (OUTPATIENT)
Dept: CT IMAGING | Facility: HOSPITAL | Age: 81
Discharge: HOME | End: 2021-05-28
Admitting: INTERNAL MEDICINE

## 2021-01-01 VITALS
SYSTOLIC BLOOD PRESSURE: 172 MMHG | HEIGHT: 62 IN | DIASTOLIC BLOOD PRESSURE: 62 MMHG | WEIGHT: 106 LBS | BODY MASS INDEX: 19.51 KG/M2 | HEART RATE: 55 BPM

## 2021-01-01 VITALS
DIASTOLIC BLOOD PRESSURE: 100 MMHG | SYSTOLIC BLOOD PRESSURE: 170 MMHG | WEIGHT: 112 LBS | HEIGHT: 62 IN | HEART RATE: 70 BPM | BODY MASS INDEX: 20.61 KG/M2

## 2021-01-01 VITALS
WEIGHT: 107 LBS | HEIGHT: 62 IN | DIASTOLIC BLOOD PRESSURE: 68 MMHG | HEART RATE: 62 BPM | OXYGEN SATURATION: 98 % | SYSTOLIC BLOOD PRESSURE: 127 MMHG | BODY MASS INDEX: 19.69 KG/M2

## 2021-01-01 VITALS
DIASTOLIC BLOOD PRESSURE: 82 MMHG | BODY MASS INDEX: 20.06 KG/M2 | HEIGHT: 62 IN | SYSTOLIC BLOOD PRESSURE: 178 MMHG | WEIGHT: 109 LBS

## 2021-01-01 DIAGNOSIS — Z23 IMMUNIZATION DUE: ICD-10-CM

## 2021-01-01 DIAGNOSIS — I10 ESSENTIAL HYPERTENSION: ICD-10-CM

## 2021-01-01 DIAGNOSIS — R53.83 FATIGUE, UNSPECIFIED TYPE: ICD-10-CM

## 2021-01-01 DIAGNOSIS — K83.1 BILIARY OBSTRUCTION: ICD-10-CM

## 2021-01-01 DIAGNOSIS — C78.7 SECONDARY LIVER CANCER (HCC): ICD-10-CM

## 2021-01-01 DIAGNOSIS — E87.6 HYPOKALEMIA: ICD-10-CM

## 2021-01-01 DIAGNOSIS — M06.9 RHEUMATOID ARTHRITIS INVOLVING MULTIPLE SITES, UNSPECIFIED WHETHER RHEUMATOID FACTOR PRESENT (HCC): ICD-10-CM

## 2021-01-01 DIAGNOSIS — R35.0 URINARY FREQUENCY: ICD-10-CM

## 2021-01-01 DIAGNOSIS — G89.3 CANCER ASSOCIATED PAIN: Primary | ICD-10-CM

## 2021-01-01 DIAGNOSIS — C25.0 MALIGNANT NEOPLASM OF HEAD OF PANCREAS (HCC): Primary | ICD-10-CM

## 2021-01-01 DIAGNOSIS — Z48.02 ENCOUNTER FOR REMOVAL OF SUTURES: ICD-10-CM

## 2021-01-01 DIAGNOSIS — R10.13 EPIGASTRIC PAIN: ICD-10-CM

## 2021-01-01 DIAGNOSIS — R63.4 WEIGHT LOSS: ICD-10-CM

## 2021-01-01 DIAGNOSIS — C25.9 MALIGNANT NEOPLASM OF PANCREAS, UNSPECIFIED LOCATION OF MALIGNANCY (HCC): ICD-10-CM

## 2021-01-01 DIAGNOSIS — B37.0 THRUSH: ICD-10-CM

## 2021-01-01 DIAGNOSIS — C25.9 MALIGNANT NEOPLASM OF PANCREAS, UNSPECIFIED LOCATION OF MALIGNANCY (HCC): Primary | ICD-10-CM

## 2021-01-01 DIAGNOSIS — C25.0 MALIGNANT NEOPLASM OF HEAD OF PANCREAS (HCC): ICD-10-CM

## 2021-01-01 DIAGNOSIS — H92.02 LEFT EAR PAIN: ICD-10-CM

## 2021-01-01 DIAGNOSIS — E55.9 VITAMIN D DEFICIENCY: ICD-10-CM

## 2021-01-01 LAB
25(OH)D3+25(OH)D2 SERPL-MCNC: 22.8 NG/ML (ref 30–100)
ALBUMIN SERPL-MCNC: 3.8 G/DL (ref 3.5–5.2)
ALBUMIN SERPL-MCNC: 4 G/DL (ref 3.7–4.7)
ALBUMIN SERPL-MCNC: 4.2 G/DL (ref 3.5–5.2)
ALBUMIN/GLOB SERPL: 1.2 {RATIO} (ref 1.2–2.2)
ALBUMIN/GLOB SERPL: 1.3 G/DL
ALBUMIN/GLOB SERPL: 1.3 G/DL
ALP SERPL-CCNC: 133 U/L (ref 39–117)
ALP SERPL-CCNC: 171 U/L (ref 39–117)
ALP SERPL-CCNC: 75 IU/L (ref 39–117)
ALT SERPL-CCNC: 10 IU/L (ref 0–32)
ALT SERPL-CCNC: 17 U/L (ref 1–33)
ALT SERPL-CCNC: 18 U/L (ref 1–33)
AST SERPL-CCNC: 16 IU/L (ref 0–40)
AST SERPL-CCNC: 23 U/L (ref 1–32)
AST SERPL-CCNC: 30 U/L (ref 1–32)
BASOPHILS # BLD AUTO: 0.04 10*3/MM3 (ref 0–0.2)
BASOPHILS # BLD AUTO: 0.05 10*3/MM3 (ref 0–0.2)
BASOPHILS NFR BLD AUTO: 0.3 % (ref 0–1.5)
BASOPHILS NFR BLD AUTO: 0.5 % (ref 0–1.5)
BILIRUB SERPL-MCNC: 0.3 MG/DL (ref 0–1.2)
BILIRUB SERPL-MCNC: 0.4 MG/DL (ref 0–1.2)
BILIRUB SERPL-MCNC: 0.5 MG/DL (ref 0–1.2)
BUN SERPL-MCNC: 11 MG/DL (ref 8–27)
BUN SERPL-MCNC: 8 MG/DL (ref 8–23)
BUN SERPL-MCNC: 9 MG/DL (ref 8–23)
BUN/CREAT SERPL: 14.3 (ref 7–25)
BUN/CREAT SERPL: 14.3 (ref 7–25)
BUN/CREAT SERPL: 20 (ref 12–28)
CALCIUM SERPL-MCNC: 8.8 MG/DL (ref 8.6–10.5)
CALCIUM SERPL-MCNC: 9.2 MG/DL (ref 8.7–10.3)
CALCIUM SERPL-MCNC: 9.3 MG/DL (ref 8.6–10.5)
CHLORIDE SERPL-SCNC: 100 MMOL/L (ref 96–106)
CHLORIDE SERPL-SCNC: 91 MMOL/L (ref 98–107)
CHLORIDE SERPL-SCNC: 97 MMOL/L (ref 98–107)
CO2 SERPL-SCNC: 22 MMOL/L (ref 20–29)
CO2 SERPL-SCNC: 28.7 MMOL/L (ref 22–29)
CO2 SERPL-SCNC: 30.1 MMOL/L (ref 22–29)
CREAT BLDA-MCNC: 0.5 MG/DL (ref 0.6–1.3)
CREAT BLDA-MCNC: 0.6 MG/DL (ref 0.6–1.3)
CREAT SERPL-MCNC: 0.56 MG/DL (ref 0.57–1)
CREAT SERPL-MCNC: 0.56 MG/DL (ref 0.57–1)
CREAT SERPL-MCNC: 0.63 MG/DL (ref 0.57–1)
EOSINOPHIL # BLD AUTO: 0.02 10*3/MM3 (ref 0–0.4)
EOSINOPHIL # BLD AUTO: 0.17 10*3/MM3 (ref 0–0.4)
EOSINOPHIL NFR BLD AUTO: 0.2 % (ref 0.3–6.2)
EOSINOPHIL NFR BLD AUTO: 1.6 % (ref 0.3–6.2)
ERYTHROCYTE [DISTWIDTH] IN BLOOD BY AUTOMATED COUNT: 13.1 % (ref 12.3–15.4)
ERYTHROCYTE [DISTWIDTH] IN BLOOD BY AUTOMATED COUNT: 13.4 % (ref 12.3–15.4)
GAMMA INTERFERON BACKGROUND BLD IA-ACNC: 0.09 IU/ML
GLOBULIN SER CALC-MCNC: 2.9 GM/DL
GLOBULIN SER CALC-MCNC: 3.3 G/DL (ref 1.5–4.5)
GLOBULIN SER CALC-MCNC: 3.3 GM/DL
GLUCOSE SERPL-MCNC: 103 MG/DL (ref 65–99)
GLUCOSE SERPL-MCNC: 156 MG/DL (ref 65–99)
GLUCOSE SERPL-MCNC: 158 MG/DL (ref 65–99)
HCT VFR BLD AUTO: 34 % (ref 34–46.6)
HCT VFR BLD AUTO: 41.2 % (ref 34–46.6)
HGB BLD-MCNC: 11.3 G/DL (ref 12–15.9)
HGB BLD-MCNC: 13.7 G/DL (ref 12–15.9)
IMM GRANULOCYTES # BLD AUTO: 0.04 10*3/MM3 (ref 0–0.05)
IMM GRANULOCYTES # BLD AUTO: 0.1 10*3/MM3 (ref 0–0.05)
IMM GRANULOCYTES NFR BLD AUTO: 0.4 % (ref 0–0.5)
IMM GRANULOCYTES NFR BLD AUTO: 0.8 % (ref 0–0.5)
LYMPHOCYTES # BLD AUTO: 0.76 10*3/MM3 (ref 0.7–3.1)
LYMPHOCYTES # BLD AUTO: 1.53 10*3/MM3 (ref 0.7–3.1)
LYMPHOCYTES NFR BLD AUTO: 14.2 % (ref 19.6–45.3)
LYMPHOCYTES NFR BLD AUTO: 6.1 % (ref 19.6–45.3)
M TB IFN-G BLD-IMP: NEGATIVE
M TB IFN-G CD4+ BCKGRND COR BLD-ACNC: 0.09 IU/ML
M TB IFN-G CD4+CD8+ BCKGRND COR BLD-ACNC: 0.09 IU/ML
MAGNESIUM SERPL-MCNC: 1.4 MG/DL (ref 1.6–2.3)
MCH RBC QN AUTO: 27.6 PG (ref 26.6–33)
MCH RBC QN AUTO: 28.4 PG (ref 26.6–33)
MCHC RBC AUTO-ENTMCNC: 33.2 G/DL (ref 31.5–35.7)
MCHC RBC AUTO-ENTMCNC: 33.3 G/DL (ref 31.5–35.7)
MCV RBC AUTO: 83.1 FL (ref 79–97)
MCV RBC AUTO: 85.3 FL (ref 79–97)
MITOGEN IGNF BLD-ACNC: 6.41 IU/ML
MONOCYTES # BLD AUTO: 1.24 10*3/MM3 (ref 0.1–0.9)
MONOCYTES # BLD AUTO: 1.38 10*3/MM3 (ref 0.1–0.9)
MONOCYTES NFR BLD AUTO: 11.1 % (ref 5–12)
MONOCYTES NFR BLD AUTO: 11.5 % (ref 5–12)
NEUTROPHILS # BLD AUTO: 10.14 10*3/MM3 (ref 1.7–7)
NEUTROPHILS # BLD AUTO: 7.72 10*3/MM3 (ref 1.7–7)
NEUTROPHILS NFR BLD AUTO: 71.8 % (ref 42.7–76)
NEUTROPHILS NFR BLD AUTO: 81.5 % (ref 42.7–76)
NRBC BLD AUTO-RTO: 0 /100 WBC (ref 0–0.2)
NRBC BLD AUTO-RTO: 0 /100 WBC (ref 0–0.2)
PLATELET # BLD AUTO: 367 10*3/MM3 (ref 140–450)
PLATELET # BLD AUTO: 450 10*3/MM3 (ref 140–450)
POTASSIUM SERPL-SCNC: 3.3 MMOL/L (ref 3.5–5.2)
POTASSIUM SERPL-SCNC: 3.4 MMOL/L (ref 3.5–5.2)
POTASSIUM SERPL-SCNC: 4.1 MMOL/L (ref 3.5–5.2)
PROT SERPL-MCNC: 6.7 G/DL (ref 6–8.5)
PROT SERPL-MCNC: 7.3 G/DL (ref 6–8.5)
PROT SERPL-MCNC: 7.5 G/DL (ref 6–8.5)
QUANTIFERON INCUBATION: NORMAL
RBC # BLD AUTO: 4.09 10*6/MM3 (ref 3.77–5.28)
RBC # BLD AUTO: 4.83 10*6/MM3 (ref 3.77–5.28)
SERVICE CMNT-IMP: NORMAL
SODIUM SERPL-SCNC: 132 MMOL/L (ref 136–145)
SODIUM SERPL-SCNC: 139 MMOL/L (ref 136–145)
SODIUM SERPL-SCNC: 141 MMOL/L (ref 134–144)
TSH SERPL DL<=0.005 MIU/L-ACNC: 4.54 UIU/ML (ref 0.27–4.2)
WBC # BLD AUTO: 10.75 10*3/MM3 (ref 3.4–10.8)
WBC # BLD AUTO: 12.44 10*3/MM3 (ref 3.4–10.8)

## 2021-01-01 PROCEDURE — 74177 CT ABD & PELVIS W/CONTRAST: CPT

## 2021-01-01 PROCEDURE — 25010000002 IOPAMIDOL 61 % SOLUTION: Performed by: INTERNAL MEDICINE

## 2021-01-01 PROCEDURE — 82565 ASSAY OF CREATININE: CPT

## 2021-01-01 PROCEDURE — 99214 OFFICE O/P EST MOD 30 MIN: CPT | Performed by: INTERNAL MEDICINE

## 2021-01-01 PROCEDURE — 93000 ELECTROCARDIOGRAM COMPLETE: CPT | Performed by: INTERNAL MEDICINE

## 2021-01-01 RX ORDER — AMLODIPINE BESYLATE 2.5 MG/1
2.5 TABLET ORAL DAILY
Qty: 30 TABLET | Refills: 5 | Status: SHIPPED | OUTPATIENT
Start: 2021-01-01

## 2021-01-01 RX ORDER — VALSARTAN 320 MG/1
160 TABLET ORAL DAILY
Qty: 90 TABLET | Refills: 2 | Status: SHIPPED | OUTPATIENT
Start: 2021-01-01 | End: 2021-01-01

## 2021-01-01 RX ORDER — PREDNISONE 1 MG/1
5 TABLET ORAL DAILY
Qty: 90 TABLET | Refills: 2 | Status: SHIPPED | OUTPATIENT
Start: 2021-01-01

## 2021-01-01 RX ORDER — MONTELUKAST SODIUM 4 MG/1
TABLET, CHEWABLE ORAL
Qty: 180 TABLET | Refills: 1 | Status: SHIPPED | OUTPATIENT
Start: 2021-01-01 | End: 2021-01-01

## 2021-01-01 RX ORDER — CLONIDINE HYDROCHLORIDE 0.1 MG/1
0.1 TABLET ORAL 2 TIMES DAILY
Qty: 60 TABLET | Refills: 6 | Status: SHIPPED | OUTPATIENT
Start: 2021-01-01 | End: 2021-01-01

## 2021-01-01 RX ORDER — POTASSIUM CHLORIDE 20 MEQ/1
20 TABLET, EXTENDED RELEASE ORAL DAILY
Qty: 90 TABLET | Refills: 1 | Status: SHIPPED | OUTPATIENT
Start: 2021-01-01

## 2021-01-01 RX ORDER — OMEGA-3/DHA/EPA/FISH OIL 35-113.5MG
TABLET,CHEWABLE ORAL
Qty: 90 TABLET | Refills: 2 | Status: SHIPPED | OUTPATIENT
Start: 2021-01-01

## 2021-01-01 RX ORDER — MULTIVIT-MIN/IRON/FOLIC ACID/K 18-600-40
50 CAPSULE ORAL DAILY
Qty: 90 CAPSULE | Refills: 1 | Status: SHIPPED | OUTPATIENT
Start: 2021-01-01

## 2021-01-01 RX ORDER — CEPHALEXIN 500 MG/1
500 CAPSULE ORAL 2 TIMES DAILY
Qty: 14 CAPSULE | Refills: 0 | Status: SHIPPED | OUTPATIENT
Start: 2021-01-01

## 2021-01-01 RX ORDER — SACCHAROMYCES BOULARDII 250 MG
250 CAPSULE ORAL 2 TIMES DAILY
Qty: 20 CAPSULE | Refills: 0 | Status: SHIPPED | OUTPATIENT
Start: 2021-01-01

## 2021-01-01 RX ORDER — LIDOCAINE HYDROCHLORIDE 20 MG/ML
SOLUTION OROPHARYNGEAL
COMMUNITY
Start: 2021-01-01 | End: 2021-01-01

## 2021-01-01 RX ORDER — TRIAMCINOLONE ACETONIDE 55 UG/1
2 SPRAY, METERED NASAL DAILY
Qty: 16.5 G | Refills: 11 | Status: SHIPPED | OUTPATIENT
Start: 2021-01-01 | End: 2022-07-27

## 2021-01-01 RX ORDER — FOLIC ACID 1 MG/1
TABLET ORAL
Qty: 90 TABLET | Refills: 0 | Status: SHIPPED | OUTPATIENT
Start: 2021-01-01 | End: 2021-01-01

## 2021-01-01 RX ORDER — ATORVASTATIN CALCIUM 20 MG/1
20 TABLET, FILM COATED ORAL DAILY
COMMUNITY
End: 2021-01-01

## 2021-01-01 RX ORDER — OMEPRAZOLE 40 MG/1
CAPSULE, DELAYED RELEASE ORAL
Qty: 90 CAPSULE | Refills: 1 | Status: SHIPPED | OUTPATIENT
Start: 2021-01-01

## 2021-01-01 RX ADMIN — IOPAMIDOL 100 ML: 612 INJECTION, SOLUTION INTRAVENOUS at 17:11

## 2021-01-01 RX ADMIN — IOPAMIDOL 85 ML: 612 INJECTION, SOLUTION INTRAVENOUS at 11:36

## 2021-01-29 NOTE — PROGRESS NOTES
Chief Complaint   Patient presents with   • Hypertension   • Hyperlipidemia   • Stool Color Change     orange       History of Present Illness   Janet Martinez is a 80 y.o. female presents for follow up evaluation. She has pancreatic cancer. She notes occasional orange stooling. She has chosen not to have treatment. Diagnosed 9/19. She does now have evidence of a liver mets. She is taking creon for malabsorption. She has good pain regulation w/ oxycodone. She has hypertension. bp is elevated today.       The following portions of the patient's history were reviewed and updated as appropriate: allergies, current medications, past family history, past medical history, past social history, past surgical history and problem list.  Current Outpatient Medications on File Prior to Visit   Medication Sig Dispense Refill   • albuterol (PROVENTIL) (2.5 MG/3ML) 0.083% nebulizer solution Take 2.5 mg by nebulization Every 4 (Four) Hours As Needed for Wheezing.     • amLODIPine (NORVASC) 10 MG tablet Take 1 tablet by mouth Daily. 90 tablet 3   • Blood Glucose Monitoring Suppl (D-Care Glucometer) w/Device kit 1 each Daily. 1 each 2   • budesonide (PULMICORT) 0.5 MG/2ML nebulizer solution USE 1 VIAL VIA NEBULIZER TWICE DAILY RINSE MOUTH AFTER USE  12   • Calcium Carbonate-Vitamin D (CALCIUM 600+D PO) Take 1 tablet by mouth Daily.     • colestipol (COLESTID) 1 g tablet TAKE 1 TABLET BY MOUTH TWICE A  tablet 1   • CREON 38854 units capsule delayed-release particles capsule Take 12,000 units of lipase by mouth Daily. before a meal  3   • DULoxetine (CYMBALTA) 60 MG capsule Take 1 capsule by mouth 2 (Two) Times a Day. 60 capsule 3   • etanercept (ENBREL) 50 MG/ML solution prefilled syringe injection Inject 50 mg under the skin 1 (One) Time Per Week. THURSDAYS. TO HOLD 4 WEEKS     • famotidine (Pepcid) 40 MG tablet Take 1 tablet by mouth Daily. 90 tablet 3   • ferrous sulfate 325 (65 FE) MG tablet Take 1 tablet by mouth Daily  With Breakfast. 90 tablet 1   • fluticasone (FLONASE) 50 MCG/ACT nasal spray 2 sprays into the nostril(s) as directed by provider Daily. 1 bottle 5   • folic acid (FOLVITE) 1 MG tablet TAKE 1 TABLET BY MOUTH EVERY DAY 90 tablet 0   • glucose blood test strip Use as instructed 100 each 12   • glucose monitor monitoring kit 1 each As Needed (glucose testing). 1 each 3   • Insulin Pen Needle 32G X 4 MM misc 1 each Daily. 100 each 3   • ipratropium (ATROVENT) 0.06 % nasal spray 2 sprays into each nostril 4 (Four) Times a Day As Needed for Rhinitis.     • ipratropium-albuterol (DUO-NEB) 0.5-2.5 mg/3 ml nebulizer Take 3 mL by nebulization Every 4 (Four) Hours As Needed for Wheezing.     • KLOR-CON 20 MEQ CR tablet TAKE 1 TABLET BY MOUTH EVERY DAY 90 tablet 0   • Lancet Device misc 1 each Daily. 100 each 6   • magnesium oxide (MAG-OX) 400 MG tablet Take 1 tablet by mouth 2 (Two) Times a Day. 60 tablet 2   • montelukast (SINGULAIR) 10 MG tablet TAKE 1 TABLET BY MOUTH EVERY DAY 90 tablet 3   • omeprazole (priLOSEC) 40 MG capsule TAKE 1 CAPSULE BY MOUTH EVERY EVENING 90 capsule 1   • ondansetron ODT (ZOFRAN-ODT) 4 MG disintegrating tablet Place 1 tablet on the tongue Every 8 (Eight) Hours As Needed for Nausea or Vomiting. 10 tablet 0   • oxyCODONE-acetaminophen (PERCOCET)  MG per tablet Take 1 tablet by mouth Every 6 (Six) Hours As Needed for Moderate Pain .     • PERFOROMIST 20 MCG/2ML nebulizer solution USE 1 VIAL VIA NEBULIZER TWICE DAILY  12   • predniSONE (DELTASONE) 5 MG tablet Take 1 tablet by mouth Daily. 30 tablet 4   • senna (senna) 8.6 MG tablet Take 1 tablet by mouth Daily As Needed for Constipation.     • Triamcinolone Acetonide (NASACORT) 55 MCG/ACT nasal inhaler 2 sprays into the nostril(s) as directed by provider Daily. 16.5 g 11   • valsartan (DIOVAN) 320 MG tablet Take 1 tablet by mouth Daily. 90 tablet 2   • vitamin B-12 (CYANOCOBALAMIN) 1000 MCG tablet Take 1 tablet by mouth Daily. 90 tablet 2   •  [DISCONTINUED] vitamin D (ERGOCALCIFEROL) 1.25 MG (01702 UT) capsule capsule Take 50,000 Units by mouth Every 7 (Seven) Days.       • [DISCONTINUED] glucose blood test strip Test once daily for type 2 DM.  E11.65 100 each 12   • [DISCONTINUED] Insulin Glargine (BASAGLAR KWIKPEN) 100 UNIT/ML injection pen Inject 10 Units under the skin into the appropriate area as directed Every Night. With needles 2 pen 3   • [DISCONTINUED] saccharomyces boulardii (FLORASTOR) 250 MG capsule Take 1 capsule by mouth 2 (Two) Times a Day. 20 capsule 5   • [DISCONTINUED] umeclidinium-vilanterol (ANORO ELLIPTA) 62.5-25 MCG/INH aerosol powder  inhaler Inhale 1 puff Daily. 3 each 2   • [DISCONTINUED] VITAMIN D PO Take  by mouth. Once a week       Current Facility-Administered Medications on File Prior to Visit   Medication Dose Route Frequency Provider Last Rate Last Admin   • levalbuterol (XOPENEX) nebulizer solution 0.63 mg  0.63 mg Nebulization Q6H PRN Agata Burgess MD   0.63 mg at 12/17/18 1437     Review of Systems   Constitutional: Negative.    HENT: Negative.    Eyes: Negative.    Respiratory: Negative.    Cardiovascular: Negative.         Occcasional chest discomfort   Gastrointestinal: Negative.    Endocrine: Negative.    Genitourinary: Negative.    Musculoskeletal: Positive for arthralgias, back pain and joint swelling.   Skin: Negative.    Allergic/Immunologic: Negative.    Neurological: Negative.    Hematological: Negative.    Psychiatric/Behavioral: Negative.        Objective   Physical Exam  Vitals signs and nursing note reviewed.   Constitutional:       Appearance: Normal appearance.      Comments: Thin wf no acute distress   HENT:      Head: Normocephalic and atraumatic.      Right Ear: Tympanic membrane normal.      Left Ear: Tympanic membrane normal.      Nose: Nose normal.      Mouth/Throat:      Mouth: Mucous membranes are moist.   Eyes:      Extraocular Movements: Extraocular movements intact.      Pupils: Pupils are  "equal, round, and reactive to light.   Neck:      Musculoskeletal: Normal range of motion.   Cardiovascular:      Rate and Rhythm: Normal rate and regular rhythm.      Pulses: Normal pulses.      Heart sounds: Normal heart sounds.   Pulmonary:      Effort: Pulmonary effort is normal.      Breath sounds: Normal breath sounds.   Abdominal:      General: Abdomen is flat.      Palpations: Abdomen is soft.   Skin:     General: Skin is warm and dry.   Neurological:      General: No focal deficit present.      Mental Status: She is alert and oriented to person, place, and time.   Psychiatric:         Mood and Affect: Mood normal.         Behavior: Behavior normal.         Thought Content: Thought content normal.         Judgment: Judgment normal.          /100   Pulse 70   Ht 157.5 cm (62\")   Wt 50.8 kg (112 lb)   LMP  (LMP Unknown)   BMI 20.49 kg/m²     Assessment/Plan   Diagnoses and all orders for this visit:    Malignant neoplasm of pancreas, unspecified location of malignancy (CMS/HCC)  -     Magnesium  -     QuantiFERON TB Gold  -     Comprehensive Metabolic Panel    Rheumatoid arthritis involving multiple sites, unspecified whether rheumatoid factor present (CMS/HCC)  -     Magnesium  -     QuantiFERON TB Gold  -     Comprehensive Metabolic Panel    Hypokalemia  -     Magnesium  -     QuantiFERON TB Gold  -     Comprehensive Metabolic Panel    Other orders  -     cloNIDine (Catapres) 0.1 MG tablet; Take 1 tablet by mouth 2 (Two) Times a Day.        Patient w/ pancreatic cancer. She will have labs tested to ensure no potassium reduction given loose stooling. She has RA and needs quantiferon testing to ensure no TB while on enbrel therapy. She will start clonidine .1 mg bid she will follow up in 3 months or prn.          "

## 2021-02-04 NOTE — TELEPHONE ENCOUNTER
ldvm on cell re: tb test neg and mailed to pt and to continue potassium and mag supplement as labs are low

## 2021-02-04 NOTE — TELEPHONE ENCOUNTER
----- Message from Agata Burgess MD sent at 2/4/2021  9:37 AM EST -----  Patient has slightly low potassium and magnesium she can continue one tablet each supplementation   She can also have her tuberculosis testing sent to her, it is negative.

## 2021-03-26 NOTE — TELEPHONE ENCOUNTER
Caller: Janet Martinez    Relationship: Self    Best call back number:     What is the best time to reach you: ANY TIME IS OK    Who are you requesting to speak with (clinical staff, provider,  specific staff member):     Do you know the name of the person who called:     What was the call regarding: PT IS SCHEDULED TO HAVE HER SECOND COVID VACCINE TOMORROW AT 5PM AND HAS BEEN READING THAT SHE SHOULD NOT TAKE ANY PAIN MEDS.  PT HAS PANCREATIC CANCER AND TAKES PAIN MEDS DAILY, SHOULD SHE STOP? CAN SHE STILL GET THE VACCINE TOMORROW?    Do you require a callback: YES

## 2021-03-26 NOTE — TELEPHONE ENCOUNTER
Per dr dunn she is sending in Carafate       Pt informed and an appt made for Monday ----- Message from Tutu Jacobsen sent at 3/26/2021 12:27 PM EDT -----  Patient is having some pains, getting covid shot tomorrow and was told not to take pain medicine. Wants to speak with someone clinical

## 2021-03-29 NOTE — PROGRESS NOTES
Chief Complaint   Patient presents with   • Gas   • Pain     stomach   • Stool Color Change     orange       History of Present Illness   Janet Martinez is a 80 y.o. female presents for follow up evaluation. She has pancreatic cancer. She is experiencing pain. She is taking oxycodone 5 mg daily (1/2 of a 10 mg tablet). This gives about 12 hours of benefit. Her largest concern is of constipation with pain tablets. She notes an orange discoloration to her stooling. She is not having loose stooling or constipation at this time. In august she had a blockage of the bile duct. This was resolved with stinting. She reports pain today is higher in nature than previous pain.       The following portions of the patient's history were reviewed and updated as appropriate: allergies, current medications, past family history, past medical history, past social history, past surgical history and problem list.  Current Outpatient Medications on File Prior to Visit   Medication Sig Dispense Refill   • albuterol (PROVENTIL) (2.5 MG/3ML) 0.083% nebulizer solution Take 2.5 mg by nebulization Every 4 (Four) Hours As Needed for Wheezing.     • amLODIPine (NORVASC) 10 MG tablet Take 1 tablet by mouth Daily. 90 tablet 3   • atorvastatin (LIPITOR) 20 MG tablet Take 20 mg by mouth Daily.     • Blood Glucose Monitoring Suppl (D-Care Glucometer) w/Device kit 1 each Daily. 1 each 2   • Calcium Carbonate-Vitamin D (CALCIUM 600+D PO) Take 1 tablet by mouth Daily.     • cloNIDine (Catapres) 0.1 MG tablet Take 1 tablet by mouth 2 (Two) Times a Day. 60 tablet 6   • CREON 32606 units capsule delayed-release particles capsule Take 12,000 units of lipase by mouth Daily. before a meal  3   • DULoxetine (CYMBALTA) 60 MG capsule Take 1 capsule by mouth 2 (Two) Times a Day. 60 capsule 3   • etanercept (ENBREL) 50 MG/ML solution prefilled syringe injection Inject 50 mg under the skin 1 (One) Time Per Week. THURSDAYS. TO HOLD 4 WEEKS     • fluticasone  (FLONASE) 50 MCG/ACT nasal spray 2 sprays into the nostril(s) as directed by provider Daily. 1 bottle 5   • folic acid (FOLVITE) 1 MG tablet TAKE 1 TABLET BY MOUTH EVERY DAY 90 tablet 0   • glucose blood test strip Use as instructed 100 each 12   • glucose monitor monitoring kit 1 each As Needed (glucose testing). 1 each 3   • Insulin Pen Needle 32G X 4 MM misc 1 each Daily. 100 each 3   • ipratropium-albuterol (DUO-NEB) 0.5-2.5 mg/3 ml nebulizer Take 3 mL by nebulization Every 4 (Four) Hours As Needed for Wheezing.     • KLOR-CON 20 MEQ CR tablet TAKE 1 TABLET BY MOUTH EVERY DAY 90 tablet 0   • Lancet Device misc 1 each Daily. 100 each 6   • montelukast (SINGULAIR) 10 MG tablet TAKE 1 TABLET BY MOUTH EVERY DAY 90 tablet 3   • omeprazole (priLOSEC) 40 MG capsule TAKE 1 CAPSULE BY MOUTH EVERY EVENING 90 capsule 1   • oxyCODONE-acetaminophen (PERCOCET)  MG per tablet Take 1 tablet by mouth Every 6 (Six) Hours As Needed for Moderate Pain .     • PERFOROMIST 20 MCG/2ML nebulizer solution USE 1 VIAL VIA NEBULIZER TWICE DAILY  12   • predniSONE (DELTASONE) 5 MG tablet Take 1 tablet by mouth Daily. 30 tablet 4   • senna (senna) 8.6 MG tablet Take 1 tablet by mouth Daily As Needed for Constipation.     • valsartan (DIOVAN) 320 MG tablet Take 1 tablet by mouth Daily. 90 tablet 2   • budesonide (PULMICORT) 0.5 MG/2ML nebulizer solution USE 1 VIAL VIA NEBULIZER TWICE DAILY RINSE MOUTH AFTER USE  12   • colestipol (COLESTID) 1 g tablet TAKE 1 TABLET BY MOUTH TWICE A  tablet 1   • famotidine (Pepcid) 40 MG tablet Take 1 tablet by mouth Daily. 90 tablet 3   • ferrous sulfate 325 (65 FE) MG tablet Take 1 tablet by mouth Daily With Breakfast. 90 tablet 1   • ipratropium (ATROVENT) 0.06 % nasal spray 2 sprays into each nostril 4 (Four) Times a Day As Needed for Rhinitis.     • magnesium oxide (MAG-OX) 400 MG tablet Take 1 tablet by mouth 2 (Two) Times a Day. 60 tablet 2   • ondansetron ODT (ZOFRAN-ODT) 4 MG  disintegrating tablet Place 1 tablet on the tongue Every 8 (Eight) Hours As Needed for Nausea or Vomiting. 10 tablet 0   • Triamcinolone Acetonide (NASACORT) 55 MCG/ACT nasal inhaler 2 sprays into the nostril(s) as directed by provider Daily. 16.5 g 11   • vitamin B-12 (CYANOCOBALAMIN) 1000 MCG tablet Take 1 tablet by mouth Daily. 90 tablet 2     Current Facility-Administered Medications on File Prior to Visit   Medication Dose Route Frequency Provider Last Rate Last Admin   • levalbuterol (XOPENEX) nebulizer solution 0.63 mg  0.63 mg Nebulization Q6H PRN Agata Burgess MD   0.63 mg at 12/17/18 1437     Review of Systems   Constitutional: Negative.    HENT: Negative.    Eyes: Negative.    Respiratory: Negative.    Cardiovascular: Negative.    Gastrointestinal: Negative.    Endocrine: Negative.    Genitourinary: Negative.    Musculoskeletal: Positive for arthralgias.   Skin: Negative.    Allergic/Immunologic: Negative.    Neurological: Negative.    Hematological: Negative.    Psychiatric/Behavioral: Negative.        Objective   Physical Exam  Vitals and nursing note reviewed.   Constitutional:       Appearance: Normal appearance.   HENT:      Head: Normocephalic and atraumatic.      Right Ear: Tympanic membrane normal.      Left Ear: Tympanic membrane normal.      Nose: Nose normal.      Mouth/Throat:      Mouth: Mucous membranes are moist.   Eyes:      Extraocular Movements: Extraocular movements intact.      Pupils: Pupils are equal, round, and reactive to light.   Cardiovascular:      Rate and Rhythm: Normal rate and regular rhythm.      Pulses: Normal pulses.      Heart sounds: Normal heart sounds.   Pulmonary:      Effort: Pulmonary effort is normal.   Abdominal:      General: Abdomen is flat.      Palpations: Abdomen is soft.      Comments: Acute pain midepigastric region   Rebound mild guard     Musculoskeletal:         General: Normal range of motion.      Cervical back: Normal range of motion.   Skin:      "General: Skin is warm and dry.   Neurological:      General: No focal deficit present.      Mental Status: She is alert and oriented to person, place, and time.   Psychiatric:         Mood and Affect: Mood normal.         Behavior: Behavior normal.         Thought Content: Thought content normal.         Judgment: Judgment normal.          Ht 157.5 cm (62\")   Wt 49.4 kg (109 lb)   LMP  (LMP Unknown)   BMI 19.94 kg/m²     Assessment/Plan   Diagnoses and all orders for this visit:    Malignant neoplasm of pancreas, unspecified location of malignancy (CMS/HCC)  -     CT Abdomen Pelvis With Contrast; Future    Epigastric pain  -     CT Abdomen Pelvis With Contrast; Future    Other orders  -     atorvastatin (LIPITOR) 20 MG tablet; Take 20 mg by mouth Daily.      Patient with midepigastric pain. Suspect progression of pancreatic cancer or hepatic mets. Will test ct abd/ pelvis to further evaluate. She has not taken pain med today and seems to be undertreating her pain in general. Discussed need to take pain meds before high level of pain and will try .5 tab bid. She has very elevated bp today. Partially from pain and also has not taken meds. Will be more consistent with her bp medication. She will follow up here in 1-2 month or prn.     Patient has been erroneously marked as diabetic. Based on the available clinical information, she does not have diabetes and should therefore be excluded from diabetic health maintenance and quality measures for the remainder of the reporting period.      ADDENDUM  Prelim report w/ increased size of malignant mass in both pancreas and liver. However, duct is patent at this time. Advised patient's spouse of results and of advise to increase frequency of pain medication.   jw  "

## 2021-03-30 NOTE — TELEPHONE ENCOUNTER
Pt informed      ----- Message from Agata Burgess MD sent at 3/30/2021  8:21 AM EDT -----  Please advise patient her potassium and vitamin d levels are low. Supplements for this deficiency are sent to her pharmacy.   enrique

## 2021-03-30 NOTE — TELEPHONE ENCOUNTER
PATIENT'S  IS CALLING TODAY TO REQUEST A PERSONAL CALL FROM DR GUTIERREZ TO INFORM PATIENT AND  OF THE CT RESULTS FROM YESTERDAY. THEY ARE STATING THEY PREFER TO HEAR IT FROM DR GUTIERREZ HERSELF. PATIENT'S  WAS TRYING TO LOOK UP RESULTS ON MYCHART BUT WANTED ME TO LET DR GUTIERREZ KNOW THAT IS WHY HE IS CALLING AND TO REQUEST A RETURN CALL. THE PATIENT IS VERY EAGER TO HEAR THE RESULTS. PLEASE ADVISE.     CALL BACK: [phone]

## 2021-04-27 NOTE — PROGRESS NOTES
Chief Complaint   Patient presents with   • Hypertension   • Suture / Staple Removal       History of Present Illness   Janet Martinez is a 80 y.o. female presents for follow up evaluation. Patient has pancreatic cancer. In general she is feeling fairly well. She does report a bad day yesterday w/ increased pain. She took oxycodone 10 mg w/ benefit after second dose. She had decreased appetitie and did not tolerate her breakfast but was able to eat later in the day.   Patient had recent eye lid surgery. She has a suture that was not removed.   She has hypertension. bp is elevated today but she has not yet taken her medication.     The following portions of the patient's history were reviewed and updated as appropriate: allergies, current medications, past family history, past medical history, past social history, past surgical history and problem list.  Current Outpatient Medications on File Prior to Visit   Medication Sig Dispense Refill   • albuterol (PROVENTIL) (2.5 MG/3ML) 0.083% nebulizer solution Take 2.5 mg by nebulization Every 4 (Four) Hours As Needed for Wheezing.     • amLODIPine (NORVASC) 10 MG tablet Take 1 tablet by mouth Daily. 90 tablet 3   • atorvastatin (LIPITOR) 20 MG tablet Take 20 mg by mouth Daily.     • Blood Glucose Monitoring Suppl (D-Care Glucometer) w/Device kit 1 each Daily. 1 each 2   • budesonide (PULMICORT) 0.5 MG/2ML nebulizer solution USE 1 VIAL VIA NEBULIZER TWICE DAILY RINSE MOUTH AFTER USE  12   • Calcium Carbonate-Vitamin D (CALCIUM 600+D PO) Take 1 tablet by mouth Daily.     • cloNIDine (Catapres) 0.1 MG tablet Take 1 tablet by mouth 2 (Two) Times a Day. 60 tablet 6   • colestipol (COLESTID) 1 g tablet TAKE 1 TABLET BY MOUTH TWICE A  tablet 1   • CREON 66763 units capsule delayed-release particles capsule Take 12,000 units of lipase by mouth Daily. before a meal  3   • DULoxetine (CYMBALTA) 60 MG capsule Take 1 capsule by mouth 2 (Two) Times a Day. 60 capsule 3   •  etanercept (ENBREL) 50 MG/ML solution prefilled syringe injection Inject 50 mg under the skin 1 (One) Time Per Week. THURSDAYS. TO HOLD 4 WEEKS     • famotidine (Pepcid) 40 MG tablet Take 1 tablet by mouth Daily. 90 tablet 3   • ferrous sulfate 325 (65 FE) MG tablet Take 1 tablet by mouth Daily With Breakfast. 90 tablet 1   • fluticasone (FLONASE) 50 MCG/ACT nasal spray 2 sprays into the nostril(s) as directed by provider Daily. 1 bottle 5   • folic acid (FOLVITE) 1 MG tablet TAKE 1 TABLET BY MOUTH EVERY DAY 90 tablet 0   • glucose blood test strip Use as instructed 100 each 12   • glucose monitor monitoring kit 1 each As Needed (glucose testing). 1 each 3   • Insulin Pen Needle 32G X 4 MM misc 1 each Daily. 100 each 3   • ipratropium (ATROVENT) 0.06 % nasal spray 2 sprays into each nostril 4 (Four) Times a Day As Needed for Rhinitis.     • ipratropium-albuterol (DUO-NEB) 0.5-2.5 mg/3 ml nebulizer Take 3 mL by nebulization Every 4 (Four) Hours As Needed for Wheezing.     • Lancet Device misc 1 each Daily. 100 each 6   • magnesium oxide (MAG-OX) 400 MG tablet Take 1 tablet by mouth 2 (Two) Times a Day. 60 tablet 2   • montelukast (SINGULAIR) 10 MG tablet TAKE 1 TABLET BY MOUTH EVERY DAY 90 tablet 3   • omeprazole (priLOSEC) 40 MG capsule TAKE 1 CAPSULE BY MOUTH EVERY DAY IN THE EVENING 90 capsule 1   • ondansetron ODT (ZOFRAN-ODT) 4 MG disintegrating tablet Place 1 tablet on the tongue Every 8 (Eight) Hours As Needed for Nausea or Vomiting. 10 tablet 0   • oxyCODONE-acetaminophen (PERCOCET)  MG per tablet Take 1 tablet by mouth Every 6 (Six) Hours As Needed for Moderate Pain .     • PERFOROMIST 20 MCG/2ML nebulizer solution USE 1 VIAL VIA NEBULIZER TWICE DAILY  12   • potassium chloride (KLOR-CON) 20 MEQ CR tablet Take 1 tablet by mouth Daily. 90 tablet 1   • predniSONE (DELTASONE) 5 MG tablet Take 1 tablet by mouth Daily. 30 tablet 4   • senna (senna) 8.6 MG tablet Take 1 tablet by mouth Daily As Needed for  Constipation.     • Triamcinolone Acetonide (NASACORT) 55 MCG/ACT nasal inhaler 2 sprays into the nostril(s) as directed by provider Daily. 16.5 g 11   • valsartan (DIOVAN) 320 MG tablet Take 1 tablet by mouth Daily. 90 tablet 2   • vitamin B-12 (CYANOCOBALAMIN) 1000 MCG tablet Take 1 tablet by mouth Daily. 90 tablet 2   • Vitamin D, Cholecalciferol, 50 MCG (2000 UT) capsule Take 50 mcg by mouth Daily. 90 capsule 1     Current Facility-Administered Medications on File Prior to Visit   Medication Dose Route Frequency Provider Last Rate Last Admin   • levalbuterol (XOPENEX) nebulizer solution 0.63 mg  0.63 mg Nebulization Q6H PRN Agata Burgess MD   0.63 mg at 12/17/18 1437     Review of Systems   Constitutional: Negative.    HENT: Negative.    Eyes: Negative.    Respiratory: Negative.    Cardiovascular: Negative.    Gastrointestinal: Negative.    Endocrine: Negative.    Genitourinary: Negative.    Musculoskeletal: Negative.    Skin: Negative.    Allergic/Immunologic: Negative.    Neurological: Negative.    Hematological: Negative.    Psychiatric/Behavioral: Negative.        Objective   Physical Exam  Vitals and nursing note reviewed.   Constitutional:       Appearance: Normal appearance. She is well-developed.   HENT:      Head: Normocephalic and atraumatic.      Right Ear: Tympanic membrane and external ear normal.      Left Ear: Tympanic membrane and external ear normal.      Nose: Nose normal.      Mouth/Throat:      Mouth: Mucous membranes are moist.   Eyes:      Extraocular Movements: Extraocular movements intact.      Pupils: Pupils are equal, round, and reactive to light.   Cardiovascular:      Rate and Rhythm: Normal rate and regular rhythm.      Pulses: Normal pulses.      Heart sounds: Normal heart sounds.   Pulmonary:      Effort: Pulmonary effort is normal. No respiratory distress.      Breath sounds: Normal breath sounds.   Abdominal:      General: Abdomen is flat.      Palpations: Abdomen is soft.       "Tenderness: There is abdominal tenderness.   Musculoskeletal:         General: Normal range of motion.      Cervical back: Normal range of motion and neck supple.   Skin:     General: Skin is warm and dry.   Neurological:      General: No focal deficit present.      Mental Status: She is alert and oriented to person, place, and time.   Psychiatric:         Mood and Affect: Mood normal.         Behavior: Behavior normal.         Thought Content: Thought content normal.         Judgment: Judgment normal.          /62   Pulse 55   Ht 157.5 cm (62\")   Wt 48.1 kg (106 lb)   LMP  (LMP Unknown)   BMI 19.39 kg/m²     Assessment/Plan   Diagnoses and all orders for this visit:    Malignant neoplasm of head of pancreas (CMS/HCC)    Essential hypertension    Weight loss    Encounter for removal of sutures      Patient w/ pancreatic cancer. She is having some discomfort and I believe is undertreating her pain. She may schedule 5-10 mg oxydocone routinely. She will take zofran for nausea. She is to take an ensure supplement  After eating and a sample is provided. She had a residual suture on her lid. This was removed today.            "

## 2021-05-14 NOTE — TELEPHONE ENCOUNTER
HH called to increase steroid to 10 mg per pt.   Dr dunn increased until Monday and added nebulizer machine 2-4 times a day and can increase oxycodone by 1 until monday

## 2021-05-18 NOTE — TELEPHONE ENCOUNTER
Pts taking 10 mg of steroids for energy given by  Dr solis , per pt to   Pt states she has cut it down to 5 mg but will increase as she needs   is making a visit today to pt and will call after visit

## 2021-05-19 NOTE — TELEPHONE ENCOUNTER
Hospice informed          Hospice informed of your note. They want to know if they can make decisions as far as med changes can be made after hours and weekends for comfort level, nausea etc.  I sent them a med list and she states that there are many differences of what they have. She is sending her med list to you.  Pt and Hospice do not know if she is taking all meds needed. (this is why they are getting list together to see) Hospice says there are many rxs in the house. She is not taking any DM meds as far as she knows.       Hospice had a visit yesterday with pt  She is having a hard time with her meds  So I sent a med list to Hospice to organize    Pain seems to be well controlled at this time

## 2021-05-28 NOTE — PROGRESS NOTES
"Chief Complaint   Patient presents with   • Hypertension   • Hyperlipidemia   • Pancreatic Cancer       History of Present Illness   Janet Martinez is a 80 y.o. female presents for acute care. Patient has pancreatic cancer. She reports yesterday started \"on and off\" with chest pain. This is radiating to her neck on the right which is \"very painful\". She was riding in the car. She felt it again today. She took 7.5 mg oxycodone then needed more and took additional 15 mg. She has a biliary stint w/ prior biliary obstruction. She had similar pain at that time.       The following portions of the patient's history were reviewed and updated as appropriate: allergies, current medications, past family history, past medical history, past social history, past surgical history and problem list.  Current Outpatient Medications on File Prior to Visit   Medication Sig Dispense Refill   • albuterol (PROVENTIL) (2.5 MG/3ML) 0.083% nebulizer solution Take 2.5 mg by nebulization Every 4 (Four) Hours As Needed for Wheezing.     • atorvastatin (LIPITOR) 20 MG tablet Take 20 mg by mouth Daily.     • Blood Glucose Monitoring Suppl (D-Care Glucometer) w/Device kit 1 each Daily. 1 each 2   • budesonide (PULMICORT) 0.5 MG/2ML nebulizer solution USE 1 VIAL VIA NEBULIZER TWICE DAILY RINSE MOUTH AFTER USE  12   • Calcium Carbonate-Vitamin D (CALCIUM 600+D PO) Take 1 tablet by mouth Daily.     • cloNIDine (Catapres) 0.1 MG tablet Take 1 tablet by mouth 2 (Two) Times a Day. 60 tablet 6   • colestipol (COLESTID) 1 g tablet TAKE 1 TABLET BY MOUTH TWICE A  tablet 1   • CREON 48846 units capsule delayed-release particles capsule Take 12,000 units of lipase by mouth Daily. before a meal  3   • DULoxetine (CYMBALTA) 60 MG capsule Take 1 capsule by mouth 2 (Two) Times a Day. 60 capsule 3   • etanercept (ENBREL) 50 MG/ML solution prefilled syringe injection Inject 50 mg under the skin 1 (One) Time Per Week. THURSDAYS. TO HOLD 4 WEEKS     • " famotidine (Pepcid) 40 MG tablet Take 1 tablet by mouth Daily. 90 tablet 3   • ferrous sulfate 325 (65 FE) MG tablet Take 1 tablet by mouth Daily With Breakfast. 90 tablet 1   • folic acid (FOLVITE) 1 MG tablet TAKE 1 TABLET BY MOUTH EVERY DAY 90 tablet 0   • glucose blood test strip Use as instructed 100 each 12   • glucose monitor monitoring kit 1 each As Needed (glucose testing). 1 each 3   • ipratropium-albuterol (DUO-NEB) 0.5-2.5 mg/3 ml nebulizer Take 3 mL by nebulization Every 4 (Four) Hours As Needed for Wheezing.     • Lidocaine Viscous HCl (XYLOCAINE) 2 % solution      • magnesium oxide (MAG-OX) 400 MG tablet Take 1 tablet by mouth 2 (Two) Times a Day. 60 tablet 2   • montelukast (SINGULAIR) 10 MG tablet TAKE 1 TABLET BY MOUTH EVERY DAY 90 tablet 3   • omeprazole (priLOSEC) 40 MG capsule TAKE 1 CAPSULE BY MOUTH EVERY DAY IN THE EVENING 90 capsule 1   • ondansetron ODT (ZOFRAN-ODT) 4 MG disintegrating tablet Place 1 tablet on the tongue Every 8 (Eight) Hours As Needed for Nausea or Vomiting. 10 tablet 0   • oxyCODONE-acetaminophen (PERCOCET)  MG per tablet Take 1 tablet by mouth Every 6 (Six) Hours As Needed for Moderate Pain .     • PERFOROMIST 20 MCG/2ML nebulizer solution USE 1 VIAL VIA NEBULIZER TWICE DAILY  12   • potassium chloride (KLOR-CON) 20 MEQ CR tablet Take 1 tablet by mouth Daily. 90 tablet 1   • predniSONE (DELTASONE) 5 MG tablet Take 1 tablet by mouth Daily. 90 tablet 2   • senna (senna) 8.6 MG tablet Take 1 tablet by mouth Daily As Needed for Constipation.     • valsartan (DIOVAN) 320 MG tablet Take 1 tablet by mouth Daily. 90 tablet 2   • vitamin B-12 (CYANOCOBALAMIN) 1000 MCG tablet Take 1 tablet by mouth Daily. 90 tablet 2   • Vitamin D, Cholecalciferol, 50 MCG (2000 UT) capsule Take 50 mcg by mouth Daily. 90 capsule 1   • [DISCONTINUED] amLODIPine (NORVASC) 10 MG tablet Take 1 tablet by mouth Daily. 90 tablet 3   • [DISCONTINUED] fluticasone (FLONASE) 50 MCG/ACT nasal spray 2  sprays into the nostril(s) as directed by provider Daily. 1 bottle 5   • [DISCONTINUED] Insulin Pen Needle 32G X 4 MM misc 1 each Daily. 100 each 3   • [DISCONTINUED] ipratropium (ATROVENT) 0.06 % nasal spray 2 sprays into each nostril 4 (Four) Times a Day As Needed for Rhinitis.     • [DISCONTINUED] Lancet Device misc 1 each Daily. 100 each 6     Current Facility-Administered Medications on File Prior to Visit   Medication Dose Route Frequency Provider Last Rate Last Admin   • levalbuterol (XOPENEX) nebulizer solution 0.63 mg  0.63 mg Nebulization Q6H PRN Agata Burgess MD   0.63 mg at 12/17/18 1437     Review of Systems   Constitutional: Negative.    HENT: Negative.    Eyes: Negative.    Respiratory: Negative.    Cardiovascular: Negative.    Gastrointestinal: Positive for abdominal pain and constipation.   Endocrine: Negative.    Genitourinary: Negative.    Musculoskeletal: Negative.    Skin: Negative.    Allergic/Immunologic: Negative.    Neurological: Negative.    Hematological: Negative.    Psychiatric/Behavioral: Negative.        Objective   Physical Exam  Vitals and nursing note reviewed.   Constitutional:       Appearance: Normal appearance.   HENT:      Head: Normocephalic and atraumatic.      Right Ear: Tympanic membrane normal.      Left Ear: Tympanic membrane normal.      Nose: Nose normal.      Mouth/Throat:      Mouth: Mucous membranes are moist.   Eyes:      Extraocular Movements: Extraocular movements intact.      Pupils: Pupils are equal, round, and reactive to light.   Cardiovascular:      Rate and Rhythm: Normal rate and regular rhythm.      Pulses: Normal pulses.      Heart sounds: Normal heart sounds.   Pulmonary:      Effort: Pulmonary effort is normal.      Breath sounds: Normal breath sounds.   Abdominal:      General: Abdomen is flat.      Palpations: Abdomen is soft.   Musculoskeletal:         General: Normal range of motion.      Cervical back: Normal range of motion.   Skin:     General:  "Skin is warm and dry.   Neurological:      General: No focal deficit present.      Mental Status: She is alert and oriented to person, place, and time.   Psychiatric:         Mood and Affect: Mood normal.         Behavior: Behavior normal.         Thought Content: Thought content normal.         Judgment: Judgment normal.          /78   Pulse 62   Ht 157.5 cm (62.01\")   Wt 48.5 kg (107 lb)   LMP  (LMP Unknown)   SpO2 98%   BMI 19.57 kg/m²     Assessment/Plan   Diagnoses and all orders for this visit:    Malignant neoplasm of pancreas, unspecified location of malignancy (CMS/HCC)  -     Comprehensive Metabolic Panel  -     CBC & Differential  -     Amylase; Future  -     Lipase; Future  -     CT Abdomen Pelvis With Contrast; Future    Biliary obstruction  -     CT Abdomen Pelvis With Contrast; Future    Essential hypertension    Other orders  -     Lidocaine Viscous HCl (XYLOCAINE) 2 % solution        Patient w/ acute on chronic abdominal pain. She does have some radiation. Advised can not absolutely ensure it is not cardiac but ekg is not suggestive. Most likely from pancreatic cancer. Will ensure that she does not have biliary obstruction. Will try to optimize oxycodone to maintain pain relief. Spouse will track oxycodone usage and look to convert to a long acting prep or a fentanyl patch. Will d/w hosparus. She will reduce then d/c clonidine and reduce losartan to 1/2 tab. If bp remains low will d/c this entirely. She will f/u here routinely.        "

## 2021-06-22 NOTE — TELEPHONE ENCOUNTER
PATIENT'S  CALLED, HOSPICE NURSE AT THEIR HOME STATING HER BP /110  WANTING TO SPEAK TO KIMMY OR DR GUTIERREZ    [phone]

## 2021-07-22 NOTE — TELEPHONE ENCOUNTER
Caller: Óscar Martinez    Relationship: Emergency Contact    Best call back number: [phone]    What medication are you requesting: N/A    What are your current symptoms: SORES IN MOUTH, MOUTH PAIN     How long have you been experiencing symptoms: COUPLE OF WEEKS     Have you had these symptoms before:    [x] Yes  [] No    Have you been treated for these symptoms before:   [x] Yes  [] No    If a prescription is needed, what is your preferred pharmacy and phone number: [id number] - [place], [place] - [address] AT [facility] - [phone] [facility] [phone] FX      STATES THAT HOSPICE PRESCRIBED A MOUTH WASH FOR PATIENT BUT IT IS NOT WORKING.

## 2021-07-27 NOTE — PROGRESS NOTES
"Chief Complaint   Patient presents with   • Nausea   • Fatigue   • Earache   • Thrush   • Abdominal Pain       History of Present Illness   Janet Martinez is a 81 y.o. female presents for acute care. She is seen by video in place of office visit related to terminal cancer and covid pandemic. Visit by video consent given. Patient has had sore throat. Started on nystatin and diflucan for presumed thrush. Spouse reports her mouth is \"much better now.\" patient reportedly has had a \"very bad weekend\". Plugged ear. hurts when she coughs. \"always cold\". She is having difficulty holding her urine. Sometimes is incontinent of urine.  Fingernail w/ a purpelish discoloration.   Abdomen is very tight. Constipated but did have a bm yesterday. On fentanyl patch w/ prn oxycodone. Has not required much breakthrough pain med.   Normotensive at home. Most recent bp is 140s/70s.     The following portions of the patient's history were reviewed and updated as appropriate: allergies, current medications, past family history, past medical history, past social history, past surgical history and problem list.  Current Outpatient Medications on File Prior to Visit   Medication Sig Dispense Refill   • albuterol (PROVENTIL) (2.5 MG/3ML) 0.083% nebulizer solution Take 2.5 mg by nebulization Every 4 (Four) Hours As Needed for Wheezing.     • amLODIPine (NORVASC) 2.5 MG tablet Take 1 tablet by mouth Daily. 30 tablet 5   • Blood Glucose Monitoring Suppl (D-Care Glucometer) w/Device kit 1 each Daily. 1 each 2   • budesonide (PULMICORT) 0.5 MG/2ML nebulizer solution USE 1 VIAL VIA NEBULIZER TWICE DAILY RINSE MOUTH AFTER USE  12   • CREON 20129 units capsule delayed-release particles capsule Take 12,000 units of lipase by mouth Daily. before a meal  3   • [facility] Vitamin B-12 1000 MCG tablet TAKE 1 TABLET BY MOUTH EVERY DAY 90 tablet 2   • DULoxetine (CYMBALTA) 60 MG capsule Take 1 capsule by mouth 2 (Two) Times a Day. 60 capsule 3   • etanercept " (ENBREL) 50 MG/ML solution prefilled syringe injection Inject 50 mg under the skin 1 (One) Time Per Week. THURSDAYS. TO HOLD 4 WEEKS     • famotidine (Pepcid) 40 MG tablet Take 1 tablet by mouth Daily. 90 tablet 3   • ipratropium-albuterol (DUO-NEB) 0.5-2.5 mg/3 ml nebulizer Take 3 mL by nebulization Every 4 (Four) Hours As Needed for Wheezing.     • montelukast (SINGULAIR) 10 MG tablet TAKE 1 TABLET BY MOUTH EVERY DAY 90 tablet 3   • omeprazole (priLOSEC) 40 MG capsule TAKE 1 CAPSULE BY MOUTH EVERY DAY IN THE EVENING 90 capsule 1   • ondansetron ODT (ZOFRAN-ODT) 4 MG disintegrating tablet Place 1 tablet on the tongue Every 8 (Eight) Hours As Needed for Nausea or Vomiting. 10 tablet 0   • oxyCODONE-acetaminophen (PERCOCET)  MG per tablet Take 1 tablet by mouth Every 6 (Six) Hours As Needed for Moderate Pain .     • PERFOROMIST 20 MCG/2ML nebulizer solution USE 1 VIAL VIA NEBULIZER TWICE DAILY  12   • potassium chloride (KLOR-CON) 20 MEQ CR tablet Take 1 tablet by mouth Daily. 90 tablet 1   • predniSONE (DELTASONE) 5 MG tablet Take 1 tablet by mouth Daily. 90 tablet 2   • senna (senna) 8.6 MG tablet Take 1 tablet by mouth Daily As Needed for Constipation.     • Vitamin D, Cholecalciferol, 50 MCG (2000 UT) capsule Take 50 mcg by mouth Daily. 90 capsule 1     Current Facility-Administered Medications on File Prior to Visit   Medication Dose Route Frequency Provider Last Rate Last Admin   • levalbuterol (XOPENEX) nebulizer solution 0.63 mg  0.63 mg Nebulization Q6H PRN Agata Burgess MD   0.63 mg at 12/17/18 9477     Review of Systems   Constitutional: Positive for fatigue.   HENT: Positive for ear pain and rhinorrhea.    Eyes: Negative.    Respiratory: Negative.    Cardiovascular: Negative.    Gastrointestinal: Positive for abdominal distention, abdominal pain and constipation.   Endocrine: Negative.    Genitourinary: Negative.    Musculoskeletal: Positive for arthralgias.   Skin: Negative.     Allergic/Immunologic: Negative.    Neurological: Negative.    Hematological: Negative.    Psychiatric/Behavioral: Negative.        Objective   Physical Exam  Constitutional:       Comments: Chronically ill   Not acutely distressed     HENT:      Head: Normocephalic.      Mouth/Throat:      Mouth: Mucous membranes are dry.   Pulmonary:      Effort: Pulmonary effort is normal.   Musculoskeletal:         General: Normal range of motion.      Cervical back: Normal range of motion.   Neurological:      General: No focal deficit present.      Mental Status: She is oriented to person, place, and time.   Psychiatric:         Mood and Affect: Mood normal.         Behavior: Behavior normal.      Comments: sedated          LMP  (LMP Unknown)     Assessment/Plan   Diagnoses and all orders for this visit:    Cancer associated pain    Secondary liver cancer (CMS/HCC)    Malignant neoplasm of head of pancreas (CMS/HCC)    Thrush    Left ear pain    Urinary frequency    Other orders  -     cephalexin (Keflex) 500 MG capsule; Take 1 capsule by mouth 2 (Two) Times a Day.  -     saccharomyces boulardii (Florastor) 250 MG capsule; Take 1 capsule by mouth 2 (Two) Times a Day.  -     Triamcinolone Acetonide (NASACORT) 55 MCG/ACT nasal inhaler; 2 sprays into the nostril(s) as directed by provider Daily.        Patient w/ terminal cancer. She was recently treated for thrush empyrically and this worked well. She has additional nystatin wash if needed. She has some otalgia and urinary frequency. Will empyrically treat with keflex and florastor. She will use nasacort for congestion and cough. She can continue dyphenhydramine at night. D/w spouse that haldol is very sedating. Better for agitation if ondanestron works for nausea can try that first given increasing fatigue. She will continue w/ hosparus care and f/u here prn.   Total visit 28 min w >50% spent counseling patient.

## 2021-08-09 RX ORDER — FOLIC ACID 1 MG/1
TABLET ORAL
Qty: 90 TABLET | Refills: 0 | OUTPATIENT
Start: 2021-08-09

## 2021-08-09 NOTE — PLAN OF CARE
Problem: Patient Care Overview (Adult)  Goal: Plan of Care Review  Outcome: Ongoing (interventions implemented as appropriate)   01/06/18 7034   Coping/Psychosocial Response Interventions   Plan Of Care Reviewed With patient   Patient Care Overview   Progress improving   Outcome Evaluation   Outcome Summary/Follow up Plan Pt upadlib. No c/o pain, n/v/d. No insulin needs per sliding scale. Room air. VSS. Droplet precautions maintained. Non productive cough and some wheezing still. SOA with exertion. Family at bedside. Will continue to monitor.      Goal: Adult Individualization and Mutuality  Outcome: Ongoing (interventions implemented as appropriate)    Goal: Discharge Needs Assessment  Outcome: Ongoing (interventions implemented as appropriate)      Problem: Respiratory Insufficiency (Adult)  Goal: Acid/Base Balance  Outcome: Ongoing (interventions implemented as appropriate)    Goal: Effective Ventilation  Outcome: Ongoing (interventions implemented as appropriate)         Pt left voice mail on schedulers line  I tried calling back 4 times this morning  Recording wireless caller is not available

## 2021-09-18 RX ORDER — CALCIUM CARBONATE/VITAMIN D3 600 MG-20
TABLET ORAL
Qty: 90 CAPSULE | Refills: 1 | OUTPATIENT
Start: 2021-09-18

## 2022-03-15 NOTE — PROGRESS NOTES
Chief Complaint   Patient presents with   • Cough   • Shortness of Breath       History of Present Illness   Janet Martinez is a 77 y.o. female presents for er follow up. Patient having cough for last 2 days. Was unable to stop coughing and became short of breath so she went to the ER. Known COPD. She was started on azithitromycin and medrol dose pack. She reports that today her breathing has improved. She is taking albuterol inhaler at home as well. Recently treated for nasal infection by ENT and is continuing on nasal rinse as well. She is taking tussionex prn for cough. Reports no benefit with tessalon perles.       The following portions of the patient's history were reviewed and updated as appropriate: allergies, current medications, past family history, past medical history, past social history, past surgical history and problem list.  Current Outpatient Prescriptions on File Prior to Visit   Medication Sig Dispense Refill   • Amlodipine-Valsartan-HCTZ -25 MG tablet Take 10 mg by mouth Daily. 90 tablet 2   • atorvastatin (LIPITOR) 20 MG tablet Take 1 tablet by mouth Daily. 90 tablet 2   • azelastine (ASTELIN) 0.1 % nasal spray 2 sprays into each nostril 2 (Two) Times a Day. Use in each nostril as directed 1 each 12   • azithromycin (ZITHROMAX) 250 MG tablet Take 2 tablets the first day, then 1 tablet daily for 4 days. 6 tablet 0   • budesonide (PULMICORT) 0.5 MG/2ML nebulizer solution Take 0.5 mg by nebulization Daily.     • buPROPion XL (WELLBUTRIN XL) 150 MG 24 hr tablet Take 1 tablet by mouth Every Morning. 90 tablet 1   • DULoxetine (CYMBALTA) 60 MG capsule TAKE 1 CAPSULE BY MOUTH DAILY. 90 capsule 0   • etanercept (ENBREL) 50 MG/ML solution prefilled syringe injection Inject 50 mg under the skin 1 (one) time per week. THURSDAYS     • folic acid (FOLVITE) 1 MG tablet TAKE 1 TABLET BY MOUTH DAILY. 90 tablet 1   • ipratropium-albuterol (COMBIVENT RESPIMAT)  MCG/ACT inhaler Inhale 1 puff 4 (Four)  Times a Day As Needed for Wheezing. 4 g 11   • MethylPREDNISolone (MEDROL, SAI,) 4 MG tablet Take as directed on package instructions. 21 tablet 0   • omeprazole (PriLOSEC) 40 MG capsule TK ONE C PO D  3   • potassium chloride (K-DUR,KLOR-CON) 20 MEQ CR tablet Take 1 tablet by mouth Daily. 30 tablet 4   • tobramycin (MARIA LUISA) Take 300 mg by nebulization 2 (Two) Times a Day.     • guaiFENesin (MUCINEX) 600 MG 12 hr tablet Take 1,200 mg by mouth 2 (Two) Times a Day.     • mupirocin (BACTROBAN) 2 % ointment Apply  topically 3 (Three) Times a Day.     • Unable to find 1 each 1 (One) Time. neilmed sinus     • [DISCONTINUED] benzonatate (TESSALON PERLES) 100 MG capsule 1 to 2 capsules 3 times a day 30 capsule 0   • [DISCONTINUED] HYDROcod Polst-CPM Polst ER (TUSSIONEX PENNKINETIC ER) 10-8 MG/5ML ER suspension Take 5 mL by mouth Every 12 (Twelve) Hours As Needed for Cough. 115 mL 0     No current facility-administered medications on file prior to visit.      Review of Systems   Constitutional: Positive for fatigue.   HENT: Negative.    Eyes: Negative.    Respiratory: Positive for cough, shortness of breath and wheezing.    Cardiovascular: Negative.    Gastrointestinal: Negative.    Genitourinary: Negative.    Musculoskeletal: Positive for arthralgias.   Skin: Negative.    Hematological: Negative.    Psychiatric/Behavioral: Negative.        Objective   Physical Exam   Constitutional: She is oriented to person, place, and time. She appears well-developed and well-nourished.   HENT:   Head: Normocephalic.   Right Ear: External ear normal.   Left Ear: External ear normal.   Eyes: EOM are normal. Pupils are equal, round, and reactive to light.   Neck: Normal range of motion. Neck supple.   Cardiovascular: Normal rate, regular rhythm, normal heart sounds and intact distal pulses.    Pulmonary/Chest:   Few rhonchi clear w/ cough   Abdominal: Soft. Bowel sounds are normal.   Neurological: She is alert and oriented to person, place,  and time.   Skin: Skin is warm and dry.   Psychiatric: She has a normal mood and affect. Her behavior is normal. Judgment and thought content normal.   Nursing note and vitals reviewed.       /60  Pulse 76  Wt 139 lb (63 kg)  SpO2 99%  BMI 23.86 kg/m2    Assessment/Plan   Diagnoses and all orders for this visit:    Chronic obstructive pulmonary disease with acute exacerbation    Other orders  -     guaifenesin-codeine (GUAIFENESIN AC) 100-10 MG/5ML liquid; Take 5 mL by mouth 3 (Three) Times a Day As Needed for Cough.    ER follow up for acute copd exacerbation. ER records reviewed. cxr and cbc wnl. She will take complete medrol and azithromycin dosing. Move evening tabs medrol to earlier in the day to avoid insomnia. Will give cheratussin cough syrup not to be taken with tussionex. She will continue as needed nebulizer. Follow up routinely.               [[id number]] : 0

## (undated) DEVICE — MSK PROC CURAPLEX O2 2/ADAPT 7FT

## (undated) DEVICE — ERBE NESSY®PLATE 170 SPLIT; 168CM²; CABLE 3M: Brand: ERBE

## (undated) DEVICE — SOL NACL 0.9PCT 1000ML

## (undated) DEVICE — TUBING, SUCTION, 1/4" X 10', STRAIGHT: Brand: MEDLINE

## (undated) DEVICE — VITAL SIGNS™ JACKSON-REES CIRCUITS: Brand: VITAL SIGNS™

## (undated) DEVICE — SUT MONOCRYL 4/0 PS2 27IN Y426H ETY426H

## (undated) DEVICE — MSK AIRWY LARYNG LMA UNIQUE STD PK SZ4

## (undated) DEVICE — BITEBLOCK OMNI BLOC

## (undated) DEVICE — DEV LK WIREGUIDE FUSN OLYMP SCP

## (undated) DEVICE — BNDR ABD PREMIUM/UNIV 10IN 27TO48IN

## (undated) DEVICE — SINGLE-USE BIOPSY FORCEPS: Brand: RADIAL JAW 4

## (undated) DEVICE — SENSR O2 OXIMAX FNGR A/ 18IN NONSTR

## (undated) DEVICE — LOU LAP CHOLE: Brand: MEDLINE INDUSTRIES, INC.

## (undated) DEVICE — CONMED DISPOSABLE BIPOLAR CABLE, 10' (3.05M): Brand: CONMED

## (undated) DEVICE — 3M™ STERI-STRIP™ REINFORCED ADHESIVE SKIN CLOSURES, R1547, 1/2 IN X 4 IN (12 MM X 100 MM), 6 STRIPS/ENVELOPE: Brand: 3M™ STERI-STRIP™

## (undated) DEVICE — ADAPT CLN BIOGUARD AIR/H2O DISP

## (undated) DEVICE — LN SMPL CO2 SHTRM SD STREAM W/M LUER

## (undated) DEVICE — SYS CLS CARTR/THOMSN SG 10/12 AND 15MM

## (undated) DEVICE — SPHINCTEROTOME: Brand: HYDRATOME RX 44

## (undated) DEVICE — SOL ANTISTICK CAUTRY ELECTROLUBE LF

## (undated) DEVICE — Device

## (undated) DEVICE — ENDOPATH XCEL BLADELESS TROCARS WITH STABILITY SLEEVES: Brand: ENDOPATH XCEL

## (undated) DEVICE — CANNULA,ADULT,SOFT-TOUCH,7'TUBE,UC: Brand: PENDING

## (undated) DEVICE — SINGLE USE BIOPSY VALVE MAJ-210: Brand: SINGLE USE BIOPSY VALVE (STERILE)

## (undated) DEVICE — TBG 02 CRUSH RESIST LF CLR 7FT

## (undated) DEVICE — BNDG ADHS CURAD FLX/FABRC 2X4IN STRL LF

## (undated) DEVICE — Device: Brand: DEFENDO AIR/WATER/SUCTION AND BIOPSY VALVE

## (undated) DEVICE — KT ORCA ORCAPOD DISP STRL

## (undated) DEVICE — ANTIBACTERIAL UNDYED BRAIDED (POLYGLACTIN 910), SYNTHETIC ABSORBABLE SUTURE: Brand: COATED VICRYL

## (undated) DEVICE — OBT BLADLES DAVINCI/S LNG 8MM

## (undated) DEVICE — ADAPT SWVL FIBROPTIC BRONCH

## (undated) DEVICE — SUT PROLN 2/0 CT2 30IN 8411H

## (undated) DEVICE — SINGLE USE SUCTION VALVE MAJ-209: Brand: SINGLE USE SUCTION VALVE (STERILE)

## (undated) DEVICE — ENDOSCOPIC ULTRASOUND FINE NEEDLE BIOPSY (FNB) DEVICE: Brand: ACQUIRE

## (undated) DEVICE — GLV SURG BIOGEL LTX PF 6 1/2

## (undated) DEVICE — THE TORRENT IRRIGATION SCOPE CONNECTOR IS USED WITH THE TORRENT IRRIGATION TUBING TO PROVIDE IRRIGATION FLUIDS SUCH AS STERILE WATER DURING GASTROINTESTINAL ENDOSCOPIC PROCEDURES WHEN USED IN CONJUNCTION WITH AN IRRIGATION PUMP (OR ELECTROSURGICAL UNIT).: Brand: TORRENT

## (undated) DEVICE — UNDYED BRAIDED (POLYGLACTIN 910), SYNTHETIC ABSORBABLE SUTURE: Brand: COATED VICRYL

## (undated) DEVICE — CANN NASL CO2 TRULINK W/O2 A/

## (undated) DEVICE — TIP COVER ACCESSORY

## (undated) DEVICE — 3M™ STERI-STRIP™ COMPOUND BENZOIN TINCTURE 40 BAGS/CARTON 4 CARTONS/CASE C1544: Brand: 3M™ STERI-STRIP™

## (undated) DEVICE — GLV SURG BIOGEL LTX PF 8 1/2

## (undated) DEVICE — LN SMPL O2 NASL/ORL SMART/CAPNOLINE PLS A/

## (undated) DEVICE — APPL CHLORAPREP W/TINT 26ML ORNG

## (undated) DEVICE — VISUALIZATION SYSTEM: Brand: CLEARIFY

## (undated) DEVICE — MARKR SKIN W/RULR AND LBL

## (undated) DEVICE — 1842 FOAM BLOCK NEEDLE COUNTER: Brand: DEVON

## (undated) DEVICE — DRAPE,REIN 53X77,STERILE: Brand: MEDLINE